# Patient Record
Sex: MALE | Race: WHITE | Employment: FULL TIME | URBAN - METROPOLITAN AREA
[De-identification: names, ages, dates, MRNs, and addresses within clinical notes are randomized per-mention and may not be internally consistent; named-entity substitution may affect disease eponyms.]

---

## 2018-09-18 ENCOUNTER — TRANSFERRED RECORDS (OUTPATIENT)
Dept: HEALTH INFORMATION MANAGEMENT | Facility: CLINIC | Age: 48
End: 2018-09-18

## 2020-10-27 ENCOUNTER — OFFICE VISIT (OUTPATIENT)
Dept: FAMILY MEDICINE | Facility: CLINIC | Age: 50
End: 2020-10-27
Payer: COMMERCIAL

## 2020-10-27 VITALS
TEMPERATURE: 98.4 F | HEART RATE: 111 BPM | DIASTOLIC BLOOD PRESSURE: 98 MMHG | HEIGHT: 74 IN | SYSTOLIC BLOOD PRESSURE: 142 MMHG | WEIGHT: 315 LBS | RESPIRATION RATE: 22 BRPM | BODY MASS INDEX: 40.43 KG/M2 | OXYGEN SATURATION: 96 %

## 2020-10-27 DIAGNOSIS — I10 HYPERTENSION GOAL BP (BLOOD PRESSURE) < 140/90: ICD-10-CM

## 2020-10-27 DIAGNOSIS — E78.5 HYPERLIPIDEMIA LDL GOAL <100: ICD-10-CM

## 2020-10-27 DIAGNOSIS — E11.65 TYPE 2 DIABETES MELLITUS WITH HYPERGLYCEMIA, WITHOUT LONG-TERM CURRENT USE OF INSULIN (H): Primary | ICD-10-CM

## 2020-10-27 DIAGNOSIS — Z23 NEED FOR PROPHYLACTIC VACCINATION AND INOCULATION AGAINST INFLUENZA: ICD-10-CM

## 2020-10-27 DIAGNOSIS — E66.01 MORBID OBESITY (H): ICD-10-CM

## 2020-10-27 LAB — HBA1C MFR BLD: 6.7 % (ref 0–5.6)

## 2020-10-27 PROCEDURE — 82043 UR ALBUMIN QUANTITATIVE: CPT | Performed by: FAMILY MEDICINE

## 2020-10-27 PROCEDURE — 36415 COLL VENOUS BLD VENIPUNCTURE: CPT | Performed by: FAMILY MEDICINE

## 2020-10-27 PROCEDURE — 83036 HEMOGLOBIN GLYCOSYLATED A1C: CPT | Performed by: FAMILY MEDICINE

## 2020-10-27 PROCEDURE — 90682 RIV4 VACC RECOMBINANT DNA IM: CPT | Performed by: FAMILY MEDICINE

## 2020-10-27 PROCEDURE — 99203 OFFICE O/P NEW LOW 30 MIN: CPT | Mod: 25 | Performed by: FAMILY MEDICINE

## 2020-10-27 PROCEDURE — 90471 IMMUNIZATION ADMIN: CPT | Performed by: FAMILY MEDICINE

## 2020-10-27 PROCEDURE — 99207 PR FOOT EXAM NO CHARGE: CPT | Performed by: FAMILY MEDICINE

## 2020-10-27 PROCEDURE — 80053 COMPREHEN METABOLIC PANEL: CPT | Performed by: FAMILY MEDICINE

## 2020-10-27 RX ORDER — LISINOPRIL 10 MG/1
10 TABLET ORAL DAILY
Qty: 30 TABLET | Refills: 1 | Status: SHIPPED | OUTPATIENT
Start: 2020-10-27 | End: 2020-12-10

## 2020-10-27 RX ORDER — LISINOPRIL 5 MG/1
5 TABLET ORAL
COMMUNITY
Start: 2019-11-04 | End: 2020-10-30 | Stop reason: DRUGHIGH

## 2020-10-27 ASSESSMENT — MIFFLIN-ST. JEOR: SCORE: 2480.12

## 2020-10-27 NOTE — PROGRESS NOTES
"Subjective     Heath Carpenter is a 50 year old male who presents to clinic today for the following health issues:    HPI         New Patient/Transfer of Care  Transferring care from [facility] due to change of insurance  Has been diagnosed with diabetes but was not prescribed any treatment.   Family History: Maternal grandmother and uncles dx with diabetes, as well as sister.     Hypertension Follow-up  Currently on Lisinopril 5 mg/day- tolerating well. No side effects reported.    Blood pressure     Do you check your blood pressure regularly outside of the clinic? Yes     Are you following a low salt diet? Yes    Are your blood pressures ever more than 140 on the top number (systolic) OR more   than 90 on the bottom number (diastolic), for example 140/90? Yes 146/94, will also have low 120/80s      DIABETES - Patient has a longstanding history of DiabetesType Type II . Patient is not being treated. Control has been unable to assess. Complicating factors include but are not limited to: hypertension, hyperlipidemia and morbid obesity .     Reporting some left sided hip pain he is seeing a chiropractor for, advised him to follow up for another appointment to further evaluate and discuss.         Review of Systems   Constitutional, HEENT, cardiovascular, pulmonary, gi and gu systems are negative, except as otherwise noted.      Objective    BP (!) 142/98   Pulse 111   Temp 98.4  F (36.9  C) (Tympanic)   Resp 22   Ht 1.88 m (6' 2\")   Wt (!) 156 kg (344 lb)   SpO2 96%   BMI 44.17 kg/m    Body mass index is 44.17 kg/m .  Physical Exam   GENERAL: healthy, alert and no distress  NECK: no adenopathy, no asymmetry, masses, or scars and thyroid normal to palpation  RESP: lungs clear to auscultation - no rales, rhonchi or wheezes  CV: regular rate and rhythm, normal S1 S2, no S3 or S4, no murmur, click or rub, no peripheral edema and peripheral pulses strong  Diabetic foot exam: normal DP and PT pulses, no trophic changes or " "ulcerative lesions, normal sensory exam and normal monofilament exam    DATA  Reviewed and discussed with patient prior to discharge.  Component      Latest Ref Rng & Units 10/27/2020   Hemoglobin A1C      0 - 5.6 % 6.7 (H)           Assessment & Plan     Heath was seen today for establish care and imm/inj.    Diagnoses and all orders for this visit:    Type 2 diabetes mellitus with hyperglycemia, without long-term current use of insulin (H)  -     A1C FUTURE 6mo  -     FOOT EXAM  -     Albumin Random Urine Quantitative with Creat Ratio        -     Comprehensive metabolic panel  -     EYE ADULT REFERRAL; Future  -     Start: aspirin (ASA) 81 MG EC tablet; Take 1 tablet (81 mg) by mouth daily        -     Start: metFORMIN (GLUCOPHAGE) 500 MG tablet; Take 1 tablet (500 mg) by mouth daily (with dinner)    Hypertension goal BP (blood pressure) < 140/90, uncontrolled  -    Increase dose:  lisinopril (ZESTRIL) 10 MG tablet; Take 1 tablet (10 mg) by mouth daily    Hyperlipidemia LDL goal <100  -     Lipid Profile; Future    Morbid obesity (H)  Weight management plan: Discussed healthy diet and exercise guidelines      Need for prophylactic vaccination and inoculation against influenza  -     INFLUENZA QUAD, RECOMBINANT, P-FREE (RIV4) (FLUBLOCK) [72209]         BMI:   Estimated body mass index is 44.17 kg/m  as calculated from the following:    Height as of this encounter: 1.88 m (6' 2\").    Weight as of this encounter: 156 kg (344 lb).   Weight management plan: Discussed healthy diet and exercise guidelines         Return in about 1 month (around 11/27/2020) for Physical Exam with fasting labs.  Diabetes follow up in 3 months.    Fatemeh Stevens MD  [facility]    "

## 2020-10-28 LAB
ALBUMIN SERPL-MCNC: 3.9 G/DL (ref 3.4–5)
ALP SERPL-CCNC: 76 U/L (ref 40–150)
ALT SERPL W P-5'-P-CCNC: 109 U/L (ref 0–70)
ANION GAP SERPL CALCULATED.3IONS-SCNC: 8 MMOL/L (ref 3–14)
AST SERPL W P-5'-P-CCNC: 89 U/L (ref 0–45)
BILIRUB SERPL-MCNC: 0.4 MG/DL (ref 0.2–1.3)
BUN SERPL-MCNC: 22 MG/DL (ref 7–30)
CALCIUM SERPL-MCNC: 9.3 MG/DL (ref 8.5–10.1)
CHLORIDE SERPL-SCNC: 103 MMOL/L (ref 94–109)
CO2 SERPL-SCNC: 25 MMOL/L (ref 20–32)
CREAT SERPL-MCNC: 1.08 MG/DL (ref 0.66–1.25)
CREAT UR-MCNC: 231 MG/DL
GFR SERPL CREATININE-BSD FRML MDRD: 78 ML/MIN/{1.73_M2}
GLUCOSE SERPL-MCNC: 119 MG/DL (ref 70–99)
MICROALBUMIN UR-MCNC: 69 MG/L
MICROALBUMIN/CREAT UR: 29.7 MG/G CR (ref 0–17)
POTASSIUM SERPL-SCNC: 4.1 MMOL/L (ref 3.4–5.3)
PROT SERPL-MCNC: 8.8 G/DL (ref 6.8–8.8)
SODIUM SERPL-SCNC: 136 MMOL/L (ref 133–144)

## 2020-11-01 DIAGNOSIS — R74.8 ELEVATED LIVER ENZYMES: Primary | ICD-10-CM

## 2020-12-08 DIAGNOSIS — I10 HYPERTENSION GOAL BP (BLOOD PRESSURE) < 140/90: ICD-10-CM

## 2020-12-10 NOTE — TELEPHONE ENCOUNTER
"Routing refill request to provider for review/approval because:  Labs out of range:  bp  Pt due for physical  Medication pended for approval, 30 day supply with reminder.               Requested Prescriptions   Pending Prescriptions Disp Refills     lisinopril (ZESTRIL) 10 MG tablet [Pharmacy Med Name: LISINOPRIL 10 MG TABLET] 30 tablet 0     Sig: TAKE 1 TABLET BY MOUTH EVERY DAY       ACE Inhibitors (Including Combos) Protocol Failed - 12/8/2020  4:04 PM        Failed - Blood pressure under 140/90 in past 12 months     BP Readings from Last 3 Encounters:   10/27/20 (!) 142/98   10/19/16 98/61   05/23/16 132/86                 Passed - Recent (12 mo) or future (30 days) visit within the authorizing provider's specialty     Patient has had an office visit with the authorizing provider or a provider within the authorizing providers department within the previous 12 mos or has a future within next 30 days. See \"Patient Info\" tab in inbasket, or \"Choose Columns\" in Meds & Orders section of the refill encounter.              Passed - Medication is active on med list        Passed - Patient is age 18 or older        Passed - Normal serum creatinine on file in past 12 months     Recent Labs   Lab Test 10/27/20  1740   CR 1.08       Ok to refill medication if creatinine is low          Passed - Normal serum potassium on file in past 12 months     Recent Labs   Lab Test 10/27/20  1740   POTASSIUM 4.1                  "

## 2020-12-13 RX ORDER — LISINOPRIL 10 MG/1
TABLET ORAL
Qty: 30 TABLET | Refills: 0 | Status: SHIPPED | OUTPATIENT
Start: 2020-12-13 | End: 2021-01-21

## 2020-12-14 ENCOUNTER — TRANSFERRED RECORDS (OUTPATIENT)
Dept: HEALTH INFORMATION MANAGEMENT | Facility: CLINIC | Age: 50
End: 2020-12-14

## 2020-12-14 LAB — RETINOPATHY: NEGATIVE

## 2021-01-15 ENCOUNTER — HEALTH MAINTENANCE LETTER (OUTPATIENT)
Age: 51
End: 2021-01-15

## 2021-01-17 DIAGNOSIS — I10 HYPERTENSION GOAL BP (BLOOD PRESSURE) < 140/90: ICD-10-CM

## 2021-01-19 NOTE — TELEPHONE ENCOUNTER
"Requested Prescriptions   Pending Prescriptions Disp Refills    lisinopril (ZESTRIL) 10 MG tablet [Pharmacy Med Name: LISINOPRIL 10 MG TABLET] 30 tablet 0     Sig: TAKE 1 TABLET BY MOUTH EVERY DAY       ACE Inhibitors (Including Combos) Protocol Failed - 1/17/2021 11:24 AM        Failed - Blood pressure under 140/90 in past 12 months     BP Readings from Last 3 Encounters:   10/27/20 (!) 142/98   10/19/16 98/61   05/23/16 132/86                 Passed - Recent (12 mo) or future (30 days) visit within the authorizing provider's specialty     Patient has had an office visit with the authorizing provider or a provider within the authorizing providers department within the previous 12 mos or has a future within next 30 days. See \"Patient Info\" tab in inbasket, or \"Choose Columns\" in Meds & Orders section of the refill encounter.              Passed - Medication is active on med list        Passed - Patient is age 18 or older        Passed - Normal serum creatinine on file in past 12 months     Recent Labs   Lab Test 10/27/20  1740   CR 1.08       Ok to refill medication if creatinine is low          Passed - Normal serum potassium on file in past 12 months     Recent Labs   Lab Test 10/27/20  1740   POTASSIUM 4.1                Routing refill request to provider for review/approval because:  Failed protocol.  Ingrid JOEN-RN  [facility]    "

## 2021-01-21 RX ORDER — LISINOPRIL 10 MG/1
TABLET ORAL
Qty: 30 TABLET | Refills: 0 | Status: SHIPPED | OUTPATIENT
Start: 2021-01-21 | End: 2021-01-29

## 2021-01-29 ENCOUNTER — ANCILLARY PROCEDURE (OUTPATIENT)
Dept: GENERAL RADIOLOGY | Facility: CLINIC | Age: 51
End: 2021-01-29
Attending: FAMILY MEDICINE
Payer: COMMERCIAL

## 2021-01-29 ENCOUNTER — OFFICE VISIT (OUTPATIENT)
Dept: FAMILY MEDICINE | Facility: CLINIC | Age: 51
End: 2021-01-29
Payer: COMMERCIAL

## 2021-01-29 VITALS
BODY MASS INDEX: 43.76 KG/M2 | HEART RATE: 80 BPM | TEMPERATURE: 98.2 F | OXYGEN SATURATION: 97 % | DIASTOLIC BLOOD PRESSURE: 86 MMHG | WEIGHT: 315 LBS | RESPIRATION RATE: 18 BRPM | SYSTOLIC BLOOD PRESSURE: 134 MMHG

## 2021-01-29 DIAGNOSIS — G47.33 OSA ON CPAP: ICD-10-CM

## 2021-01-29 DIAGNOSIS — E78.5 HYPERLIPIDEMIA LDL GOAL <100: ICD-10-CM

## 2021-01-29 DIAGNOSIS — M25.562 LEFT KNEE PAIN, UNSPECIFIED CHRONICITY: ICD-10-CM

## 2021-01-29 DIAGNOSIS — I10 HYPERTENSION GOAL BP (BLOOD PRESSURE) < 140/90: ICD-10-CM

## 2021-01-29 DIAGNOSIS — Z85.828 HISTORY OF SKIN CANCER: ICD-10-CM

## 2021-01-29 DIAGNOSIS — R74.8 ELEVATED LIVER ENZYMES: ICD-10-CM

## 2021-01-29 DIAGNOSIS — E11.65 TYPE 2 DIABETES MELLITUS WITH HYPERGLYCEMIA, WITHOUT LONG-TERM CURRENT USE OF INSULIN (H): Primary | ICD-10-CM

## 2021-01-29 LAB
ALT SERPL W P-5'-P-CCNC: 96 U/L (ref 0–70)
AST SERPL W P-5'-P-CCNC: 71 U/L (ref 0–45)
CHOLEST SERPL-MCNC: 202 MG/DL
HBA1C MFR BLD: 6.6 % (ref 0–5.6)
HDLC SERPL-MCNC: 43 MG/DL
LDLC SERPL CALC-MCNC: 128 MG/DL
NONHDLC SERPL-MCNC: 159 MG/DL
TRIGL SERPL-MCNC: 156 MG/DL
VIT B12 SERPL-MCNC: 352 PG/ML (ref 193–986)

## 2021-01-29 PROCEDURE — 82607 VITAMIN B-12: CPT | Performed by: FAMILY MEDICINE

## 2021-01-29 PROCEDURE — 84460 ALANINE AMINO (ALT) (SGPT): CPT | Performed by: FAMILY MEDICINE

## 2021-01-29 PROCEDURE — 80061 LIPID PANEL: CPT | Performed by: FAMILY MEDICINE

## 2021-01-29 PROCEDURE — 73562 X-RAY EXAM OF KNEE 3: CPT | Mod: LT | Performed by: RADIOLOGY

## 2021-01-29 PROCEDURE — 99207 PR FOOT EXAM NO CHARGE: CPT | Performed by: FAMILY MEDICINE

## 2021-01-29 PROCEDURE — 83036 HEMOGLOBIN GLYCOSYLATED A1C: CPT | Performed by: FAMILY MEDICINE

## 2021-01-29 PROCEDURE — 36415 COLL VENOUS BLD VENIPUNCTURE: CPT | Performed by: FAMILY MEDICINE

## 2021-01-29 PROCEDURE — 99214 OFFICE O/P EST MOD 30 MIN: CPT | Performed by: FAMILY MEDICINE

## 2021-01-29 PROCEDURE — 84450 TRANSFERASE (AST) (SGOT): CPT | Performed by: FAMILY MEDICINE

## 2021-01-29 RX ORDER — LISINOPRIL 20 MG/1
20 TABLET ORAL DAILY
Qty: 90 TABLET | Refills: 3 | Status: SHIPPED | OUTPATIENT
Start: 2021-01-29 | End: 2022-01-31

## 2021-01-29 NOTE — PROGRESS NOTES
Sabi Joe is a 50 year old who presents to clinic today for the following health issues     HPI     Diabetes Follow-up  Currently on Metformin 500 mg/day-tolerating well. Admits that he forgets to take it about 3 times a week.     How often are you checking your blood sugar? Not at all    What concerns do you have today about your diabetes? None     Do you have any of these symptoms? (Select all that apply)  Numbness in feet      BP Readings from Last 2 Encounters:   01/29/21 134/86   10/27/20 (!) 142/98     Hemoglobin A1C (%)   Date Value   01/29/2021 6.6 (H)   10/27/2020 6.7 (H)         How many servings of fruits and vegetables do you eat daily?  2-3    On average, how many sweetened beverages do you drink each day (Examples: soda, juice, sweet tea, etc.  Do NOT count diet or artificially sweetened beverages)?   0    How many days per week do you exercise enough to make your heart beat faster? Has a farm    How many minutes a day do you exercise enough to make your heart beat faster? na    How many days per week do you miss taking your medication?  2-3x/ week - forgets to take metformin at night .  Wanting to switch to taking in the morning     Hyperlipidemia Follow-Up  Currently not on any statin therapy    Are you regularly taking any medication or supplement to lower your cholesterol?   No    Are you having muscle aches or other side effects that you think could be caused by your cholesterol lowering medication?  N/A    Hypertension Follow-up  Currently on Lisinopril 10 mg/day- tolerating well.   BP remains borderline high.     Do you check your blood pressure regularly outside of the clinic? Yes     Are you following a low salt diet? Yes    Are your blood pressures ever more than 140 on the top number (systolic) OR more   than 90 on the bottom number (diastolic), for example 140/90? Yes      Musculoskeletal problem/pain  Onset/Duration: end of December   Description  Location: left knee, left  him, left lower back   Joint Swelling: no  Redness: no  Pain: YES- ache now, at times sharp   Warmth: no  Intensity:  moderate  Progression of Symptoms:  same  Accompanying signs and symptoms:   Fevers: no  Numbness/tingling/weakness: YES- weakness   History  Trauma to the area: YES- fall, missed three steps while carrying laundry basket down stairs  (heard snap in left knee with fall)   Recent illness:  no  Previous similar problem: no  Previous evaluation:  no  Precipitating or alleviating factors:  Aggravating factors include: getting up from sitting position, straightening of left  leg   Therapies tried and outcome: stretching, yoga for legs and hips- helpful but it's not going away     Reports a history of JONA- diagnosed about 7 years ago. Currently on nightly CPAP  Due for renewal/prescription for CPAP supplies   States that he has not seen a Sleep Medicine provider in 7 years.       At the last office visit- noted to have elevated liver enzymes. Due for repeat labs.   Component      Latest Ref Rng & Units 10/27/2020   ALT      0 - 70 U/L 109 (H)   AST      0 - 45 U/L 89 (H)       Requesting a referral to Dermatology. Reports a history of a non--melanoma skin cancer and needs routine skin care checks by the Dermatologist.       Review of Systems   Constitutional, HEENT, cardiovascular, pulmonary, gi and gu systems are negative, except as otherwise noted.      Objective    /86   Pulse 80   Temp 98.2  F (36.8  C) (Tympanic)   Resp 18   Wt (!) 154.6 kg (340 lb 12.8 oz)   SpO2 97%   BMI 43.76 kg/m    Body mass index is 43.76 kg/m .  Physical Exam   GENERAL: healthy, alert and no distress  RESP: lungs clear to auscultation - no rales, rhonchi or wheezes  CV: regular rate and rhythm, normal S1 S2, no S3 or S4, no murmur, click or rub, no peripheral edema and peripheral pulses strong  Diabetic foot exam: normal DP and PT pulses, no trophic changes or ulcerative lesions, normal sensory exam, normal  monofilament exam and onychomycosis  Left Knee: Normal appearing. No gross abnormalities. Left lateral joint tenderness. FROM. Negative anterior and posterior drawer's sign. Positive crepitus.     DATA  Reviewed and discussed with patient prior to discharge.  Results for orders placed or performed in visit on 01/29/21   XR Knee Left 3 Views     Status: None (Preliminary result)    Narrative    LEFT KNEE THREE VIEWS   1/29/2021 11:51 AM     HISTORY:  Left knee pain, unspecified chronicity.      Impression    IMPRESSION: There is at least one calcified intra-articular body in  the anterior central aspect of the joint and there is probably one or  two additional calcified intra-articular bodies. No effusion. Normal  otherwise.   Hemoglobin A1c     Status: Abnormal   Result Value Ref Range    Hemoglobin A1C 6.6 (H) 0 - 5.6 %       Assessment & Plan     Type 2 diabetes mellitus with hyperglycemia, without long-term current use of insulin (H); controlled  - Hemoglobin A1c  - FOOT EXAM  - Vitamin B12  - Refill: metFORMIN (GLUCOPHAGE) 500 MG tablet  Dispense: 90 tablet; Refill: 3    Hypertension goal BP (blood pressure) < 140/90. Initial BP elevated  - Increase dose: lisinopril (ZESTRIL) 20 MG tablet  Dispense: 90 tablet; Refill: 3  - Continue home monitoring BP check.     Hyperlipidemia LDL goal <100  - Lipid panel reflex to direct LDL Fasting  - Consider starting moderate or high intensity statin after labs.    Elevated liver enzymes, suspect fatty liver  - ALT  - AST    Left knee pain, unspecified chronicity  - XR Knee Left 3 Views  - Consider Knee MRI if X ray is non-informative    JONA on CPAP  - SLEEP EVALUATION & MANAGEMENT REFERRAL - ADULT -[facility] [place]  [phone] (Age 15 and up)    History of skin cancer (non  - DERMATOLOGY ADULT REFERRAL        Return in about 3 months (around 4/29/2021) for Diabetes Follow Up with a HgbA1C prior to visit.    Fatemeh Stevens MD  [facility]  [facility]

## 2021-02-03 DIAGNOSIS — E78.5 HYPERLIPIDEMIA LDL GOAL <100: Primary | ICD-10-CM

## 2021-02-03 RX ORDER — ATORVASTATIN CALCIUM 20 MG/1
20 TABLET, FILM COATED ORAL DAILY
Qty: 90 TABLET | Refills: 3 | Status: SHIPPED | OUTPATIENT
Start: 2021-02-03 | End: 2022-01-31

## 2021-02-11 NOTE — PROGRESS NOTES
Heath is a 50 year old who is being evaluated via a billable video visit.      How would you like to obtain your AVS? MyChart  If the video visit is dropped, the invitation should be resent by: Text to cell phone: [phone]  Will anyone else be joining your video visit? Krystyna Adam MA    Video Start Time: 3:30 PM  Video-Visit Details    Type of service:  Video Visit    Video End Time:3:50 PM    Originating Location (pt. Location): Home    Distant Location (provider location):  [facility]     Platform used for Video Visit: LXSN  Sleep Consultation:    Date on this visit: 2/15/2021    Heath Carpenter is sent by Fatemeh Stevens for a sleep consultation regarding sleep apnea.    Primary Physician: Fatemeh Stevens MICHAEL Pricehl presents to clinic for management of sleep apnea.     His medical history is significant for HTN & DM-2.     Patient was diagnosed with sleep apnea in 2015. PSG at [facility] on 3/2/2015 at weight of 334 lbs showed an AHI of 33 per hour (based on 4% desaturation criteria) and RDI of 42 per hour. CPAP at 8 cm H2O was effective.     Patient prescribed CPAP and reports that he had a good response to treatment. He has not used therapy for last 9 months, mainly because there was a disruption in supplies.     He uses nasal mask.     Auto-PAP download:  Download report is not available.,     Heath goes to sleep at 9:00 PM during the week. He wakes up at 4:00 - 5:30 AM without an alarm. He falls asleep in 15 minutes.  Heath denies difficulty falling asleep.  He wakes up 0-1 times a night for 5 minutes before falling back to sleep.  Heath wakes up to go to the bathroom and uncertain reasons.  On weekends, Heath goes to sleep at 9:00 PM.  He wakes up at 5:30 AM without an alarm. He falls asleep in 10 minutes.  Patient gets an average of 7 hours of sleep per night.     Heath is a .      He denies no morning headaches and restless  legs. Heath denies any bruxism, sleep walking, sleep talking, dream enactment, sleep paralysis, cataplexy and hypnogogic/hypnopompic hallucinations.    Patient's [place] Sleepiness score 14/24 consistent with  daytime sleepiness.      Heath naps 0 times per week . He takes no inadvertant naps.  He denies closing eyes, dozing and falling asleep while driving.  Patient was counseled on the importance of driving while alert, to pull over if drowsy, or nap before getting into the vehicle if sleepy.      He uses 1 cups/day of coffee. Last caffeine intake is usually before 10 am.    Allergies:    Allergies   Allergen Reactions     Molds & Smuts Other (See Comments)     Puffy eyes     No Clinical Screening - See Comments Other (See Comments)     Cigarette smoke       Medications:    Current Outpatient Medications   Medication Sig Dispense Refill     aspirin (ASA) 81 MG EC tablet Take 1 tablet (81 mg) by mouth daily       atorvastatin (LIPITOR) 20 MG tablet Take 1 tablet (20 mg) by mouth daily 90 tablet 3     lisinopril (ZESTRIL) 20 MG tablet Take 1 tablet (20 mg) by mouth daily 90 tablet 3     metFORMIN (GLUCOPHAGE) 500 MG tablet Take 1 tablet (500 mg) by mouth daily (with breakfast) 90 tablet 3     MULTIPLE VITAMIN PO Take 1 tablet by mouth daily       Omeprazole (PRILOSEC PO) Take 40 mg by mouth every morning         Problem List:  Patient Active Problem List    Diagnosis Date Noted     Morbid obesity (H) 10/27/2020     Priority: Medium     Type 2 diabetes mellitus with hyperglycemia, without long-term current use of insulin (H) 10/27/2020     Priority: Medium     Hypertension goal BP (blood pressure) < 140/90 10/27/2020     Priority: Medium        Past Medical/Surgical History:  Past Medical History:   Diagnosis Date     Gastro-oesophageal reflux disease      Hypertension goal BP (blood pressure) < 140/90      Past Surgical History:   Procedure Laterality Date     VASECTOMY         Social History:  Social History      Socioeconomic History     Marital status:      Spouse name: Not on file     Number of children: Not on file     Years of education: Not on file     Highest education level: Not on file   Occupational History     Not on file   Social Needs     Financial resource strain: Not on file     Food insecurity     Worry: Not on file     Inability: Not on file     Transportation needs     Medical: Not on file     Non-medical: Not on file   Tobacco Use     Smoking status: Never Smoker     Smokeless tobacco: Never Used   Substance and Sexual Activity     Alcohol use: Yes     Comment: rare     Drug use: No     Sexual activity: Yes     Partners: Female   Lifestyle     Physical activity     Days per week: Not on file     Minutes per session: Not on file     Stress: Not on file   Relationships     Social connections     Talks on phone: Not on file     Gets together: Not on file     Attends Faith service: Not on file     Active member of club or organization: Not on file     Attends meetings of clubs or organizations: Not on file     Relationship status: Not on file     Intimate partner violence     Fear of current or ex partner: Not on file     Emotionally abused: Not on file     Physically abused: Not on file     Forced sexual activity: Not on file   Other Topics Concern     Not on file   Social History Narrative     Not on file       Family History:  Family History   Problem Relation Age of Onset     Alcoholism Father      Lung Cancer Maternal Grandfather        Review of Systems:  A complete review of systems reviewed by me is negative with the exeption of what has been mentioned in the history of present illness.  CONSTITUTIONAL: NEGATIVE for weight gain/loss, fever, chills, sweats or night sweats, drug allergies.  EYES: NEGATIVE for changes in vision, blind spots, double vision.  ENT: NEGATIVE for ear pain, sore throat, sinus pain, post-nasal drip, runny nose, bloody nose  CARDIAC: NEGATIVE for fast heartbeats or  fluttering in chest, chest pain or pressure, breathlessness when lying flat, swollen legs or swollen feet.  NEUROLOGIC: NEGATIVE headaches, weakness or numbness in the arms or legs.  DERMATOLOGIC: NEGATIVE for rashes, new moles or change in mole(s)  PULMONARY: NEGATIVE SOB at rest, SOB with activity, dry cough, productive cough, coughing up blood, wheezing or whistling when breathing.    GASTROINTESTINAL: NEGATIVE for nausea or vomitting, loose or watery stools, fat or grease in stools, constipation, abdominal pain, bowel movements black in color or blood noted.  GENITOURINARY: NEGATIVE for pain during urination, blood in urine, urinating more frequently than usual, irregular menstrual periods.  MUSCULOSKELETAL: NEGATIVE for muscle pain, bone or joint pain, swollen joints.  ENDOCRINE: NEGATIVE for increased thirst or urination, diabetes.  LYMPHATIC: NEGATIVE for swollen lymph nodes, lumps or bumps in the breasts or nipple discharge.    Physical Examination:  Vitals: There were no vitals taken for this visit.  BMI= There is no height or weight on file to calculate BMI.         [place] Total Score 2/11/2021   Total score - [place] 14       [place] Total Score: 6 (02/11/21 2729)    GENERAL APPEARANCE: healthy, alert, active and no distress  RESP: Negative for cough or dyspnea  NEURO: mentation intact and speech normal  PSYCH: mentation appears normal and affect normal/bright    Impression/Plan:    1. Severe Obstructive sleep apnea    Patient has severe obstructive sleep apnea with an apnea hypopnea index of 33 per hour in 2015. He was prescribed CPAP therapy and reports to have responded well and used therapy regularly until 9 months ago. He has uncontrolled hypertension and wants to return to regular PAP therapy. I will have him continue current PAP settings and we can follow up in a few months to review CPAP download.     Plan:     1. Restart CPAP therapy. Patient will contact DME to obtain supplies      Obstructive  sleep apnea reviewed.  Complications of untreated sleep apnea were reviewed.    I spent a total of 40 minutes for this appointment today which include time spent before, during and after the visit for patient care, counseling and coordination of care.    Dr. Calixto Richardson     CC: Fatemeh Stevens

## 2021-02-12 DIAGNOSIS — I10 HYPERTENSION GOAL BP (BLOOD PRESSURE) < 140/90: ICD-10-CM

## 2021-02-15 ENCOUNTER — HOSPITAL ENCOUNTER (OUTPATIENT)
Dept: MRI IMAGING | Facility: CLINIC | Age: 51
Discharge: HOME | End: 2021-02-15
Attending: FAMILY MEDICINE | Admitting: FAMILY MEDICINE
Payer: COMMERCIAL

## 2021-02-15 ENCOUNTER — VIRTUAL VISIT (OUTPATIENT)
Dept: SLEEP MEDICINE | Facility: CLINIC | Age: 51
End: 2021-02-15
Attending: FAMILY MEDICINE
Payer: COMMERCIAL

## 2021-02-15 DIAGNOSIS — M25.562 LEFT KNEE PAIN, UNSPECIFIED CHRONICITY: ICD-10-CM

## 2021-02-15 DIAGNOSIS — G47.33 OBSTRUCTIVE SLEEP APNEA: Primary | ICD-10-CM

## 2021-02-15 DIAGNOSIS — M24.00 INTRA-ARTICULAR LOOSE BODY: ICD-10-CM

## 2021-02-15 DIAGNOSIS — Z91.81 HISTORY OF FALL: ICD-10-CM

## 2021-02-15 DIAGNOSIS — M25.562 LEFT KNEE PAIN, UNSPECIFIED CHRONICITY: Primary | ICD-10-CM

## 2021-02-15 PROCEDURE — 73721 MRI JNT OF LWR EXTRE W/O DYE: CPT | Mod: LT

## 2021-02-15 PROCEDURE — 73721 MRI JNT OF LWR EXTRE W/O DYE: CPT | Mod: 26 | Performed by: RADIOLOGY

## 2021-02-15 PROCEDURE — 99203 OFFICE O/P NEW LOW 30 MIN: CPT | Mod: 95 | Performed by: INTERNAL MEDICINE

## 2021-02-15 RX ORDER — LISINOPRIL 20 MG/1
20 TABLET ORAL DAILY
Qty: 90 TABLET | Refills: 3 | OUTPATIENT
Start: 2021-02-15

## 2021-02-16 ENCOUNTER — CARE COORDINATION (OUTPATIENT)
Dept: SLEEP MEDICINE | Facility: CLINIC | Age: 51
End: 2021-02-16

## 2021-02-16 NOTE — PROGRESS NOTES
Scan of download cpap, routed to Dr. Richardson for review.  Heath had virtual appointment 2/15/2021.  Mailed SD Card back to him.

## 2021-02-16 NOTE — PROGRESS NOTES
Spoke with patient, he states he is using [facility] for cpap supplies. Patient will call back close to may to schedule his 3 month follow up.    Rosangela Adam MA

## 2021-02-17 DIAGNOSIS — I10 HYPERTENSION GOAL BP (BLOOD PRESSURE) < 140/90: ICD-10-CM

## 2021-02-18 ENCOUNTER — DOCUMENTATION ONLY (OUTPATIENT)
Dept: SLEEP MEDICINE | Facility: CLINIC | Age: 51
End: 2021-02-18

## 2021-02-18 RX ORDER — LISINOPRIL 20 MG/1
20 TABLET ORAL DAILY
Qty: 90 TABLET | Refills: 3 | OUTPATIENT
Start: 2021-02-18

## 2021-02-18 NOTE — PROGRESS NOTES
CPAP download from CPAP reviewed.       [employer] (04/18/20- 07/16/20)    Auto-PAP 8-15 cmH2O download:  5/90 total days of use. 4/90 days with >4 hours use.  Average use 5 hours 6 minutes per day. Median Leak 3.8 L/min. 95%ile Leak 13.9 L/min. CPAP 95% pressure 10.8cm. AHI 1.0      Pressure settings are adequate. Has poor compliance with CPAP last year but patient is planning to restart regular therapy.

## 2021-02-19 ENCOUNTER — CARE COORDINATION (OUTPATIENT)
Dept: SLEEP MEDICINE | Facility: CLINIC | Age: 51
End: 2021-02-19

## 2021-02-19 NOTE — PROGRESS NOTES
Scan of compliance cpap report 4/18/2020 -7/16/2020.  Copy given to Dr Calixto Richardson for review

## 2021-02-22 ENCOUNTER — ANCILLARY PROCEDURE (OUTPATIENT)
Dept: GENERAL RADIOLOGY | Facility: CLINIC | Age: 51
End: 2021-02-22
Attending: ORTHOPAEDIC SURGERY
Payer: COMMERCIAL

## 2021-02-22 ENCOUNTER — OFFICE VISIT (OUTPATIENT)
Dept: ORTHOPEDICS | Facility: CLINIC | Age: 51
End: 2021-02-22
Attending: FAMILY MEDICINE
Payer: COMMERCIAL

## 2021-02-22 VITALS
DIASTOLIC BLOOD PRESSURE: 83 MMHG | BODY MASS INDEX: 39.17 KG/M2 | HEIGHT: 75 IN | SYSTOLIC BLOOD PRESSURE: 128 MMHG | WEIGHT: 315 LBS

## 2021-02-22 DIAGNOSIS — M25.562 LEFT KNEE PAIN: ICD-10-CM

## 2021-02-22 DIAGNOSIS — M17.12 PRIMARY OSTEOARTHRITIS OF LEFT KNEE: ICD-10-CM

## 2021-02-22 DIAGNOSIS — M24.00 INTRA-ARTICULAR LOOSE BODY: Primary | ICD-10-CM

## 2021-02-22 PROCEDURE — 99243 OFF/OP CNSLTJ NEW/EST LOW 30: CPT | Mod: 25 | Performed by: ORTHOPAEDIC SURGERY

## 2021-02-22 PROCEDURE — 20610 DRAIN/INJ JOINT/BURSA W/O US: CPT | Mod: LT | Performed by: ORTHOPAEDIC SURGERY

## 2021-02-22 PROCEDURE — 73560 X-RAY EXAM OF KNEE 1 OR 2: CPT | Mod: TC | Performed by: RADIOLOGY

## 2021-02-22 RX ORDER — TRIAMCINOLONE ACETONIDE 40 MG/ML
40 INJECTION, SUSPENSION INTRA-ARTICULAR; INTRAMUSCULAR ONCE
Status: COMPLETED | OUTPATIENT
Start: 2021-02-22 | End: 2021-02-22

## 2021-02-22 RX ADMIN — TRIAMCINOLONE ACETONIDE 40 MG: 40 INJECTION, SUSPENSION INTRA-ARTICULAR; INTRAMUSCULAR at 10:08

## 2021-02-22 ASSESSMENT — MIFFLIN-ST. JEOR: SCORE: 2489.2

## 2021-02-22 ASSESSMENT — PAIN SCALES - GENERAL: PAINLEVEL: MILD PAIN (2)
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
## 2021-02-22 NOTE — PROGRESS NOTES
Clinic Administered Medication Documentation      Injection Documentation     Injection was administered by provider (please see MAR for given by information). Please see MAR and medication order for additional information.     Site: Joint injection   Medication Used: Kenalog 40mg   Expiration Date:  10/2022      The following medication was given by Nelson Swenson PA-C:     MEDICATION: Kenalog 40mg/1ml  ROUTE: Joint Injection  SITE: left knee  DOSE: 1 mL  LOT #: [id number]  : [address]  EXPIRATION DATE:  10/2022  NDC: [address]-5

## 2021-03-02 ENCOUNTER — HOSPITAL ENCOUNTER (EMERGENCY)
Facility: CLINIC | Age: 51
Discharge: HOME | End: 2021-03-02
Attending: EMERGENCY MEDICINE | Admitting: EMERGENCY MEDICINE
Payer: COMMERCIAL

## 2021-03-02 ENCOUNTER — APPOINTMENT (OUTPATIENT)
Dept: ULTRASOUND IMAGING | Facility: CLINIC | Age: 51
End: 2021-03-02
Attending: EMERGENCY MEDICINE
Payer: COMMERCIAL

## 2021-03-02 VITALS
SYSTOLIC BLOOD PRESSURE: 146 MMHG | DIASTOLIC BLOOD PRESSURE: 83 MMHG | WEIGHT: 315 LBS | TEMPERATURE: 98.6 F | HEART RATE: 74 BPM | OXYGEN SATURATION: 96 % | BODY MASS INDEX: 41.87 KG/M2 | RESPIRATION RATE: 18 BRPM

## 2021-03-02 DIAGNOSIS — N45.1 EPIDIDYMITIS: ICD-10-CM

## 2021-03-02 LAB
ALBUMIN UR-MCNC: NEGATIVE MG/DL
APPEARANCE UR: CLEAR
BILIRUB UR QL STRIP: NEGATIVE
COLOR UR AUTO: YELLOW
GLUCOSE UR STRIP-MCNC: NEGATIVE MG/DL
HGB UR QL STRIP: NEGATIVE
KETONES UR STRIP-MCNC: NEGATIVE MG/DL
LEUKOCYTE ESTERASE UR QL STRIP: NEGATIVE
NITRATE UR QL: NEGATIVE
PH UR STRIP: 5 PH (ref 5–7)
SOURCE: NORMAL
SP GR UR STRIP: 1.02 (ref 1–1.03)
UROBILINOGEN UR STRIP-MCNC: 0 MG/DL (ref 0–2)

## 2021-03-02 PROCEDURE — 96372 THER/PROPH/DIAG INJ SC/IM: CPT | Performed by: EMERGENCY MEDICINE

## 2021-03-02 PROCEDURE — 99285 EMERGENCY DEPT VISIT HI MDM: CPT | Performed by: EMERGENCY MEDICINE

## 2021-03-02 PROCEDURE — 250N000013 HC RX MED GY IP 250 OP 250 PS 637: Performed by: EMERGENCY MEDICINE

## 2021-03-02 PROCEDURE — 81003 URINALYSIS AUTO W/O SCOPE: CPT | Performed by: EMERGENCY MEDICINE

## 2021-03-02 PROCEDURE — 99285 EMERGENCY DEPT VISIT HI MDM: CPT | Mod: 25 | Performed by: EMERGENCY MEDICINE

## 2021-03-02 PROCEDURE — 250N000011 HC RX IP 250 OP 636: Performed by: EMERGENCY MEDICINE

## 2021-03-02 PROCEDURE — 76870 US EXAM SCROTUM: CPT

## 2021-03-02 RX ORDER — OXYCODONE AND ACETAMINOPHEN 5; 325 MG/1; MG/1
1 TABLET ORAL ONCE
Status: COMPLETED | OUTPATIENT
Start: 2021-03-02 | End: 2021-03-02

## 2021-03-02 RX ORDER — OXYCODONE AND ACETAMINOPHEN 5; 325 MG/1; MG/1
1-2 TABLET ORAL EVERY 4 HOURS PRN
Qty: 12 TABLET | Refills: 0 | Status: SHIPPED | OUTPATIENT
Start: 2021-03-02 | End: 2021-05-24

## 2021-03-02 RX ORDER — ONDANSETRON 4 MG/1
4 TABLET, ORALLY DISINTEGRATING ORAL ONCE
Status: COMPLETED | OUTPATIENT
Start: 2021-03-02 | End: 2021-03-02

## 2021-03-02 RX ORDER — LEVOFLOXACIN 500 MG/1
500 TABLET, FILM COATED ORAL DAILY
Qty: 10 TABLET | Refills: 0 | Status: SHIPPED | OUTPATIENT
Start: 2021-03-02 | End: 2021-03-12

## 2021-03-02 RX ADMIN — ONDANSETRON 4 MG: 4 TABLET, ORALLY DISINTEGRATING ORAL at 17:43

## 2021-03-02 RX ADMIN — OXYCODONE HYDROCHLORIDE AND ACETAMINOPHEN 1 TABLET: 5; 325 TABLET ORAL at 18:45

## 2021-03-02 RX ADMIN — HYDROMORPHONE HYDROCHLORIDE 1 MG: 1 INJECTION, SOLUTION INTRAMUSCULAR; INTRAVENOUS; SUBCUTANEOUS at 17:36

## 2021-03-02 NOTE — ED PROVIDER NOTES
"  History     Chief Complaint   Patient presents with     Flank Pain     Groin Swelling     The history is provided by the patient.     Heath Carpenter is a 50 year old male with a PMHx of hypertension, diabetes who presents to the emergency department secondary to pain in his right testicle that is radiating up into his groin area, abdomen and right lower back. The patient says his groin pain started yesterday and has gradually been spreading. He twisted at the waist and noticed a cramping pain in his right chest/abdomen that took his breath away. It has returned intermittently, but has now subsided. Today when he was sitting down the pain went into his right testicle. He says it felt as though \"someone was squeezing it\". This feeling has returned intermittently throughout the rest of the day today. He has noticed his right testicle appears to be larger than normal and larger than the left testicle. He is unsure if it has twisted. The patient has felt nauseated and notes his GERD being increasingly worse. He decided to get a cheeseburger and states that this helped calm his GERD symptoms.  No fatigue, fevers, vomiting, cough, urinary symptoms. He mentions that his urine is always bright due to his vitamins. No history of similar pain affecting his testicles. He has not been lifting any heavy objects recently. Patient fell in December 2020 and ended up seeing orthopedics for his left leg and is now starting PT, but never had pain in his right side.     Allergies:  Allergies   Allergen Reactions     Molds & Smuts Other (See Comments)     Puffy eyes     No Clinical Screening - See Comments Other (See Comments)     Cigarette smoke       Problem List:    Patient Active Problem List    Diagnosis Date Noted     Morbid obesity (H) 10/27/2020     Priority: Medium     Type 2 diabetes mellitus with hyperglycemia, without long-term current use of insulin (H) 10/27/2020     Priority: Medium     Hypertension goal BP (blood " pressure) < 140/90 10/27/2020     Priority: Medium        Past Medical History:    Past Medical History:   Diagnosis Date     Gastro-oesophageal reflux disease      Hypertension goal BP (blood pressure) < 140/90        Past Surgical History:    Past Surgical History:   Procedure Laterality Date     VASECTOMY         Family History:    Family History   Problem Relation Age of Onset     Alcoholism Father      Lung Cancer Maternal Grandfather        Social History:  Marital Status:   [2]  Social History     Tobacco Use     Smoking status: Never Smoker     Smokeless tobacco: Never Used   Substance Use Topics     Alcohol use: Yes     Comment: rare     Drug use: No        Medications:    aspirin (ASA) 81 MG EC tablet  atorvastatin (LIPITOR) 20 MG tablet  levofloxacin (LEVAQUIN) 500 MG tablet  lisinopril (ZESTRIL) 20 MG tablet  metFORMIN (GLUCOPHAGE) 500 MG tablet  MULTIPLE VITAMIN PO  oxyCODONE-acetaminophen (PERCOCET) 5-325 MG tablet          Review of Systems   All other systems reviewed and are negative.      Physical Exam   BP: (!) 142/92  Pulse: 102  Temp: 98.6  F (37  C)  Resp: 18  Weight: (!) 152 kg (335 lb)  SpO2: 96 %      Physical Exam  Vitals signs and nursing note reviewed.   Constitutional:       General: He is not in acute distress.     Appearance: Normal appearance. He is not diaphoretic.   HENT:      Head: Normocephalic and atraumatic.      Right Ear: External ear normal.      Left Ear: External ear normal.      Mouth/Throat:      Mouth: Mucous membranes are moist.   Eyes:      General: No scleral icterus.     Extraocular Movements: Extraocular movements intact.      Conjunctiva/sclera: Conjunctivae normal.      Pupils: Pupils are equal, round, and reactive to light.   Neck:      Musculoskeletal: Normal range of motion and neck supple. No neck rigidity or muscular tenderness.   Cardiovascular:      Rate and Rhythm: Normal rate.      Heart sounds: Normal heart sounds.   Pulmonary:      Breath sounds:  Normal breath sounds.   Genitourinary:     Penis: Normal.       Comments: Right testicle is descended and somewhat swollen and exquisitely tender diffusely without palpable masses compared to the left.  The left testicle is actually higher than the right testicle and the left testicle is nontender.  Musculoskeletal: Normal range of motion.         General: No deformity or signs of injury.   Skin:     General: Skin is warm and dry.      Coloration: Skin is not jaundiced or pale.      Findings: No bruising or rash.   Neurological:      General: No focal deficit present.      Mental Status: He is alert and oriented to person, place, and time.   Psychiatric:         Behavior: Behavior normal.         Thought Content: Thought content normal.         ED Course        Procedures                   Results for orders placed or performed during the hospital encounter of 03/02/21 (from the past 24 hour(s))   US Testicular & Scrotum w Doppler Ltd    Narrative    US TESTICULAR AND SCROTUM WITH DOPPLER LIMITED 3/2/2021 6:15 PM    CLINICAL HISTORY: right testicular pain  TECHNIQUE: Ultrasound of scrotum with color flow and spectral Doppler  with waveform analysis performed.    COMPARISON: None.    FINDINGS:    RIGHT: Right testicle measures 4.4 x 3.6 x 2.8 cm. No testicular mass.  Numerous microcalcifications throughout the right testicle. Normal  arterial duplex and normal color flow. The right epididymis is  enlarged and hyperemic. Small right hydrocele. No varicocele.    LEFT: Left testicle measures 4.5 x 3 x 2.2 cm. No testicular mass.  Numerous microcalcifications throughout the left testicle. Normal  arterial duplex and normal color flow. Normal epididymis. No  hydrocele. No varicocele.      Impression    IMPRESSION:  1.  Right-sided epididymitis.  2.  Testicular microlithiasis. No current testicular mass. Some  studies have suggested an association between this finding and future  development of testicular cancer. Clinical  follow-up recommended.    ROSIE CONTRERAS MD   UA reflex to Microscopic and Culture    Specimen: Midstream Urine   Result Value Ref Range    Color Urine Yellow     Appearance Urine Clear     Glucose Urine Negative NEG^Negative mg/dL    Bilirubin Urine Negative NEG^Negative    Ketones Urine Negative NEG^Negative mg/dL    Specific Gravity Urine 1.023 1.003 - 1.035    Blood Urine Negative NEG^Negative    pH Urine 5.0 5.0 - 7.0 pH    Protein Albumin Urine Negative NEG^Negative mg/dL    Urobilinogen mg/dL 0.0 0.0 - 2.0 mg/dL    Nitrite Urine Negative NEG^Negative    Leukocyte Esterase Urine Negative NEG^Negative    Source Midstream Urine        Medications   HYDROmorphone (DILAUDID) injection 1 mg (1 mg Intramuscular Given 3/2/21 1736)   ondansetron (ZOFRAN-ODT) ODT tab 4 mg (4 mg Oral Given 3/2/21 1743)   oxyCODONE-acetaminophen (PERCOCET) 5-325 MG per tablet 1 tablet (1 tablet Oral Given 3/2/21 1845)       Assessments & Plan (with Medical Decision Making)  Heath is a 50 year old male who presents to the ED for concerns of pain in his right testicle radiating into his right abdomen and right lower back. No history of testicular torsion. Patient is vitally stable upon arrival. He is afebrile. Physical exam as noted above with exquisite right testicular tenderness on exam. Pain and nausea medications were given while the patient was in the ED prior to his ultrasound.  Urinalysis was collected and negative.  Ultrasound demonstrated epididymitis.  I recommended Levaquin, Percocet, tightfitting underwear, ice packs or frozen peas, follow-up with primary care if no improvement.  Return to ER precautions and follow-up precautions discussed.       I have reviewed the nursing notes.    I have reviewed the findings, diagnosis, plan and need for follow up with the patient.      Discharge Medication List as of 3/2/2021  7:16 PM      START taking these medications    Details   levofloxacin (LEVAQUIN) 500 MG tablet Take 1 tablet (500  mg) by mouth daily for 10 days, Disp-10 tablet, R-0, E-Prescribe      oxyCODONE-acetaminophen (PERCOCET) 5-325 MG tablet Take 1-2 tablets by mouth every 4 hours as needed for pain, Disp-12 tablet, R-0, E-Prescribe             Final diagnoses:   Epididymitis       This document serves as a record of services personally performed by Yo Madrid MD. It was created on their behalf by Elma Mcmillan, a trained medical scribe. The creation of this record is based on the provider's personal observations and the statements of the patient. This document has been checked and approved by the attending provider.    Note: Chart documentation done in part with Dragon Voice Recognition software. Although reviewed after completion, some word and grammatical errors may remain.    3/2/2021   [facility] EMERGENCY DEPT     Yo Madrid MD  03/02/21 2010

## 2021-03-02 NOTE — ED TRIAGE NOTES
Pt here with right flank pain going down to lower right abdomen.  This morning having low back and right hip pain, and right testicular pain

## 2021-03-03 ENCOUNTER — HOSPITAL ENCOUNTER (OUTPATIENT)
Dept: PHYSICAL THERAPY | Facility: CLINIC | Age: 51
Setting detail: THERAPIES SERIES
End: 2021-03-03
Attending: ORTHOPAEDIC SURGERY
Payer: COMMERCIAL

## 2021-03-03 DIAGNOSIS — M17.12 PRIMARY OSTEOARTHRITIS OF LEFT KNEE: ICD-10-CM

## 2021-03-03 DIAGNOSIS — M24.00 INTRA-ARTICULAR LOOSE BODY: ICD-10-CM

## 2021-03-03 PROCEDURE — 97110 THERAPEUTIC EXERCISES: CPT | Mod: GP | Performed by: PHYSICAL THERAPIST

## 2021-03-03 PROCEDURE — 97161 PT EVAL LOW COMPLEX 20 MIN: CPT | Mod: GP | Performed by: PHYSICAL THERAPIST
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
## 2021-03-03 NOTE — ED NOTES
Reviewed discharge instruction, verbalized understanding. No questions or concerns. Meds reviewed. VS reassessed.
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
## 2021-03-03 NOTE — DISCHARGE INSTRUCTIONS
Your ultrasound showed epididymitis as we thought it would be. No evidence for torsion or twisted testicle. Use tight fitting underwear, ice packs, pain pills as needed and levaquin for the infection. Return to the ER if new or worsening symptoms.

## 2021-03-03 NOTE — PROGRESS NOTES
[facility]    OUTPATIENT PHYSICAL THERAPY ORTHOPEDIC EVALUATION  PLAN OF TREATMENT FOR OUTPATIENT REHABILITATION  (COMPLETE FOR INITIAL CLAIMS ONLY)  Patient's Last Name, First Name, M.I.  YOB: 1970  Heath Carpenter    Provider s Name:  [facility]   Medical Record No.  [id number]   Start of Care Date:  03/03/21   Onset Date:  12/26/20   Type:     _X__PT   ___OT   ___SLP Medical Diagnosis:  Primary osteoarthritis of L knee; intra-articular loose body     PT Diagnosis:  L knee pain, weakness, compensation due to injury to lateral meniscus   Visits from SOC:  1      _________________________________________________________________________________  Plan of Treatment/Functional Goals:  balance training, manual therapy, neuromuscular re-education, strengthening, stretching           Goals  Goal Identifier: #1  Goal Description: Pt will demonstrate 5/5 MMT of L LE in order to demonstrate ability to get off floor without assistance of chair or sturdy object.  Target Date: 04/16/21    Goal Identifier: #2  Goal Description: Pt will demonstrate ability to squat down to  objects off floor without knee pain.  Target Date: 04/16/21    Goal Identifier: #3  Goal Description: Pt will report >75% on the LEFS demonstrating improved mobility with daily functional activities.  Target Date: 04/16/21                                                           Therapy Frequency:  2 times/Week  Predicted Duration of Therapy Intervention:  6 weeks    Lia Ulrich, PT                 I CERTIFY THE NEED FOR THESE SERVICES FURNISHED UNDER        THIS PLAN OF TREATMENT AND WHILE UNDER MY CARE     (Physician co-signature of this document indicates review and certification of the therapy plan).                       Certification Date From:  03/03/21   Certification Date To:  04/16/21    Referring Provider:  Dr. Amezquita / Nelson Swenson PA-C    Initial  Assessment        See Epic Evaluation Start of Care Date: 03/03/21             Thank you for your referral.    Lia Ulrich, PT, DPT  [facility]  [phone]

## 2021-03-08 ENCOUNTER — HOSPITAL ENCOUNTER (OUTPATIENT)
Dept: PHYSICAL THERAPY | Facility: CLINIC | Age: 51
Setting detail: THERAPIES SERIES
End: 2021-03-08
Attending: ORTHOPAEDIC SURGERY
Payer: COMMERCIAL

## 2021-03-08 ENCOUNTER — OFFICE VISIT (OUTPATIENT)
Dept: DERMATOLOGY | Facility: CLINIC | Age: 51
End: 2021-03-08
Payer: COMMERCIAL

## 2021-03-08 DIAGNOSIS — L82.0 INFLAMED SEBORRHEIC KERATOSIS: ICD-10-CM

## 2021-03-08 DIAGNOSIS — D18.01 CHERRY ANGIOMA: ICD-10-CM

## 2021-03-08 DIAGNOSIS — L57.0 ACTINIC KERATOSIS: ICD-10-CM

## 2021-03-08 DIAGNOSIS — L81.4 SOLAR LENTIGO: Primary | ICD-10-CM

## 2021-03-08 DIAGNOSIS — D22.9 MULTIPLE BENIGN NEVI: ICD-10-CM

## 2021-03-08 DIAGNOSIS — Z85.828 HISTORY OF NONMELANOMA SKIN CANCER: ICD-10-CM

## 2021-03-08 DIAGNOSIS — D48.5 NEOPLASM OF UNCERTAIN BEHAVIOR OF SKIN: ICD-10-CM

## 2021-03-08 DIAGNOSIS — L82.1 SEBORRHEIC KERATOSIS: ICD-10-CM

## 2021-03-08 DIAGNOSIS — L91.8 INFLAMED SKIN TAG: ICD-10-CM

## 2021-03-08 PROCEDURE — 11102 TANGNTL BX SKIN SINGLE LES: CPT | Mod: XS | Performed by: DERMATOLOGY

## 2021-03-08 PROCEDURE — 17000 DESTRUCT PREMALG LESION: CPT | Mod: XS | Performed by: DERMATOLOGY

## 2021-03-08 PROCEDURE — 17110 DESTRUCTION B9 LES UP TO 14: CPT | Performed by: DERMATOLOGY

## 2021-03-08 PROCEDURE — 88305 TISSUE EXAM BY PATHOLOGIST: CPT | Performed by: PATHOLOGY

## 2021-03-08 PROCEDURE — 99203 OFFICE O/P NEW LOW 30 MIN: CPT | Mod: 25 | Performed by: DERMATOLOGY

## 2021-03-08 PROCEDURE — 97140 MANUAL THERAPY 1/> REGIONS: CPT | Mod: GP | Performed by: PHYSICAL THERAPIST

## 2021-03-08 PROCEDURE — 11200 RMVL SKIN TAGS UP TO&INC 15: CPT | Mod: XS | Performed by: DERMATOLOGY

## 2021-03-08 ASSESSMENT — PAIN SCALES - GENERAL: PAINLEVEL: NO PAIN (0)

## 2021-03-08 NOTE — NURSING NOTE
Heath Carpenter's goals for this visit include:   Chief Complaint   Patient presents with     Derm Problem     Skin check; recheck surgical scar right upper arm, lesion on right zygomatic for a month now, lesion on back has had it for about 6 months, skin tags inner thigh/legs.      New Patient     seen at [facility]  in the past       He requests these members of his care team be copied on today's visit information: Yes     PCP: Fatemeh Stevens    Referring Provider:  No referring provider defined for this encounter.    There were no vitals taken for this visit.    Do you need any medication refills at today's visit? No   [id number], MEDICAL ASSISTANT

## 2021-03-08 NOTE — PATIENT INSTRUCTIONS
[facility] Dermatology Visit    Thank you for allowing us to participate in your care. Your findings, instructions and follow-up plan are as follows:         When should I call my doctor?    If you are worsening or not improving, please, contact us or seek urgent care as noted below.     Who should I call with questions (adults)?    [facility] (adult and pediatric): [phone]     [facility] (adult): [phone]    For urgent needs outside of business hours call the [facility] at [phone] and ask for the dermatology resident on call    If this is a medical emergency and you are unable to reach an ER, Call 911      Who should I call with questions (pediatric)?  [facility]- Pediatric Dermatology  Dr. Aga Shields, Dr. Kiran Stoddard, Dr. Ashia Cannon, Katelyn Simeon, PA  Dr. Gabrielle Kim, Dr. Ingrid Machado & Dr. Slade Jasso  Non Urgent  Nurse Triage Line; [phone]- Charo and Justa RN Care Coordinators   Jerilyn (/Complex ) [phone]    If you need a prescription refill, please contact your pharmacy. Refills are approved or denied by our Physicians during normal business hours, Monday through Fridays  Per office policy, refills will not be granted if you have not been seen within the past year (or sooner depending on your child's condition)    Scheduling Information:  Pediatric Appointment Scheduling and Call Center [phone]  Radiology Scheduling- [phone]  Sedation Unit Scheduling- [phone]  [place] Scheduling- General [phone]; Pediatric Dermatology [phone]  Main  Services: [phone]  Serbian: [phone]  Spanish: [phone]  Hmong/Maltese/Faroese: [phone]  Preadmission Nursing Department Fax Number: [phone] (Fax all pre-operative paperwork to this number)    For urgent matters arising during evenings,  weekends, or holidays that cannot wait for normal business hours please call [phone] and ask for the Dermatology Resident On-Call to be paged.      Cryotherapy    What is it?    Use of a very cold liquid, such as liquid nitrogen, to freeze and destroy abnormal skin cells that need to be removed    What should I expect?    Tenderness and redness    A small blister that might grow and fill with dark purple blood. There may be crusting.    More than one treatment may be needed if the lesions do not go away.    How do I care for the treated area?    Gently wash the area with your hands when bathing.    Use a thin layer of Vaseline to help with healing. You may use a Band-Aid.     The area should heal within 7-10 days and may leave behind a pink or lighter color.     Do not use an antibiotic or Neosporin ointment.     You may take acetaminophen (Tylenol) for pain.     Call your Doctor if you have:    Severe pain    Signs of infection (warmth, redness, cloudy yellow drainage, and or a bad smell)    Questions or concerns    Who should I call with questions?       [facility]: [phone]       [facility]: [phone]       For urgent needs outside of business hours call the [facility] at [phone]        and ask for the dermatology resident on call      Wound Care After a Biopsy    What is a skin biopsy?  A skin biopsy allows the doctor to examine a very small piece of tissue under the microscope to determine the diagnosis and the best treatment for the skin condition. A local anesthetic (numbing medicine)  is injected with a very small needle into the skin area to be tested. A small piece of skin is taken from the area. Sometimes a suture (stitch) is used.     What are the risks of a skin biopsy?  I will experience scar, bleeding, swelling, pain, crusting and redness. I may experience incomplete removal or recurrence. Risks of this procedure  are excessive bleeding, bruising, infection, nerve damage, numbness, thick (hypertrophic or keloidal) scar and non-diagnostic biopsy.    How should I care for my wound for the first 24 hours?    Keep the wound dry and covered for 24 hours    If it bleeds, hold direct pressure on the area for 15 minutes. If bleeding does not stop then go to the emergency room    Avoid strenuous exercise the first 1-2 days or as your doctor instructs you    How should I care for the wound after 24 hours?    After 24 hours, remove the bandage    You may bathe or shower as normal    If you had a scalp biopsy, you can shampoo as usual and can use shower water to clean the biopsy site daily    Clean the wound twice a day with gentle soap and water    Do not scrub, be gentle    Apply white petroleum/Vaseline after cleaning the wound with a cotton swab or a clean finger, and keep the site covered with a Bandaid /bandage. Bandages are not necessary with a scalp biopsy    If you are unable to cover the site with a Bandaid /bandage, re-apply ointment 2-3 times a day to keep the site moist. Moisture will help with healing    Avoid strenuous activity for first 1-2 days    Avoid lakes, rivers, pools, and oceans until the stitches are removed or the site is healed    How do I clean my wound?    Wash hands thoroughly with soap or use hand  before all wound care    Clean the wound with gentle soap and water    Apply white petroleum/Vaseline  to wound after it is clean    Replace the Bandaid /bandage to keep the wound covered for the first few days or as instructed by your doctor    If you had a scalp biopsy, warm shower water to the area on a daily basis should suffice    What should I use to clean my wound?     Cotton-tipped applicators (Qtips )    White petroleum jelly (Vaseline ). Use a clean new container and use Q-tips to apply.    Bandaids   as needed    Gentle soap     How should I care for my wound long term?    Do not get your  wound dirty    Keep up with wound care for one week or until the area is healed.    A small scab will form and fall off by itself when the area is completely healed. The area will be red and will become pink in color as it heals. Sun protection is very important for how your scar will turn out. Sunscreen with an SPF 30 or greater is recommended once the area is healed.    You should have some soreness but it should be mild and slowly go away over several days. Talk to your doctor about using tylenol for pain,    When should I call my doctor?  If you have increased:     Pain or swelling    Pus or drainage (clear or slightly yellow drainage is ok)    Temperature over 100F    Spreading redness or warmth around wound    When will I hear about my results?  The biopsy results can take 2-3 weeks to come back. The clinic will call you with the results, send you a [employer] message, or have you schedule a follow-up clinic or phone time to discuss the results. Contact our clinics if you do not hear from us in 3 weeks.     Who should I call with questions?    [facility]: [phone]     [facility]: [phone]    For urgent needs outside of business hours call the [facility] at [phone] and ask for the dermatology resident on call

## 2021-03-08 NOTE — PROGRESS NOTES
[facility] Dermatology Note  Encounter Date: Mar 8, 2021  Office Visit     Dermatology Problem List:  1. Hx NMSC   - BCC, right upper arm, s/p ED&C 5/21/19  2. ISKs. LiqN2.   3. AK. LiqN2.   4. Skin tags. LiqN2.   5. NUB, central upper back. BCC vs. Other. S/p shave bx 3/8/21.   ____________________________________________    Assessment & Plan:     # Neoplasm of uncertain behavior of skin, central upper back. The differential diagnosis includes BCC versus other.   - Shave biopsy as below    # Actinic keratosis, L nasal sidewall x 1.   - Cryotherapy performed today (see procedure note(s) below).    # Inflamed seborrheic keratosis, R cheek x 1.   - Cryotherapy performed today (see procedure note(s) below).    # Inflamed skin tag, R inner thigh x 1, R thigh posterior x 1  - Cryotherapy performed today (see procedure note(s) below).    #Hx NMSC. NERD.   - Sun precaution was advised including the use of sun screens of SPF 30 or higher, sun protective clothing, and avoidance of tanning beds.  - Continue at least annual skin examinations    #Benign lesions: Multiple benign nevi, solar lentigos, seborrheic keratoses, cherry angiomas. Explained to patient benign nature of lesion. No treatment is necessary at this time unless the lesion changes or becomes symptomatic.     - ABCDs of melanoma were discussed and self skin checks were advised.  - Sun precaution was advised including the use of sun screens of SPF 30 or higher, sun protective clothing, and avoidance of tanning beds.      Procedures Performed:   - Shave biopsy procedure note, location(s): central upper back. After discussion of benefits and risks including but not limited to bleeding, infection, scar, incomplete removal, recurrence, and non-diagnostic biopsy, written consent and photographs were obtained. The area was cleaned with isopropyl alcohol. 0.5mL of 1% lidocaine with epinephrine was injected to obtain adequate anesthesia of lesion(s).  Shave biopsy at site(s) performed. Hemostasis was achieved with aluminium chloride. Petrolatum ointment and a sterile dressing were applied. The patient tolerated the procedure and no complications were noted. The patient was provided with verbal and written post care instructions.   - Cryotherapy procedure note, location(s): 2 skin tags (R inner thigh x 1, R posterior thigh x 1). After verbal consent and discussion of risks and benefits including, but not limited to, dyspigmentation/scar, blister, and pain, 2 lesion(s) was(were) treated with 1-2 mm freeze border for 1-2 cycles with liquid nitrogen. Post cryotherapy instructions were provided.  - Cryotherapy procedure note, location(s): 1 inflamed SK (R cheek) . After verbal consent and discussion of risks and benefits including, but not limited to, dyspigmentation/scar, blister, and pain, 1 lesion(s) was(were) treated with 1-2 mm freeze border for 1-2 cycles with liquid nitrogen. Post cryotherapy instructions were provided.  - Cryotherapy procedure note, location(s): 1 AK ( L nasal sidewall). After verbal consent and discussion of risks and benefits including, but not limited to, dyspigmentation/scar, blister, and pain, 1 lesion(s) was(were) treated with 1-2 mm freeze border for 1-2 cycles with liquid nitrogen. Post cryotherapy instructions were provided.    Follow-up: pending path results    Staff:     Jay Roach MD  Pronouns: he/him/his    Department of Dermatology  [facility]: Phone: [phone], Fax:[phone]  [facility]: Phone: [phone] Fax: [phone]    ____________________________________________    CC: Derm Problem (Skin check; recheck surgical scar right upper arm, lesion on right zygomatic for a month now, lesion on back has had it for about 6 months, skin tags inner thigh/legs. ) and New Patient (seen at [facility] in the past)    HPI:  Mr. Heath Carpenter is a(n) 50 year old male who presents today as a new patient with a history of BCC on the right upper arm s/p ED&C 5/19, present for full body skin examinations.     He has a few concerns today including:    (1) Recheck of surgical scar on the right upper arm. Had ED&C in 5/19 for BCC. He states scar was initially thick but has been softening well. No pain, tenderness.     (2) Lesion of concern on the right cheek - Brown, rough spot that became inflamed about 2-3 weeks ago while shaving. No pain, bleeding.     (3) Lesion of concern on the back - dark raised \"mole\" that has been slightly changing in size and color.     (4) Skin tags - Left inguinal area and R thigh. He states these are itchy and would like treated with cryotherapy.     The patient otherwise denies any new or concerning lesions. No bleeding, painful, pruritic, or changing lesions. They report a personal history of skin cancer. There is no family history of skin cancer. No history of immunosuppression. There is a history of indoor tanning (100-200 lifetime sessions in his 40s and 50s). They do use sunscreen and protective clothing when outdoors for sun protection. There is former occupational exposure to ultraviolet light or other forms of radiation. Patient has been a ; works as a . Health otherwise stable. No other skin concerns.     Patient is otherwise feeling well, without additional skin concerns.     Labs Reviewed:  N/A    Physical Exam:  Vitals: There were no vitals taken for this visit.  SKIN: Full skin, which includes the head/face, both arms, chest, back, abdomen,both legs, genitalia and/or groin buttocks, digits and/or nails, was examined.  - On the central upper back (/neck) there is a 2.0 x 1.5 cm pink, shiny plaque with non-specific dilated blood vessels  - Brown macules on sun exposed areas  - Brown waxy, stuck-on appearing papules on face, trunk, " and extremities, including excoriated lesion on the R cheek x 1.  - Pink gritty papule on the L nasal sidewall x 1.  - Brown macules and papules on trunk and extremities without concerning dermoscopy features  - Red vascular papules on trunk.  - Well healed circular scar on the right upper arm at prior ED&C site with mild hypertrophy in central portion but no concerning features for recurrence.  - Skin-colored pedunculated papules on the right inner thigh x 1, right posterior thigh x 1.    - No other lesions of concern on areas examined.             Medications:  Current Outpatient Medications   Medication     aspirin (ASA) 81 MG EC tablet     atorvastatin (LIPITOR) 20 MG tablet     levofloxacin (LEVAQUIN) 500 MG tablet     lisinopril (ZESTRIL) 20 MG tablet     metFORMIN (GLUCOPHAGE) 500 MG tablet     MULTIPLE VITAMIN PO     oxyCODONE-acetaminophen (PERCOCET) 5-325 MG tablet     No current facility-administered medications for this visit.       Past Medical History:   Patient Active Problem List   Diagnosis     Morbid obesity (H)     Type 2 diabetes mellitus with hyperglycemia, without long-term current use of insulin (H)     Hypertension goal BP (blood pressure) < 140/90     Past Medical History:   Diagnosis Date     Gastro-oesophageal reflux disease      Hypertension goal BP (blood pressure) < 140/90

## 2021-03-08 NOTE — LETTER
3/8/2021         RE: Heath Carpenter  [address] [place] [address]        Dear Colleague,    Thank you for referring your patient, Heath Carpenter, to the [facility]. Please see a copy of my visit note below.    [facility] Dermatology Note  Encounter Date: Mar 8, 2021  Office Visit     Dermatology Problem List:  1. Hx NMSC   - BCC, right upper arm, s/p ED&C 5/21/19  2. ISKs. LiqN2.   3. AK. LiqN2.   4. Skin tags. LiqN2.   5. NUB, central upper back. BCC vs. Other. S/p shave bx 3/8/21.   ____________________________________________    Assessment & Plan:     # Neoplasm of uncertain behavior of skin, central upper back. The differential diagnosis includes BCC versus other.   - Shave biopsy as below    # Actinic keratosis, L nasal sidewall x 1.   - Cryotherapy performed today (see procedure note(s) below).    # Inflamed seborrheic keratosis, R cheek x 1.   - Cryotherapy performed today (see procedure note(s) below).    # Inflamed skin tag, R inner thigh x 1, R thigh posterior x 1  - Cryotherapy performed today (see procedure note(s) below).    #Hx NMSC. NERD.   - Sun precaution was advised including the use of sun screens of SPF 30 or higher, sun protective clothing, and avoidance of tanning beds.  - Continue at least annual skin examinations    #Benign lesions: Multiple benign nevi, solar lentigos, seborrheic keratoses, cherry angiomas. Explained to patient benign nature of lesion. No treatment is necessary at this time unless the lesion changes or becomes symptomatic.     - ABCDs of melanoma were discussed and self skin checks were advised.  - Sun precaution was advised including the use of sun screens of SPF 30 or higher, sun protective clothing, and avoidance of tanning beds.      Procedures Performed:   - Shave biopsy procedure note, location(s): central upper back. After discussion of benefits and risks including but not limited to bleeding, infection, scar,  incomplete removal, recurrence, and non-diagnostic biopsy, written consent and photographs were obtained. The area was cleaned with isopropyl alcohol. 0.5mL of 1% lidocaine with epinephrine was injected to obtain adequate anesthesia of lesion(s). Shave biopsy at site(s) performed. Hemostasis was achieved with aluminium chloride. Petrolatum ointment and a sterile dressing were applied. The patient tolerated the procedure and no complications were noted. The patient was provided with verbal and written post care instructions.   - Cryotherapy procedure note, location(s): 2 skin tags (R inner thigh x 1, R posterior thigh x 1). After verbal consent and discussion of risks and benefits including, but not limited to, dyspigmentation/scar, blister, and pain, 2 lesion(s) was(were) treated with 1-2 mm freeze border for 1-2 cycles with liquid nitrogen. Post cryotherapy instructions were provided.  - Cryotherapy procedure note, location(s): 1 inflamed SK (R cheek) . After verbal consent and discussion of risks and benefits including, but not limited to, dyspigmentation/scar, blister, and pain, 1 lesion(s) was(were) treated with 1-2 mm freeze border for 1-2 cycles with liquid nitrogen. Post cryotherapy instructions were provided.  - Cryotherapy procedure note, location(s): 1 AK ( L nasal sidewall). After verbal consent and discussion of risks and benefits including, but not limited to, dyspigmentation/scar, blister, and pain, 1 lesion(s) was(were) treated with 1-2 mm freeze border for 1-2 cycles with liquid nitrogen. Post cryotherapy instructions were provided.    Follow-up: pending path results    Staff:     Jay Roach MD  Pronouns: he/him/his    Department of Dermatology  [facility]: Phone: [phone], Fax:[phone]  [facility]: Phone: [phone] Fax:  "[phone]    ____________________________________________    CC: Derm Problem (Skin check; recheck surgical scar right upper arm, lesion on right zygomatic for a month now, lesion on back has had it for about 6 months, skin tags inner thigh/legs. ) and New Patient (seen at [facility]  in the past)    HPI:  Mr. Heath Carpenter is a(n) 50 year old male who presents today as a new patient with a history of BCC on the right upper arm s/p ED&C 5/19, present for full body skin examinations.     He has a few concerns today including:    (1) Recheck of surgical scar on the right upper arm. Had ED&C in 5/19 for BCC. He states scar was initially thick but has been softening well. No pain, tenderness.     (2) Lesion of concern on the right cheek - Brown, rough spot that became inflamed about 2-3 weeks ago while shaving. No pain, bleeding.     (3) Lesion of concern on the back - dark raised \"mole\" that has been slightly changing in size and color.     (4) Skin tags - Left inguinal area and R thigh. He states these are itchy and would like treated with cryotherapy.     The patient otherwise denies any new or concerning lesions. No bleeding, painful, pruritic, or changing lesions. They report a personal history of skin cancer. There is no family history of skin cancer. No history of immunosuppression. There is a history of indoor tanning (100-200 lifetime sessions in his 40s and 50s). They do use sunscreen and protective clothing when outdoors for sun protection. There is former occupational exposure to ultraviolet light or other forms of radiation. Patient has been a ; works as a . Health otherwise stable. No other skin concerns.     Patient is otherwise feeling well, without additional skin concerns.     Labs Reviewed:  N/A    Physical Exam:  Vitals: There were no vitals taken for this visit.  SKIN: Full skin, which includes the head/face, both arms, chest, back, abdomen,both legs, " genitalia and/or groin buttocks, digits and/or nails, was examined.  - On the central upper back (/neck) there is a 2.0 x 1.5 cm pink, shiny plaque with non-specific dilated blood vessels  - Brown macules on sun exposed areas  - Brown waxy, stuck-on appearing papules on face, trunk, and extremities, including excoriated lesion on the R cheek x 1.  - Pink gritty papule on the L nasal sidewall x 1.  - Brown macules and papules on trunk and extremities without concerning dermoscopy features  - Red vascular papules on trunk.  - Well healed circular scar on the right upper arm at prior ED&C site with mild hypertrophy in central portion but no concerning features for recurrence.  - Skin-colored pedunculated papules on the right inner thigh x 1, right posterior thigh x 1.    - No other lesions of concern on areas examined.             Medications:  Current Outpatient Medications   Medication     aspirin (ASA) 81 MG EC tablet     atorvastatin (LIPITOR) 20 MG tablet     levofloxacin (LEVAQUIN) 500 MG tablet     lisinopril (ZESTRIL) 20 MG tablet     metFORMIN (GLUCOPHAGE) 500 MG tablet     MULTIPLE VITAMIN PO     oxyCODONE-acetaminophen (PERCOCET) 5-325 MG tablet     No current facility-administered medications for this visit.       Past Medical History:   Patient Active Problem List   Diagnosis     Morbid obesity (H)     Type 2 diabetes mellitus with hyperglycemia, without long-term current use of insulin (H)     Hypertension goal BP (blood pressure) < 140/90     Past Medical History:   Diagnosis Date     Gastro-oesophageal reflux disease      Hypertension goal BP (blood pressure) < 140/90            Again, thank you for allowing me to participate in the care of your patient.        Sincerely,        Jay Roach MD

## 2021-03-10 ENCOUNTER — HOSPITAL ENCOUNTER (OUTPATIENT)
Dept: PHYSICAL THERAPY | Facility: CLINIC | Age: 51
Setting detail: THERAPIES SERIES
End: 2021-03-10
Attending: ORTHOPAEDIC SURGERY
Payer: COMMERCIAL

## 2021-03-10 PROCEDURE — 97140 MANUAL THERAPY 1/> REGIONS: CPT | Mod: GP | Performed by: PHYSICAL THERAPIST

## 2021-03-11 LAB — COPATH REPORT: NORMAL

## 2021-03-12 ENCOUNTER — TELEPHONE (OUTPATIENT)
Dept: SURGERY | Facility: CLINIC | Age: 51
End: 2021-03-12

## 2021-03-12 NOTE — RESULT ENCOUNTER NOTE
Could we call patient and let him know the spot on the upper back was a BCC as expected. Given the size of the lesion, I would recommend excision. Give you schedule with Dr. Cobb?    He should otherwise follow-up with me in 6 months. Thanks!    Jay Roach MD  Pronouns: he/him/his    Department of Dermatology  [facility]: Phone: [phone], Fax:[phone]  [facility]: Phone: [phone] Fax: [phone]

## 2021-03-12 NOTE — TELEPHONE ENCOUNTER
Excision previsit information                                                    Heath Carpenter is a 50 year old male     Patient is being referred to Dr. Cobb  Referring Provider:   For treatment of: basal cell carcinoma  Site(s): Central upper back    -if site is the groin or below knee, send to RN for initiation of antibiotic prophylaxis protocol.  Previous Records received:  NO    Medication & Allergy Information                                                    CURRENT MEDICATIONS:   Current Outpatient Medications   Medication Sig Dispense Refill     aspirin (ASA) 81 MG EC tablet Take 1 tablet (81 mg) by mouth daily       atorvastatin (LIPITOR) 20 MG tablet Take 1 tablet (20 mg) by mouth daily 90 tablet 3     levofloxacin (LEVAQUIN) 500 MG tablet Take 1 tablet (500 mg) by mouth daily for 10 days 10 tablet 0     lisinopril (ZESTRIL) 20 MG tablet Take 1 tablet (20 mg) by mouth daily 90 tablet 3     metFORMIN (GLUCOPHAGE) 500 MG tablet Take 1 tablet (500 mg) by mouth daily (with breakfast) 90 tablet 3     MULTIPLE VITAMIN PO Take 1 tablet by mouth daily       oxyCODONE-acetaminophen (PERCOCET) 5-325 MG tablet Take 1-2 tablets by mouth every 4 hours as needed for pain 12 tablet 0       Do you take the following medications:  Aspirin:  YES. Pt states he will check with his PCP on aspirin use prior to procedure   Ibuprofen/Advil/Motrin, Aleve/Naproxen:   NO  Coumadin, Eliquis, Pradaxa, Xarelto:   NO   -If on Coumadin, INR should be checked within 7 days of surgery.  Range should be 3.5 or less or within therapeutic range.  Vitamin E:   YES-Pt instructed to stop taking one week prior   Plavix:   NO    [place]'s wart: NO   Garlic supplement:  NO   Fish Oil: NO  Antibiotic Prophylaxis:  NO    Do you have an ALLERGIC REACTION to any medications:  NO   Molds & smuts and No clinical screening - see comments    Past Medical History                                                    Do you currently or have you  previously had any of the following conditions:       HIV/AIDS:  NO    Hepatitis:  NO    Suppressed Immune System:  NO    Autoimmune disorder (eg RA, Lupus):  NO    Prolonged bleeding or bleeding disorder:  yes    Fever blisters/herpes, cold sores, shingles:  NO    Kidney disease:  NO  Creatinine   Date Value Ref Range Status   10/27/2020 1.08 0.66 - 1.25 mg/dL Final   ]    Pacemaker or Defibrillator:  NO.      History of artificial or heart valve replacement:  NO.      Endocarditis/Rheumatic Fever: NO    Organ transplant: NO.      Joint replacement within past 2 years: NO    Previous prosthetic joint infection: NO    Diabetes: YES    Pregnant:: NO    Keloids or Abnormal scars: YES    Mobility device (wheelchair, transfer difficulty): NO    Tobacco use:  NO.    History   Smoking Status     Never Smoker   Smokeless Tobacco     Never Used       If any positives, send to RN to initiate antibiotic prophylaxis protocol and/or anticoagulation management protocol    Reviewed by:  MARIA DE JESUS Ibrahim Matthew David, MD  [facility] [place]             Could we call patient and let him know the spot on the upper back was a BCC as expected. Given the size of the lesion, I would recommend excision. Give you schedule with Dr. Cobb?     He should otherwise follow-up with me in 6 months. Thanks!     Jay Roach MD   Pronouns: he/him/his      Department of Dermatology   [facility]: Phone: [phone], Fax:[phone]   [facility]: Phone: [phone] Fax: [phone]    Associated Results    Dermatological path order and indications  Order: [id number]  Status:  Final result   Visible to patient:  Yes (MyChart) Dx:  Neoplasm of uncertain behavior of skin  Component 4d ago   Copath Report Patient Name: OLGA DAVILA   MR#: [id number]   Specimen #: [id number]   Collected:  3/8/2021   Received: 3/9/2021   Reported: 3/10/2021 20:49   Ordering Phy(s): ANGELITO MOSER     For improved result formatting, select 'View Enhanced Report Format' under    Linked Documents section.     SPECIMEN(S):   Skin, central upper back, shave     FINAL DIAGNOSIS:   Skin, central upper back, shave:   - Basal cell carcinoma, superficial type, extending to the lateral margin   - (see description)     I have personally reviewed all specimens and/or slides, including the   listed special stains, and used them   with my medical judgement to determine or confirm the final diagnosis.            Shikha Cain RN   3/12/2021  1:57 PM CST      Six month follow up previously scheduled..Shikha Yun RN, RN   3/12/2021  1:51 PM CST      Pt called and notified of results and pt scheduled for an excision with  on 3/29/21 at 3pm. Excision previsit screening completed..Shikha Cain RN

## 2021-03-22 ENCOUNTER — DOCUMENTATION ONLY (OUTPATIENT)
Dept: SLEEP MEDICINE | Facility: CLINIC | Age: 51
End: 2021-03-22
Payer: COMMERCIAL

## 2021-03-22 ENCOUNTER — MYC MEDICAL ADVICE (OUTPATIENT)
Dept: SLEEP MEDICINE | Facility: CLINIC | Age: 51
End: 2021-03-22

## 2021-03-22 ENCOUNTER — HOSPITAL ENCOUNTER (OUTPATIENT)
Dept: PHYSICAL THERAPY | Facility: CLINIC | Age: 51
Setting detail: THERAPIES SERIES
End: 2021-03-22
Attending: ORTHOPAEDIC SURGERY
Payer: COMMERCIAL

## 2021-03-22 DIAGNOSIS — G47.33 OSA (OBSTRUCTIVE SLEEP APNEA): Primary | ICD-10-CM

## 2021-03-22 PROCEDURE — 97140 MANUAL THERAPY 1/> REGIONS: CPT | Mod: GP | Performed by: PHYSICAL THERAPIST

## 2021-03-22 NOTE — PROGRESS NOTES
Patient came to [place] for mask fitting appointment on March 22, 2021. Patient requested to switch masks because the mask is very old. Discussed the following masks: airfit n30i and Airfit n20 Patient selected a Resmed Mask name: Airfit N20 Nasal mask size Large

## 2021-03-24 ENCOUNTER — DOCUMENTATION ONLY (OUTPATIENT)
Dept: SLEEP MEDICINE | Facility: CLINIC | Age: 51
End: 2021-03-24

## 2021-03-24 NOTE — PROGRESS NOTES
Sent note to my coworker Janice to call patient and schedule for replacement cpap setup at [facility]. Patient saw her there on 3/22/2021 and she does her own scheduling.

## 2021-03-29 ENCOUNTER — OFFICE VISIT (OUTPATIENT)
Dept: DERMATOLOGY | Facility: CLINIC | Age: 51
End: 2021-03-29
Payer: COMMERCIAL

## 2021-03-29 ENCOUNTER — HOSPITAL ENCOUNTER (OUTPATIENT)
Dept: PHYSICAL THERAPY | Facility: CLINIC | Age: 51
Setting detail: THERAPIES SERIES
End: 2021-03-29
Attending: ORTHOPAEDIC SURGERY
Payer: COMMERCIAL

## 2021-03-29 VITALS — DIASTOLIC BLOOD PRESSURE: 92 MMHG | SYSTOLIC BLOOD PRESSURE: 139 MMHG | HEART RATE: 98 BPM

## 2021-03-29 DIAGNOSIS — C44.519 BCC (BASAL CELL CARCINOMA), TRUNK: Primary | ICD-10-CM

## 2021-03-29 PROCEDURE — 12032 INTMD RPR S/A/T/EXT 2.6-7.5: CPT | Mod: GC | Performed by: DERMATOLOGY

## 2021-03-29 PROCEDURE — 97110 THERAPEUTIC EXERCISES: CPT | Mod: GP | Performed by: PHYSICAL THERAPIST

## 2021-03-29 PROCEDURE — 97140 MANUAL THERAPY 1/> REGIONS: CPT | Mod: GP | Performed by: PHYSICAL THERAPIST

## 2021-03-29 PROCEDURE — 11604 EXC TR-EXT MAL+MARG 3.1-4 CM: CPT | Mod: GC | Performed by: DERMATOLOGY

## 2021-03-29 PROCEDURE — 88305 TISSUE EXAM BY PATHOLOGIST: CPT | Performed by: PATHOLOGY

## 2021-03-29 ASSESSMENT — PAIN SCALES - GENERAL: PAINLEVEL: NO PAIN (0)

## 2021-03-29 NOTE — PROGRESS NOTES
DERMATOLOGY EXCISION PROCEDURE NOTE    Dermatology Problem List:  1. Hx NMSC              - BCC, right upper arm, s/p ED&C 5/21/19   - BCC, central upper back, s/p excision 3/29/21  2. ISKs. LiqN2.   3. AK. LiqN2.   4. Skin tags. LiqN2.       NAME OF PROCEDURE: Excision intermediate layered linear closure  Staff surgeon: Dr. Cobb  Resident: Dr. Vann  Scrub Nurse: Yolanda Gan LPN      PRE-OPERATIVE DIAGNOSIS:  Basal Cell Carcinoma  POST-OPERATIVE DIAGNOSIS: Same   LOCATION: central upper back  FINAL EXCISION SIZE(DEFECT SIZE): 3.2x2.1 cm  MARGIN: 0.4 cm  FINAL REPAIR LENGTH: 6.9 cm   ANESTHESIA: 12cc 1% lidocaine with 1:100,000 epinephrine           INDICATIONS: This patient presented with a 2.4x1.3cm Basal Cell Carcinoma. Excision was indicated. We discussed the principles of treatment and most likely complications including scarring, bleeding, infection, incomplete excision, wound dehiscence, pain, nerve damage, and recurrence. Informed consent was obtained and the patient underwent the procedure as follows:    PROCEDURE: The patient was taken to the operative suite. Time-out was performed.  The treatment area was anesthetized with 1% lidocaine with epinephrine. The area was prepped with Chlorhexidine and rinsed with sterile saline and draped with sterile towels. The lesion was delineated and excised down to subcutaneous fat in a elliptical manner. Hemostasis was obtained by electrocoagulation.     REPAIR: An intermediate layered linear closure was selected as the procedure which would maximally preserve both function and cosmesis.    After the excision of the tumor, the area was carefully undermined. Hemostasis was obtained with bipolar electrocoagulation.  Closure was oriented so that the wound was in the patient's natural skin tension lines. The subcutaneous and dermal layers were then closed with 3/4 vicryl sutures. The epidermis was then carefully approximated along the length of the wound using 4-0  prolene simple running sutures.     Estimated blood loss was less than 10 ml for all surgical sites. A sterile pressure dressing was applied and wound care instructions, with a written handout, were given. The patient was discharged from the [facility] Center alert and ambulatory.     The patient elected for pathology results to automatically release and understands that the clinical staff will contact them as soon as possible to notify them of the results.     Follow-up in 2 weeks for suture removal.     Dr. Cobb was immediately available for the entire surgery and was physicially present for the key portions of the procedure.    Anatomic Pathology Results: pending    Clinical Follow-Up: 6 months     Staff Involved:  Resident/Staff     Staff Physician Comments:   I saw and evaluated the patient with the resident and I agree with the assessment and plan. I was present for the key portions of the above major procedure and examination.    Heber Cobb DO    Department of Dermatology  [facility]: Phone: [phone], Fax:[phone]  [facility]: Phone: [phone], Fax: [phone]

## 2021-03-29 NOTE — NURSING NOTE
Heath Carpenter's goals for this visit include:   Chief Complaint   Patient presents with     Derm Problem     Heath is here today for excision on his central upper back due to BCC       He requests these members of his care team be copied on today's visit information:     PCP: Fatemeh Stevens    Referring Provider:  No referring provider defined for this encounter.    BP (!) 139/92 (BP Location: Left arm, Patient Position: Sitting, Cuff Size: Adult Large)   Pulse 98     Do you need any medication refills at today's visit? No    Yolanda Gan LPN

## 2021-03-29 NOTE — PATIENT INSTRUCTIONS

## 2021-03-29 NOTE — LETTER
3/29/2021         RE: Heath Carpenter  [address] [place] [id number]        Dear Colleague,    Thank you for referring your patient, Heath Carpenter, to the [facility]. Please see a copy of my visit note below.    DERMATOLOGY EXCISION PROCEDURE NOTE    Dermatology Problem List:  1. Hx NMSC              - BCC, right upper arm, s/p ED&C 5/21/19   - BCC, central upper back, s/p excision 3/29/21  2. ISKs. LiqN2.   3. AK. LiqN2.   4. Skin tags. LiqN2.       NAME OF PROCEDURE: Excision intermediate layered linear closure  Staff surgeon: Dr. Cobb  Resident: Dr. Vann  Scrub Nurse: Yolanda Gan LPN      PRE-OPERATIVE DIAGNOSIS:  Basal Cell Carcinoma  POST-OPERATIVE DIAGNOSIS: Same   LOCATION: central upper back  FINAL EXCISION SIZE(DEFECT SIZE): 3.2x2.1 cm  MARGIN: 0.4 cm  FINAL REPAIR LENGTH: 6.9 cm   ANESTHESIA: 12cc 1% lidocaine with 1:100,000 epinephrine           INDICATIONS: This patient presented with a 2.4x1.3cm Basal Cell Carcinoma. Excision was indicated. We discussed the principles of treatment and most likely complications including scarring, bleeding, infection, incomplete excision, wound dehiscence, pain, nerve damage, and recurrence. Informed consent was obtained and the patient underwent the procedure as follows:    PROCEDURE: The patient was taken to the operative suite. Time-out was performed.  The treatment area was anesthetized with 1% lidocaine with epinephrine. The area was prepped with Chlorhexidine and rinsed with sterile saline and draped with sterile towels. The lesion was delineated and excised down to subcutaneous fat in a elliptical manner. Hemostasis was obtained by electrocoagulation.     REPAIR: An intermediate layered linear closure was selected as the procedure which would maximally preserve both function and cosmesis.    After the excision of the tumor, the area was carefully undermined. Hemostasis was obtained with bipolar electrocoagulation.  Closure was  oriented so that the wound was in the patient's natural skin tension lines. The subcutaneous and dermal layers were then closed with 3/4 vicryl sutures. The epidermis was then carefully approximated along the length of the wound using 4-0 prolene simple running sutures.     Estimated blood loss was less than 10 ml for all surgical sites. A sterile pressure dressing was applied and wound care instructions, with a written handout, were given. The patient was discharged from the Dermatologic Surgery Center alert and ambulatory.     The patient elected for pathology results to automatically release and understands that the clinical staff will contact them as soon as possible to notify them of the results.     Follow-up in 2 weeks for suture removal.     Dr. Cobb was immediately available for the entire surgery and was physicially present for the key portions of the procedure.    Anatomic Pathology Results: pending    Clinical Follow-Up: 6 months     Staff Involved:  Resident/Staff     Staff Physician Comments:   I saw and evaluated the patient with the resident and I agree with the assessment and plan. I was present for the key portions of the above major procedure and examination.    Heber Cobb DO    Department of Dermatology  [facility]: Phone: [phone], Fax:[phone]  [facility]: Phone: [phone], Fax: [phone]          Again, thank you for allowing me to participate in the care of your patient.        Sincerely,        Heber Cobb MD

## 2021-03-30 RX ORDER — CEPHALEXIN 500 MG/1
500 CAPSULE ORAL 2 TIMES DAILY
Qty: 14 CAPSULE | Refills: 0 | Status: SHIPPED | OUTPATIENT
Start: 2021-03-30 | End: 2021-04-06

## 2021-03-31 LAB — COPATH REPORT: NORMAL

## 2021-04-05 ENCOUNTER — HOSPITAL ENCOUNTER (OUTPATIENT)
Dept: PHYSICAL THERAPY | Facility: CLINIC | Age: 51
Setting detail: THERAPIES SERIES
End: 2021-04-05
Attending: ORTHOPAEDIC SURGERY
Payer: COMMERCIAL

## 2021-04-05 PROCEDURE — 97140 MANUAL THERAPY 1/> REGIONS: CPT | Mod: GP | Performed by: PHYSICAL THERAPIST

## 2021-04-06 ENCOUNTER — TELEPHONE (OUTPATIENT)
Dept: DERMATOLOGY | Facility: CLINIC | Age: 51
End: 2021-04-06

## 2021-04-06 NOTE — TELEPHONE ENCOUNTER
Shikha Cain RN   4/6/2021 10:53 AM CDT      Pt called and notified of results. Pt verbalized understanding and had no questions or concerns...Shikha Son, MD [facility] [place]             Please call the patient with pathology results.     The pathologist saw small pieces of the basal cell carcinoma adjacent to the biopsy scar, however the surgical margins were clear.  The pathologist suggested we were successful removing the entire basal cell carcinoma.     As long as the wound is healing well, no additional surgery is necessary.   Thank you.    Associated Results    Dermatological path order and indications  Order: [id number]  Status:  Final result   Visible to patient:  Yes (MyChart) Dx:  BCC (basal cell carcinoma), trunk  Component 8d ago   Copath Report Patient Name: OLGA DAVILA   MR#: [id number]   Specimen #: [id number]   Collected: 3/29/2021   Received: 3/30/2021   Reported: 3/31/2021 22:02   Ordering Phy(s): MATTIE SON     For improved result formatting, select 'View Enhanced Report Format' under    Linked Documents section.     SPECIMEN(S):   Skin, central upper back, excision     FINAL DIAGNOSIS:   Skin, central upper back, excision:   - Residual basal cell carcinoma, superficial type, adjacent to scar,   completely excised - (see description)

## 2021-04-19 ENCOUNTER — OFFICE VISIT (OUTPATIENT)
Dept: DERMATOLOGY | Facility: CLINIC | Age: 51
End: 2021-04-19
Payer: COMMERCIAL

## 2021-04-19 ENCOUNTER — HOSPITAL ENCOUNTER (OUTPATIENT)
Dept: PHYSICAL THERAPY | Facility: CLINIC | Age: 51
Setting detail: THERAPIES SERIES
End: 2021-04-19
Attending: ORTHOPAEDIC SURGERY
Payer: COMMERCIAL

## 2021-04-19 DIAGNOSIS — Z85.828 HISTORY OF BASAL CELL CARCINOMA OF SKIN: Primary | ICD-10-CM

## 2021-04-19 DIAGNOSIS — Z51.89 VISIT FOR WOUND CHECK: ICD-10-CM

## 2021-04-19 PROCEDURE — 99024 POSTOP FOLLOW-UP VISIT: CPT | Performed by: DERMATOLOGY

## 2021-04-19 PROCEDURE — 97140 MANUAL THERAPY 1/> REGIONS: CPT | Mod: GP | Performed by: PHYSICAL THERAPIST

## 2021-04-19 ASSESSMENT — PAIN SCALES - GENERAL: PAINLEVEL: NO PAIN (0)

## 2021-04-19 NOTE — NURSING NOTE
Heath Carpenter's goals for this visit include:   Chief Complaint   Patient presents with     Suture Removal     BCC central upper back        He requests these members of his care team be copied on today's visit information:     PCP: Fatemeh Stevens    Referring Provider:  No referring provider defined for this encounter.    There were no vitals taken for this visit.    Do you need any medication refills at today's visit? No    Coby Guajardo, AMANDA on 4/19/2021 at 11:09 AM

## 2021-04-19 NOTE — LETTER
4/19/2021         RE: Heath Carpenter  [address] [facility] [address]        Dear Colleague,    Thank you for referring your patient, Heath Carpenter, to the [facility]. Please see a copy of my visit note below.    [facility] Post-operative Visit note:  Dermatology Problem List:  1. Hx NMSC   - BCC, central upper back, s/p excision 3/29/21              - BCC, right upper arm, s/p ED&C 5/21/19  2. ISKs. LiqN2.   3. AK. LiqN2.   4. Skin tags. LiqN2.       Subjective: The patient follows up for a wound check after undergoing excision to remove a basal cell carcinoma from the central upper back on 3/29/2021. The patient says the site(s) is(are) doing well and denies any bleeding, purulence, or excess pain/tenderness.    Objective: Focused examination of the prior surgical site was performed.  - Appropriately healing surgical site on the central upper back with no purulence, tenderness or surrounding erythema.  Assessment and Plan: Appropriately healing surgical site without any signs of infection    Instructed to continue daily dressing changes and application of petroleum jelly until healed over with new pink skin. Sutures removed today.     Recommended sunscreens SPF #50 or greater and protective clothing. Risk of scar dyspigmentation discussed.     Continue periodic self skin exams and report of any new or changing lesions.     Please refer to previous clinic note for details regarding treatment.  Follow up in 6 months with Dr. Roach.    Staff:    Heber Cobb DO    Department of Dermatology  [facility]: Phone: [phone], Fax:[phone]  [facility]: Phone: [phone], Fax: [phone]              Again, thank you for allowing me to participate in the care of your patient.        Sincerely,        Heber Cobb MD

## 2021-04-19 NOTE — PROGRESS NOTES
Rehabilitation Services      OUTPATIENT PHYSICAL THERAPY  PLAN OF TREATMENT FOR OUTPATIENT REHABILITATION    Patient's Last Name, First Name, M.I.                YOB: 1970  Heath Carpenter                        Provider's Name  Lia Ulrich, PT Medical Record No.  [id number]                               Onset Date: 12/26/2020   Start of Care Date: 3/3/2021   Type:     _X_PT   ___OT   ___SLP Medical Diagnosis: Primary osteoarthritis of L knee; intra-articular loose body                       PT Diagnosis: L knee pain, weakness, compensation due to injury to lateral meniscus      _________________________________________________________________________________  Plan of Treatment:    Frequency/Duration: 1x/week for 6 weeks     Goals:  Goal Identifier #1   Goal Description Pt will demonstrate 5/5 MMT of L LE in order to demonstrate ability to get off floor without assistance of chair or sturdy object.   Target Date 06/02/21   Date Met      Progress:     Goal Identifier #2   Goal Description Pt will demonstrate ability to squat down to  objects off floor without knee pain.   Target Date 06/02/21   Date Met  (Still needs to use object/UE to get up)   Progress:     Goal Identifier #3   Goal Description Pt will report >75% on the LEFS demonstrating improved mobility with daily functional activities.   Target Date 06/02/21   Date Met      Progress:     Progress Toward Goals:   Progress this reporting period: Pt has been seen for a total of 7 visits.  He is reporting improvement with strength and mobility but still having some pain especially with getting off the floor and squatting down.  States he will have difficulties and will occasionally need UE support to get back up.  Pt is demonstrating tissue restrictions and weakness in gluteal musculature.  He will continue to benefit from skilled PT services to address  Conor Mack MD, FAASM, [facility]  Christine Castellanos, MSN, RN, CNP     [address] [place] [address] SLEEP MEDICINE  [address] [facility] [address]  Dept: [phone]  Dept Fax: : [phone] [facility]  [address] [facility] [address] [phone]    Subjective:     Patient ID: Oswaldo Saint is a 77 y.o. female. Chief Complaint   Patient presents with    Sleep Apnea       HPI:      Machine Present today:  Yes    Machine Modem/Download Info:  Compliance (hours/night): 4.9 hrs/night  Download AHI (/hour): 4.5 /HR  Average CPAP Pressure : 8.8 cmH2O           APAP - Settings  Pressure Min: 7 cmH2O  Pressure Max: 20 cmH2O                 Comfort Settings  Humidity Level (0-8): 3  Heated Tubing (Yes/No): Yes  Flex/EPR (0-3): 2 PAP Mask  Mask Type: Nasal mask  Mask Model: Dream Wear  Mask Size: S     [place] - Total score: 15    Follow-up :     Last Visit : May 2018      Patient reports the listed chronic Co-morbidities: Obesity, DM, Asthma    are well controlled and stable at this time. Subjective Health Changes: None     Over Night Oximetry: [] Yes  [] No  [x] NA   Using O2: [] Yes  [] No  [x] NA   Patient is compliant with the machine  [] Yes  [x] No   Feeling rested when using the machine   [x] Yes  [] No     Pressure is comfortable with inspiration and expiration  [x] Yes  [] No     Noticed changes in pressure   [] Yes  [] No  [x] NA   Mask is fitting well  [x] Yes  [] No   Noting Mask Air Leak  [] Yes  [x] No   Having painful Aerophagia  [] Yes  [x] No   Nocturia   0  per night.    Having  HA upon waking  [] Yes  [x] No   Dry mouth upon waking   Dry Nose  Dry Eyes  [] Yes  [x] No   Congestion upon waking   [] Yes  [x] No    Nose Bleeds  [] Yes  [x] No   Using Sleep Aides    [x] NA   Understands how to change humidification and/or tubing temperature for comfort while at home  [x] Yes [] No     Difficulties falling asleep  [] Yes  [x] No   Difficulties staying asleep  [] Yes  [x] No   Approximate time to bed  1am   Approximate wake time  5am   Taking Naps  frequent   If taking naps usual length  120 minutes  [] NA   If taking naps using the machine  [x] Yes  [] No  [] NA   Drowsy when driving  [] Yes  [x] No     Does patient carry a DOT/CDL  [] Yes  [x] No     Does patient carry FAA/Pilots License   [] Yes  [x] No      Any concerns noted with the machine at this time  [] Yes  [x] No        Diagnosis Orders   1. MARION (obstructive sleep apnea)     2. Type 2 diabetes mellitus without complication, with long-term current use of insulin ([facility] [address]     3. Moderate persistent asthma without complication     4. Class 1 obesity with alveolar hypoventilation without serious comorbidity with body mass index (BMI) of 32.0 to 32.9 in adult [facility])         The chronic medical conditions listed are directly related to the primary diagnosis listed above. The management of the primary diagnosis affects the secondary diagnosis and vice versa. Review of Systems   Constitutional: Negative for appetite change, chills, fatigue and fever. HENT: Negative for congestion, nosebleeds, rhinorrhea and sinus pressure. Eyes: Negative for pain and redness. Respiratory: Negative for apnea, cough and shortness of breath. Cardiovascular: Negative for chest pain and palpitations. Gastrointestinal: Negative for abdominal distention and abdominal pain. Neurological: Negative for dizziness and headaches. Psychiatric/Behavioral: Negative for sleep disturbance.        Social History     Socioeconomic History    Marital status: Single     Spouse name: Not on file    Number of children: Not on file    Years of education: Not on file    Highest education level: Not on file   Occupational History    Occupation: retired   Social Needs    Financial resource strain: Not on file    Food insecurity:     Worry: Not on file impairments and continue working towards PLOF.    Certification date from 4/17/2021 to 6/2/2021.    Lia Ulrich, PT          I CERTIFY THE NEED FOR THESE SERVICES FURNISHED UNDER        THIS PLAN OF TREATMENT AND WHILE UNDER MY CARE     (Physician co-signature of this document indicates review and certification of the therapy plan).                Referring Provider: Dr. Amezquita / Nelson Swenson PA-C    Thank you for your referral.    Lia Ulrich, PT, DPT  [facility]  [phone]     Inability: Not on file    Transportation needs:     Medical: Not on file     Non-medical: Not on file   Tobacco Use    Smoking status: Never Smoker    Smokeless tobacco: Never Used   Substance and Sexual Activity    Alcohol use: No     Alcohol/week: 0.0 oz    Drug use: No    Sexual activity: Not on file   Lifestyle    Physical activity:     Days per week: Not on file     Minutes per session: Not on file    Stress: Not on file   Relationships    Social connections:     Talks on phone: Not on file     Gets together: Not on file     Attends Mu-ism service: Not on file     Active member of club or organization: Not on file     Attends meetings of clubs or organizations: Not on file     Relationship status: Not on file    Intimate partner violence:     Fear of current or ex partner: Not on file     Emotionally abused: Not on file     Physically abused: Not on file     Forced sexual activity: Not on file   Other Topics Concern    Not on file   Social History Narrative    Not on file       Prior to Admission medications    Medication Sig Start Date End Date Taking? Authorizing Provider   methocarbamol (ROBAXIN) 500 MG tablet Take 1 tablet by mouth 3 times daily for 10 days 5/15/19 5/25/19 Yes Thien Sandoval MD   atorvastatin (LIPITOR) 10 MG tablet Take 1 tablet by mouth daily 4/18/19  Yes Thien Sandoval MD   Continuous Blood Gluc  (FREESTYLE WINSTON 14 DAY READER) RUSS Use sensor as directed 3/20/19  Yes Thien Sandoval MD   Continuous Blood Gluc Sensor (FREESTYLE WINSTON 14 DAY SENSOR) MISC Use sensor as directed 3/20/19  Yes Thien Sandoval MD   methylphenidate (RITALIN) 20 MG tablet Take 1 tablet by mouth 3 times daily for 89 days.  3/20/19 6/17/19 Yes Thien Sandoval MD   Insulin Glargine-Lixisenatide [facility]) 100-33 UNT-MCG/ML SOPN INJECT 18 UNITS SUBCUTANEOUSLY EVERY MORNING AND 26 UNITS EVERY EVENING 3/6/19  Yes Thien Sandoval MD   celecoxib (CELEBREX) 200 MG capsule Take 1 capsule by mouth 2 times daily 12/12/18  Yes Janell Iniguez MD   diazepam (VALIUM) 5 MG tablet TAKE 1/2 TABLET BY MOUTH 3 TIMES A DAY 2/22/18  Yes Historical Provider, MD   albuterol sulfate  (90 Base) MCG/ACT inhaler Inhale 2 puffs into the lungs 2 times daily   Yes Historical Provider, MD   Insulin Pen Needle 32G X 4 MM MISC 1 each by Does not apply route daily 3/8/18  Yes Janell Iniguez MD   lisinopril (PRINIVIL;ZESTRIL) 2.5 MG tablet Take 1 tablet by mouth daily  Patient taking differently: Take 2.5 mg by mouth daily  2/1/18  Yes Janell Iniguez MD   budesonide-formoterol [facility]) 160-4.5 MCG/ACT AERO Inhale 2 puffs into the lungs 2 times daily Patient to bring Symbicort Voucher 12/5/16  Yes Janell Iniguez MD       Allergies as of 05/21/2019 - Review Complete 05/21/2019   Allergen Reaction Noted    Naproxen Other (See Comments) 09/12/2012    Pepcid [famotidine]  02/17/2016    Soma [carisoprodol]  02/17/2016    Tanzeum [albiglutide] Other (See Comments) 02/17/2016    Tetracyclines & related  02/17/2016    Xanax [alprazolam]  76/31/6861    Tricyclic antidepressants Palpitations 02/17/2016       Patient Active Problem List   Diagnosis    Hot flashes    MARION (obstructive sleep apnea)    Trigger finger, left index finger    Uses hearing aid    Moderate persistent asthma without complication    Ophthalmoplegic migraine    Trigger finger, right index finger    Vitamin D deficiency    Type 2 diabetes mellitus (HCC)    Trigger ring finger of right hand    Trigger middle finger of right hand    Class 1 obesity with alveolar hypoventilation without serious comorbidity with body mass index (BMI) of 32.0 to 32.9 in adult [facility])       Past Medical History:   Diagnosis Date    ADHD (attention deficit hyperactivity disorder)     Asthma     Diabetes ([facility] [address]     MARION (obstructive sleep apnea) 2/17/2016       Past Surgical History:   Procedure Laterality Date  CATARACT REMOVAL      GALLBLADDER SURGERY      HYSTERECTOMY      TONSILLECTOMY         Family History   Problem Relation Age of Onset    High Blood Pressure Mother     Stroke Mother     Heart Disease Father     High Blood Pressure Sister     Diabetes Sister     Diabetes Brother     Cancer Maternal Grandmother        Vitals:  Weight BMI   Wt Readings from Last 3 Encounters:   05/21/19 175 lb (79.4 kg)   05/15/19 176 lb (79.8 kg)   03/20/19 177 lb (80.3 kg)    Body mass index is 32.01 kg/m². BP HR SaO2   BP Readings from Last 3 Encounters:   05/21/19 132/64   05/15/19 124/60   03/20/19 126/71    Pulse Readings from Last 3 Encounters:   05/21/19 74   05/15/19 110   03/20/19 106    SpO2 Readings from Last 3 Encounters:   05/21/19 98%   05/15/19 97%   03/20/19 97%        Assessment/Plan:     Type 2 diabetes mellitus (HCC)  Chronic- Stable. Cont meds per PCP and other physicians. Moderate persistent asthma without complication  Chronic- Stable. Cont meds per PCP and other physicians. Class 1 obesity with alveolar hypoventilation without serious comorbidity with body mass index (BMI) of 32.0 to 32.9 in adult (HCC)  Chronic-Stable. Encouraged her to work on weight loss through diet and exercise. MARION (obstructive sleep apnea)  Reviewed compliance download with pt. Supplies and parts as needed for her machine. These are medically necessary. Continue medications per her PCP and other physicians. Limit caffeine use after 3pm.  Encouraged her to work on weight loss through diet and exercise. Diagnoses of Type 2 diabetes mellitus without complication, with long-term current use of insulin (HCC), Moderate persistent asthma without complication, and Class 1 obesity with alveolar hypoventilation without serious comorbidity with body mass index (BMI) of 32.0 to 32.9 in adult [facility]) were pertinent to this visit.   The chronic medical conditions listed are directly related to the primary diagnosis

## 2021-04-19 NOTE — PROGRESS NOTES
Outpatient Physical Therapy Progress Note     Patient: Heath Carpenter  : 1970    Beginning/End Dates of Reporting Period:  3/3/2021 to 2021    Referring Provider: Dr. Amezquita    Therapy Diagnosis: L knee pain, weakness, compensation due to injury to lateral meniscus     Client Self Report: Pt states the knee and back were bothersome from sitting on the plane.  Pt states had to order the orthotics.  Pt states not having the cramping anymore.  Noticing not so much back and knee pain while walking around on vacation.    Objective Measurements:  Objective Measure: LEFS  Details: 61/80 = 76.25%  Objective Measure: Palpation  Details: Tissue tenderness and tightness to the L ITBand, glute medius, and glute elis.  Objective Measure: Position  Details: Pes planus B, feels pull in L hip and back when standing bare foot.    Goals:  Goal Identifier #1   Goal Description Pt will demonstrate 5/5 MMT of L LE in order to demonstrate ability to get off floor without assistance of chair or sturdy object.   Target Date 21   Date Met      Progress:     Goal Identifier #2   Goal Description Pt will demonstrate ability to squat down to  objects off floor without knee pain.   Target Date 21   Date Met  (Still needs to use object/UE to get up)   Progress:     Goal Identifier #3   Goal Description Pt will report >75% on the LEFS demonstrating improved mobility with daily functional activities.   Target Date 21   Date Met      Progress:     Progress Toward Goals:   Progress this reporting period: Pt has been seen for a total of 7 visits.  He is reporting improvement with strength and mobility but still having some pain especially with getting off the floor and squatting down.  States he will have difficulties and will occasionally need UE support to get back up.  Pt is demonstrating tissue restrictions and weakness in gluteal musculature.  He will continue to benefit from skilled PT services to address  impairments and continue working towards PLOF.    Plan:  Continue therapy per current plan of care.    Discharge:  No    Thank you for your referral.    Lia Ulrich PT, DPT  [facility]  [phone]

## 2021-04-26 ENCOUNTER — HOSPITAL ENCOUNTER (OUTPATIENT)
Dept: PHYSICAL THERAPY | Facility: CLINIC | Age: 51
Setting detail: THERAPIES SERIES
End: 2021-04-26
Attending: ORTHOPAEDIC SURGERY
Payer: COMMERCIAL

## 2021-04-26 PROCEDURE — 97530 THERAPEUTIC ACTIVITIES: CPT | Mod: GP | Performed by: PHYSICAL THERAPIST

## 2021-04-26 PROCEDURE — 97140 MANUAL THERAPY 1/> REGIONS: CPT | Mod: GP | Performed by: PHYSICAL THERAPIST

## 2021-05-03 ENCOUNTER — HOSPITAL ENCOUNTER (OUTPATIENT)
Dept: PHYSICAL THERAPY | Facility: CLINIC | Age: 51
Setting detail: THERAPIES SERIES
End: 2021-05-03
Attending: ORTHOPAEDIC SURGERY
Payer: COMMERCIAL

## 2021-05-03 PROCEDURE — 97140 MANUAL THERAPY 1/> REGIONS: CPT | Mod: GP | Performed by: PHYSICAL THERAPIST

## 2021-05-09 ENCOUNTER — HEALTH MAINTENANCE LETTER (OUTPATIENT)
Age: 51
End: 2021-05-09

## 2021-05-10 ENCOUNTER — HOSPITAL ENCOUNTER (OUTPATIENT)
Dept: PHYSICAL THERAPY | Facility: CLINIC | Age: 51
Setting detail: THERAPIES SERIES
End: 2021-05-10
Attending: ORTHOPAEDIC SURGERY
Payer: COMMERCIAL

## 2021-05-10 PROCEDURE — 97140 MANUAL THERAPY 1/> REGIONS: CPT | Mod: GP | Performed by: PHYSICAL THERAPIST

## 2021-05-11 ENCOUNTER — MYC MEDICAL ADVICE (OUTPATIENT)
Dept: FAMILY MEDICINE | Facility: CLINIC | Age: 51
End: 2021-05-11

## 2021-05-11 DIAGNOSIS — M79.18 MUSCULOSKELETAL PAIN: Primary | ICD-10-CM

## 2021-05-17 NOTE — PROGRESS NOTES
[facility] Dermatology Post-operative Visit note:  Dermatology Problem List:  1. Hx NMSC   - BCC, central upper back, s/p excision 3/29/21              - BCC, right upper arm, s/p ED&C 5/21/19  2. ISKs. LiqN2.   3. AK. LiqN2.   4. Skin tags. LiqN2.       Subjective: The patient follows up for a wound check after undergoing excision to remove a basal cell carcinoma from the central upper back on 3/29/2021. The patient says the site(s) is(are) doing well and denies any bleeding, purulence, or excess pain/tenderness.    Objective: Focused examination of the prior surgical site was performed.  - Appropriately healing surgical site on the central upper back with no purulence, tenderness or surrounding erythema.  Assessment and Plan: Appropriately healing surgical site without any signs of infection    Instructed to continue daily dressing changes and application of petroleum jelly until healed over with new pink skin. Sutures removed today.     Recommended sunscreens SPF #50 or greater and protective clothing. Risk of scar dyspigmentation discussed.     Continue periodic self skin exams and report of any new or changing lesions.     Please refer to previous clinic note for details regarding treatment.  Follow up in 6 months with Dr. Roach.    Staff:    Heber Cobb DO    Department of Dermatology  [facility]: Phone: [phone], Fax:[phone]  [facility]: Phone: [phone], Fax: [phone]

## 2021-05-22 ENCOUNTER — HOSPITAL ENCOUNTER (EMERGENCY)
Facility: CLINIC | Age: 51
Discharge: HOME | End: 2021-05-23
Attending: FAMILY MEDICINE | Admitting: FAMILY MEDICINE
Payer: COMMERCIAL

## 2021-05-22 ENCOUNTER — APPOINTMENT (OUTPATIENT)
Dept: CT IMAGING | Facility: CLINIC | Age: 51
End: 2021-05-22
Attending: FAMILY MEDICINE
Payer: COMMERCIAL

## 2021-05-22 ENCOUNTER — APPOINTMENT (OUTPATIENT)
Dept: GENERAL RADIOLOGY | Facility: CLINIC | Age: 51
End: 2021-05-22
Attending: FAMILY MEDICINE
Payer: COMMERCIAL

## 2021-05-22 DIAGNOSIS — B34.9 VIRAL SYNDROME: ICD-10-CM

## 2021-05-22 DIAGNOSIS — A69.20 LYME DISEASE: ICD-10-CM

## 2021-05-22 DIAGNOSIS — R50.9 FEBRILE ILLNESS: ICD-10-CM

## 2021-05-22 LAB
ALBUMIN SERPL-MCNC: 3.5 G/DL (ref 3.4–5)
ALBUMIN UR-MCNC: NEGATIVE MG/DL
ALP SERPL-CCNC: 62 U/L (ref 40–150)
ALT SERPL W P-5'-P-CCNC: 77 U/L (ref 0–70)
ANION GAP SERPL CALCULATED.3IONS-SCNC: 6 MMOL/L (ref 3–14)
APPEARANCE UR: CLEAR
APTT PPP: 32 SEC (ref 22–37)
AST SERPL W P-5'-P-CCNC: 66 U/L (ref 0–45)
BASE EXCESS BLDV CALC-SCNC: 6.9 MMOL/L
BASOPHILS # BLD AUTO: 0 10E9/L (ref 0–0.2)
BASOPHILS NFR BLD AUTO: 0.8 %
BILIRUB SERPL-MCNC: 0.5 MG/DL (ref 0.2–1.3)
BILIRUB UR QL STRIP: NEGATIVE
BUN SERPL-MCNC: 13 MG/DL (ref 7–30)
CALCIUM SERPL-MCNC: 8.5 MG/DL (ref 8.5–10.1)
CHLORIDE SERPL-SCNC: 104 MMOL/L (ref 94–109)
CO2 SERPL-SCNC: 29 MMOL/L (ref 20–32)
COLOR UR AUTO: YELLOW
CREAT SERPL-MCNC: 1.1 MG/DL (ref 0.66–1.25)
D DIMER PPP FEU-MCNC: 1.2 UG/ML FEU (ref 0–0.5)
DIFFERENTIAL METHOD BLD: ABNORMAL
EOSINOPHIL # BLD AUTO: 0 10E9/L (ref 0–0.7)
EOSINOPHIL NFR BLD AUTO: 0.2 %
ERYTHROCYTE [DISTWIDTH] IN BLOOD BY AUTOMATED COUNT: 14.3 % (ref 10–15)
GFR SERPL CREATININE-BSD FRML MDRD: 76 ML/MIN/{1.73_M2}
GLUCOSE SERPL-MCNC: 124 MG/DL (ref 70–99)
GLUCOSE UR STRIP-MCNC: NEGATIVE MG/DL
HCO3 BLDV-SCNC: 32 MMOL/L (ref 21–28)
HCT VFR BLD AUTO: 40.5 % (ref 40–53)
HGB BLD-MCNC: 13.2 G/DL (ref 13.3–17.7)
HGB UR QL STRIP: ABNORMAL
IMM GRANULOCYTES # BLD: 0 10E9/L (ref 0–0.4)
IMM GRANULOCYTES NFR BLD: 0.4 %
INR PPP: 1.1 (ref 0.86–1.14)
KETONES UR STRIP-MCNC: NEGATIVE MG/DL
LABORATORY COMMENT REPORT: NORMAL
LACTATE BLD-SCNC: 1.2 MMOL/L (ref 0.7–2)
LEUKOCYTE ESTERASE UR QL STRIP: NEGATIVE
LYMPHOCYTES # BLD AUTO: 1.1 10E9/L (ref 0.8–5.3)
LYMPHOCYTES NFR BLD AUTO: 23.8 %
MCH RBC QN AUTO: 27.7 PG (ref 26.5–33)
MCHC RBC AUTO-ENTMCNC: 32.6 G/DL (ref 31.5–36.5)
MCV RBC AUTO: 85 FL (ref 78–100)
MONOCYTES # BLD AUTO: 0.5 10E9/L (ref 0–1.3)
MONOCYTES NFR BLD AUTO: 9.5 %
MUCOUS THREADS #/AREA URNS LPF: PRESENT /LPF
NEUTROPHILS # BLD AUTO: 3.1 10E9/L (ref 1.6–8.3)
NEUTROPHILS NFR BLD AUTO: 65.3 %
NITRATE UR QL: NEGATIVE
NRBC # BLD AUTO: 0 10*3/UL
NRBC BLD AUTO-RTO: 0 /100
O2/TOTAL GAS SETTING VFR VENT: 21 %
PCO2 BLDV: 47 MM HG (ref 40–50)
PH BLDV: 7.44 PH (ref 7.32–7.43)
PH UR STRIP: 6 PH (ref 5–7)
PLATELET # BLD AUTO: 164 10E9/L (ref 150–450)
PO2 BLDV: 23 MM HG (ref 25–47)
POTASSIUM SERPL-SCNC: 3.9 MMOL/L (ref 3.4–5.3)
PROT SERPL-MCNC: 7.6 G/DL (ref 6.8–8.8)
RBC # BLD AUTO: 4.77 10E12/L (ref 4.4–5.9)
RBC #/AREA URNS AUTO: 3 /HPF (ref 0–2)
SARS-COV-2 RNA RESP QL NAA+PROBE: NEGATIVE
SODIUM SERPL-SCNC: 139 MMOL/L (ref 133–144)
SOURCE: ABNORMAL
SP GR UR STRIP: 1.02 (ref 1–1.03)
SPECIMEN SOURCE: NORMAL
SQUAMOUS #/AREA URNS AUTO: <1 /HPF (ref 0–1)
UROBILINOGEN UR STRIP-MCNC: 0 MG/DL (ref 0–2)
WBC # BLD AUTO: 4.8 10E9/L (ref 4–11)
WBC #/AREA URNS AUTO: <1 /HPF (ref 0–5)

## 2021-05-22 PROCEDURE — 96375 TX/PRO/DX INJ NEW DRUG ADDON: CPT | Performed by: FAMILY MEDICINE

## 2021-05-22 PROCEDURE — 250N000011 HC RX IP 250 OP 636: Performed by: FAMILY MEDICINE

## 2021-05-22 PROCEDURE — 85379 FIBRIN DEGRADATION QUANT: CPT | Performed by: FAMILY MEDICINE

## 2021-05-22 PROCEDURE — 83605 ASSAY OF LACTIC ACID: CPT | Performed by: FAMILY MEDICINE

## 2021-05-22 PROCEDURE — 36415 COLL VENOUS BLD VENIPUNCTURE: CPT | Performed by: FAMILY MEDICINE

## 2021-05-22 PROCEDURE — 96361 HYDRATE IV INFUSION ADD-ON: CPT | Performed by: FAMILY MEDICINE

## 2021-05-22 PROCEDURE — 87086 URINE CULTURE/COLONY COUNT: CPT | Performed by: FAMILY MEDICINE

## 2021-05-22 PROCEDURE — 85730 THROMBOPLASTIN TIME PARTIAL: CPT | Performed by: FAMILY MEDICINE

## 2021-05-22 PROCEDURE — 80053 COMPREHEN METABOLIC PANEL: CPT | Performed by: FAMILY MEDICINE

## 2021-05-22 PROCEDURE — 86618 LYME DISEASE ANTIBODY: CPT | Performed by: FAMILY MEDICINE

## 2021-05-22 PROCEDURE — C9803 HOPD COVID-19 SPEC COLLECT: HCPCS | Performed by: FAMILY MEDICINE

## 2021-05-22 PROCEDURE — 85610 PROTHROMBIN TIME: CPT | Performed by: FAMILY MEDICINE

## 2021-05-22 PROCEDURE — 86617 LYME DISEASE ANTIBODY: CPT | Performed by: FAMILY MEDICINE

## 2021-05-22 PROCEDURE — 250N000013 HC RX MED GY IP 250 OP 250 PS 637: Performed by: FAMILY MEDICINE

## 2021-05-22 PROCEDURE — 85025 COMPLETE CBC W/AUTO DIFF WBC: CPT | Performed by: FAMILY MEDICINE

## 2021-05-22 PROCEDURE — 87040 BLOOD CULTURE FOR BACTERIA: CPT | Performed by: FAMILY MEDICINE

## 2021-05-22 PROCEDURE — 81001 URINALYSIS AUTO W/SCOPE: CPT | Performed by: FAMILY MEDICINE

## 2021-05-22 PROCEDURE — 258N000003 HC RX IP 258 OP 636: Performed by: FAMILY MEDICINE

## 2021-05-22 PROCEDURE — 250N000009 HC RX 250: Performed by: FAMILY MEDICINE

## 2021-05-22 PROCEDURE — 71045 X-RAY EXAM CHEST 1 VIEW: CPT

## 2021-05-22 PROCEDURE — 82803 BLOOD GASES ANY COMBINATION: CPT | Performed by: FAMILY MEDICINE

## 2021-05-22 PROCEDURE — 86617 LYME DISEASE ANTIBODY: CPT | Mod: 91 | Performed by: FAMILY MEDICINE

## 2021-05-22 PROCEDURE — 96365 THER/PROPH/DIAG IV INF INIT: CPT | Mod: 59 | Performed by: FAMILY MEDICINE

## 2021-05-22 PROCEDURE — 99285 EMERGENCY DEPT VISIT HI MDM: CPT | Performed by: FAMILY MEDICINE

## 2021-05-22 PROCEDURE — 71275 CT ANGIOGRAPHY CHEST: CPT

## 2021-05-22 PROCEDURE — 99285 EMERGENCY DEPT VISIT HI MDM: CPT | Mod: 25 | Performed by: FAMILY MEDICINE

## 2021-05-22 PROCEDURE — 87635 SARS-COV-2 COVID-19 AMP PRB: CPT | Performed by: FAMILY MEDICINE

## 2021-05-22 RX ORDER — ONDANSETRON 4 MG/1
4 TABLET, ORALLY DISINTEGRATING ORAL EVERY 6 HOURS PRN
Qty: 10 TABLET | Refills: 0 | Status: SHIPPED | OUTPATIENT
Start: 2021-05-22 | End: 2021-05-25

## 2021-05-22 RX ORDER — IBUPROFEN 800 MG/1
800 TABLET, FILM COATED ORAL ONCE
Status: COMPLETED | OUTPATIENT
Start: 2021-05-22 | End: 2021-05-22

## 2021-05-22 RX ORDER — DOXYCYCLINE 100 MG/1
100 CAPSULE ORAL 2 TIMES DAILY
Qty: 40 CAPSULE | Refills: 0 | Status: SHIPPED | OUTPATIENT
Start: 2021-05-22 | End: 2021-06-11

## 2021-05-22 RX ORDER — CEFTRIAXONE 2 G/1
2 INJECTION, POWDER, FOR SOLUTION INTRAMUSCULAR; INTRAVENOUS ONCE
Status: COMPLETED | OUTPATIENT
Start: 2021-05-22 | End: 2021-05-23

## 2021-05-22 RX ORDER — IOPAMIDOL 755 MG/ML
100 INJECTION, SOLUTION INTRAVASCULAR ONCE
Status: COMPLETED | OUTPATIENT
Start: 2021-05-22 | End: 2021-05-22

## 2021-05-22 RX ORDER — ACETAMINOPHEN 650 MG/1
650 SUPPOSITORY RECTAL
Status: DISCONTINUED | OUTPATIENT
Start: 2021-05-22 | End: 2021-05-23 | Stop reason: HOSPADM

## 2021-05-22 RX ORDER — ONDANSETRON 2 MG/ML
4 INJECTION INTRAMUSCULAR; INTRAVENOUS EVERY 30 MIN PRN
Status: DISCONTINUED | OUTPATIENT
Start: 2021-05-22 | End: 2021-05-23 | Stop reason: HOSPADM

## 2021-05-22 RX ORDER — ACETAMINOPHEN 325 MG/1
650 TABLET ORAL
Status: DISCONTINUED | OUTPATIENT
Start: 2021-05-22 | End: 2021-05-23 | Stop reason: HOSPADM

## 2021-05-22 RX ADMIN — IBUPROFEN 800 MG: 800 TABLET, FILM COATED ORAL at 22:26

## 2021-05-22 RX ADMIN — SODIUM CHLORIDE 40 ML: 9 INJECTION, SOLUTION INTRAVENOUS at 22:59

## 2021-05-22 RX ADMIN — ACETAMINOPHEN 650 MG: 325 TABLET, FILM COATED ORAL at 20:35

## 2021-05-22 RX ADMIN — ONDANSETRON 4 MG: 2 INJECTION INTRAMUSCULAR; INTRAVENOUS at 23:14

## 2021-05-22 RX ADMIN — SODIUM CHLORIDE 2000 ML: 9 INJECTION, SOLUTION INTRAVENOUS at 20:36

## 2021-05-22 RX ADMIN — IOPAMIDOL 100 ML: 755 INJECTION, SOLUTION INTRAVENOUS at 22:58

## 2021-05-22 RX ADMIN — CEFTRIAXONE SODIUM 2 G: 2 INJECTION, POWDER, FOR SOLUTION INTRAMUSCULAR; INTRAVENOUS at 23:39

## 2021-05-22 ASSESSMENT — ENCOUNTER SYMPTOMS
TROUBLE SWALLOWING: 0
VOMITING: 1
NAUSEA: 1
CONFUSION: 1
APPETITE CHANGE: 1
WHEEZING: 0
LIGHT-HEADEDNESS: 1
ABDOMINAL PAIN: 0
EYE REDNESS: 0
DIZZINESS: 1
NERVOUS/ANXIOUS: 0
SINUS PAIN: 0
HALLUCINATIONS: 0
FEVER: 1
DIAPHORESIS: 1
DIARRHEA: 0
SHORTNESS OF BREATH: 0
DYSURIA: 0
BACK PAIN: 0
SINUS PRESSURE: 0
FATIGUE: 1
SORE THROAT: 0
CHILLS: 1
COUGH: 0
SPEECH DIFFICULTY: 1
FREQUENCY: 1
WEAKNESS: 1

## 2021-05-23 ENCOUNTER — NURSE TRIAGE (OUTPATIENT)
Dept: NURSING | Facility: CLINIC | Age: 51
End: 2021-05-23

## 2021-05-23 VITALS
TEMPERATURE: 98.7 F | SYSTOLIC BLOOD PRESSURE: 97 MMHG | DIASTOLIC BLOOD PRESSURE: 52 MMHG | HEART RATE: 78 BPM | WEIGHT: 315 LBS | RESPIRATION RATE: 20 BRPM | BODY MASS INDEX: 42.5 KG/M2 | OXYGEN SATURATION: 99 %

## 2021-05-23 LAB — B BURGDOR IGG+IGM SER QL: 1.29 (ref 0–0.89)

## 2021-05-23 NOTE — TELEPHONE ENCOUNTER
S: Fever  B: Was at ED 5/22 for 103.0 fever.  Covid came back negative.  Awaiting labs to come back for Lyme's disease.  This morning 0800, 103.0 (ear).  Took Tylenol at 0815.  At 0915 fever 101.8 (ear).  Still having body aches in shoulders and back.  Left ED at 0025 have not picked up discharge antibiotic of Doxycycline.  Patient was told to call if fever was above 100.0.  Patient is diabetic.  A: Will age on call provider as second level triage.  Dr. Jama at 0935.   R: Per Dr. Jama have patient monitor fever at home.  Start antibiotics and stay on Tylenol for fever.  Writer called patient with update. Caller states understanding of the recommended disposition.  Advised to call back if further questions or concerns.  Ramya Dominguez RN MAN   Triage Nurse Advisor [facility]    Reason for Disposition    [1] Fever > 100.0 F (37.8 C) AND [2] diabetes mellitus or weak immune system (e.g., HIV positive, cancer chemo, splenectomy, organ transplant, chronic steroids)    Additional Information    Negative: Shock suspected (e.g., cold/pale/clammy skin, too weak to stand, low BP, rapid pulse)    Negative: Difficult to awaken or acting confused (e.g., disoriented, slurred speech)    Negative: [1] Difficulty breathing AND [2] bluish lips, tongue or face    Negative: New onset rash with multiple purple (or blood-colored) spots or dots    Negative: Sounds like a life-threatening emergency to the triager    Negative: [1] Headache AND [2] stiff neck (can't touch chin to chest)    Negative: Difficulty breathing    Negative: IV drug abuse    Negative: [1] Drinking very little AND [2] dehydration suspected (e.g., no urine > 12 hours, very dry mouth, very lightheaded)    Negative: Patient sounds very sick or weak to the triager  (Exception: mild weakness and hasn't taken fever medicine)    Negative: [1] Fever > 100.0 F (37.8 C) AND [2] has port (portacath), central line, or PICC line    Negative: [1] Fever > 100.0 F (37.8 C)  AND [2] indwelling urinary catheter (e.g., Riley, Coude)    Negative: [1] Fever > 100.0 F (37.8 C) AND [2] bedridden (e.g., nursing home patient, CVA, chronic illness, recovering from surgery)    Negative: [1] Fever > 101 F (38.3 C) AND [2] age > 60    Negative: Fever > 104 F (40 C)    Protocols used: FEVER-A-AH

## 2021-05-23 NOTE — DISCHARGE INSTRUCTIONS
Your febrile illness is highly suspicious for Lyme disease.  A Lyme test has been sent out.  Your blood work is consistent with Lyme disease or a viral illness.  Your Covid test is negative.  Your CT scan shows no pneumonia or Covid pneumonia.  Return to the emergency department if you are getting worse.  Drink plenty of liquids.  Get extra rest.  Follow-up next week in clinic with your primary care provider for reevaluation.

## 2021-05-23 NOTE — ED PROVIDER NOTES
History     Chief Complaint   Patient presents with     Heat Exposure     HPI  Heath Carpenter is a 51 year old male with history of morbid obesity, adult onset type 2 diabetes without long-term use of insulin, hypertension, GERD who presents to the emergency department with fever chills body aches malaise fatigue lightheadedness weakness.  He was also working in a hot humid barn the last couple of days and is worried about heat illness because of high heat and humidity.  He has been trying to drink lots of liquids but noticed his urine is darker than usual.  He feels dehydrated with a dry mouth and feels lightheaded and dizzy.  He feels mentally slow and his wife thought he was slurring his speech a little bit.  Patient has been prediabetic but was diagnosed with type 2 diabetes and started on Metformin in January.  He has never required insulin.  He has had increased urinary frequency today but smaller amounts and dark urine.  He has had nausea and vomiting.  He tried some chicken soup for dinner and kept it down for a while but then became nauseated and threw that up also.  He has no abdominal pain.  He has been having normal bowel movements.  No diarrhea.  No travel outside the area.  The patient works as a  so he is mostly at a desk.  He did get the Covid vaccine in January or February-Pfizer.  He has no known Covid exposures but there is a local endemic.  His wife has been vaccinated.  They have a 17-year-old son who is currently not ill.  No one at work that he knows of has COVID-19.  He has no cough sore throat, loss of taste or smell or shortness of breath or dyspnea on exertion.  He denies any chest pain.  [place] and [place].  He has had no tick bites.  He does pull off nonembedded ticks on occasion.    Allergies:  Allergies   Allergen Reactions     Molds & Smuts Other (See Comments)     Puffy eyes     No Clinical Screening - See Comments Other (See Comments)     Cigarette  smoke       Problem List:    Patient Active Problem List    Diagnosis Date Noted     Morbid obesity (H) 10/27/2020     Priority: Medium     Type 2 diabetes mellitus with hyperglycemia, without long-term current use of insulin (H) 10/27/2020     Priority: Medium     Hypertension goal BP (blood pressure) < 140/90 10/27/2020     Priority: Medium        Past Medical History:    Past Medical History:   Diagnosis Date     Gastro-oesophageal reflux disease      Hypertension goal BP (blood pressure) < 140/90        Past Surgical History:    Past Surgical History:   Procedure Laterality Date     VASECTOMY         Family History:    Family History   Problem Relation Age of Onset     Alcoholism Father      Lung Cancer Maternal Grandfather        Social History:  Marital Status:   [2]  Social History     Tobacco Use     Smoking status: Never Smoker     Smokeless tobacco: Never Used   Substance Use Topics     Alcohol use: Yes     Comment: rare     Drug use: No        Medications:    doxycycline hyclate (VIBRAMYCIN) 100 MG capsule  ondansetron (ZOFRAN ODT) 4 MG ODT tab  aspirin (ASA) 81 MG EC tablet  atorvastatin (LIPITOR) 20 MG tablet  lisinopril (ZESTRIL) 20 MG tablet  metFORMIN (GLUCOPHAGE) 500 MG tablet  MULTIPLE VITAMIN PO  oxyCODONE-acetaminophen (PERCOCET) 5-325 MG tablet          Review of Systems   Constitutional: Positive for appetite change, chills, diaphoresis, fatigue and fever.   HENT: Negative for congestion, sinus pressure, sinus pain, sore throat and trouble swallowing.    Eyes: Negative for redness and visual disturbance.   Respiratory: Negative for cough, shortness of breath and wheezing.    Cardiovascular: Negative for chest pain.   Gastrointestinal: Positive for nausea and vomiting. Negative for abdominal pain and diarrhea.   Endocrine: Positive for polyuria.   Genitourinary: Positive for frequency. Negative for dysuria.   Musculoskeletal: Negative for back pain.   Skin: Positive for pallor. Negative  for rash.   Allergic/Immunologic: Negative for immunocompromised state.   Neurological: Positive for dizziness, speech difficulty, weakness and light-headedness.   Psychiatric/Behavioral: Positive for confusion. Negative for hallucinations. The patient is not nervous/anxious.    All other systems reviewed and are negative.      Physical Exam   BP: 134/80  Pulse: 96  Temp: 101.6  F (38.7  C)  Resp: 20  Weight: (!) 154.2 kg (340 lb)  SpO2: 98 %      Physical Exam  Vitals signs and nursing note reviewed.   Constitutional:       Comments: Alert morbidly obese ill-appearing 51-year-old.  He is febrile.,  Normotensive.  /80   Pulse 96   Temp 101.6  F (38.7  C) (Oral)   Resp 20   Wt (!) 154.2 kg (340 lb)   SpO2 98%   BMI 42.50 kg/m       HENT:      Head: Normocephalic and atraumatic.      Right Ear: Tympanic membrane, ear canal and external ear normal.      Left Ear: Tympanic membrane, ear canal and external ear normal.      Nose: Nose normal.      Mouth/Throat:      Mouth: Mucous membranes are dry.   Eyes:      Conjunctiva/sclera: Conjunctivae normal.   Neck:      Musculoskeletal: Normal range of motion and neck supple.   Cardiovascular:      Rate and Rhythm: Normal rate.      Pulses: Normal pulses.      Heart sounds: Normal heart sounds.   Pulmonary:      Effort: Pulmonary effort is normal.      Breath sounds: Normal breath sounds.   Abdominal:      General: Abdomen is flat.   Musculoskeletal: Normal range of motion.   Skin:     General: Skin is warm and dry.      Capillary Refill: Capillary refill takes less than 2 seconds.      Comments: I examined his face chest abdomen and arms and see no rash.  He is flush but he is febrile.   Neurological:      General: No focal deficit present.      Mental Status: He is oriented to person, place, and time.      GCS: GCS eye subscore is 4. GCS verbal subscore is 5. GCS motor subscore is 6.      Cranial Nerves: Cranial nerves are intact.      Sensory: Sensation is  intact.      Motor: Motor function is intact.      Coordination: Coordination is intact.      Gait: Gait is intact.      Comments: Alert and oriented x3.  His speech is clear and articulate.  He answers questions quickly and appropriately.  I note no confusion or disorientation.  He is not dysarthric.  He is not aphasic.  He makes good eye contact.  Pupils are equal react and accommodate.  Extraocular movements intact without nystagmus.  Visual fields intact.  Vision and hearing reported normal.  Face is symmetric.         ED Course     Results for orders placed or performed during the hospital encounter of 05/22/21   XR Chest Port 1 View     Status: None    Narrative    XR CHEST PORT 1 VIEW 5/22/2021 8:53 PM    HISTORY: fever    COMPARISON: 5/23/2016      Impression    IMPRESSION: No infiltrate, pleural effusion or pneumothorax. Right  upper lobe granulomas unchanged. Normal heart size.    RIAZ HOWARD MD   CT Chest Pulmonary Embolism w Contrast     Status: None    Narrative    EXAM: CT CHEST PULMONARY EMBOLISM W CONTRAST  LOCATION: [facility]  DATE/TIME: 5/22/2021 10:52 PM    INDICATION: PE suspected, low/intermediate prob, positive D-dimer. Shortness of breath and fever.  COMPARISON: None.  TECHNIQUE: CT chest pulmonary angiogram during arterial phase injection of IV contrast. Multiplanar reformats and MIP reconstructions were performed. Dose reduction techniques were used.   CONTRAST: 100 mLs IsoVue 370    FINDINGS:  ANGIOGRAM CHEST: Pulmonary arteries are normal caliber and negative for pulmonary emboli. Thoracic aorta is negative for dissection. No CT evidence of right heart strain.    LUNGS AND PLEURA: Mild geographic groundglass opacities consistent with mosaic attenuation of lung and most suggestive of small airways disease. Minimal discoid atelectasis at posterior lung bases. Calcified granuloma right upper lobe.    MEDIASTINUM/AXILLAE: No lymphadenopathy. Heart upper limits of  normal.    CORONARY ARTERY CALCIFICATION: Mild.    UPPER ABDOMEN: Fatty infiltration of liver.    MUSCULOSKELETAL: Normal.      Impression    IMPRESSION:  1.  No pulmonary emboli identified.  2.  Mosaic attenuation of lung parenchyma consistent with small airways disease. No pneumonia evident.   CBC with platelets differential     Status: Abnormal   Result Value Ref Range    WBC 4.8 4.0 - 11.0 10e9/L    RBC Count 4.77 4.4 - 5.9 10e12/L    Hemoglobin 13.2 (L) 13.3 - 17.7 g/dL    Hematocrit 40.5 40.0 - 53.0 %    MCV 85 78 - 100 fl    MCH 27.7 26.5 - 33.0 pg    MCHC 32.6 31.5 - 36.5 g/dL    RDW 14.3 10.0 - 15.0 %    Platelet Count 164 150 - 450 10e9/L    Diff Method Automated Method     % Neutrophils 65.3 %    % Lymphocytes 23.8 %    % Monocytes 9.5 %    % Eosinophils 0.2 %    % Basophils 0.8 %    % Immature Granulocytes 0.4 %    Nucleated RBCs 0 0 /100    Absolute Neutrophil 3.1 1.6 - 8.3 10e9/L    Absolute Lymphocytes 1.1 0.8 - 5.3 10e9/L    Absolute Monocytes 0.5 0.0 - 1.3 10e9/L    Absolute Eosinophils 0.0 0.0 - 0.7 10e9/L    Absolute Basophils 0.0 0.0 - 0.2 10e9/L    Abs Immature Granulocytes 0.0 0 - 0.4 10e9/L    Absolute Nucleated RBC 0.0    Comprehensive metabolic panel     Status: Abnormal   Result Value Ref Range    Sodium 139 133 - 144 mmol/L    Potassium 3.9 3.4 - 5.3 mmol/L    Chloride 104 94 - 109 mmol/L    Carbon Dioxide 29 20 - 32 mmol/L    Anion Gap 6 3 - 14 mmol/L    Glucose 124 (H) 70 - 99 mg/dL    Urea Nitrogen 13 7 - 30 mg/dL    Creatinine 1.10 0.66 - 1.25 mg/dL    GFR Estimate 76 >60 mL/min/[1.73_m2]    GFR Estimate If Black 88 >60 mL/min/[1.73_m2]    Calcium 8.5 8.5 - 10.1 mg/dL    Bilirubin Total 0.5 0.2 - 1.3 mg/dL    Albumin 3.5 3.4 - 5.0 g/dL    Protein Total 7.6 6.8 - 8.8 g/dL    Alkaline Phosphatase 62 40 - 150 U/L    ALT 77 (H) 0 - 70 U/L    AST 66 (H) 0 - 45 U/L   Lactic acid whole blood     Status: None   Result Value Ref Range    Lactic Acid 1.2 0.7 - 2.0 mmol/L   Blood gas venous      Status: Abnormal   Result Value Ref Range    Ph Venous 7.44 (H) 7.32 - 7.43 pH    PCO2 Venous 47 40 - 50 mm Hg    PO2 Venous 23 (L) 25 - 47 mm Hg    Bicarbonate Venous 32 (H) 21 - 28 mmol/L    Base Excess Venous 6.9 mmol/L    FIO2 21    D dimer quantitative     Status: Abnormal   Result Value Ref Range    D Dimer 1.2 (H) 0.0 - 0.50 ug/ml FEU   INR     Status: None   Result Value Ref Range    INR 1.10 0.86 - 1.14   Partial thromboplastin time     Status: None   Result Value Ref Range    PTT 32 22 - 37 sec   Symptomatic SARS-CoV-2 COVID-19 Virus (Coronavirus) by PCR     Status: None    Specimen: Nasopharyngeal   Result Value Ref Range    SARS-CoV-2 Virus Specimen Source Nasopharyngeal     SARS-CoV-2 PCR Result NEGATIVE     SARS-CoV-2 PCR Comment (Note)    Lyme Disease Chata with reflex to WB Serum     Status: Abnormal   Result Value Ref Range    Lyme Disease Antibodies Serum 1.29 (H) 0.00 - 0.89   UA with Microscopic     Status: Abnormal   Result Value Ref Range    Color Urine Yellow     Appearance Urine Clear     Glucose Urine Negative NEG^Negative mg/dL    Bilirubin Urine Negative NEG^Negative    Ketones Urine Negative NEG^Negative mg/dL    Specific Gravity Urine 1.023 1.003 - 1.035    Blood Urine Small (A) NEG^Negative    pH Urine 6.0 5.0 - 7.0 pH    Protein Albumin Urine Negative NEG^Negative mg/dL    Urobilinogen mg/dL 0.0 0.0 - 2.0 mg/dL    Nitrite Urine Negative NEG^Negative    Leukocyte Esterase Urine Negative NEG^Negative    Source Unspecified Urine     WBC Urine <1 0 - 5 /HPF    RBC Urine 3 (H) 0 - 2 /HPF    Squamous Epithelial /HPF Urine <1 0 - 1 /HPF    Mucous Urine Present (A) NEG^Negative /LPF   Blood culture     Status: None (Preliminary result)    Specimen: Blood    Right Arm   Result Value Ref Range    Specimen Description Blood Right Arm     Culture Micro No growth after 1 day    Urine Culture Aerobic Bacterial     Status: None    Specimen: Unspecified Urine   Result Value Ref Range    Specimen  Description Unspecified Urine     Special Requests Specimen received in preservative     Culture Micro No growth    Blood culture     Status: None (Preliminary result)    Specimen: Blood    Left Arm   Result Value Ref Range    Specimen Description Blood Left Arm     Culture Micro No growth after 1 day           Procedures               Critical Care time:  none                   Medications   0.9% sodium chloride BOLUS (0 mLs Intravenous Stopped 5/22/21 2205)   ibuprofen (ADVIL/MOTRIN) tablet 800 mg (800 mg Oral Given 5/22/21 2226)   iopamidol (ISOVUE-370) solution 100 mL (100 mLs Intravenous Given 5/22/21 2258)   sodium chloride 0.9 % bag 500mL for CT scan flush use (40 mLs Intravenous Given 5/22/21 2259)   sodium chloride (PF) 0.9% PF flush 3 mL (3 mLs Intravenous Given 5/22/21 2258)   cefTRIAXone (ROCEPHIN) 2 g vial to attach to  ml bag for ADULTS or NS 50 ml bag for PEDS (0 g Intravenous Stopped 5/23/21 0009)     10:04 PM--patient and wife updated.  Labs reviewed.  Unfortunately his D-dimer is elevated.  We will proceed with a chest CT to rule out PE.  Patient has no chest pain or hypoxia or shortness of breath or any other respiratory symptoms.  However given the pattern of his blood this will help further rule out COVID-19 pneumonia.  His blood work would also be consistent with a viral illness or Lyme disease.  He is at high risk for Lyme disease.  If his chest CT is clear of pneumonia I will treat him with Rocephin and a 3-week course of doxycycline.  Lyme test has been sent off.    Addendum--5/24/2021.--I checked the patient's records today and his Lyme test did come back positive.  I contacted the patient who is already aware of this.  He received 2 g of Rocephin Friday night when I saw him and he started the doxycycline.  However he continues to have fever and chills.  He thinks his fever broke last night with a drenching sweat.  He still has severe myalgias and malaise and is still feeling quite  ill.  I recommended he come back to the emergency department for reevaluation, IV fluids and some more Rocephin.  He is in agreement and will come in later today.  Assessments & Plan (with Medical Decision Making)   MDM--51-year-old with history of obesity, adult onset diabetes who presents to the emergency department with a 3-day history of fever chills malaise myalgias fatigue weakness.  He has no respiratory symptoms.  He was vaccinated with the Pfizer Covid vaccine.  He has no known Covid exposures.  His Covid test here is negative.  His blood work however appears viral/atypical infection which would be consistent with a viral illness and or Lyme disease.  [place]  His D-dimer was elevated so I did proceed with a chest CT.  I have reviewed the nursing notes.    I have reviewed the findings, diagnosis, plan and need for follow up with the patient.          Discharge Medication List as of 5/23/2021 12:17 AM      START taking these medications    Details   doxycycline hyclate (VIBRAMYCIN) 100 MG capsule Take 1 capsule (100 mg) by mouth 2 times daily for 20 days, Disp-40 capsule, R-0, E-Prescribe      ondansetron (ZOFRAN ODT) 4 MG ODT tab Take 1 tablet (4 mg) by mouth every 6 hours as needed for nausea or vomiting, Disp-10 tablet, R-0, E-Prescribe             Final diagnoses:   Febrile illness   Lyme disease   Viral syndrome       5/22/2021   [facility] EMERGENCY DEPT     Rojelio, Janusz HYATT MD  05/24/21 6692       Rojelio, Janusz HYATT MD  05/24/21 4954

## 2021-05-24 ENCOUNTER — HOSPITAL ENCOUNTER (EMERGENCY)
Facility: CLINIC | Age: 51
Discharge: HOME | End: 2021-05-24
Attending: FAMILY MEDICINE | Admitting: FAMILY MEDICINE
Payer: COMMERCIAL

## 2021-05-24 VITALS
BODY MASS INDEX: 42.5 KG/M2 | SYSTOLIC BLOOD PRESSURE: 142 MMHG | RESPIRATION RATE: 15 BRPM | WEIGHT: 315 LBS | DIASTOLIC BLOOD PRESSURE: 68 MMHG | HEART RATE: 68 BPM | TEMPERATURE: 99.7 F

## 2021-05-24 DIAGNOSIS — I10 HYPERTENSION GOAL BP (BLOOD PRESSURE) < 140/90: ICD-10-CM

## 2021-05-24 DIAGNOSIS — E11.65 TYPE 2 DIABETES MELLITUS WITH HYPERGLYCEMIA, WITHOUT LONG-TERM CURRENT USE OF INSULIN (H): ICD-10-CM

## 2021-05-24 DIAGNOSIS — A69.20 LYME DISEASE: ICD-10-CM

## 2021-05-24 LAB
BACTERIA SPEC CULT: NO GROWTH
Lab: NORMAL
SPECIMEN SOURCE: NORMAL

## 2021-05-24 PROCEDURE — 99285 EMERGENCY DEPT VISIT HI MDM: CPT | Mod: 25 | Performed by: FAMILY MEDICINE

## 2021-05-24 PROCEDURE — 99285 EMERGENCY DEPT VISIT HI MDM: CPT | Performed by: FAMILY MEDICINE

## 2021-05-24 PROCEDURE — 96365 THER/PROPH/DIAG IV INF INIT: CPT | Performed by: FAMILY MEDICINE

## 2021-05-24 PROCEDURE — 96361 HYDRATE IV INFUSION ADD-ON: CPT | Performed by: FAMILY MEDICINE

## 2021-05-24 PROCEDURE — 258N000003 HC RX IP 258 OP 636: Performed by: FAMILY MEDICINE

## 2021-05-24 PROCEDURE — 96376 TX/PRO/DX INJ SAME DRUG ADON: CPT | Performed by: FAMILY MEDICINE

## 2021-05-24 PROCEDURE — 250N000011 HC RX IP 250 OP 636: Performed by: FAMILY MEDICINE

## 2021-05-24 PROCEDURE — 96375 TX/PRO/DX INJ NEW DRUG ADDON: CPT | Performed by: FAMILY MEDICINE

## 2021-05-24 RX ORDER — HYDROMORPHONE HYDROCHLORIDE 1 MG/ML
0.5 INJECTION, SOLUTION INTRAMUSCULAR; INTRAVENOUS; SUBCUTANEOUS
Status: COMPLETED | OUTPATIENT
Start: 2021-05-24 | End: 2021-05-24

## 2021-05-24 RX ORDER — MULTIVIT WITH MINERALS/LUTEIN
1000 TABLET ORAL DAILY
COMMUNITY
End: 2023-05-18

## 2021-05-24 RX ORDER — OXYCODONE AND ACETAMINOPHEN 5; 325 MG/1; MG/1
1 TABLET ORAL EVERY 4 HOURS PRN
Qty: 12 TABLET | Refills: 0 | Status: SHIPPED | OUTPATIENT
Start: 2021-05-24 | End: 2021-06-18

## 2021-05-24 RX ORDER — MULTIVITAMIN WITH IRON
1 TABLET ORAL DAILY
COMMUNITY

## 2021-05-24 RX ORDER — ONDANSETRON 2 MG/ML
4 INJECTION INTRAMUSCULAR; INTRAVENOUS EVERY 30 MIN PRN
Status: DISCONTINUED | OUTPATIENT
Start: 2021-05-24 | End: 2021-05-24 | Stop reason: HOSPADM

## 2021-05-24 RX ORDER — IBUPROFEN 200 MG
400 TABLET ORAL EVERY 8 HOURS PRN
COMMUNITY
End: 2022-10-29

## 2021-05-24 RX ORDER — ACETAMINOPHEN 500 MG
1000 TABLET ORAL EVERY 6 HOURS PRN
COMMUNITY

## 2021-05-24 RX ORDER — CEFTRIAXONE 2 G/1
2 INJECTION, POWDER, FOR SOLUTION INTRAMUSCULAR; INTRAVENOUS ONCE
Status: COMPLETED | OUTPATIENT
Start: 2021-05-24 | End: 2021-05-24

## 2021-05-24 RX ADMIN — SODIUM CHLORIDE 1000 ML: 9 INJECTION, SOLUTION INTRAVENOUS at 14:39

## 2021-05-24 RX ADMIN — HYDROMORPHONE HYDROCHLORIDE 0.5 MG: 1 INJECTION, SOLUTION INTRAMUSCULAR; INTRAVENOUS; SUBCUTANEOUS at 14:11

## 2021-05-24 RX ADMIN — CEFTRIAXONE SODIUM 2 G: 2 INJECTION, POWDER, FOR SOLUTION INTRAMUSCULAR; INTRAVENOUS at 14:10

## 2021-05-24 RX ADMIN — HYDROMORPHONE HYDROCHLORIDE 0.5 MG: 1 INJECTION, SOLUTION INTRAMUSCULAR; INTRAVENOUS; SUBCUTANEOUS at 15:16

## 2021-05-24 RX ADMIN — ONDANSETRON 4 MG: 2 INJECTION INTRAMUSCULAR; INTRAVENOUS at 14:10

## 2021-05-24 RX ADMIN — SODIUM CHLORIDE 1000 ML: 9 INJECTION, SOLUTION INTRAVENOUS at 14:09

## 2021-05-24 RX ADMIN — HYDROMORPHONE HYDROCHLORIDE 0.5 MG: 1 INJECTION, SOLUTION INTRAMUSCULAR; INTRAVENOUS; SUBCUTANEOUS at 14:39

## 2021-05-24 NOTE — ED PROVIDER NOTES
History     Chief Complaint   Patient presents with     Fever     HPI  Heath Carpenter is a 51 year old male who returns to the emergency department for reevaluation IV fluids and antibiotics.  I saw the patient on Saturday-2 days ago with fever chills myalgias weakness and malaise.  No known tick bite.  No rash.  His blood profile was consistent with Lyme disease.  He was tested for Lyme's and treated with Rocephin and IV fluids and discharged.  His Lyme test came back positive.  He feels like his fever broke yesterday with drenching sweat.  He still feels very weak and dehydrated today.  Severe myalgias.  I recommended that he come to the ED for reevaluation and another dose of Rocephin and patient agrees.  He is still feeling quite poorly but better than he did 2 days ago.  He is eating small amounts.  He is constipated.  He has been taking Tylenol and ibuprofen.  I offered him a prescription which he initially declined but request at this time.    Allergies:  Allergies   Allergen Reactions     Molds & Smuts Other (See Comments)     Puffy eyes     No Clinical Screening - See Comments Other (See Comments)     Cigarette smoke       Problem List:    Patient Active Problem List    Diagnosis Date Noted     Morbid obesity (H) 10/27/2020     Priority: Medium     Type 2 diabetes mellitus with hyperglycemia, without long-term current use of insulin (H) 10/27/2020     Priority: Medium     Hypertension goal BP (blood pressure) < 140/90 10/27/2020     Priority: Medium        Past Medical History:    Past Medical History:   Diagnosis Date     Gastro-oesophageal reflux disease      Hypertension goal BP (blood pressure) < 140/90        Past Surgical History:    Past Surgical History:   Procedure Laterality Date     VASECTOMY         Family History:    Family History   Problem Relation Age of Onset     Alcoholism Father      Lung Cancer Maternal Grandfather        Social History:  Marital Status:   [2]  Social History      Tobacco Use     Smoking status: Never Smoker     Smokeless tobacco: Never Used   Substance Use Topics     Alcohol use: Yes     Comment: rare     Drug use: No        Medications:    acetaminophen (TYLENOL) 500 MG tablet  aspirin (ASA) 81 MG EC tablet  atorvastatin (LIPITOR) 20 MG tablet  doxycycline hyclate (VIBRAMYCIN) 100 MG capsule  ibuprofen (ADVIL/MOTRIN) 200 MG tablet  lisinopril (ZESTRIL) 20 MG tablet  magnesium 250 MG tablet  metFORMIN (GLUCOPHAGE) 500 MG tablet  MULTIPLE VITAMIN PO  ondansetron (ZOFRAN ODT) 4 MG ODT tab  oxyCODONE-acetaminophen (PERCOCET) 5-325 MG tablet  vitamin (B COMPLEX-C) tablet  vitamin E (TOCOPHEROL) 1000 units (450 mg) capsule          Review of Systems   All other systems reviewed and are negative.      Physical Exam   BP: 137/87  Pulse: 85  Temp: 98.3  F (36.8  C)  Resp: 16  Weight: (!) 154.2 kg (340 lb)      Physical Exam  Constitutional:       Appearance: He is obese.      Comments: Alert pleasant male.  He appears in no distress.  Pale.  Vital signs stable./87   Pulse 85   Temp 98.3  F (36.8  C) (Oral)   Resp 16   Wt (!) 154.2 kg (340 lb)   BMI 42.50 kg/m       HENT:      Head: Normocephalic and atraumatic.      Right Ear: Tympanic membrane, ear canal and external ear normal.      Left Ear: Tympanic membrane and ear canal normal.      Nose: Nose normal.   Eyes:      Conjunctiva/sclera: Conjunctivae normal.   Neck:      Musculoskeletal: Normal range of motion.   Cardiovascular:      Rate and Rhythm: Normal rate and regular rhythm.      Pulses: Normal pulses.   Pulmonary:      Effort: Pulmonary effort is normal.      Breath sounds: Normal breath sounds.   Abdominal:      General: Abdomen is flat.   Musculoskeletal: Normal range of motion.   Skin:     General: Skin is warm.      Capillary Refill: Capillary refill takes less than 2 seconds.   Neurological:      General: No focal deficit present.      Mental Status: He is alert and oriented to person, place, and time.    Psychiatric:         Mood and Affect: Mood normal.         Behavior: Behavior normal.         ED Course        Procedures               Critical Care time:  none               No results found for this or any previous visit (from the past 24 hour(s)).    Medications   0.9% sodium chloride BOLUS (0 mLs Intravenous Stopped 5/24/21 1516)     Followed by   0.9% sodium chloride BOLUS (0 mLs Intravenous Stopped 5/24/21 1536)   HYDROmorphone (PF) (DILAUDID) injection 0.5 mg (0.5 mg Intravenous Given 5/24/21 1516)   cefTRIAXone (ROCEPHIN) 2 g vial to attach to  ml bag for ADULTS or NS 50 ml bag for PEDS (0 g Intravenous Stopped 5/24/21 1516)       Assessments & Plan (with Medical Decision Making)   MDM--51-year-old with 5-day history of fever chills malaise myalgias headache and positive Lyme test 2 days ago.  I saw him in the ED on Saturday 2 days ago and treated him for presumptive Lyme's with Rocephin 2 g and IV fluids and he has since started doxycycline 100 twice daily for 3 weeks.  His fever broke yesterday and is feeling a little better and comes in today for reevaluation and further fluids and another dose of Rocephin.  On examination he is looking much better.  He is now afebrile.  I did not recheck his labs.  He was given 2 L of fluid and 2 g of Rocephin, Zofran and Dilaudid with improvement of his symptoms.  I also prescribed him 12 tablets of Percocet.  He should follow-up with his primary care provider.  One of their dogs is also having similar symptoms and I recommended them taking her to their .  Discussed that this is not transmissible from animal to human or human to human but the risks for animals and other family members is the same with a high prevalence of ticks and Lyme disease at this time in this area.  Patient and wife are comfortable with this evaluation treatment and discharge plan is discharged in improved conditions.  I have reviewed the nursing notes.    I have reviewed the  findings, diagnosis, plan and need for follow up with the patient.          Discharge Medication List as of 5/24/2021  3:37 PM          Final diagnoses:   Lyme disease   Type 2 diabetes mellitus with hyperglycemia, without long-term current use of insulin (H)   Hypertension goal BP (blood pressure) < 140/90       5/24/2021   [facility] EMERGENCY DEPT     RojelioJanusz MD  05/24/21 1528       Rojelio, Janusz HYATT MD  05/24/21 0133

## 2021-05-24 NOTE — DISCHARGE INSTRUCTIONS
Drink extra fluids, extra rest.  Take Percocet for severe pain.  Take Tylenol and/or ibuprofen for mild discomfort.  Follow-up if persistent fever or worsening symptoms.

## 2021-05-25 LAB
B BURGDOR IGG SER QL IB: NEGATIVE
B BURGDOR IGM SER QL IB: NEGATIVE

## 2021-05-29 LAB
BACTERIA SPEC CULT: NO GROWTH
BACTERIA SPEC CULT: NO GROWTH
SPECIMEN SOURCE: NORMAL
SPECIMEN SOURCE: NORMAL

## 2021-06-01 DIAGNOSIS — E11.65 TYPE 2 DIABETES MELLITUS WITH HYPERGLYCEMIA, WITHOUT LONG-TERM CURRENT USE OF INSULIN (H): Primary | ICD-10-CM

## 2021-06-02 ENCOUNTER — TELEPHONE (OUTPATIENT)
Dept: FAMILY MEDICINE | Facility: CLINIC | Age: 51
End: 2021-06-02

## 2021-06-02 NOTE — TELEPHONE ENCOUNTER
Diabetes Education Scheduling Outreach #1:    Call to patient to schedule. Left message with phone number to call to schedule.    Plan for 2nd outreach attempt within 1 week.    Luz Leiva  [place] [facility]  Diabetes and Nutrition Scheduling

## 2021-06-08 NOTE — TELEPHONE ENCOUNTER
Diabetes Education Scheduling Outreach #2:    Sending MyChart message for second attempt.    Luz Leiva  [place] [facility]  Diabetes and Nutrition Scheduling

## 2021-06-15 ENCOUNTER — NURSE TRIAGE (OUTPATIENT)
Dept: NURSING | Facility: CLINIC | Age: 51
End: 2021-06-15

## 2021-06-15 NOTE — TELEPHONE ENCOUNTER
Heath states that 2.5 weeks ago diagnosed with Lyme's disease and put on an antibiotic and has a fever since then.  Wife actually got admitted to the hospital with covid.  Heath states that he is going to [facility] to get tested and will call back if his test is positive for his appointment this Friday.  Currently only symptom is low grade fever.    COVID 19 Nurse Triage Plan/Patient Instructions    Please be aware that novel coronavirus (COVID-19) may be circulating in the community. If you develop symptoms such as fever, cough, or SOB or if you have concerns about the presence of another infection including coronavirus (COVID-19), please contact your health care provider or visit [email or web address].     Disposition/Instructions    Virtual Visit with provider recommended. Reference Visit Selection Guide.    Thank you for taking steps to prevent the spread of this virus.  o Limit your contact with others.  o Wear a simple mask to cover your cough.  o Wash your hands well and often.    Resources    [facility]: About COVID-19: [email or web address]    CDC: What to Do If You're Sick: www.cdc.gov/coronavirus/2019-ncov/about/steps-when-sick.html    CDC: Ending Home Isolation: www.cdc.gov/coronavirus/2019-ncov/hcp/disposition-in-home-patients.html     CDC: Caring for Someone: www.cdc.gov/coronavirus/2019-ncov/if-you-are-sick/care-for-someone.html     [facility]: Interim Guidance for Hospital Discharge to Home: www.health.[facility].[place].us/diseases/coronavirus/hcp/hospdischarge.pdf    [facility] clinical trials (COVID-19 research studies): clinicalaffairs.[facility]-clinical-trials     Below are the COVID-19 hotlines at the [place] Department of Health ([facility]). Interpreters are available.   o For health questions: Call 972-075-5306 or 1-433.665.7867 (7 a.m. to 7 p.m.)  o For questions about schools and childcare: Call 232-635-7521 or 1-938.426.1580 (7 a.m. to 7 p.m.)                          Reason for Disposition    [1] CLOSE CONTACT COVID-19 EXPOSURE within last 14 days AND [2] NO symptoms    Additional Information    Negative: [1] CLOSE CONTACT COVID-19 EXPOSURE within last 14 days AND [2] needs COVID-19 lab test to return to work AND [3] NO symptoms    Negative: [1] CLOSE CONTACT COVID-19 EXPOSURE within last 14 days AND [2] exposed person is a  (e.g., police or paramedic) AND [3] NO symptoms    Negative: [1] CLOSE CONTACT COVID-19 EXPOSURE within last 14 days AND [2] exposed person is a healthcare worker who was NOT using all recommended personal protective equipment (e.g., a respirator-N95 mask, eye protection, gloves, and gown) AND [3] NO symptoms    Negative: [1] Living or working in a correctional facility, long-term care facility, or shelter (i.e., congregate setting; densely populated) AND [2] where an outbreak has occurred AND [3] NO symptoms    Protocols used: CORONAVIRUS (COVID-19) EXPOSURE-A- 3.25

## 2021-06-18 ENCOUNTER — OFFICE VISIT (OUTPATIENT)
Dept: FAMILY MEDICINE | Facility: CLINIC | Age: 51
End: 2021-06-18
Payer: COMMERCIAL

## 2021-06-18 VITALS
TEMPERATURE: 98.7 F | OXYGEN SATURATION: 97 % | WEIGHT: 315 LBS | RESPIRATION RATE: 16 BRPM | HEART RATE: 74 BPM | BODY MASS INDEX: 42.3 KG/M2 | DIASTOLIC BLOOD PRESSURE: 98 MMHG | SYSTOLIC BLOOD PRESSURE: 145 MMHG

## 2021-06-18 DIAGNOSIS — F43.9 STRESS AT HOME: ICD-10-CM

## 2021-06-18 DIAGNOSIS — Z11.59 NEED FOR HEPATITIS C SCREENING TEST: ICD-10-CM

## 2021-06-18 DIAGNOSIS — E78.5 HYPERLIPIDEMIA LDL GOAL <100: ICD-10-CM

## 2021-06-18 DIAGNOSIS — I10 HYPERTENSION GOAL BP (BLOOD PRESSURE) < 140/90: ICD-10-CM

## 2021-06-18 DIAGNOSIS — E11.65 TYPE 2 DIABETES MELLITUS WITH HYPERGLYCEMIA, WITHOUT LONG-TERM CURRENT USE OF INSULIN (H): Primary | ICD-10-CM

## 2021-06-18 DIAGNOSIS — Z86.19 HISTORY OF LYME DISEASE: ICD-10-CM

## 2021-06-18 DIAGNOSIS — Z11.4 ENCOUNTER FOR SCREENING FOR HIV: ICD-10-CM

## 2021-06-18 LAB
CHOLEST SERPL-MCNC: 208 MG/DL
HBA1C MFR BLD: 6.4 % (ref 0–5.6)
HDLC SERPL-MCNC: 42 MG/DL
LDLC SERPL CALC-MCNC: 125 MG/DL
NONHDLC SERPL-MCNC: 166 MG/DL
TRIGL SERPL-MCNC: 206 MG/DL

## 2021-06-18 PROCEDURE — 83036 HEMOGLOBIN GLYCOSYLATED A1C: CPT | Performed by: FAMILY MEDICINE

## 2021-06-18 PROCEDURE — 99214 OFFICE O/P EST MOD 30 MIN: CPT | Mod: 25 | Performed by: FAMILY MEDICINE

## 2021-06-18 PROCEDURE — 86803 HEPATITIS C AB TEST: CPT | Performed by: FAMILY MEDICINE

## 2021-06-18 PROCEDURE — 80061 LIPID PANEL: CPT | Performed by: FAMILY MEDICINE

## 2021-06-18 PROCEDURE — 36415 COLL VENOUS BLD VENIPUNCTURE: CPT | Performed by: FAMILY MEDICINE

## 2021-06-18 PROCEDURE — 90732 PPSV23 VACC 2 YRS+ SUBQ/IM: CPT | Performed by: FAMILY MEDICINE

## 2021-06-18 PROCEDURE — 99207 PR FOOT EXAM NO CHARGE: CPT | Mod: 25 | Performed by: FAMILY MEDICINE

## 2021-06-18 PROCEDURE — 90471 IMMUNIZATION ADMIN: CPT | Performed by: FAMILY MEDICINE

## 2021-06-18 PROCEDURE — 87389 HIV-1 AG W/HIV-1&-2 AB AG IA: CPT | Performed by: FAMILY MEDICINE

## 2021-06-18 NOTE — PROGRESS NOTES
"    Assessment & Plan     Type 2 diabetes mellitus with hyperglycemia, without long-term current use of insulin (H), controlled.  - Hemoglobin A1c  - Continue current management.  - Pneumococcal vaccine 23 valent PPSV23  (Pneumovax) [81810]    Hypertension goal BP (blood pressure) < 140/90, uncontrolled  - Resume Lisinopril 20 mg/day  - Repeat BP check in 2 weeks (Ancillary)    Hyperlipidemia LDL goal <100, on Atorvastatin   - Lipid Profile    History of Lyme disease  - Completed 21 days of Doxycycline    Need for hepatitis C screening test  - Hepatitis C Screen Reflex to HCV RNA Quant and Genotype    Encounter for screening for HIV  - HIV Antigen Antibody Combo    Stress at home  - Wife is hospitalized with COVID. Patient reports that she is improving.   Monitor mental health periodically.     BMI:   Estimated body mass index is 42.3 kg/m  as calculated from the following:    Height as of 2/22/21: 1.905 m (6' 3\").    Weight as of this encounter: 153.5 kg (338 lb 6.4 oz).   Weight management plan: Discussed healthy diet and exercise guidelines        Return in about 6 months (around 12/18/2021) for Diabetes Follow Up with a HgbA1C prior to visit.    Fatemeh Stevens MD  [facility] LEANDRO Joe is a 51 year old who presents for the following health issues     HPI         Diabetes Follow-up  Currently on Metformin 500 mg/day. Has been of of it while on the Doxycycline.     How often are you checking your blood sugar? Not at all    What concerns do you have today about your diabetes? None     Do you have any of these symptoms? (Select all that apply)  Numbness in feet  Having increase in stress level due to wife being in hospital for COVID, currently in the ICU but reports that she is now improving and is hoping to be discharged in week.     BP Readings from Last 2 Encounters:   06/18/21 (!) 145/98   05/24/21 (!) 142/68     Hemoglobin A1C (%)   Date Value   06/18/2021 6.4 (H)   01/29/2021 " 6.6 (H)     LDL Cholesterol Calculated (mg/dL)   Date Value   01/29/2021 128 (H)       Hypertension Follow-up  BP is elevated in the clinic today.  States that he stopped his BP meds when he started the Doxycycline.     Do you check your blood pressure regularly outside of the clinic? Yes     Are you following a low salt diet? No    Are your blood pressures ever more than 140 on the top number (systolic) OR more   than 90 on the bottom number (diastolic), for example 140/90? No    Recent history of Lyme disease- just completed a 21 days of Doxycycline feeling better. No longer having intense fevers.     Hyperlipidemia- was started on Atorvastatin. Making dietary and lifestyle changes.   Last lipid panel-   Recent Labs   Lab Test 01/29/21  1036   CHOL 202*   HDL 43   *   TRIG 156*      Would like to repeat labs today- states that he is fasting.     HEALTH CARE MAINTENANCE: due for HIV and Hep C screening. Due for Pneumovax. States that he is fully COVID vaccinated.     Review of Systems   Constitutional, HEENT, cardiovascular, pulmonary, gi and gu systems are negative, except as otherwise noted.      Objective    BP (!) 145/98   Pulse 74   Temp 98.7  F (37.1  C) (Tympanic)   Resp 16   Wt (!) 153.5 kg (338 lb 6.4 oz)   SpO2 97%   BMI 42.30 kg/m    Body mass index is 42.3 kg/m .  Physical Exam   GENERAL: healthy, alert and no distress  RESP: lungs clear to auscultation - no rales, rhonchi or wheezes  CV: regular rate and rhythm, normal S1 S2, no S3 or S4, no murmur, click or rub, no peripheral edema and peripheral pulses strong  Diabetic foot exam: normal DP and PT pulses, no trophic changes or ulcerative lesions, normal sensory exam and normal monofilament exam    DATA  Reviewed and discussed with patient prior to discharge.  Results for orders placed or performed in visit on 06/18/21   Hemoglobin A1c     Status: Abnormal   Result Value Ref Range    Hemoglobin A1C 6.4 (H) 0 - 5.6 %

## 2021-06-21 LAB
HCV AB SERPL QL IA: NONREACTIVE
HIV 1+2 AB+HIV1 P24 AG SERPL QL IA: NONREACTIVE

## 2021-07-06 NOTE — PROGRESS NOTES
Outpatient Physical Therapy Discharge Note     Patient: Heath Carpenter  : 1970    Beginning/End Dates of Reporting Period:  3/3/21 to 2021    Referring Provider: Dr. Amezquita    Therapy Diagnosis: L knee pain.     Client Self Report: Had a rough week.  States the back and hip were more sore.  Did a lot of traveling yesterday and got more sore.  Did a lot of stretching the last 4 days which has been helping.  Increased activity with outside chores.  Diet has been helping lost a couple pounds.  Trying to wean into orthotics.  Trying to figure out chiropractic coverage.    Objective Measurements:        Objective Measure: Palpation  Details: Increased tone and tightness to the L gluteal and ITBand.         Goals:  Goal Identifier #1   Goal Description Pt will demonstrate 5/5 MMT of L LE in order to demonstrate ability to get off floor without assistance of chair or sturdy object.   Target Date 21   Date Met      Progress:     Goal Identifier #2   Goal Description Pt will demonstrate ability to squat down to  objects off floor without knee pain.   Target Date 21   Date Met  (Still needs to use object/UE to get up)   Progress:     Goal Identifier #3   Goal Description Pt will report >75% on the LEFS demonstrating improved mobility with daily functional activities.   Target Date 21   Date Met      Progress:     Goal Identifier     Goal Description     Target Date     Date Met      Progress:     Goal Identifier     Goal Description     Target Date     Date Met      Progress:     Goal Identifier     Goal Description     Target Date     Date Met      Progress:     Goal Identifier     Goal Description     Target Date     Date Met      Progress:     Goal Identifier     Goal Description     Target Date     Date Met      Progress:     Progress Toward Goals:   Some progress noted.          Plan:  Discharge from therapy.    Discharge:    Reason for Discharge: Patient chooses to discontinue  therapy.    Equipment Issued: none    Discharge Plan: Patient to continue home program.

## 2021-07-29 ENCOUNTER — ALLIED HEALTH/NURSE VISIT (OUTPATIENT)
Dept: EDUCATION SERVICES | Facility: CLINIC | Age: 51
End: 2021-07-29
Attending: FAMILY MEDICINE
Payer: COMMERCIAL

## 2021-07-29 VITALS — WEIGHT: 315 LBS | BODY MASS INDEX: 43.82 KG/M2

## 2021-07-29 DIAGNOSIS — E11.65 TYPE 2 DIABETES MELLITUS WITH HYPERGLYCEMIA, WITHOUT LONG-TERM CURRENT USE OF INSULIN (H): ICD-10-CM

## 2021-07-29 PROCEDURE — G0108 DIAB MANAGE TRN  PER INDIV: HCPCS | Performed by: FAMILY MEDICINE

## 2021-07-29 RX ORDER — LANCETS
EACH MISCELLANEOUS
Qty: 100 EACH | Refills: 1 | Status: SHIPPED | OUTPATIENT
Start: 2021-07-29 | End: 2022-10-05

## 2021-07-29 NOTE — LETTER
"    7/29/2021         RE: Heath Carpenter  [id number] [id number]        Dear Colleague,    Thank you for referring your patient, Heath Carpenter, to the [facility]. Please see a copy of my visit note below.    Diabetes Self-Management Education & Support    Presents for: Individual review    SUBJECTIVE/OBJECTIVE:  Presents for: Individual review  Accompanied by: Self  Diabetes education in the past 24mo: (P) No  Diabetes type: (P) Type 2  Date of diagnosis: 10+ years w/ prediabetes  Disease course: (P) Stable  Diabetes management related comments/concerns: (P) no  Other concerns:: None  Cultural Influences/Ethnic Background:  Choose not to answer    Diabetes Symptoms & Complications:  Fatigue: (P) Yes  Neuropathy: (P) Yes  Polydipsia: (P) Yes  Polyphagia: (P) Sometimes  Polyuria: (P) Sometimes  Visual change: (P) No  Slow healing wounds: (P) Sometimes  Autonomic neuropathy: (P) No  CVA: (P) No  Heart disease: (P) No  Nephropathy: (P) No  Peripheral neuropathy: (P) Yes  Peripheral Vascular Disease: (P) No  Retinopathy: (P) No  Sexual dysfunction: (P) Yes    Patient Problem List and Family Medical History reviewed for relevant medical history, current medical status, and diabetes risk factors.    Vitals:  Wt (!) 159 kg (350 lb 9.6 oz)   BMI 43.82 kg/m    Estimated body mass index is 43.82 kg/m  as calculated from the following:    Height as of 2/22/21: 1.905 m (6' 3\").    Weight as of this encounter: 159 kg (350 lb 9.6 oz).   Last 3 BP:   BP Readings from Last 3 Encounters:   06/18/21 (!) 145/98   05/24/21 (!) 142/68   05/23/21 97/52       History   Smoking Status     Never Smoker   Smokeless Tobacco     Never Used       Labs:  Lab Results   Component Value Date    A1C 6.4 06/18/2021     Lab Results   Component Value Date     05/22/2021     Lab Results   Component Value Date     06/18/2021     HDL Cholesterol   Date Value Ref Range Status   06/18/2021 42 >39 mg/dL " TRANSFER - OUT REPORT:    Verbal report given to [address] Manuel Harris RN (name) on [address]  being transferred to 6th floor (unit) for routine progression of care       Report consisted of patients Situation, Background, Assessment and   Recommendations(SBAR). Information from the following report(s) SBAR was reviewed with the receiving nurse. Lines:   Peripheral IV 06/10/21 Left;Posterior Forearm (Active)   Site Assessment Clean, dry, & intact 06/10/21 1139   Phlebitis Assessment 0 06/10/21 1139   Infiltration Assessment 0 06/10/21 1139   Dressing Status Clean, dry, & intact 06/10/21 1139       Peripheral IV 06/10/21 Right Antecubital (Active)   Site Assessment Clean, dry, & intact 06/10/21 1424        Opportunity for questions and clarification was provided.       Patient transported with:   [employer] Final   ]  GFR Estimate   Date Value Ref Range Status   05/22/2021 76 >60 mL/min/[1.73_m2] Final     Comment:     Non  GFR Calc  Starting 12/18/2018, serum creatinine based estimated GFR (eGFR) will be   calculated using the Chronic Kidney Disease Epidemiology Collaboration   (CKD-EPI) equation.       GFR Estimate If Black   Date Value Ref Range Status   05/22/2021 88 >60 mL/min/[1.73_m2] Final     Comment:      GFR Calc  Starting 12/18/2018, serum creatinine based estimated GFR (eGFR) will be   calculated using the Chronic Kidney Disease Epidemiology Collaboration   (CKD-EPI) equation.       Lab Results   Component Value Date    CR 1.10 05/22/2021     No results found for: MICROALBUMIN    Healthy Eating:  Healthy Eating Assessed Today: Yes  Cultural/Sabianism diet restrictions?: (P) No  Meal planning/habits: (P) Calorie counting, Carb counting, Smaller portions, Keeps food records  How many times a week on average do you eat food made away from home (restaurant/take-out)?: (P) 5+  Meals include: (P) Breakfast, Lunch, Dinner, Morning Snack, Evening Snack  Breakfast: bowl of Special K, banana, coffee w/ 2% milk  Lunch: leftover chicken fried rice, shrimp OR sandwich w/ wheat bread, miracle whip, mustard, turkey, cheese & lettuc, small salads + ranch + cheese, water  Dinner: spaghetti OR protein, starch, vegetable - weighs foods  Snacks: AM - [employer] yogurt; HS -  Other: Using Noom - 2210 kcals/day as goal  Beverages: (P) Water, Tea, Coffee, Milk  Has patient met with a dietitian in the past?: No    Being Active:  Being Active Assessed Today: Yes  Exercise:: Yes  Days per week of moderate to strenuous exercise (like a brisk walk): 7  On average, minutes per day of exercise at this level: 60  How intense was your typical exercise? : Moderate (like brisk walking)  Exercise Minutes per Week: 420  Barrier to exercise: (P) Other    Monitoring:  Monitoring Assessed Today: Yes  Did patient bring  glucose meter to appointment? : No  Times checking blood sugar at home (number): (P) Never  Blood glucose trend: (P) No change    Taught meter today     Taking Medications:  Diabetes Medication(s)     Biguanides       metFORMIN (GLUCOPHAGE) 500 MG tablet    Take 1 tablet (500 mg) by mouth daily (with breakfast)     Patient taking differently: Take 500 mg by mouth daily (with lunch)           Taking Medication Assessed Today: Yes  Current Treatments: (P) Oral Medication (taken by mouth)  Problems taking diabetes medications regularly?: No  Diabetes medication side effects?: Yes    Problem Solving:  Problem Solving Assessed Today: Yes  Is the patient at risk for hypoglycemia?: No  Is the patient at risk for DKA?: No  Does patient have severe weather/disaster plan for diabetes management?: No  Does patient have sick day plan for diabetes management?: No    Reducing Risks:  Reducing Risks Assessed Today: No  Diabetes Risks: Family History, Age over 45 years  CAD Risks: (P) Diabetes Mellitus, Dyslipidemia, Hypertension, Male sex, Obesity, Stress  Has dilated eye exam at least once a year?: (P) Yes  Sees dentist every 6 months?: (P) Yes  Feet checked by healthcare provider in the last year?: (P) Yes    Healthy Coping:  Healthy Coping Assessed Today: Yes  Emotional response to diabetes: Ready to learn, Acceptance, Concern for health and well-being  Informal Support system:: (P) Children, Family, Friends, Spouse  Stage of change: ACTION (Actively working towards change)  Support resources: None  Patient Activation Measure Survey Score:  No flowsheet data found.    Diabetes knowledge and skills assessment:   Patient is knowledgeable in diabetes management concepts related to: Healthy Eating and Being Active    Patient needs further education on the following diabetes management concepts: Healthy Eating, Being Active, Monitoring, Taking Medication, Problem Solving, Reducing Risks and Healthy Coping    Based on learning  assessment above, most appropriate setting for further diabetes education would be: Group class or Individual setting.      INTERVENTIONS:    Education provided today on:  AADE Self-Care Behaviors:  Diabetes Pathophysiology  Healthy Eating: carbohydrate counting, consistency in amount, composition, and timing of food intake, weight reduction, portion control, plate planning method and label reading  Being Active: relationship to blood glucose, describe appropriate activity program and precautions to take  Monitoring: log and interpret results, individual blood glucose targets and frequency of monitoring  Taking Medication: side effects of prescribed medications  Problem Solving: low blood glucose - causes, signs/symptoms, treatment and prevention  Reducing Risks: major complications of diabetes, prevention, early diagnostic measures and treatment of complications and A1C - goals, relating to blood glucose levels, how often to check  Patient was instructed on Contour Next EZ meter and was able to provide an accurate return demonstration. Patient's blood glucose reading today was 205 mg/dL.    Opportunities for ongoing education and support in diabetes-self management were discussed.    Pt verbalized understanding of concepts discussed and recommendations provided today.       Education Materials Provided:  BG Log Sheet, Carbohydrate Counting and My Plate Planner      ASSESSMENT:  Patient has had diabetes for many years, always borderline diabetes/pre-diabetes A1Cs. Has been on & off metformin over time, now back on it. Asking for more education regarding his diet, testing blood sugars and diabetes in general. He has utilized [employer] in the past, now using [employer]om to log foods. 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 He would like to be able to check his blood sugars on occasion - agreed that this is appropriate a few times  a week, especially for help with food choices, but no need to check blood sugars all that frequently. He is agreeable.  His goal is to lose some weight - encouraged more dedicated exercise to help support healthy diet changes. He is agreeable.     Patient's most recent   Lab Results   Component Value Date    A1C 6.4 06/18/2021    is meeting goal of <7.0    PLAN  Check blood sugars 1-2 times/week, no more than once a day  Count carbohydrates at meals & snacks - goal of 60-75 grams/meal & 15-30 grams/snack  See Follow-Up section for recommended follow-up.    Mirna Ellison RD, LD, [facility]   Time Spent: 60 minutes  Encounter Type: Individual    Any diabetes medication dose changes were made via the CDE Protocol and Collaborative Practice Agreement with the patient's referring provider. A copy of this encounter was shared with the provider.

## 2021-07-29 NOTE — PROGRESS NOTES
"Diabetes Self-Management Education & Support    Presents for: Individual review    SUBJECTIVE/OBJECTIVE:  Presents for: Individual review  Accompanied by: Self  Diabetes education in the past 24mo: (P) No  Diabetes type: (P) Type 2  Date of diagnosis: 10+ years w/ prediabetes  Disease course: (P) Stable  Diabetes management related comments/concerns: (P) no  Other concerns:: None  Cultural Influences/Ethnic Background:  Choose not to answer    Diabetes Symptoms & Complications:  Fatigue: (P) Yes  Neuropathy: (P) Yes  Polydipsia: (P) Yes  Polyphagia: (P) Sometimes  Polyuria: (P) Sometimes  Visual change: (P) No  Slow healing wounds: (P) Sometimes  Autonomic neuropathy: (P) No  CVA: (P) No  Heart disease: (P) No  Nephropathy: (P) No  Peripheral neuropathy: (P) Yes  Peripheral Vascular Disease: (P) No  Retinopathy: (P) No  Sexual dysfunction: (P) Yes    Patient Problem List and Family Medical History reviewed for relevant medical history, current medical status, and diabetes risk factors.    Vitals:  Wt (!) 159 kg (350 lb 9.6 oz)   BMI 43.82 kg/m    Estimated body mass index is 43.82 kg/m  as calculated from the following:    Height as of 2/22/21: 1.905 m (6' 3\").    Weight as of this encounter: 159 kg (350 lb 9.6 oz).   Last 3 BP:   BP Readings from Last 3 Encounters:   06/18/21 (!) 145/98   05/24/21 (!) 142/68   05/23/21 97/52       History   Smoking Status     Never Smoker   Smokeless Tobacco     Never Used       Labs:  Lab Results   Component Value Date    A1C 6.4 06/18/2021     Lab Results   Component Value Date     05/22/2021     Lab Results   Component Value Date     06/18/2021     HDL Cholesterol   Date Value Ref Range Status   06/18/2021 42 >39 mg/dL Final   ]  GFR Estimate   Date Value Ref Range Status   05/22/2021 76 >60 mL/min/[1.73_m2] Final     Comment:     Non  GFR Calc  Starting 12/18/2018, serum creatinine based estimated GFR (eGFR) will be   calculated using the Chronic " Kidney Disease Epidemiology Collaboration   (CKD-EPI) equation.       GFR Estimate If Black   Date Value Ref Range Status   05/22/2021 88 >60 mL/min/[1.73_m2] Final     Comment:      GFR Calc  Starting 12/18/2018, serum creatinine based estimated GFR (eGFR) will be   calculated using the Chronic Kidney Disease Epidemiology Collaboration   (CKD-EPI) equation.       Lab Results   Component Value Date    CR 1.10 05/22/2021     No results found for: MICROALBUMIN    Healthy Eating:  Healthy Eating Assessed Today: Yes  Cultural/Scientology diet restrictions?: (P) No  Meal planning/habits: (P) Calorie counting, Carb counting, Smaller portions, Keeps food records  How many times a week on average do you eat food made away from home (restaurant/take-out)?: (P) 5+  Meals include: (P) Breakfast, Lunch, Dinner, Morning Snack, Evening Snack  Breakfast: bowl of Special K, banana, coffee w/ 2% milk  Lunch: leftover chicken fried rice, shrimp OR sandwich w/ wheat bread, miracle whip, mustard, turkey, cheese & lettuc, small salads + ranch + cheese, water  Dinner: spaghetti OR protein, starch, vegetable - weighs foods  Snacks: AM - [employer]i yogurt; HS -  Other: Using Noom - 2210 kcals/day as goal  Beverages: (P) Water, Tea, Coffee, Milk  Has patient met with a dietitian in the past?: No    Being Active:  Being Active Assessed Today: Yes  Exercise:: Yes  Days per week of moderate to strenuous exercise (like a brisk walk): 7  On average, minutes per day of exercise at this level: 60  How intense was your typical exercise? : Moderate (like brisk walking)  Exercise Minutes per Week: 420  Barrier to exercise: (P) Other    Monitoring:  Monitoring Assessed Today: Yes  Did patient bring glucose meter to appointment? : No  Times checking blood sugar at home (number): (P) Never  Blood glucose trend: (P) No change    Taught meter today     Taking Medications:  Diabetes Medication(s)     Biguanides       metFORMIN (GLUCOPHAGE) 500 MG  tablet    Take 1 tablet (500 mg) by mouth daily (with breakfast)     Patient taking differently: Take 500 mg by mouth daily (with lunch)           Taking Medication Assessed Today: Yes  Current Treatments: (P) Oral Medication (taken by mouth)  Problems taking diabetes medications regularly?: No  Diabetes medication side effects?: Yes    Problem Solving:  Problem Solving Assessed Today: Yes  Is the patient at risk for hypoglycemia?: No  Is the patient at risk for DKA?: No  Does patient have severe weather/disaster plan for diabetes management?: No  Does patient have sick day plan for diabetes management?: No    Reducing Risks:  Reducing Risks Assessed Today: No  Diabetes Risks: Family History, Age over 45 years  CAD Risks: (P) Diabetes Mellitus, Dyslipidemia, Hypertension, Male sex, Obesity, Stress  Has dilated eye exam at least once a year?: (P) Yes  Sees dentist every 6 months?: (P) Yes  Feet checked by healthcare provider in the last year?: (P) Yes    Healthy Coping:  Healthy Coping Assessed Today: Yes  Emotional response to diabetes: Ready to learn, Acceptance, Concern for health and well-being  Informal Support system:: (P) Children, Family, Friends, Spouse  Stage of change: ACTION (Actively working towards change)  Support resources: None  Patient Activation Measure Survey Score:  No flowsheet data found.    Diabetes knowledge and skills assessment:   Patient is knowledgeable in diabetes management concepts related to: Healthy Eating and Being Active    Patient needs further education on the following diabetes management concepts: Healthy Eating, Being Active, Monitoring, Taking Medication, Problem Solving, Reducing Risks and Healthy Coping    Based on learning assessment above, most appropriate setting for further diabetes education would be: Group class or Individual setting.      INTERVENTIONS:    Education provided today on:  AADE Self-Care Behaviors:  Diabetes Pathophysiology  Healthy Eating: carbohydrate  counting, consistency in amount, composition, and timing of food intake, weight reduction, portion control, plate planning method and label reading  Being Active: relationship to blood glucose, describe appropriate activity program and precautions to take  Monitoring: log and interpret results, individual blood glucose targets and frequency of monitoring  Taking Medication: side effects of prescribed medications  Problem Solving: low blood glucose - causes, signs/symptoms, treatment and prevention  Reducing Risks: major complications of diabetes, prevention, early diagnostic measures and treatment of complications and A1C - goals, relating to blood glucose levels, how often to check  Patient was instructed on Contour Next EZ meter and was able to provide an accurate return demonstration. Patient's blood glucose reading today was 205 mg/dL.    Opportunities for ongoing education and support in diabetes-self management were discussed.    Pt verbalized understanding of concepts discussed and recommendations provided today.       Education Materials Provided:  BG Log Sheet, Carbohydrate Counting and My Plate Planner      ASSESSMENT:  Patient has had diabetes for many years, always borderline diabetes/pre-diabetes A1Cs. Has been on & off metformin over time, now back on it. Asking for more education regarding his diet, testing blood sugars and diabetes in general. He has utilized Sgnam in the past, now using [employer]igen to log foods. 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 He would like to be able to check his blood sugars on occasion - agreed that this is appropriate a few times a week, especially for help with food choices, but no need to check blood sugars all that frequently. He is agreeable.  His goal is to lose some weight - encouraged more dedicated exercise to help support healthy diet changes. He is agreeable.      Patient's most recent   Lab Results   Component Value Date    A1C 6.4 06/18/2021    is meeting goal of <7.0    PLAN  Check blood sugars 1-2 times/week, no more than once a day  Count carbohydrates at meals & snacks - goal of 60-75 grams/meal & 15-30 grams/snack  See Follow-Up section for recommended follow-up.    Mirna Ellison RD, LD, [facility]   Time Spent: 60 minutes  Encounter Type: Individual    Any diabetes medication dose changes were made via the CDE Protocol and Collaborative Practice Agreement with the patient's referring provider. A copy of this encounter was shared with the provider.

## 2021-07-30 ENCOUNTER — VIRTUAL VISIT (OUTPATIENT)
Dept: NUTRITION | Facility: CLINIC | Age: 51
End: 2021-07-30
Payer: COMMERCIAL

## 2021-07-30 DIAGNOSIS — E66.01 MORBID OBESITY (H): Primary | ICD-10-CM

## 2021-07-30 DIAGNOSIS — E11.65 TYPE 2 DIABETES MELLITUS WITH HYPERGLYCEMIA, WITHOUT LONG-TERM CURRENT USE OF INSULIN (H): ICD-10-CM

## 2021-07-30 PROCEDURE — 97802 MEDICAL NUTRITION INDIV IN: CPT

## 2021-07-30 NOTE — PROGRESS NOTES
Type of service:  Video Visit    If the video visit is dropped, the video visit invitation should be resent by: Send to e-mail at: [email or web address]    Originating Location (pt. Location): Other work  Distant Location (provider location): Home  Mode of Communication:  Video Conference via Tagent    Video Start Time: 2:35 pm  Video End Time (time video stopped): 3:18 pm    How would patient like to obtain AVS? [facility]      Medical Nutrition Therapy  Visit Type:Initial assessment and intervention    Heath Carpenter presents today for MNT and education related to type 2 diabetes and weight management.   He is accompanied by self.     ASSESSMENT:   Patient comments/concerns relating to nutrition: Has noticed some weight gain with Noom. Calorie goal on Noom is ~2200 kcal/d. He feels this is too high. He is worried about his fatty liver and wants to help his diabetes and lose weight. Had a stressful last 1-2 months with his wife as she had covid and was in the hospital and he could not visit her. She is home now. Pt does most of the cooking for meals. He plans ahead for dinner but not as much for breakfast and lunch.     NUTRITION HISTORY:  Trying to shop the perimeter of the grocery store. Is frustrated with trying to lose weight but not seeing any come off.  How many times a week on average do you eat food made away from home (restaurant/take-out)?: (P) 5+  Meals include: (P) Breakfast, Lunch, Dinner, Morning Snack, Evening Snack  Breakfast: bowl of Special K, banana, coffee w/ 2% milk  Lunch: leftover chicken fried rice, shrimp OR sandwich w/ wheat bread, miracle whip, mustard, turkey, cheese & lettuce, small salads + ranch + cheese, water  Dinner: spaghetti OR protein, starch, vegetable - weighs foods  Snacks: AM - Aldi yogurt; HS - likes something sweet like ice cream (has tried smoothies and yogurt instead)    Misses meals? 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 Used to walk but since his wife had covid he has not been walking. Is a  so has activity with his role.     SOCIO/ECONOMIC:   Lives with: spouse    MEDICATIONS:  Current Outpatient Medications   Medication     acetaminophen (TYLENOL) 500 MG tablet     aspirin (ASA) 81 MG EC tablet     atorvastatin (LIPITOR) 20 MG tablet     blood glucose (NO BRAND SPECIFIED) test strip     ibuprofen (ADVIL/MOTRIN) 200 MG tablet     lisinopril (ZESTRIL) 20 MG tablet     magnesium 250 MG tablet     metFORMIN (GLUCOPHAGE) 500 MG tablet     Microlet Lancets MISC     MULTIPLE VITAMIN PO     vitamin (B COMPLEX-C) tablet     vitamin E (TOCOPHEROL) 1000 units (450 mg) capsule     No current facility-administered medications for this visit.       LABS:  Last Basic Metabolic Panel:  Lab Results   Component Value Date     05/22/2021      Lab Results   Component Value Date    POTASSIUM 3.9 05/22/2021     Lab Results   Component Value Date    CHLORIDE 104 05/22/2021     Lab Results   Component Value Date    ANCELMO 8.5 05/22/2021     Lab Results   Component Value Date    CO2 29 05/22/2021     Lab Results   Component Value Date    BUN 13 05/22/2021     Lab Results   Component Value Date    CR 1.10 05/22/2021     Lab Results   Component Value Date     05/22/2021       ANTHROPOMETRICS:  Vitals: There were no vitals taken for this visit.  There is no height or weight on file to calculate BMI.      Wt Readings from Last 5 Encounters:   07/29/21 (!) 159 kg (350 lb 9.6 oz)   06/18/21 (!) 153.5 kg (338 lb 6.4 oz)   05/24/21 (!) 154.2 kg (340 lb)   05/22/21 (!) 154.2 kg (340 lb)   03/02/21 (!) 152 kg (335 lb)       Weight Change: has seen it go up a bit    ESTIMATED KCAL REQUIREMENTS:  5429-1852 kcal per day using mifflin st jeor (for weight loss)    NUTRITION DIAGNOSIS: Food- and nutrition-related knowledge deficit related to no prior nutrition education as evidenced by  pt with a number of questions today regarding his diet and fatty liver, weight loss, and diabetes.     NUTRITION INTERVENTION:  Education given to support: general nutrition guidelines, weight reduction, consistent meals, fat modification, exercise, fiber, behavior modification, portion control and heart healthy diet  Motivational Interviewing    Focused on how many calories to aim for. Educated on goal of 0858-3530 kcal/d for weight loss but would try to aim for the lower end of this.  Discussed apps for tracking his intakes. He likes my fitness pal so encouraged this one.  For macros, encouraged 50% carb, 25-30% protein, 20-25% fat as a starting place. Educated on keeping his carb intake consistent at meals to help his blood sugars.  Focused on heart healthy sources today to help with his cholesterol and liver too.  Encouraged foods rich in fiber and limiting sat fat in the diet.  For his liver, encouraged weight loss, healthy blood sugars, exercise, and avoiding alcohol. Discussed some recipe sites for healthy meals and encouraged planning ahead.      PATIENT'S BEHAVIOR CHANGE GOALS:   See Patient Instructions for patient stated behavior change goals. AVS was printed and given to patient at today's appointment.    MONITOR / EVALUATE:  RD will monitor/evaluate:  Progress toward meeting stated nutrition-related goals  Weight change    FOLLOW-UP:  Follow up with RD as needed.  Call RD with questions/concerns.     WILLEM Johnson CDE    Time spent in minutes: 43  Encounter: Individual

## 2021-07-30 NOTE — PATIENT INSTRUCTIONS
Recipe websites:  [email or web address]    [email or web address]    For diabetes, recommend 45-60 grams of carb per meal. Try to include lean protein at each meal/snack. Can track intakes in My Fitness Pal.     For weight loss, recommend 5091-6104 calories per day. Would try to aim for the lower end of this.    Recommend to increase exercise as able and aim for at least 150 min of mod intensity exercise per week.     To help improve fatty liver:    Losing excess weight    Getting regular exercise    Controlling diabetes    Controlling high cholesterol or triglyceride levels    Taking medicines and vitamins as prescribed by your provider    Not smoking    Not drinking alcohol    Eating a healthy diet        Patient Education     Diabetes: Understanding Carbohydrates, Fats, and Protein  Food is a source of fuel and nourishment for your body. It s also a source of pleasure. Having diabetes doesn t mean you have to eat special foods or give up desserts. Instead, your dietitian can show you how to plan meals to suit your body. To start, learn how different foods affect blood sugar.  Carbohydrates  Carbohydrates (carbs) are the main source of fuel for the body. They raise blood sugar. Many people think carbohydrates are only in pasta or bread. But carbohydrates are in many kinds of foods. Carbs include:    Sugars.These are naturally in foods such as fruit, milk, honey, and molasses. Sugars can also be added to many foods. They may be added to cereals and yogurt to candy and desserts. Sugars raise blood sugar.    Starches. These are in bread, cereals, pasta, and dried beans. They re found in corn, peas, potatoes, yam, acorn squash, and butternut squash. Starches raise blood sugar.     Fiber. This is in foods such as vegetables, fruits, beans, and whole grains. Unlike other carbs, fiber isn t digested or absorbed. So it doesn t raise blood sugar. In fact, fiber can help keep blood sugar from rising too fast. It helps keep blood  "cholesterol at a healthy level.  Did you know?  Even though carbohydrates raise blood sugar, it s best to have some in every meal. They are an important part of a healthy diet.  Fat  Fat is an energy source that can be stored until needed. Fat does not raise blood sugar. But it can raise blood cholesterol. This increases the risk of heart disease. Fat is high in calories. Eating too many calories can cause weight gain. Not all types of fat are the same.  More healthy:    Monounsaturated fats. These are mostly found in vegetable oils, such as olive, canola, and peanut oils. They are found in avocados and some nuts. Monounsaturated fats are healthy for your heart. That s because they lower LDL (unhealthy) cholesterol.    Polyunsaturated fats.These are mostly found in vegetable oils, such as corn, safflower, and soybean oils. They are found in some seeds, nuts, and fish. Polyunsaturated fats lower LDL (unhealthy) cholesterol. So, choosing them instead of saturated fats is healthy for your heart. Certain unsaturated fats can help lower triglycerides.   Less healthy:    Saturated fats.These are found in animal products, such as meat, poultry, whole milk, lard, and butter. Saturated fats raise LDL cholesterol.They are not healthy for your heart.    Hydrogenated oilsand trans fats. These are formed when vegetable oils are processed into solid fats. They are found in many processed foods. Hydrogenated oils and trans fats raise LDL (\"bad\") cholesterol and lower HDL (\"good\") cholesterol. They are not healthy for your heart.  Protein  Protein helps the body build and repair muscle and other tissue. Protein has little or no effect on blood sugar. But many foods that have protein also have saturated fat. By choosing low-fat protein sources, you can get the benefits of protein without the extra fat:    Plant protein. This is found in dry beans and peas, nuts, and soy products, such as tofu and soymilk. These foods tend to have no " cholesterol.Most are low in saturated fat.    Animal protein. This is found in fish, poultry, meat, cheese, milk, and eggs. These foods have cholesterol.They can be high in saturated fat. Aim for lean, lower-fat choices. Don't eat fried foods.  Conor last reviewed this educational content on 4/1/2019 2000-2021 [employer]. All rights reserved. This information is not intended as a substitute for professional medical care. Always follow your healthcare professional's instructions.           Patient Education     Diabetes: Understanding Carbohydrates, Fats, and Protein  Food is a source of fuel and nourishment for your body. It s also a source of pleasure. Having diabetes doesn t mean you have to eat special foods or give up desserts. Instead, your dietitian can show you how to plan meals to suit your body. To start, learn how different foods affect blood sugar.  Carbohydrates  Carbohydrates (carbs) are the main source of fuel for the body. They raise blood sugar. Many people think carbohydrates are only in pasta or bread. But carbohydrates are in many kinds of foods. Carbs include:    Sugars.These are naturally in foods such as fruit, milk, honey, and molasses. Sugars can also be added to many foods. They may be added to cereals and yogurt to candy and desserts. Sugars raise blood sugar.    Starches. These are in bread, cereals, pasta, and dried beans. They re found in corn, peas, potatoes, yam, acorn squash, and butternut squash. Starches raise blood sugar.     Fiber. This is in foods such as vegetables, fruits, beans, and whole grains. Unlike other carbs, fiber isn t digested or absorbed. So it doesn t raise blood sugar. In fact, fiber can help keep blood sugar from rising too fast. It helps keep blood cholesterol at a healthy level.  Did you know?  Even though carbohydrates raise blood sugar, it s best to have some in every meal. They are an important part of a healthy diet.  Fat  Fat is an  "energy source that can be stored until needed. Fat does not raise blood sugar. But it can raise blood cholesterol. This increases the risk of heart disease. Fat is high in calories. Eating too many calories can cause weight gain. Not all types of fat are the same.  More healthy:    Monounsaturated fats. These are mostly found in vegetable oils, such as olive, canola, and peanut oils. They are found in avocados and some nuts. Monounsaturated fats are healthy for your heart. That s because they lower LDL (unhealthy) cholesterol.    Polyunsaturated fats.These are mostly found in vegetable oils, such as corn, safflower, and soybean oils. They are found in some seeds, nuts, and fish. Polyunsaturated fats lower LDL (unhealthy) cholesterol. So, choosing them instead of saturated fats is healthy for your heart. Certain unsaturated fats can help lower triglycerides.   Less healthy:    Saturated fats.These are found in animal products, such as meat, poultry, whole milk, lard, and butter. Saturated fats raise LDL cholesterol.They are not healthy for your heart.    Hydrogenated oilsand trans fats. These are formed when vegetable oils are processed into solid fats. They are found in many processed foods. Hydrogenated oils and trans fats raise LDL (\"bad\") cholesterol and lower HDL (\"good\") cholesterol. They are not healthy for your heart.  Protein  Protein helps the body build and repair muscle and other tissue. Protein has little or no effect on blood sugar. But many foods that have protein also have saturated fat. By choosing low-fat protein sources, you can get the benefits of protein without the extra fat:    Plant protein. This is found in dry beans and peas, nuts, and soy products, such as tofu and soymilk. These foods tend to have no cholesterol.Most are low in saturated fat.    Animal protein. This is found in fish, poultry, meat, cheese, milk, and eggs. These foods have cholesterol.They can be high in saturated fat. Aim " for lean, lower-fat choices. Don't eat fried foods.  [employer] last reviewed this educational content on 4/1/2019 2000-2021 [employer]. All rights reserved. This information is not intended as a substitute for professional medical care. Always follow your healthcare professional's instructions.           Patient Education     Healthy Meals for Diabetes     A healthcare provider will help you develop a meal plan that fits your needs.   Ask your healthcare team to help you make a meal plan that fits your needs. Your meal plan tells you when to eat your meals and snacks, what kinds of foods to eat, and how much of each food to eat. You don t have to give up all the foods you like. But you do need to follow some guidelines.  Choose healthy carbohydrates  Starches, sugars, and fiber are all types of carbohydrates. Fiber can help lower your cholesterol and triglycerides. Fiber is also healthy for your heart. You should have 20 to 35 grams of total fiber each day. Fiber-rich foods include:    Whole-grain breads and cereals    Bulgur wheat    Brown rice   Whole-wheat pasta    Fruits and vegetables    Dry beans, and peas   Keep track of the amount of carbohydrates you eat. This can help you keep the right balance of physical activity and medicine. The amount of carbohydrates needed will vary for each person. It depends on many things such as your health, the medicines you take, and how active you are. Your healthcare team will help you figure out the right amount of carbohydrates for you. You may start with around 45 to 60 grams of carbohydrates per meal, depending on your situation.   Here are some examples of foods containing about 15 grams of carbohydrates (1 serving of carbohydrates):    1/2 cup of canned or frozen fruit    A small piece of fresh fruit (4 ounces)    1 slice of bread    1/2 cup of oatmeal    1/3 cup of rice    4 to 6 crackers    1/2 English muffin    1/2 cup of black beans    1/4 of a  large baked potato (3 ounces)    2/3 cup of plain fat-free yogurt    1 cup of soup    1/2 cup of casserole    6 chicken nuggets    2-inch-square brownie or cake without frosting    2 small cookies    1/2 cup of ice cream or sherbet  Choose healthy protein foods  Eating protein that is low in fat can help you control your weight. It also helps keep your heart healthy. Low-fat protein foods include:    Fish    Plant proteins, such as dry beans and peas, nuts, and soy products like tofu and soymilk    Lean meat with all visible fat removed    Poultry with the skin removed    Low-fat or nonfat milk, cheese, and yogurt  Limit unhealthy fats and sugar  Saturated and trans fats are unhealthy for your heart. They raise LDL (bad) cholesterol. Fat is also high in calories, so it can make you gain weight. To cut down on unhealthy fats and sugar, limit these foods:    Butter or margarine    Palm and palm kernel oils and coconut oil    Cream    Cheese    Cornejo    Lunch meats   Ice cream    Sweet bakery goods such as pies, muffins, and donuts    Jams and jellies    Candy bars    Regular sodas   How much to eat  The amount of food you eat affects your blood sugar. It also affects your weight. Your healthcare team will tell you how much of each type of food you should eat.    Use measuring cups and spoons and a food scale to measure serving sizes.    Learn what a correct serving size looks like on your plate. This will help when you are away from home and can t measure your servings. For example, a serving of meat is about the palm of your hand.    Eat only the number of servings given on your meal plan for each food. Don t take seconds.    Learn to read food labels. Be sure to look at serving size, total carbohydrates, fiber, calories, sugar, and saturated and trans fats. Look for healthier alternatives to foods that have added sugar.    Plan ahead for parties so you can still have a good time without going overboard with  unhealthy food choices. Set a good example yourself by bringing a healthy dish to pot randal.   Choose healthy snacks  When it comes to snacks, we usually think about foods with added sugar and fats. But there are many other options for healthier snack choices. Here are a few snack ideas to choose from:  Snacks with less than 5 grams of carbohydrates    1 piece of string cheese    3 celery sticks plus 1 tablespoon of peanut butter    5 cherry tomatoes plus 1 tablespoon of ranch dressing    1 hard-boiled egg    1/4 cup of fresh blueberries     5 baby carrots    1 cup of light popcorn    1/2 cup of sugar-free gelatin    15 almonds  Snacks with about 10 to 20 grams of carbohydrates    1/3 cup of hummus plus 1 cup of fresh cut nonstarchy vegetables (carrots, green peppers, broccoli, celery, or a combination)    1/2 cup of fresh or canned fruit plus 1/4 cup of cottage cheese    1/2 cup of tuna salad with 4 crackers    2 rice cakes and a tablespoon of peanut butter    1 small apple or orange    3 cups light popcorn    1/2 of a turkey sandwich (1 slice of whole-wheat bread, 2 ounces of turkey, and mustard)  Portion sizes are important to controlling your blood sugar and staying at a healthy weight. Stock up on healthy snack items so you always have them on hand.  When to eat  Your meal plan will likely include breakfast, lunch, dinner, and some snacks.    Try to eat your meals and snacks at about the same times each day.    Eat all your meals and snacks. Skipping a meal or snack can make your blood sugar drop too low. It can also cause you to eat too much at the next meal or snack. Then your blood sugar could get too high.  [employer] last reviewed this educational content on 11/1/2018 2000-2021 [employer]. All rights reserved. This information is not intended as a substitute for professional medical care. Always follow your healthcare professional's instructions.

## 2021-09-13 ENCOUNTER — OFFICE VISIT (OUTPATIENT)
Dept: DERMATOLOGY | Facility: CLINIC | Age: 51
End: 2021-09-13
Payer: COMMERCIAL

## 2021-09-13 DIAGNOSIS — D48.5 NEOPLASM OF UNCERTAIN BEHAVIOR OF SKIN: ICD-10-CM

## 2021-09-13 DIAGNOSIS — L82.1 SEBORRHEIC KERATOSIS: ICD-10-CM

## 2021-09-13 DIAGNOSIS — D18.01 CHERRY ANGIOMA: ICD-10-CM

## 2021-09-13 DIAGNOSIS — L91.8 INFLAMED SKIN TAG: ICD-10-CM

## 2021-09-13 DIAGNOSIS — L81.4 SOLAR LENTIGO: ICD-10-CM

## 2021-09-13 DIAGNOSIS — D22.9 MULTIPLE BENIGN NEVI: ICD-10-CM

## 2021-09-13 DIAGNOSIS — L82.0 SEBORRHEIC KERATOSIS, INFLAMED: ICD-10-CM

## 2021-09-13 DIAGNOSIS — L73.9 FOLLICULITIS: ICD-10-CM

## 2021-09-13 DIAGNOSIS — L57.8 SUN-DAMAGED SKIN: ICD-10-CM

## 2021-09-13 DIAGNOSIS — Z85.828 HISTORY OF NONMELANOMA SKIN CANCER: Primary | ICD-10-CM

## 2021-09-13 DIAGNOSIS — L57.0 ACTINIC KERATOSIS: ICD-10-CM

## 2021-09-13 PROCEDURE — 17000 DESTRUCT PREMALG LESION: CPT | Mod: XS | Performed by: DERMATOLOGY

## 2021-09-13 PROCEDURE — 17003 DESTRUCT PREMALG LES 2-14: CPT | Mod: XS | Performed by: DERMATOLOGY

## 2021-09-13 PROCEDURE — 17110 DESTRUCTION B9 LES UP TO 14: CPT | Mod: GC | Performed by: DERMATOLOGY

## 2021-09-13 PROCEDURE — 88305 TISSUE EXAM BY PATHOLOGIST: CPT | Performed by: DERMATOLOGY

## 2021-09-13 PROCEDURE — 11102 TANGNTL BX SKIN SINGLE LES: CPT | Mod: XS | Performed by: DERMATOLOGY

## 2021-09-13 PROCEDURE — 99213 OFFICE O/P EST LOW 20 MIN: CPT | Mod: 25 | Performed by: DERMATOLOGY

## 2021-09-13 PROCEDURE — 11200 RMVL SKIN TAGS UP TO&INC 15: CPT | Mod: XS | Performed by: DERMATOLOGY

## 2021-09-13 NOTE — NURSING NOTE
The following medication was given:     MEDICATION:  Lidocaine with epinephrine 1% [id number]  ROUTE: SQ  SITE: see procedure note  DOSE: 0.5cc  LOT #: -[id number]  : [place]  EXPIRATION DATE: 6/1/22  NDC#: 9592-9777-41  Was there drug waste? 1.5cc  Multi-dose vial: Yes        Nona Leonard on 9/13/2021 at 2:07 PM

## 2021-09-13 NOTE — NURSING NOTE
Heath Carpenter's goals for this visit include:   Chief Complaint   Patient presents with     Skin Check     hx of nmsc/ spots on face are only concern      He requests these members of his care team be copied on today's visit information:     PCP: Fatemeh Stevens    Referring Provider:  No referring provider defined for this encounter.    There were no vitals taken for this visit.    Do you need any medication refills at today's visit? No    Coby Guajardo, AMANDA on 9/13/2021 at 12:53 PM

## 2021-09-13 NOTE — LETTER
9/13/2021         RE: Heath Carpenter  [address] [facility] [address]        Dear Colleague,    Thank you for referring your patient, Heath Carpenter, to the [facility]. Please see a copy of my visit note below.    [facility] Dermatology Note  Encounter Date: Sep 13, 2021  Office Visit     Dermatology Problem List:  1. Atrophic, shiny macule on L shoulder, unclear etiology   - Ddx includes scar vs BCC   - s/p shave bx 9/13/21, pathology pending  2. History of NMSC   - BCC, central upper back, s/p excision 3/29/21   - BCC, right upper arm, s/p ED&C 5/21/19  3. ISKs. LiqN2.   4. AK. LiqN2.   5. Skin tags. LiqN2.   6. Folliculitis on back of scalp, presumed 2/2 to CPAP mask strap  ____________________________________________    Assessment & Plan:    # NUB, L shoulder.  Differential diagnosis includes scar vs malignancy (e.g. BCC). Performed shave biopsy for further diagnostic evaluation, with pathology currently pending.  - See biopsy procedure note below.    # History of NMSC (BCC x2 s/p excision; 5/2019, 3/2021)  Scars appear well-healed, with no evidence of recurrence  - Recommend sunscreens SPF #30 or greater, protective clothing and avoidance of tanning beds.   - Recommended next skin check in 6 months.    # Actinic keratoses x3 on R-side of face  - s/p cryotherapy to all lesions    # Seborrheic keratosis, inflamed. - right cheek x2.  - See cryotherapy procedure note below.    # Irritated skin tags. - left neck x2.  - See cryotherapy procedure note below.    # Folliculitis on back of scalp, presumed 2/2 to CPAP mask strap  - Recommend regular repositioning of strap on a night to night basis  - May consider topical clindamycin in the future    # Benign lesions: Multiple benign nevi, seborrheic keratoses, cherry angiomas, skin tags. Explained to patient benign nature of lesions. No treatment is necessary at this time unless the lesions change or become symptomatic.   -  Sun precaution was advised including the use of sun screens of SPF 30 or higher, sun protective clothing, and avoidance of tanning beds.    Procedures Performed:   - Cryotherapy procedure note, locations: right cheek and left neck. After verbal consent and discussion of risks and benefits including, but not limited to, dyspigmentation/scar, blister, and pain, 3 AKs, 2 iSKs, and 2 iSKs were treated with 1-2 mm freeze border for 1-2 cycles with liquid nitrogen. Post cryotherapy instructions were provided.    - Shave biopsy procedure note, location: left shoulder. After discussion of benefits and risks including but not limited to bleeding, infection, scar, incomplete removal, recurrence, and non-diagnostic biopsy, written consent and photographs were obtained. The area was cleaned with isopropyl alcohol. 0.5mL of 1% lidocaine with epinephrine was injected to obtain adequate anesthesia of lesion. Shave biopsy at site performed. Hemostasis was achieved with aluminium chloride. Petrolatum ointment and a sterile dressing were applied. The patient tolerated the procedure and no complications were noted. The patient was provided with verbal and written post care instructions.     Follow-up: 6 months in-person for skin check, or earlier for new or changing lesions.    Scribe Disclosure:   Gay CAMPBELL, am serving as a scribe to document services personally performed by this physician, Dr. Jay Roach, based on data collection and the provider's statements to me.     This patient was seen and staffed with the attending physician, who agrees with the assessment and plan.    Maycol Mcmillan MD  Internal Medicine & Pediatrics, PGY-4  [facility]    Provider Disclosure:   The documentation recorded by the scribe accurately reflects the services I personally performed and the decisions made by me.    Jay Roach MD    Department of Dermatology  [facility]  [facility]: Phone: [phone], Fax:[phone]  [facility]: Phone: [phone] Fax: [phone]    ____________________________________________    CC: Skin Check (hx of nmsc/ spots on face are only concern )    HPI:  Mr. Heath Carpenter is a(n) 51 year old male who presents today as a return patient for skin check.    Last skin check was 3/8/21. At that time, cryotherapy was performed on AK (left nasal sidewall), iSK (right cheek), and skin tags (right inner thigh and right posterior thigh). A biopsy was also performed on the central upper back for a concerning lesion, with pathology eventually demonstrating presence of a BCC. This has since been excised by Dr. Cobb on 3/29/21.    Today, patient is accompanied by wife. Patient notes the following concerns:    1.) Bumps on R-side of face. Endorses that these sometimes get caught shaving and will bleed.  2.) Skin tags on L neck that get caught when shaving. Would like these frozen off today.  3.) Breakout on scalp. Believes this was infected hairs from the band/strap of CPAP machine. Has since calmed down, though has occasionally had to stop using his CPAP machine due to discomfort from this rash.  4.) Patient notes that the NMSC scar on the upper back previously had a stitch remaining after the excision was originally performed (Spring 2021). This stitch has since been removed, from which the scar has been healing well since then.    The patient otherwise denies any new or concerning lesions. No bleeding, painful, pruritic, or changing lesions. There is no family history of skin cancer. 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 Patient denies regular use of sun screen + sun-protective clothing prior to recent skin cancer diagnoses (BCC), though has since begun to regularly use sun screen and sun-protective clothing. Health otherwise  stable. No other skin concerns.     Labs Reviewed:  N/A    Physical Exam:  Vitals: There were no vitals taken for this visit.  SKIN: Full skin, which includes the head/face, both arms, chest, back, abdomen,both legs, genitalia and/or groin buttocks, digits and/or nails, was examined.  - There is presence of a pink, shiny atrophic macule with associated telangiectasia on the L shoulder.  - Pink gritty papules with superficial scale on the right cheek x3.  - There are tan to brown waxy stuck on papules with surrounding erythema on the right cheek x2.   - There are scattered skin colored pedunculated papules with surrounding erythema on the left neck x2.  - Well healed scars at prior skin cancer surgical sites (upper back, R shoulder) without evidence of recurrence.  - There is scattered presence of mild follicular erythema + mild pustules on the back of the scalp  - Brown macules on sun exposed areas  - Brown waxy, stuck-on appearing papules on the trunk and extremities.   - Brown macules and papules on trunk and extremities without concerning dermoscopy features.  - Red vascular papules on the back.  - There are scattered skin colored pedunculated papules in the left axilla.  - No other lesions of concern on areas examined.     Medications:  Current Outpatient Medications   Medication     acetaminophen (TYLENOL) 500 MG tablet     aspirin (ASA) 81 MG EC tablet     atorvastatin (LIPITOR) 20 MG tablet     blood glucose (NO BRAND SPECIFIED) test strip     ibuprofen (ADVIL/MOTRIN) 200 MG tablet     lisinopril (ZESTRIL) 20 MG tablet     magnesium 250 MG tablet     metFORMIN (GLUCOPHAGE) 500 MG tablet     Microlet Lancets MISC     MULTIPLE VITAMIN PO     vitamin (B COMPLEX-C) tablet     vitamin E (TOCOPHEROL) 1000 units (450 mg) capsule     No current facility-administered medications for this visit.      Past Medical History:   Patient Active Problem List   Diagnosis     Morbid obesity (H)     Type 2 diabetes mellitus with  hyperglycemia, without long-term current use of insulin (H)     Hypertension goal BP (blood pressure) < 140/90     Past Medical History:   Diagnosis Date     Gastro-oesophageal reflux disease      Hypertension goal BP (blood pressure) < 140/90             Again, thank you for allowing me to participate in the care of your patient.        Sincerely,        Jay Roach MD

## 2021-09-13 NOTE — PATIENT INSTRUCTIONS
Based upon today's exam, there was 1 lesion on your left shoulder that could be a scar, though does demonstrate some features concerning for possibility of skin cancer, for which we performed a biopsy for further evaluation. The pathology results should be available in 2-weeks, from which we will contact you and determine need for follow-up.    There were several spots on the right side of face concerning for pre skin cancer, which we froze during today's visit with cryotherapy.    We also froze several benign lesions on your face and neck with cryotherapy during today's visit.    Please continue to practice safe sun precautions including regular use of sun screen and sun protective clothing.    -----------------------------------------------------------------    Sun protective clothing and Resources     Semantify ([email or web address])  [employer] (Ovalis)  [email or web address] ([employer])  [employer]s (Tropical Skoops) - affordable  Skinz ([email or web address])    Long sleeve - Magali Cool DRI UPF 50 or [place] PFG UPF 50  Hoodie - [place] PFG UPF 50  Swimshirt/Rash Guard - Bess UPF 50 (on Amazon)  Neck - Outdoor Research Ubertubes ([email or web address])    -------------------------------------------------------------------  Wound Care After a Biopsy    What is a skin biopsy?  A skin biopsy allows the doctor to examine a very small piece of tissue under the microscope to determine the diagnosis and the best treatment for the skin condition. A local anesthetic (numbing medicine)  is injected with a very small needle into the skin area to be tested. A small piece of skin is taken from the area. Sometimes a suture (stitch) is used.     What are the risks of a skin biopsy?  I will experience scar, bleeding, swelling, pain, crusting and redness. I may experience incomplete removal or recurrence. Risks of this procedure are excessive bleeding, bruising, infection, nerve damage, numbness, thick (hypertrophic or  keloidal) scar and non-diagnostic biopsy.    How should I care for my wound for the first 24 hours?    Keep the wound dry and covered for 24 hours    If it bleeds, hold direct pressure on the area for 15 minutes. If bleeding does not stop then go to the emergency room    Avoid strenuous exercise the first 1-2 days or as your doctor instructs you    How should I care for the wound after 24 hours?    After 24 hours, remove the bandage    You may bathe or shower as normal    If you had a scalp biopsy, you can shampoo as usual and can use shower water to clean the biopsy site daily    Clean the wound twice a day with gentle soap and water    Do not scrub, be gentle    Apply white petroleum/Vaseline after cleaning the wound with a cotton swab or a clean finger, and keep the site covered with a Bandaid /bandage. Bandages are not necessary with a scalp biopsy    If you are unable to cover the site with a Bandaid /bandage, re-apply ointment 2-3 times a day to keep the site moist. Moisture will help with healing    Avoid strenuous activity for first 1-2 days    Avoid lakes, rivers, pools, and oceans until the stitches are removed or the site is healed    How do I clean my wound?    Wash hands thoroughly with soap or use hand  before all wound care    Clean the wound with gentle soap and water    Apply white petroleum/Vaseline  to wound after it is clean    Replace the Bandaid /bandage to keep the wound covered for the first few days or as instructed by your doctor    If you had a scalp biopsy, warm shower water to the area on a daily basis should suffice    What should I use to clean my wound?     Cotton-tipped applicators (Qtips )    White petroleum jelly (Vaseline ). Use a clean new container and use Q-tips to apply.    Bandaids   as needed    Gentle soap     How should I care for my wound long term?    Do not get your wound dirty    Keep up with wound care for one week or until the area is healed.    A small scab  will form and fall off by itself when the area is completely healed. The area will be red and will become pink in color as it heals. Sun protection is very important for how your scar will turn out. Sunscreen with an SPF 30 or greater is recommended once the area is healed.    You should have some soreness but it should be mild and slowly go away over several days. Talk to your doctor about using tylenol for pain,    When should I call my doctor?  If you have increased:     Pain or swelling    Pus or drainage (clear or slightly yellow drainage is ok)    Temperature over 100F    Spreading redness or warmth around wound    When will I hear about my results?  The biopsy results can take 2-3 weeks to come back. The clinic will call you with the results, send you a PLDT message, or have you schedule a follow-up clinic or phone time to discuss the results. Contact our clinics if you do not hear from us in 3 weeks.     Who should I call with questions?    [facility]: [phone]     [facility]: [phone]    For urgent needs outside of business hours call the [facility] at [phone] and ask for the dermatology resident on call

## 2021-09-13 NOTE — PROGRESS NOTES
[facility] Dermatology Note  Encounter Date: Sep 13, 2021  Office Visit     Dermatology Problem List:  1. Atrophic, shiny macule on L shoulder, unclear etiology   - Ddx includes scar vs BCC   - s/p shave bx 9/13/21, pathology pending  2. History of NMSC   - BCC, central upper back, s/p excision 3/29/21   - BCC, right upper arm, s/p ED&C 5/21/19  3. ISKs. LiqN2.   4. AK. LiqN2.   5. Skin tags. LiqN2.   6. Folliculitis on back of scalp, presumed 2/2 to CPAP mask strap  ____________________________________________    Assessment & Plan:    # NUB, L shoulder.  Differential diagnosis includes scar vs malignancy (e.g. BCC). Performed shave biopsy for further diagnostic evaluation, with pathology currently pending.  - See biopsy procedure note below.    # History of NMSC (BCC x2 s/p excision; 5/2019, 3/2021)  Scars appear well-healed, with no evidence of recurrence  - Recommend sunscreens SPF #30 or greater, protective clothing and avoidance of tanning beds.   - Recommended next skin check in 6 months.    # Actinic keratoses x3 on R-side of face  - s/p cryotherapy to all lesions    # Seborrheic keratosis, inflamed. - right cheek x2.  - See cryotherapy procedure note below.    # Irritated skin tags. - left neck x2.  - See cryotherapy procedure note below.    # Folliculitis on back of scalp, presumed 2/2 to CPAP mask strap  - Recommend regular repositioning of strap on a night to night basis  - May consider topical clindamycin in the future    # Benign lesions: Multiple benign nevi, seborrheic keratoses, cherry angiomas, skin tags. Explained to patient benign nature of lesions. No treatment is necessary at this time unless the lesions change or become symptomatic.   - Sun precaution was advised including the use of sun screens of SPF 30 or higher, sun protective clothing, and avoidance of tanning beds.    Procedures Performed:   - Cryotherapy procedure note, locations: right cheek and left neck. After  verbal consent and discussion of risks and benefits including, but not limited to, dyspigmentation/scar, blister, and pain, 3 AKs, 2 iSKs, and 2 iSKs were treated with 1-2 mm freeze border for 1-2 cycles with liquid nitrogen. Post cryotherapy instructions were provided.    - Shave biopsy procedure note, location: left shoulder. After discussion of benefits and risks including but not limited to bleeding, infection, scar, incomplete removal, recurrence, and non-diagnostic biopsy, written consent and photographs were obtained. The area was cleaned with isopropyl alcohol. 0.5mL of 1% lidocaine with epinephrine was injected to obtain adequate anesthesia of lesion. Shave biopsy at site performed. Hemostasis was achieved with aluminium chloride. Petrolatum ointment and a sterile dressing were applied. The patient tolerated the procedure and no complications were noted. The patient was provided with verbal and written post care instructions.     Follow-up: 6 months in-person for skin check, or earlier for new or changing lesions.    Scribe Disclosure:   I, Gay Vera, am serving as a scribe to document services personally performed by this physician, Dr. Jay Roach, based on data collection and the provider's statements to me.     This patient was seen and staffed with the attending physician, who agrees with the assessment and plan.    Maycol Mcmillan MD  Internal Medicine & Pediatrics, PGY-4  [facility]    Provider Disclosure:   The documentation recorded by the scribe accurately reflects the services I personally performed and the decisions made by me.    Staff Physician Comments:   I saw and evaluated the patient with the resident and I agree with the assessment and plan.  I was present for the entire minor procedure, key portions of the above major procedure and examination.    Jay Roach MD  Pronouns: he/him/his    Department of Dermatology  [facility]  [facility]: Phone: [phone], Fax:[phone]  [facility]: Phone: [phone] Fax: [phone]         ____________________________________________    CC: Skin Check (hx of nmsc/ spots on face are only concern )    HPI:  Mr. Heath Carpenter is a(n) 51 year old male who presents today as a return patient for skin check.    Last skin check was 3/8/21. At that time, cryotherapy was performed on AK (left nasal sidewall), iSK (right cheek), and skin tags (right inner thigh and right posterior thigh). A biopsy was also performed on the central upper back for a concerning lesion, with pathology eventually demonstrating presence of a BCC. This has since been excised by Dr. Cobb on 3/29/21.    Today, patient is accompanied by wife. Patient notes the following concerns:    1.) Bumps on R-side of face. Endorses that these sometimes get caught shaving and will bleed.  2.) Skin tags on L neck that get caught when shaving. Would like these frozen off today.  3.) Breakout on scalp. Believes this was infected hairs from the band/strap of CPAP machine. Has since calmed down, though has occasionally had to stop using his CPAP machine due to discomfort from this rash.  4.) Patient notes that the NMSC scar on the upper back previously had a stitch remaining after the excision was originally performed (Spring 2021). This stitch has since been removed, from which the scar has been healing well since then.    The patient otherwise denies any new or concerning lesions. No bleeding, painful, pruritic, or changing lesions. There is no family history of skin cancer. 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 Patient denies regular use of sun screen + sun-protective clothing prior to recent skin cancer diagnoses (BCC), though has since begun to regularly use sun screen and  sun-protective clothing. Health otherwise stable. No other skin concerns.     Labs Reviewed:  N/A    Physical Exam:  Vitals: There were no vitals taken for this visit.  SKIN: Full skin, which includes the head/face, both arms, chest, back, abdomen,both legs, genitalia and/or groin buttocks, digits and/or nails, was examined.  - There is presence of a pink, shiny atrophic macule with associated telangiectasia on the L shoulder.  - Pink gritty papules with superficial scale on the right cheek x3.  - There are tan to brown waxy stuck on papules with surrounding erythema on the right cheek x2.   - There are scattered skin colored pedunculated papules with surrounding erythema on the left neck x2.  - Well healed scars at prior skin cancer surgical sites (upper back, R shoulder) without evidence of recurrence.  - There is scattered presence of mild follicular erythema + mild pustules on the back of the scalp  - Brown macules on sun exposed areas  - Brown waxy, stuck-on appearing papules on the trunk and extremities.   - Brown macules and papules on trunk and extremities without concerning dermoscopy features.  - Red vascular papules on the back.  - There are scattered skin colored pedunculated papules in the left axilla.  - No other lesions of concern on areas examined.     Medications:  Current Outpatient Medications   Medication     acetaminophen (TYLENOL) 500 MG tablet     aspirin (ASA) 81 MG EC tablet     atorvastatin (LIPITOR) 20 MG tablet     blood glucose (NO BRAND SPECIFIED) test strip     ibuprofen (ADVIL/MOTRIN) 200 MG tablet     lisinopril (ZESTRIL) 20 MG tablet     magnesium 250 MG tablet     metFORMIN (GLUCOPHAGE) 500 MG tablet     Microlet Lancets MISC     MULTIPLE VITAMIN PO     vitamin (B COMPLEX-C) tablet     vitamin E (TOCOPHEROL) 1000 units (450 mg) capsule     No current facility-administered medications for this visit.      Past Medical History:   Patient Active Problem List   Diagnosis     Morbid  obesity (H)     Type 2 diabetes mellitus with hyperglycemia, without long-term current use of insulin (H)     Hypertension goal BP (blood pressure) < 140/90     Past Medical History:   Diagnosis Date     Gastro-oesophageal reflux disease      Hypertension goal BP (blood pressure) < 140/90

## 2021-09-15 LAB
PATH REPORT.COMMENTS IMP SPEC: NORMAL
PATH REPORT.COMMENTS IMP SPEC: NORMAL
PATH REPORT.FINAL DX SPEC: NORMAL
PATH REPORT.GROSS SPEC: NORMAL
PATH REPORT.MICROSCOPIC SPEC OTHER STN: NORMAL
PATH REPORT.RELEVANT HX SPEC: NORMAL

## 2021-10-24 ENCOUNTER — HEALTH MAINTENANCE LETTER (OUTPATIENT)
Age: 51
End: 2021-10-24

## 2022-01-28 DIAGNOSIS — E78.5 HYPERLIPIDEMIA LDL GOAL <100: ICD-10-CM

## 2022-01-28 DIAGNOSIS — I10 HYPERTENSION GOAL BP (BLOOD PRESSURE) < 140/90: ICD-10-CM

## 2022-01-31 NOTE — TELEPHONE ENCOUNTER
"Routing refill request to provider for review/approval because:  Labs out of range:  bp  Patient needs to be seen because:  Due for recheck    Medication pended for approval, 30 day supply with reminder.     Requested Prescriptions   Pending Prescriptions Disp Refills     atorvastatin (LIPITOR) 20 MG tablet [Pharmacy Med Name: ATORVASTATIN 20 MG TABLET] 30 tablet 0     Sig: TAKE 1 TABLET BY MOUTH EVERY DAY       Statins Protocol Passed - 1/28/2022  9:18 AM        Passed - LDL on file in past 12 months     Recent Labs   Lab Test 06/18/21  1120   *             Passed - No abnormal creatine kinase in past 12 months     No lab results found.             Passed - Recent (12 mo) or future (30 days) visit within the authorizing provider's specialty     Patient has had an office visit with the authorizing provider or a provider within the authorizing providers department within the previous 12 mos or has a future within next 30 days. See \"Patient Info\" tab in inbasket, or \"Choose Columns\" in Meds & Orders section of the refill encounter.              Passed - Medication is active on med list        Passed - Patient is age 18 or older           lisinopril (ZESTRIL) 20 MG tablet [Pharmacy Med Name: LISINOPRIL 20 MG TABLET] 30 tablet 0     Sig: TAKE 1 TABLET BY MOUTH EVERY DAY       ACE Inhibitors (Including Combos) Protocol Failed - 1/28/2022  9:18 AM        Failed - Blood pressure under 140/90 in past 12 months     BP Readings from Last 3 Encounters:   06/18/21 (!) 145/98   05/24/21 (!) 142/68   05/23/21 97/52                 Passed - Recent (12 mo) or future (30 days) visit within the authorizing provider's specialty     Patient has had an office visit with the authorizing provider or a provider within the authorizing providers department within the previous 12 mos or has a future within next 30 days. See \"Patient Info\" tab in inbasket, or \"Choose Columns\" in Meds & Orders section of the refill encounter.             "  Passed - Medication is active on med list        Passed - Patient is age 18 or older        Passed - Normal serum creatinine on file in past 12 months     Recent Labs   Lab Test 05/22/21 2029   CR 1.10       Ok to refill medication if creatinine is low          Passed - Normal serum potassium on file in past 12 months     Recent Labs   Lab Test 05/22/21 2029   POTASSIUM 3.9

## 2022-02-02 RX ORDER — ATORVASTATIN CALCIUM 20 MG/1
TABLET, FILM COATED ORAL
Qty: 30 TABLET | Refills: 0 | Status: SHIPPED | OUTPATIENT
Start: 2022-02-02 | End: 2022-03-30

## 2022-02-02 RX ORDER — LISINOPRIL 20 MG/1
TABLET ORAL
Qty: 30 TABLET | Refills: 0 | Status: SHIPPED | OUTPATIENT
Start: 2022-02-02 | End: 2022-03-30

## 2022-02-13 ENCOUNTER — HEALTH MAINTENANCE LETTER (OUTPATIENT)
Age: 52
End: 2022-02-13

## 2022-03-20 ENCOUNTER — TRANSFERRED RECORDS (OUTPATIENT)
Dept: HEALTH INFORMATION MANAGEMENT | Facility: CLINIC | Age: 52
End: 2022-03-20
Payer: COMMERCIAL

## 2022-03-20 LAB — RETINOPATHY: NORMAL

## 2022-03-27 ASSESSMENT — ENCOUNTER SYMPTOMS
PARESTHESIAS: 0
CONSTIPATION: 0
MYALGIAS: 1
NERVOUS/ANXIOUS: 0
HEARTBURN: 1
HEMATURIA: 0
COUGH: 0
PALPITATIONS: 0
HEADACHES: 0
CHILLS: 0
FEVER: 0
HEMATOCHEZIA: 0
DIZZINESS: 0
ABDOMINAL PAIN: 0
NAUSEA: 0
FREQUENCY: 0
DIARRHEA: 0
SORE THROAT: 0
ARTHRALGIAS: 1
SHORTNESS OF BREATH: 0
DYSURIA: 0
WEAKNESS: 1
JOINT SWELLING: 1
EYE PAIN: 0

## 2022-03-30 ENCOUNTER — OFFICE VISIT (OUTPATIENT)
Dept: FAMILY MEDICINE | Facility: CLINIC | Age: 52
End: 2022-03-30
Payer: COMMERCIAL

## 2022-03-30 VITALS
BODY MASS INDEX: 40.43 KG/M2 | WEIGHT: 315 LBS | HEART RATE: 88 BPM | DIASTOLIC BLOOD PRESSURE: 72 MMHG | HEIGHT: 74 IN | TEMPERATURE: 97.3 F | SYSTOLIC BLOOD PRESSURE: 128 MMHG | OXYGEN SATURATION: 97 % | RESPIRATION RATE: 14 BRPM

## 2022-03-30 DIAGNOSIS — M25.562 CHRONIC PAIN OF LEFT KNEE: ICD-10-CM

## 2022-03-30 DIAGNOSIS — E11.65 TYPE 2 DIABETES MELLITUS WITH HYPERGLYCEMIA, WITHOUT LONG-TERM CURRENT USE OF INSULIN (H): ICD-10-CM

## 2022-03-30 DIAGNOSIS — E66.01 MORBID OBESITY (H): ICD-10-CM

## 2022-03-30 DIAGNOSIS — G89.29 CHRONIC PAIN OF LEFT KNEE: ICD-10-CM

## 2022-03-30 DIAGNOSIS — Z00.00 ROUTINE GENERAL MEDICAL EXAMINATION AT A HEALTH CARE FACILITY: Primary | ICD-10-CM

## 2022-03-30 DIAGNOSIS — E78.5 HYPERLIPIDEMIA LDL GOAL <100: ICD-10-CM

## 2022-03-30 DIAGNOSIS — K21.00 GASTROESOPHAGEAL REFLUX DISEASE WITH ESOPHAGITIS, UNSPECIFIED WHETHER HEMORRHAGE: ICD-10-CM

## 2022-03-30 DIAGNOSIS — G47.33 OSA (OBSTRUCTIVE SLEEP APNEA): ICD-10-CM

## 2022-03-30 DIAGNOSIS — I10 HYPERTENSION GOAL BP (BLOOD PRESSURE) < 140/90: ICD-10-CM

## 2022-03-30 DIAGNOSIS — B37.2 YEAST INFECTION OF THE SKIN: ICD-10-CM

## 2022-03-30 LAB
ALBUMIN SERPL-MCNC: 3.7 G/DL (ref 3.4–5)
ALP SERPL-CCNC: 80 U/L (ref 40–150)
ALT SERPL W P-5'-P-CCNC: 73 U/L (ref 0–70)
ANION GAP SERPL CALCULATED.3IONS-SCNC: 7 MMOL/L (ref 3–14)
AST SERPL W P-5'-P-CCNC: 39 U/L (ref 0–45)
BILIRUB SERPL-MCNC: 0.4 MG/DL (ref 0.2–1.3)
BUN SERPL-MCNC: 16 MG/DL (ref 7–30)
CALCIUM SERPL-MCNC: 8.8 MG/DL (ref 8.5–10.1)
CHLORIDE BLD-SCNC: 105 MMOL/L (ref 94–109)
CO2 SERPL-SCNC: 26 MMOL/L (ref 20–32)
CREAT SERPL-MCNC: 0.84 MG/DL (ref 0.66–1.25)
GFR SERPL CREATININE-BSD FRML MDRD: >90 ML/MIN/1.73M2
GLUCOSE BLD-MCNC: 144 MG/DL (ref 70–99)
HBA1C MFR BLD: 6.7 % (ref 0–5.6)
POTASSIUM BLD-SCNC: 4 MMOL/L (ref 3.4–5.3)
PROT SERPL-MCNC: 8 G/DL (ref 6.8–8.8)
SODIUM SERPL-SCNC: 138 MMOL/L (ref 133–144)

## 2022-03-30 PROCEDURE — 83036 HEMOGLOBIN GLYCOSYLATED A1C: CPT | Performed by: FAMILY MEDICINE

## 2022-03-30 PROCEDURE — 36415 COLL VENOUS BLD VENIPUNCTURE: CPT | Performed by: FAMILY MEDICINE

## 2022-03-30 PROCEDURE — 82043 UR ALBUMIN QUANTITATIVE: CPT | Performed by: FAMILY MEDICINE

## 2022-03-30 PROCEDURE — 80053 COMPREHEN METABOLIC PANEL: CPT | Performed by: FAMILY MEDICINE

## 2022-03-30 PROCEDURE — 99396 PREV VISIT EST AGE 40-64: CPT | Performed by: FAMILY MEDICINE

## 2022-03-30 PROCEDURE — 99214 OFFICE O/P EST MOD 30 MIN: CPT | Mod: 25 | Performed by: FAMILY MEDICINE

## 2022-03-30 RX ORDER — LISINOPRIL 20 MG/1
20 TABLET ORAL DAILY
Qty: 90 TABLET | Refills: 3 | Status: SHIPPED | OUTPATIENT
Start: 2022-03-30 | End: 2023-03-01

## 2022-03-30 RX ORDER — ATORVASTATIN CALCIUM 20 MG/1
20 TABLET, FILM COATED ORAL DAILY
Qty: 90 TABLET | Refills: 3 | Status: SHIPPED | OUTPATIENT
Start: 2022-03-30 | End: 2023-03-01

## 2022-03-30 RX ORDER — NYSTATIN 100000 U/G
CREAM TOPICAL 2 TIMES DAILY
Qty: 30 G | Refills: 1 | Status: SHIPPED | OUTPATIENT
Start: 2022-03-30 | End: 2023-03-01

## 2022-03-30 ASSESSMENT — ENCOUNTER SYMPTOMS
DIZZINESS: 0
HEADACHES: 0
FREQUENCY: 0
CHILLS: 0
NERVOUS/ANXIOUS: 0
DYSURIA: 0
HEARTBURN: 1
HEMATURIA: 0
PALPITATIONS: 0
MYALGIAS: 1
JOINT SWELLING: 1
SHORTNESS OF BREATH: 0
SORE THROAT: 0
HEMATOCHEZIA: 0
CONSTIPATION: 0
DIARRHEA: 0
FEVER: 0
PARESTHESIAS: 0
ABDOMINAL PAIN: 0
NAUSEA: 0
WEAKNESS: 1
EYE PAIN: 0
ARTHRALGIAS: 1
COUGH: 0

## 2022-03-30 ASSESSMENT — PAIN SCALES - GENERAL: PAINLEVEL: NO PAIN (0)

## 2022-03-30 NOTE — PATIENT INSTRUCTIONS
Stop metformin     Start ozempic injection as directed     Set up oximetry they will call you     Nystatin for rash in groin     Set up ortho     Take omeprazole twice daily       Preventive Health Recommendations  Male Ages 50 - 64    Yearly exam:             See your health care provider every year in order to  o   Review health changes.   o   Discuss preventive care.    o   Review your medicines if your doctor has prescribed any.     Have a cholesterol test every 5 years, or more frequently if you are at risk for high cholesterol/heart disease.     Have a diabetes test (fasting glucose) every three years. If you are at risk for diabetes, you should have this test more often.     Have a colonoscopy at age 50, or have a yearly FIT test (stool test). These exams will check for colon cancer.      Talk with your health care provider about whether or not a prostate cancer screening test (PSA) is right for you.    You should be tested each year for STDs (sexually transmitted diseases), if you re at risk.     Shots: Get a flu shot each year. Get a tetanus shot every 10 years.     Nutrition:    Eat at least 5 servings of fruits and vegetables daily.     Eat whole-grain bread, whole-wheat pasta and brown rice instead of white grains and rice.     Get adequate Calcium and Vitamin D.     Lifestyle    Exercise for at least 150 minutes a week (30 minutes a day, 5 days a week). This will help you control your weight and prevent disease.     Limit alcohol to one drink per day.     No smoking.     Wear sunscreen to prevent skin cancer.     See your dentist every six months for an exam and cleaning.     See your eye doctor every 1 to 2 years.

## 2022-03-30 NOTE — PROGRESS NOTES
SUBJECTIVE:   CC: Heath Carpenter is an 52 year old male who presents for preventative health visit.       Patient has been advised of split billing requirements and indicates understanding: Yes  Healthy Habits:     Getting at least 3 servings of Calcium per day:  Yes    Bi-annual eye exam:  Yes    Dental care twice a year:  Yes    Sleep apnea or symptoms of sleep apnea:  Sleep apnea    Diet:  Regular (no restrictions)    Frequency of exercise:  2-3 days/week    Duration of exercise:  15-30 minutes    Taking medications regularly:  Yes    Medication side effects:  Muscle aches    PHQ-2 Total Score: 2    Additional concerns today:  No          Diabetes Follow-up    How often are you checking your blood sugar? A few times a month  What time of day are you checking your blood sugars (select all that apply)?  Before meals  Have you had any blood sugars above 200?  No  Have you had any blood sugars below 70?  No    What symptoms do you notice when your blood sugar is low?  None    What concerns do you have today about your diabetes? None     Do you have any of these symptoms? (Select all that apply)  No numbness or tingling in feet.  No redness, sores or blisters on feet.  No complaints of excessive thirst.  No reports of blurry vision.  No significant changes to weight.    Have you had a diabetic eye exam in the last 12 months? Yes- Date of last eye exam: 03/20/2022 ,  Location: [facility] in [place]         BP Readings from Last 2 Encounters:   03/30/22 128/72   06/18/21 (!) 145/98     Hemoglobin A1C POCT (%)   Date Value   06/18/2021 6.4 (H)   01/29/2021 6.6 (H)     Hemoglobin A1C (%)   Date Value   03/30/2022 6.7 (H)     LDL Cholesterol Calculated (mg/dL)   Date Value   06/18/2021 125 (H)   01/29/2021 128 (H)         Hyperlipidemia Follow-Up      Are you regularly taking any medication or supplement to lower your cholesterol?   No    Are you having muscle aches or other side effects that you think could be caused by  your cholesterol lowering medication?  No  Hypertension Follow-up      Do you check your blood pressure regularly outside of the clinic? No     Are you following a low salt diet? Yes    Are your blood pressures ever more than 140 on the top number (systolic) OR more   than 90 on the bottom number (diastolic), for example 140/90? unsure      Musculoskeletal problem/pain      Duration: years off and on     Description  Location: left knee and left heel       Intensity:  moderate, severe    Accompanying signs and symptoms: none    History  Previous similar problem: YES  Previous evaluation:  x-ray, MRI and orthopedic evaluation    Precipitating or alleviating factors:  Trauma or overuse: no   Aggravating factors include: walking, climbing stairs, exercise and overuse    Therapies tried and outcome: rest/inactivity, stretching, exercises and has had cortisone in the left knee and this helped     Left heel he uses heel cups     Rash      Duration: off and on for years     Description  Location: left groing   Itching: moderate    Intensity:  moderate    Accompanying signs and symptoms: None    History (similar episodes/previous evaluation): None    Precipitating or alleviating factors:  New exposures:  None  Recent travel: no      Therapies tried and outcome: lamasil cream        Today's PHQ-2 Score:   PHQ-2 ( 1999 Pf[employer]) 3/27/2022   Q1: Little interest or pleasure in doing things 1   Q2: Feeling down, depressed or hopeless 1   PHQ-2 Score 2   PHQ-2 Total Score (12-17 Years)- Positive if 3 or more points; Administer PHQ-A if positive -   Q1: Little interest or pleasure in doing things Several days   Q2: Feeling down, depressed or hopeless Several days   PHQ-2 Score 2       Abuse: Current or Past(Physical, Sexual or Emotional)- No  Do you feel safe in your environment? Yes    Have you ever done Advance Care Planning? (For example, a Health Directive, POLST, or a discussion with a medical provider or your loved ones about  your wishes): Yes, patient states has an Advance Care Planning document and will bring a copy to the clinic.    Social History     Tobacco Use     Smoking status: Never Smoker     Smokeless tobacco: Never Used   Substance Use Topics     Alcohol use: Yes     Comment: rare     If you drink alcohol do you typically have >3 drinks per day or >7 drinks per week? No    Alcohol Use 3/30/2022   Prescreen: >3 drinks/day or >7 drinks/week? -   Prescreen: >3 drinks/day or >7 drinks/week? No       Last PSA: No results found for: PSA    Reviewed orders with patient. Reviewed health maintenance and updated orders accordingly - Yes  Labs reviewed in EPIC    Reviewed and updated as needed this visit by clinical staff   Tobacco  Allergies  Meds  Problems  Med Hx  Surg Hx  Fam Hx          Reviewed and updated as needed this visit by Provider   Tobacco  Allergies  Meds  Problems  Med Hx  Surg Hx  Fam Hx             Review of Systems   Constitutional: Negative for chills and fever.   HENT: Negative for congestion, ear pain, hearing loss and sore throat.    Eyes: Negative for pain and visual disturbance.   Respiratory: Negative for cough and shortness of breath.    Cardiovascular: Negative for chest pain, palpitations and peripheral edema.   Gastrointestinal: Positive for heartburn. Negative for abdominal pain, constipation, diarrhea, hematochezia and nausea.   Genitourinary: Positive for impotence. Negative for dysuria, frequency, genital sores, hematuria, penile discharge and urgency.   Musculoskeletal: Positive for arthralgias, joint swelling and myalgias.   Skin: Positive for rash.   Neurological: Positive for weakness. Negative for dizziness, headaches and paresthesias.   Psychiatric/Behavioral: Positive for mood changes. The patient is not nervous/anxious.      Heartburn is much worse   He is having dry heaves at times   He is on PPI and this helps but lots of breakthrough sxs       JONA   Not sure if settings are  "right   Very tired during the day   Sleepy       OBJECTIVE:   /72   Pulse 88   Temp 97.3  F (36.3  C) (Tympanic)   Resp 14   Ht 1.867 m (6' 1.5\")   Wt (!) 152 kg (335 lb)   SpO2 97%   BMI 43.60 kg/m      Physical Exam  GENERAL: healthy, alert and no distress  EYES: Eyes grossly normal to inspection, PERRL and conjunctivae and sclerae normal  HENT: ear canals and TM's normal, nose and mouth without ulcers or lesions  NECK: no adenopathy, no asymmetry, masses, or scars and thyroid normal to palpation  RESP: lungs clear to auscultation - no rales, rhonchi or wheezes  CV: regular rate and rhythm, normal S1 S2, no S3 or S4, no murmur, click or rub, no peripheral edema and peripheral pulses strong  ABDOMEN: soft, nontender, no hepatosplenomegaly, no masses and bowel sounds normal  MS: no gross musculoskeletal defects noted, no edema  SKIN: erythematous rash left groin  NEURO: Normal strength and tone, mentation intact and speech normal  PSYCH: mentation appears normal, affect normal/bright    Diagnostic Test Results:  Labs reviewed in Epic    ASSESSMENT/PLAN:   (Z00.00) Routine general medical examination at a health care facility  (primary encounter diagnosis)  Comment:   Plan:     (E11.65) Type 2 diabetes mellitus with hyperglycemia, without long-term current use of insulin (H)  Comment: side affects on metformin   Needs weight loss   Will try new med ozempic    Plan: REVIEW OF HEALTH MAINTENANCE PROTOCOL ORDERS,         HEMOGLOBIN A1C, Albumin Random Urine         Quantitative with Creat Ratio, semaglutide         (OZEMPIC) 2 MG/1.5ML SOPN pen, Comprehensive         metabolic panel            (E78.5) Hyperlipidemia LDL goal <100  Comment: Stable no change in treatment plan.   Plan: atorvastatin (LIPITOR) 20 MG tablet            (I10) Hypertension goal BP (blood pressure) < 140/90  Comment: Stable no change in treatment plan.   Plan: lisinopril (ZESTRIL) 20 MG tablet            (E66.01) Morbid obesity " "(H)  Comment: trial of ozempic   Plan:     (K21.00) Gastroesophageal reflux disease with esophagitis, unspecified whether hemorrhage  Comment: not well controlled at all   Increase ppi to bid   Plan: Adult Gastro Ref - Procedure Only            (G47.33) JONA (obstructive sleep apnea)  Comment: ? Settings   Plan: Overnight oximetry study            (M25.562,  G89.29) Chronic pain of left knee  Comment: not well controlled   Plan: Orthopedic  Referral            (B37.2) Yeast infection of the skin  Comment:   Plan: nystatin (MYCOSTATIN) [id number] UNIT/GM external         cream              Patient has been advised of split billing requirements and indicates understanding: Yes    COUNSELING:   Reviewed preventive health counseling, as reflected in patient instructions    Estimated body mass index is 43.6 kg/m  as calculated from the following:    Height as of this encounter: 1.867 m (6' 1.5\").    Weight as of this encounter: 152 kg (335 lb).     Weight management plan: Discussed healthy diet and exercise guidelines    He reports that he has never smoked. He has never used smokeless tobacco.      Counseling Resources:  ATP IV Guidelines  Pooled Cohorts Equation Calculator  FRAX Risk Assessment  ICSI Preventive Guidelines  Dietary Guidelines for Americans, 2010  USDA's MyPlate  ASA Prophylaxis  Lung CA Screening    Maria Antonia Goodwin MD  [facility]  "

## 2022-03-31 ENCOUNTER — TELEPHONE (OUTPATIENT)
Dept: ORTHOPEDICS | Facility: CLINIC | Age: 52
End: 2022-03-31
Payer: COMMERCIAL

## 2022-03-31 DIAGNOSIS — G89.29 CHRONIC PAIN OF LEFT KNEE: ICD-10-CM

## 2022-03-31 DIAGNOSIS — M25.562 CHRONIC PAIN OF LEFT KNEE: ICD-10-CM

## 2022-03-31 DIAGNOSIS — M17.12 PRIMARY OSTEOARTHRITIS OF LEFT KNEE: Primary | ICD-10-CM

## 2022-03-31 LAB
CREAT UR-MCNC: 238 MG/DL
MICROALBUMIN UR-MCNC: 32 MG/L
MICROALBUMIN/CREAT UR: 13.45 MG/G CR (ref 0–17)

## 2022-03-31 NOTE — TELEPHONE ENCOUNTER
Patient scheduled for appointment on 4/12/22 for discussion of viscosupplementation injection vs steroid injection of left knee.      Steroid  injection last completed 2/22/21 by Dr Amezquita.       Prior authorization referral for Monovisc injection pended.     Please advise.    Alexander Jimenez ATC

## 2022-04-12 ENCOUNTER — OFFICE VISIT (OUTPATIENT)
Dept: ORTHOPEDICS | Facility: CLINIC | Age: 52
End: 2022-04-12
Attending: FAMILY MEDICINE
Payer: COMMERCIAL

## 2022-04-12 VITALS
WEIGHT: 315 LBS | DIASTOLIC BLOOD PRESSURE: 83 MMHG | SYSTOLIC BLOOD PRESSURE: 133 MMHG | BODY MASS INDEX: 40.43 KG/M2 | HEIGHT: 74 IN

## 2022-04-12 DIAGNOSIS — M25.462 KNEE EFFUSION, LEFT: Primary | ICD-10-CM

## 2022-04-12 DIAGNOSIS — M21.42 PES PLANUS OF BOTH FEET: ICD-10-CM

## 2022-04-12 DIAGNOSIS — M21.41 PES PLANUS OF BOTH FEET: ICD-10-CM

## 2022-04-12 DIAGNOSIS — G89.29 CHRONIC PAIN OF LEFT KNEE: ICD-10-CM

## 2022-04-12 DIAGNOSIS — M17.12 PRIMARY OSTEOARTHRITIS OF LEFT KNEE: ICD-10-CM

## 2022-04-12 DIAGNOSIS — M76.62 ACHILLES TENDINITIS OF LEFT LOWER EXTREMITY: ICD-10-CM

## 2022-04-12 DIAGNOSIS — M72.2 PLANTAR FASCIITIS, BILATERAL: ICD-10-CM

## 2022-04-12 DIAGNOSIS — M25.562 CHRONIC PAIN OF LEFT KNEE: ICD-10-CM

## 2022-04-12 PROCEDURE — 99204 OFFICE O/P NEW MOD 45 MIN: CPT | Mod: 25 | Performed by: FAMILY MEDICINE

## 2022-04-12 PROCEDURE — 20611 DRAIN/INJ JOINT/BURSA W/US: CPT | Mod: LT | Performed by: FAMILY MEDICINE

## 2022-04-12 RX ORDER — HYALURONATE SODIUM 10 MG/ML
20 SYRINGE (ML) INTRAARTICULAR
Status: DISCONTINUED | OUTPATIENT
Start: 2022-04-12 | End: 2022-07-12 | Stop reason: ALTCHOICE

## 2022-04-12 RX ADMIN — Medication 20 MG: at 09:15

## 2022-04-12 NOTE — PROGRESS NOTES
Heath Carpenter  :  1970  DOS: 2022  MRN: [id number]    Sports Medicine Clinic Visit    PCP: Maria Antonia Goodwin    Heath Carpenter is a 52 year old male who is seen in consultation at the request of  Maria Antonia Goodwin M.D. presenting with chronic left knee pain and bilateral heel pain.    Injury: Gradual onset of pain over the past 18+ months, that has been worsening over the past 4+ months.  Pain located over left deep superior anterior and lateral knee, radiating to lateral thigh and posterior lower leg.  Additional Features:  Positive: swelling, grinding and weakness.  Symptoms are better with Ice and Rest.  Symptoms are worse with: kneeling, going from sit to stand, walking, bending knee.  Other evaluation and/or treatments so far consists of: Ice, Ibuprofen, Rest and physical therapy/HEP, steroid injection, Ortho Surgery - Dr Amezquita.  Recent imaging completed: MRI completed .  Prior History of related problems: steroid injection completed 21 provided good relief.    Second complaint of chronic bilateral plantar heel and medial arch pain.  Pain is worse with waking in AM, prolonged standing, going to stand.  Currently treating with gel cups, trial of HEP/PT completed, OTC orthotics, and CAM boot.  No recent imaging completed.      Social History: currently employed as     Review of Systems  Musculoskeletal: as above  Remainder of review of systems is negative including constitutional, CV, pulmonary, GI, Skin and Neurologic except as noted in HPI or medical history.    Past Medical History:   Diagnosis Date     Cancer (H)     Skin     Depressive disorder      Diabetes (H)      Gastro-oesophageal reflux disease      Hypertension goal BP (blood pressure) < 140/90      Past Surgical History:   Procedure Laterality Date     COLONOSCOPY       VASECTOMY       Family History   Problem Relation Age of Onset     Alcoholism Father      Lung Cancer Maternal Grandfather      Diabetes  "Maternal Grandfather      Hypertension Maternal Grandfather      Other Cancer Maternal Grandfather         Lung     Colon Cancer Maternal Grandmother        Objective  /83   Ht 1.867 m (6' 1.5\")   Wt (!) 152 kg (335 lb)   BMI 43.60 kg/m        General: healthy, alert and in no distress      HEENT: no scleral icterus or conjunctival erythema     Skin: no suspicious lesions or rash. No jaundice.     CV: regular rhythm by palpation, 2+ distal pulses, no pedal edema      Resp: normal respiratory effort without conversational dyspnea     Psych: normal mood and affect      Gait: mildly antalgic, appropriate coordination and balance     Neuro: normal light touch sensory exam of the extremities. Motor strength as noted below     Left Knee exam    ROM:        Decreased terminal active and passive ROM with flexion and extension    Inspection:       no visible ecchymosis        effusion noted moderate    Skin:       no visible deformities       well perfused       capillary refill brisk    Patellar Motion:        Normal patellar tracking noted through range of motion       Crepitus noted in the patellofemoral joint    Tender:        lateral patellar border       medial joint line       lateral joint line    Non Tender:         remainder of knee area    Special Tests:        Mild pain with lateral>medial Cade       neg (-) Lachman       neg (-) anterior drawer       neg (-) posterior drawer       neg (-) varus at 0 deg and 30 deg       neg (-) valgus at 0 deg and 30 deg    Evaluation of ipsilateral kinetic chain       normal strength with hip extension and abduction    Lower leg and foot/ankle exam  Tightness in b/l gastrocs with palpable spasm and active trigger points, L>R  TTP of the achilles insertion on the left, neg garcia test  TTP of b/l plantar fascia at attachment to calcaneus, L>R  No lower leg or foot/ankle bony TTP  Bilateral pes planus    Radiology  MR left knee without contrast 2/15/2021 9:21 " AM     Techniques: Multiplanar multisequence imaging of the left knee was  obtained without administration of intra-articular or intravenous  contrast using routing protocol.     History: Knee trauma, meniscal/ligament injury suspected, xray done  (Age >= 1y); Left knee pain, unspecified chronicity; History of fall     Additional History from EMR: None     Comparison: Radiographs of the left knee 1/29/2021     Findings:     MENISCI:  Medial meniscus: Intact.   Lateral meniscus: Intact.      LIGAMENTS  Cruciate ligaments: Intact.  Medial supporting structures: Intact.  Lateral supporting structures: Intact.     EXTENSOR MECHANISM  Intact. Nonspecific edema superficial to the patellar tendon. Distal  quadriceps tendinosis.     FLUID  Small joint effusion. No substantial Baker's cyst.     OSSEOUS and ARTICULAR STRUCTURES  Bones: No fracture, contusion, or osseous lesion is seen. Corticated  intra-articular ossicle adjacent to the lateral tibial spine near the  attachment of the lateral meniscus anterior and anterior cruciate  ligament which measures up to 5 mm.     Patellofemoral compartment: No hyaline cartilage disease.     Medial compartment: No hyaline cartilage disease.     Lateral compartment: Heterogeneity of cartilage over the lateral  tibial plateau compatible with mild chondromalacia.     ANCILLARY FINDINGS  Edema in the proximal lateral gastrocnemius, likely delayed onset  muscle soreness muscle strain.                                                                      Impression:  1. Intra-articular body near the lateral tibial spine between the  anterior horn of the lateral meniscus and anterior cruciate ligament  measuring up to 5 mm.     2. The menisci, cruciate ligaments, and medial and lateral supporting  structures are intact.     3. Small joint effusion.     4. Mild lateral compartment chondromalacia.     I have personally reviewed the examination and initial interpretation  and I agree with the  findings.    Large Joint Injection/Arthocentesis: L knee joint    Date/Time: 4/12/2022 9:15 AM  Performed by: Geovany Bean DO  Authorized by: Geovany Bean DO     Indications:  Osteoarthritis  Needle Size:  21 G  Guidance: ultrasound    Approach:  Superolateral  Location:  Knee      Medications:  20 mg sodium hyaluronate 20 MG/2ML  Aspirate amount (mL):  22  Aspirate:  Serous and yellow  Outcome:  Tolerated well, no immediate complications  Procedure discussed: discussed risks, benefits, and alternatives    Consent Given by:  Patient  Timeout: timeout called immediately prior to procedure    Prep: patient was prepped and draped in usual sterile fashion     Ultrasound images of procedure were permanently stored.      Assessment:  1. Knee effusion, left    2. Chronic pain of left knee    3. Primary osteoarthritis of left knee    4. Plantar fasciitis, bilateral    5. Achilles tendinitis of left lower extremity        Plan:  Discussed the assessment with the patient.  Follow up: prn based on clinical progress  Chronic left knee pain, worsened recently, effusion palpable on exam  MR and XR images independently visualized and reviewed with patient today in clinic  Some underlying DJD in lateral compartment, known loose bodies but no mechanical sx of concern  Suspect his knee effusion and pain are limiting hip and knee ROM, precipitating the compensatory PF and achilles pain, also signs of lateral hip pain/bursitis for the same reasons  He does have pes planus, has tried some OTC mgmt strategies for PF, provided silicone heel cups today  Reviewed option for orthotic inserts or referral to orthotics/prosthetics for custom orthotics, defer for now  Calf stretching and massage reviewed, use percussion gun, TPI available to try in the future if needed  US guided viscosupplementation trial today, with aspiration which provided some early relief  Euflexxa #1 of 3 today   Reviewed wt loss, activity  modification and progressive increase in activity as tolerated and guided by pain  Reviewed options for potential steroid vs viscosupplementation injections and the possibility for future orthopedic referral prn  Reviewed safe and appropriate OTC medication choices, try tylenol first  Up to 3000mg daily of tylenol is generally safe, NSAID dosing and duration limitations reviewed  Discussed nature of degenerative arthrosis of the knee.   Discussed symptom treatment with ice or heat, topical treatments, and rest if needed.   Expectations and limitations of Euflexxa were reviewed in detail  Often 4-6 weeks before full effect may be noticed  Usually covered up to every 6 months by insurance, but does not need to be repeated unless pain returns, at which point we would re-evaluate  Potential use of CSI in future for flares of pain reviewed in detail  Encouraged modified progressive pain-free activity as tolerated  HEP and Supportive care reviewed  All questions were answered today  Contact us with additional questions or concerns  Signs and sx of concern reviewed    Thanks very much for sending this nice gentleman to us, I will keep you updated with his progress      Geovany Bean DO, [facility]  Sports Medicine Physician  [facility]      Time spent in chart review, one-on-one evaluation, discussion with patient regarding: nature of problem, clinical course, prior treatments, therapeutic options, shared-decision making, potential procedures and referrals, and charting related to the visit: 36 minutes.  If applicable, time does not include time spent performing any procedure.        Disclaimer: This note consists of symbols derived from keyboarding, dictation and/or voice recognition software. As a result, there may be errors in the script that have gone undetected. Please consider this when interpreting information found in this chart.

## 2022-04-12 NOTE — LETTER
2022         RE: Heath Carpenter  [address] [facility] [place] [address]        Dear Colleague,    Thank you for referring your patient, Heath Carpenter, to the [facility] [place]. Please see a copy of my visit note below.    Heath Carpenter  :  1970  DOS: 2022  MRN: [id number]    Sports Medicine Clinic Visit    PCP: Maria Antonia Goodwin    Heath Carpenter is a 52 year old male who is seen in consultation at the request of  Maria Antonia Goodwin M.D. presenting with chronic left knee pain and bilateral heel pain.    Injury: Gradual onset of pain over the past 18+ months, that has been worsening over the past 4+ months.  Pain located over left deep superior anterior and lateral knee, radiating to lateral thigh and posterior lower leg.  Additional Features:  Positive: swelling, grinding and weakness.  Symptoms are better with Ice and Rest.  Symptoms are worse with: kneeling, going from sit to stand, walking, bending knee.  Other evaluation and/or treatments so far consists of: Ice, Ibuprofen, Rest and physical therapy/HEP, steroid injection, Ortho Surgery - Dr Amezquita.  Recent imaging completed: MRI completed .  Prior History of related problems: steroid injection completed 21 provided good relief.    Second complaint of chronic bilateral plantar heel and medial arch pain.  Pain is worse with waking in AM, prolonged standing, going to stand.  Currently treating with gel cups, trial of HEP/PT completed, OTC orthotics, and CAM boot.  No recent imaging completed.      Social History: currently employed as     Review of Systems  Musculoskeletal: as above  Remainder of review of systems is negative including constitutional, CV, pulmonary, GI, Skin and Neurologic except as noted in HPI or medical history.    Past Medical History:   Diagnosis Date     Cancer (H)     Skin     Depressive disorder      Diabetes (H)      Gastro-oesophageal reflux disease       "Hypertension goal BP (blood pressure) < 140/90      Past Surgical History:   Procedure Laterality Date     COLONOSCOPY       VASECTOMY       Family History   Problem Relation Age of Onset     Alcoholism Father      Lung Cancer Maternal Grandfather      Diabetes Maternal Grandfather      Hypertension Maternal Grandfather      Other Cancer Maternal Grandfather         Lung     Colon Cancer Maternal Grandmother        Objective  /83   Ht 1.867 m (6' 1.5\")   Wt (!) 152 kg (335 lb)   BMI 43.60 kg/m        General: healthy, alert and in no distress      HEENT: no scleral icterus or conjunctival erythema     Skin: no suspicious lesions or rash. No jaundice.     CV: regular rhythm by palpation, 2+ distal pulses, no pedal edema      Resp: normal respiratory effort without conversational dyspnea     Psych: normal mood and affect      Gait: mildly antalgic, appropriate coordination and balance     Neuro: normal light touch sensory exam of the extremities. Motor strength as noted below     Left Knee exam    ROM:        Decreased terminal active and passive ROM with flexion and extension    Inspection:       no visible ecchymosis        effusion noted moderate    Skin:       no visible deformities       well perfused       capillary refill brisk    Patellar Motion:        Normal patellar tracking noted through range of motion       Crepitus noted in the patellofemoral joint    Tender:        lateral patellar border       medial joint line       lateral joint line    Non Tender:         remainder of knee area    Special Tests:        Mild pain with lateral>medial Cade       neg (-) Lachman       neg (-) anterior drawer       neg (-) posterior drawer       neg (-) varus at 0 deg and 30 deg       neg (-) valgus at 0 deg and 30 deg    Evaluation of ipsilateral kinetic chain       normal strength with hip extension and abduction    Lower leg and foot/ankle exam  Tightness in b/l gastrocs with palpable spasm and active " trigger points, L>R  TTP of the achilles insertion on the left, neg garcia test  TTP of b/l plantar fascia at attachment to calcaneus, L>R  No lower leg or foot/ankle bony TTP  Bilateral pes planus    Radiology  MR left knee without contrast 2/15/2021 9:21 AM     Techniques: Multiplanar multisequence imaging of the left knee was  obtained without administration of intra-articular or intravenous  contrast using routing protocol.     History: Knee trauma, meniscal/ligament injury suspected, xray done  (Age >= 1y); Left knee pain, unspecified chronicity; History of fall     Additional History from EMR: None     Comparison: Radiographs of the left knee 1/29/2021     Findings:     MENISCI:  Medial meniscus: Intact.   Lateral meniscus: Intact.      LIGAMENTS  Cruciate ligaments: Intact.  Medial supporting structures: Intact.  Lateral supporting structures: Intact.     EXTENSOR MECHANISM  Intact. Nonspecific edema superficial to the patellar tendon. Distal  quadriceps tendinosis.     FLUID  Small joint effusion. No substantial Baker's cyst.     OSSEOUS and ARTICULAR STRUCTURES  Bones: No fracture, contusion, or osseous lesion is seen. Corticated  intra-articular ossicle adjacent to the lateral tibial spine near the  attachment of the lateral meniscus anterior and anterior cruciate  ligament which measures up to 5 mm.     Patellofemoral compartment: No hyaline cartilage disease.     Medial compartment: No hyaline cartilage disease.     Lateral compartment: Heterogeneity of cartilage over the lateral  tibial plateau compatible with mild chondromalacia.     ANCILLARY FINDINGS  Edema in the proximal lateral gastrocnemius, likely delayed onset  muscle soreness muscle strain.                                                                      Impression:  1. Intra-articular body near the lateral tibial spine between the  anterior horn of the lateral meniscus and anterior cruciate ligament  measuring up to 5 mm.     2. The  menisci, cruciate ligaments, and medial and lateral supporting  structures are intact.     3. Small joint effusion.     4. Mild lateral compartment chondromalacia.     I have personally reviewed the examination and initial interpretation  and I agree with the findings.    Large Joint Injection/Arthocentesis: L knee joint    Date/Time: 4/12/2022 9:15 AM  Performed by: Geovany Bean DO  Authorized by: Geovany Bean DO     Indications:  Osteoarthritis  Needle Size:  21 G  Guidance: ultrasound    Approach:  Superolateral  Location:  Knee      Medications:  20 mg sodium hyaluronate 20 MG/2ML  Aspirate amount (mL):  22  Aspirate:  Serous and yellow  Outcome:  Tolerated well, no immediate complications  Procedure discussed: discussed risks, benefits, and alternatives    Consent Given by:  Patient  Timeout: timeout called immediately prior to procedure    Prep: patient was prepped and draped in usual sterile fashion     Ultrasound images of procedure were permanently stored.      Assessment:  1. Knee effusion, left    2. Chronic pain of left knee    3. Primary osteoarthritis of left knee    4. Plantar fasciitis, bilateral    5. Achilles tendinitis of left lower extremity        Plan:  Discussed the assessment with the patient.  Follow up: prn based on clinical progress  Chronic left knee pain, worsened recently, effusion palpable on exam  MR and XR images independently visualized and reviewed with patient today in clinic  Some underlying DJD in lateral compartment, known loose bodies but no mechanical sx of concern  Suspect his knee effusion and pain are limiting hip and knee ROM, precipitating the compensatory PF and achilles pain, also signs of lateral hip pain/bursitis for the same reasons  He does have pes planus, has tried some OTC mgmt strategies for PF, provided silicone heel cups today  Reviewed option for orthotic inserts or referral to orthotics/prosthetics for custom orthotics, defer for  now  Calf stretching and massage reviewed, use percussion gun, TPI available to try in the future if needed  US guided viscosupplementation trial today, with aspiration which provided some early relief  Euflexxa #1 of 3 today   Reviewed wt loss, activity modification and progressive increase in activity as tolerated and guided by pain  Reviewed options for potential steroid vs viscosupplementation injections and the possibility for future orthopedic referral prn  Reviewed safe and appropriate OTC medication choices, try tylenol first  Up to 3000mg daily of tylenol is generally safe, NSAID dosing and duration limitations reviewed  Discussed nature of degenerative arthrosis of the knee.   Discussed symptom treatment with ice or heat, topical treatments, and rest if needed.   Expectations and limitations of Euflexxa were reviewed in detail  Often 4-6 weeks before full effect may be noticed  Usually covered up to every 6 months by insurance, but does not need to be repeated unless pain returns, at which point we would re-evaluate  Potential use of CSI in future for flares of pain reviewed in detail  Encouraged modified progressive pain-free activity as tolerated  HEP and Supportive care reviewed  All questions were answered today  Contact us with additional questions or concerns  Signs and sx of concern reviewed    Thanks very much for sending this nice gentleman to us, I will keep you updated with his progress      Geovany Bean DO, CAQ  Sports Medicine Physician  [facility]      Time spent in chart review, one-on-one evaluation, discussion with patient regarding: nature of problem, clinical course, prior treatments, therapeutic options, shared-decision making, potential procedures and referrals, and charting related to the visit: 36 minutes.  If applicable, time does not include time spent performing any procedure.        Disclaimer: This note consists of symbols derived from  keyboarding, dictation and/or voice recognition software. As a result, there may be errors in the script that have gone undetected. Please consider this when interpreting information found in this chart.      Again, thank you for allowing me to participate in the care of your patient.        Sincerely,        Geovany Bean, DO

## 2022-04-19 ENCOUNTER — OFFICE VISIT (OUTPATIENT)
Dept: ORTHOPEDICS | Facility: CLINIC | Age: 52
End: 2022-04-19
Payer: COMMERCIAL

## 2022-04-19 DIAGNOSIS — M17.12 PRIMARY OSTEOARTHRITIS OF LEFT KNEE: Primary | ICD-10-CM

## 2022-04-19 DIAGNOSIS — G89.29 CHRONIC PAIN OF LEFT KNEE: ICD-10-CM

## 2022-04-19 DIAGNOSIS — M25.562 CHRONIC PAIN OF LEFT KNEE: ICD-10-CM

## 2022-04-19 PROCEDURE — 20611 DRAIN/INJ JOINT/BURSA W/US: CPT | Mod: LT | Performed by: FAMILY MEDICINE

## 2022-04-19 RX ORDER — HYALURONATE SODIUM 10 MG/ML
20 SYRINGE (ML) INTRAARTICULAR
Status: DISCONTINUED | OUTPATIENT
Start: 2022-04-19 | End: 2022-07-12 | Stop reason: ALTCHOICE

## 2022-04-19 RX ADMIN — Medication 20 MG: at 08:15

## 2022-04-19 NOTE — PROGRESS NOTES
Heath RODRIGUEZ Pauline  :  1970  DOS: 2022  MRN: [id number]    Sports Medicine Clinic Procedure    Ultrasound Guided Left Intra-Articular Knee Euflexxa Injection #2 of 3, +/- Aspiration    Clinical History: Patient is noting moderate relief following Euflexxa injection and aspiration completed on     Diagnosis:   1. Primary osteoarthritis of left knee    2. Chronic pain of left knee      Large Joint Injection/Arthocentesis: L knee joint    Date/Time: 2022 8:15 AM  Performed by: Geovany Bean DO  Authorized by: Geovany Bean DO     Indications:  Osteoarthritis  Needle Size:  21 G  Guidance: ultrasound    Approach:  Superolateral  Location:  Knee      Medications:  20 mg sodium hyaluronate 20 MG/2ML  Aspirate amount (mL):  9  Aspirate:  Serous and yellow  Outcome:  Tolerated well, no immediate complications  Procedure discussed: discussed risks, benefits, and alternatives    Consent Given by:  Patient  Timeout: timeout called immediately prior to procedure    Prep: patient was prepped and draped in usual sterile fashion     Ultrasound images of procedure were permanently stored.        Impression:  Successful Left intra-articular knee injection and aspiration.    Plan:  Follow up next week for Euflexxa #3 of 3  Expectations and limitations of Euflexxa were reviewed in detail  Often 4-6 weeks before full effect may be noticed  Usually covered up to every 6 months by insurance, but does not need to be repeated unless pain returns, at which point we would re-evaluate  Potential use of CSI in future for flares of pain reviewed in detail  Encouraged modified progressive pain-free activity as tolerated  HEP and Supportive care reviewed  All questions were answered today  Contact us with additional questions or concerns  Signs and sx of concern reviewed      Geovany Bean DO, CAQ  Primary Care Sports Medicine  [facility]

## 2022-04-20 ENCOUNTER — TELEPHONE (OUTPATIENT)
Dept: FAMILY MEDICINE | Facility: CLINIC | Age: 52
End: 2022-04-20
Payer: COMMERCIAL

## 2022-04-26 ENCOUNTER — OFFICE VISIT (OUTPATIENT)
Dept: ORTHOPEDICS | Facility: CLINIC | Age: 52
End: 2022-04-26
Payer: COMMERCIAL

## 2022-04-26 VITALS — WEIGHT: 315 LBS | BODY MASS INDEX: 40.43 KG/M2 | HEIGHT: 74 IN

## 2022-04-26 DIAGNOSIS — M25.562 CHRONIC PAIN OF LEFT KNEE: ICD-10-CM

## 2022-04-26 DIAGNOSIS — G89.29 CHRONIC PAIN OF LEFT KNEE: ICD-10-CM

## 2022-04-26 DIAGNOSIS — M17.12 PRIMARY OSTEOARTHRITIS OF LEFT KNEE: Primary | ICD-10-CM

## 2022-04-26 PROCEDURE — 20611 DRAIN/INJ JOINT/BURSA W/US: CPT | Mod: LT

## 2022-04-26 RX ORDER — HYALURONATE SODIUM 10 MG/ML
20 SYRINGE (ML) INTRAARTICULAR
Status: DISCONTINUED | OUTPATIENT
Start: 2022-04-26 | End: 2022-07-12 | Stop reason: ALTCHOICE

## 2022-04-26 RX ADMIN — Medication 20 MG: at 09:05

## 2022-04-26 NOTE — PROGRESS NOTES
Heath RODRIGUEZ Pauline  :  1970  DOS: 22  MRN: [id number]    Sports Medicine Clinic Procedure    Ultrasound Guided Left Intra-Articular Knee Euflexxa Injection #3 of 3, +/- Aspiration    Clinical History: Patient is noting moderate relief following Euflexxa injection and aspiration completed on 22.  He describes mild discomfort radiating to left lateral hip today with walking and transitioning to stand.    Diagnosis:   1. Primary osteoarthritis of left knee    2. Chronic pain of left knee         Large Joint Injection/Arthocentesis: L knee joint    Date/Time: 2022 9:05 AM  Performed by: Alexander Jimenez, ATC  Authorized by: Geovany Bean DO     Indications:  Osteoarthritis  Needle Size:  21 G  Guidance: ultrasound    Approach:  Superolateral  Location:  Knee      Medications:  20 mg sodium hyaluronate 20 MG/2ML  Aspirate amount (mL):  10  Aspirate:  Serous and yellow  Outcome:  Tolerated well, no immediate complications  Procedure discussed: discussed risks, benefits, and alternatives    Consent Given by:  Patient  Timeout: timeout called immediately prior to procedure    Prep: patient was prepped and draped in usual sterile fashion     Ultrasound images of procedure were permanently stored.        Impression:  Successful Left intra-articular knee injection and aspiration.    Plan:  Follow up next week for Euflexxa #3 of 3  Expectations and limitations of Euflexxa were reviewed in detail  Often 4-6 weeks before full effect may be noticed  Usually covered up to every 6 months by insurance, but does not need to be repeated unless pain returns, at which point we would re-evaluate  Potential use of CSI in future for flares of pain reviewed in detail  Encouraged modified progressive pain-free activity as tolerated  HEP and Supportive care reviewed  All questions were answered today  Contact us with additional questions or concerns  Signs and sx of concern reviewed      Geovany Bean DO, ADINA  Primary  Care Sports Medicine  [facility]

## 2022-04-26 NOTE — LETTER
2022         RE: Heath Carpenter  [address] [facility] [address]        Dear Colleague,    Thank you for referring your patient, Heath Carpenter, to the [facility] [place]. Please see a copy of my visit note below.    Heath Carpenter  :  1970  DOS: 22  MRN: [id number]    Sports Medicine Clinic Procedure    Ultrasound Guided Left Intra-Articular Knee Euflexxa Injection #3 of 3, +/- Aspiration    Clinical History: Patient is noting moderate relief following Euflexxa injection and aspiration completed on 22.  He describes mild discomfort radiating to left lateral hip today with walking and transitioning to stand.    Diagnosis:   1. Primary osteoarthritis of left knee    2. Chronic pain of left knee         Large Joint Injection/Arthocentesis: L knee joint    Date/Time: 2022 9:05 AM  Performed by: Alexander Jimenez, ATC  Authorized by: Geovany Bean DO     Indications:  Osteoarthritis  Needle Size:  21 G  Guidance: ultrasound    Approach:  Superolateral  Location:  Knee      Medications:  20 mg sodium hyaluronate 20 MG/2ML  Aspirate amount (mL):  10  Aspirate:  Serous and yellow  Outcome:  Tolerated well, no immediate complications  Procedure discussed: discussed risks, benefits, and alternatives    Consent Given by:  Patient  Timeout: timeout called immediately prior to procedure    Prep: patient was prepped and draped in usual sterile fashion     Ultrasound images of procedure were permanently stored.        Impression:  Successful Left intra-articular knee injection and aspiration.    Plan:  Follow up next week for Euflexxa #3 of 3  Expectations and limitations of Euflexxa were reviewed in detail  Often 4-6 weeks before full effect may be noticed  Usually covered up to every 6 months by insurance, but does not need to be repeated unless pain returns, at which point we would re-evaluate  Potential use of CSI in future for flares of pain reviewed in  detail  Encouraged modified progressive pain-free activity as tolerated  HEP and Supportive care reviewed  All questions were answered today  Contact us with additional questions or concerns  Signs and sx of concern reviewed      Geovany Bean DO, CAQ  Primary Care Sports Medicine  [facility]           Again, thank you for allowing me to participate in the care of your patient.        Sincerely,        Geovany Bean DO

## 2022-05-01 ENCOUNTER — MYC MEDICAL ADVICE (OUTPATIENT)
Dept: FAMILY MEDICINE | Facility: CLINIC | Age: 52
End: 2022-05-01
Payer: COMMERCIAL

## 2022-05-01 DIAGNOSIS — E11.65 TYPE 2 DIABETES MELLITUS WITH HYPERGLYCEMIA, WITHOUT LONG-TERM CURRENT USE OF INSULIN (H): ICD-10-CM

## 2022-05-25 ENCOUNTER — MYC MEDICAL ADVICE (OUTPATIENT)
Dept: FAMILY MEDICINE | Facility: CLINIC | Age: 52
End: 2022-05-25
Payer: COMMERCIAL

## 2022-05-29 ENCOUNTER — MYC MEDICAL ADVICE (OUTPATIENT)
Dept: FAMILY MEDICINE | Facility: CLINIC | Age: 52
End: 2022-05-29
Payer: COMMERCIAL

## 2022-06-08 ENCOUNTER — VIRTUAL VISIT (OUTPATIENT)
Dept: FAMILY MEDICINE | Facility: CLINIC | Age: 52
End: 2022-06-08
Payer: COMMERCIAL

## 2022-06-08 DIAGNOSIS — M79.672 PAIN IN BOTH FEET: ICD-10-CM

## 2022-06-08 DIAGNOSIS — E11.65 TYPE 2 DIABETES MELLITUS WITH HYPERGLYCEMIA, WITHOUT LONG-TERM CURRENT USE OF INSULIN (H): Primary | ICD-10-CM

## 2022-06-08 DIAGNOSIS — F33.0 MILD EPISODE OF RECURRENT MAJOR DEPRESSIVE DISORDER (H): ICD-10-CM

## 2022-06-08 DIAGNOSIS — M79.671 PAIN IN BOTH FEET: ICD-10-CM

## 2022-06-08 PROCEDURE — 99214 OFFICE O/P EST MOD 30 MIN: CPT | Mod: 95 | Performed by: FAMILY MEDICINE

## 2022-06-08 ASSESSMENT — ANXIETY QUESTIONNAIRES
GAD7 TOTAL SCORE: 7
2. NOT BEING ABLE TO STOP OR CONTROL WORRYING: NOT AT ALL
GAD7 TOTAL SCORE: 7
6. BECOMING EASILY ANNOYED OR IRRITABLE: NEARLY EVERY DAY
5. BEING SO RESTLESS THAT IT IS HARD TO SIT STILL: NOT AT ALL
1. FEELING NERVOUS, ANXIOUS, OR ON EDGE: SEVERAL DAYS
3. WORRYING TOO MUCH ABOUT DIFFERENT THINGS: NOT AT ALL
7. FEELING AFRAID AS IF SOMETHING AWFUL MIGHT HAPPEN: NOT AT ALL

## 2022-06-08 ASSESSMENT — PATIENT HEALTH QUESTIONNAIRE - PHQ9
SUM OF ALL RESPONSES TO PHQ QUESTIONS 1-9: 6
5. POOR APPETITE OR OVEREATING: NEARLY EVERY DAY
10. IF YOU CHECKED OFF ANY PROBLEMS, HOW DIFFICULT HAVE THESE PROBLEMS MADE IT FOR YOU TO DO YOUR WORK, TAKE CARE OF THINGS AT HOME, OR GET ALONG WITH OTHER PEOPLE: SOMEWHAT DIFFICULT
SUM OF ALL RESPONSES TO PHQ QUESTIONS 1-9: 6

## 2022-06-08 NOTE — PROGRESS NOTES
Heath is a 52 year old who is being evaluated via a billable video visit.      How would you like to obtain your AVS? Wander (f. [employer])  If the video visit is dropped, the invitation should be resent by: Text to cell phone: [phone]  Will anyone else be joining your video visit? No    Video Start Time: 4pm    Assessment & Plan     Type 2 diabetes mellitus with hyperglycemia, without long-term current use of insulin (H)  Stable no change in treatment plan.   - Lipid panel reflex to direct LDL Fasting; Future    Mild episode of recurrent major depressive disorder (H)  Trial of new med and therapy   - buPROPion (WELLBUTRIN XL) 150 MG 24 hr tablet; Take 1 tablet (150 mg) by mouth every morning  - Adult Mental Health  Referral; Future    Pain in both feet  Sounds like plantar fascitis and/or achilles tendonitis and need orthotic   Will refer to podiatry   - Orthopedic  Referral; Future             Patient Instructions   Sent Rx for wellbutrin to the pharmacy for you     Also sent referral for therapy     Sent in referral for podiatry to address foot pain     Please let me know in the next couple of weeks how you are doing via [employer] update       Return in about 3 months (around 9/8/2022) for Diabetic Visit, with me, using an in person visit.    Maria Antonia Goodwin MD  [facility]    Subjective   Heath is a 52 year old who presents for the following health issues     History of Present Illness       Mental Health Follow-up:  Patient presents to follow-up on Depression.Patient's depression since last visit has been:  Worse  The patient is having other symptoms associated with depression.      Any significant life events: job concerns, financial concerns and housing concerns  Patient is not feeling anxious or having panic attacks.  Patient has no concerns about alcohol or drug use.    He eats 2-3 servings of fruits and vegetables daily.He consumes 1 sweetened beverage(s) daily.He  "exercises with enough effort to increase his heart rate 30 to 60 minutes per day.  He exercises with enough effort to increase his heart rate 6 days per week.   He is taking medications regularly.    Today's PHQ-9         PHQ-9 Total Score: 6    PHQ-9 Q9 Thoughts of better off dead/self-harm past 2 weeks :   Not at all    How difficult have these problems made it for you to do your work, take care of things at home, or get along with other people: Somewhat difficult    Pt was on sertraline - and felt like a zombie  Would like to try something different than this     Diabetes Follow-up  Started Ozempic in March    How often are you checking your blood sugar? A few times a week averages 110-120 - highest at 140  What time of day are you checking your blood sugars (select all that apply)?  Before meals  Have you had any blood sugars above 200?  No  Have you had any blood sugars below 70?  No    What symptoms do you notice when your blood sugar is low?  None    What concerns do you have today about your diabetes? None     Do you have any of these symptoms? (Select all that apply)  Numbness in feet and Burning in feet     Having issues with feet - calves and feet hurt    Has been seeing Podiatry    Feet feel \"weird\"    Had knee injected      BP Readings from Last 2 Encounters:   04/12/22 133/83   03/30/22 128/72     Hemoglobin A1C POCT (%)   Date Value   06/18/2021 6.4 (H)   01/29/2021 6.6 (H)     Hemoglobin A1C (%)   Date Value   03/30/2022 6.7 (H)     LDL Cholesterol Calculated (mg/dL)   Date Value   06/18/2021 125 (H)   01/29/2021 128 (H)             Review of Systems   Constitutional, HEENT, cardiovascular, pulmonary, gi and gu systems are negative, except as otherwise noted.      Objective           Vitals:  No vitals were obtained today due to virtual visit.    Physical Exam   GENERAL: Healthy, alert and no distress  EYES: Eyes grossly normal to inspection.  No discharge or erythema, or obvious scleral/conjunctival " abnormalities.  RESP: No audible wheeze, cough, or visible cyanosis.  No visible retractions or increased work of breathing.    SKIN: Visible skin clear. No significant rash, abnormal pigmentation or lesions.  NEURO: Cranial nerves grossly intact.  Mentation and speech appropriate for age.  PSYCH: Mentation appears normal, affect normal/bright, judgement and insight intact, normal speech and appearance well-groomed.                Video-Visit Details    Type of service:  Video Visit    Video End Time:430pm    Originating Location (pt. Location): Home    Distant Location (provider location):  [facility]     Platform used for Video Visit: OLED-T

## 2022-06-09 PROBLEM — F33.0 MILD EPISODE OF RECURRENT MAJOR DEPRESSIVE DISORDER (H): Status: ACTIVE | Noted: 2022-06-09

## 2022-06-09 RX ORDER — BUPROPION HYDROCHLORIDE 150 MG/1
150 TABLET ORAL EVERY MORNING
Qty: 90 TABLET | Refills: 3 | Status: SHIPPED | OUTPATIENT
Start: 2022-06-09 | End: 2023-06-13

## 2022-06-09 NOTE — PATIENT INSTRUCTIONS
Sent Rx for wellbutrin to the pharmacy for you     Also sent referral for therapy     Sent in referral for podiatry to address foot pain     Please let me know in the next couple of weeks how you are doing via [employer] update

## 2022-06-20 ENCOUNTER — MYC MEDICAL ADVICE (OUTPATIENT)
Dept: FAMILY MEDICINE | Facility: CLINIC | Age: 52
End: 2022-06-20
Payer: COMMERCIAL

## 2022-07-12 ENCOUNTER — HOSPITAL ENCOUNTER (OUTPATIENT)
Dept: PHYSICAL THERAPY | Facility: CLINIC | Age: 52
Setting detail: THERAPIES SERIES
Discharge: HOME | End: 2022-07-12
Attending: FAMILY MEDICINE
Payer: COMMERCIAL

## 2022-07-12 ENCOUNTER — OFFICE VISIT (OUTPATIENT)
Dept: ORTHOPEDICS | Facility: CLINIC | Age: 52
End: 2022-07-12

## 2022-07-12 ENCOUNTER — ANCILLARY PROCEDURE (OUTPATIENT)
Dept: GENERAL RADIOLOGY | Facility: CLINIC | Age: 52
End: 2022-07-12
Attending: FAMILY MEDICINE
Payer: COMMERCIAL

## 2022-07-12 VITALS
SYSTOLIC BLOOD PRESSURE: 125 MMHG | BODY MASS INDEX: 40.43 KG/M2 | WEIGHT: 315 LBS | HEIGHT: 74 IN | DIASTOLIC BLOOD PRESSURE: 85 MMHG

## 2022-07-12 DIAGNOSIS — M21.42 PES PLANUS OF BOTH FEET: ICD-10-CM

## 2022-07-12 DIAGNOSIS — M72.2 PLANTAR FASCIITIS, BILATERAL: Primary | ICD-10-CM

## 2022-07-12 DIAGNOSIS — M21.41 PES PLANUS OF BOTH FEET: ICD-10-CM

## 2022-07-12 DIAGNOSIS — M17.12 PRIMARY OSTEOARTHRITIS OF LEFT KNEE: ICD-10-CM

## 2022-07-12 DIAGNOSIS — M72.2 PLANTAR FASCIITIS, BILATERAL: ICD-10-CM

## 2022-07-12 DIAGNOSIS — M25.562 CHRONIC PAIN OF LEFT KNEE: ICD-10-CM

## 2022-07-12 DIAGNOSIS — M76.62 ACHILLES TENDINITIS OF LEFT LOWER EXTREMITY: ICD-10-CM

## 2022-07-12 DIAGNOSIS — G89.29 CHRONIC PAIN OF LEFT KNEE: ICD-10-CM

## 2022-07-12 PROCEDURE — 97110 THERAPEUTIC EXERCISES: CPT | Mod: GP | Performed by: PHYSICAL THERAPIST

## 2022-07-12 PROCEDURE — 97161 PT EVAL LOW COMPLEX 20 MIN: CPT | Mod: GP | Performed by: PHYSICAL THERAPIST

## 2022-07-12 PROCEDURE — 73630 X-RAY EXAM OF FOOT: CPT | Mod: TC | Performed by: RADIOLOGY

## 2022-07-12 PROCEDURE — 99214 OFFICE O/P EST MOD 30 MIN: CPT | Performed by: FAMILY MEDICINE

## 2022-07-12 NOTE — PROGRESS NOTES
Heath Carpenter  :  1970  DOS: 2022  MRN: [id number]    Sports Medicine Clinic Visit    PCP: Maria Antonia Goodwin    Heath Carpenter is a 52 year old male who is seen in consultation at the request of  Maria Antonia Goodwin M.D. presenting with chronic left knee pain and bilateral heel pain.    Injury: Gradual onset of pain over the past 18+ months, that has been worsening over the past 4+ months.  Pain located over left deep superior anterior and lateral knee, radiating to lateral thigh and posterior lower leg.  Additional Features:  Positive: swelling, grinding and weakness.  Symptoms are better with Ice and Rest.  Symptoms are worse with: kneeling, going from sit to stand, walking, bending knee.  Other evaluation and/or treatments so far consists of: Ice, Ibuprofen, Rest and physical therapy/HEP, steroid injection, Ortho Surgery - Dr Amezquita.  Recent imaging completed: MRI completed .  Prior History of related problems: steroid injection completed 21 provided good relief.    Second complaint of chronic bilateral plantar heel and medial arch pain.  Pain is worse with waking in AM, prolonged standing, going to stand.  Currently treating with gel cups, trial of HEP/PT completed, OTC orthotics, and CAM boot.  No recent imaging completed.      Social History: currently employed as      Interim History - 2022  Since last visit on 2022 patient has moderate-severe bilateral heel and left achilles tendon.  He notes continued discomfort with walking and standing.  No interim injury.       Review of Systems  Musculoskeletal: as above  Remainder of review of systems is negative including constitutional, CV, pulmonary, GI, Skin and Neurologic except as noted in HPI or medical history.    Past Medical History:   Diagnosis Date     Cancer (H)     Skin     Depressive disorder      Diabetes (H)      Gastro-oesophageal reflux disease      Hypertension goal BP (blood pressure) < 140/90   "    Past Surgical History:   Procedure Laterality Date     COLONOSCOPY       VASECTOMY       Family History   Problem Relation Age of Onset     Alcoholism Father      Lung Cancer Maternal Grandfather      Diabetes Maternal Grandfather      Hypertension Maternal Grandfather      Other Cancer Maternal Grandfather         Lung     Colon Cancer Maternal Grandmother        Objective  /85   Ht 1.867 m (6' 1.5\")   Wt 146.5 kg (323 lb)   BMI 42.04 kg/m        General: healthy, alert and in no distress      HEENT: no scleral icterus or conjunctival erythema     Skin: no suspicious lesions or rash. No jaundice.     CV: regular rhythm by palpation, 2+ distal pulses, no pedal edema      Resp: normal respiratory effort without conversational dyspnea     Psych: normal mood and affect      Gait: mildly antalgic, appropriate coordination and balance     Neuro: normal light touch sensory exam of the extremities. Motor strength as noted below     Left Knee exam    ROM:        Near full terminal active and passive ROM with flexion and extension    Inspection:       no visible ecchymosis        effusion noted small    Skin:       no visible deformities       well perfused       capillary refill brisk    Patellar Motion:        Normal patellar tracking noted through range of motion       Crepitus noted in the patellofemoral joint    Tender:        lateral patellar border       medial joint line       lateral joint line    Non Tender:         remainder of knee area    Special Tests:        Mild pain with Cade       neg (-) varus at 0 deg and 30 deg       neg (-) valgus at 0 deg and 30 deg      Lower leg and foot/ankle exam  Tightness in b/l gastrocs with palpable spasm and active trigger points, L>R  TTP of the achilles insertion on the left, neg garcia test  TTP of b/l plantar fascia at attachment to calcaneus, L>R  No lower leg or foot/ankle bony TTP  Bilateral pes planus    Radiology  MR left knee without contrast " 2/15/2021 9:21 AM     Techniques: Multiplanar multisequence imaging of the left knee was  obtained without administration of intra-articular or intravenous  contrast using routing protocol.     History: Knee trauma, meniscal/ligament injury suspected, xray done  (Age >= 1y); Left knee pain, unspecified chronicity; History of fall     Additional History from EMR: None     Comparison: Radiographs of the left knee 1/29/2021     Findings:     MENISCI:  Medial meniscus: Intact.   Lateral meniscus: Intact.      LIGAMENTS  Cruciate ligaments: Intact.  Medial supporting structures: Intact.  Lateral supporting structures: Intact.     EXTENSOR MECHANISM  Intact. Nonspecific edema superficial to the patellar tendon. Distal  quadriceps tendinosis.     FLUID  Small joint effusion. No substantial Baker's cyst.     OSSEOUS and ARTICULAR STRUCTURES  Bones: No fracture, contusion, or osseous lesion is seen. Corticated  intra-articular ossicle adjacent to the lateral tibial spine near the  attachment of the lateral meniscus anterior and anterior cruciate  ligament which measures up to 5 mm.     Patellofemoral compartment: No hyaline cartilage disease.     Medial compartment: No hyaline cartilage disease.     Lateral compartment: Heterogeneity of cartilage over the lateral  tibial plateau compatible with mild chondromalacia.     ANCILLARY FINDINGS  Edema in the proximal lateral gastrocnemius, likely delayed onset  muscle soreness muscle strain.                                                                      Impression:  1. Intra-articular body near the lateral tibial spine between the  anterior horn of the lateral meniscus and anterior cruciate ligament  measuring up to 5 mm.     2. The menisci, cruciate ligaments, and medial and lateral supporting  structures are intact.     3. Small joint effusion.     4. Mild lateral compartment chondromalacia.     I have personally reviewed the examination and initial interpretation  and I  agree with the findings.    Recent Results (from the past 744 hour(s))   XR Foot Bilateral G/E 3 Views    Narrative    XR FOOT BILATERAL G/E 3 VIEWS 7/12/2022 1:48 PM     HISTORY: Plantar fasciitis, bilateral; Achilles tendinitis of left  lower extremity; Pes planus of both feet; Pes planus of both feet    COMPARISON: None.      Impression    IMPRESSION: Pes planus bilaterally. Normal joint spacing. Plantar and  Achilles calcaneal spurs bilaterally.     JOSELINE JENNINGS MD         SYSTEM ID:  [id number]       Assessment:  1. Plantar fasciitis, bilateral    2. Achilles tendinitis of left lower extremity    3. Pes planus of both feet    4. Chronic pain of left knee    5. Primary osteoarthritis of left knee        Plan:  Discussed the assessment with the patient.  Follow up: prn based on clinical progress  Chronic left knee pain, worsened recently but not as bad as previous, small effusion palpable on exam  MR and XR images independently visualized and reviewed with patient again today in clinic  Some underlying DJD in lateral compartment, known loose bodies but no mechanical sx of concern  Suspect his knee effusion and pain are limiting hip and knee ROM, precipitating the compensatory PF and achilles pain, although given increased pain in lower legs and feet today, knee pain could be secondary currently  He does have pes planus, has tried some OTC mgmt strategies for PF, provided silicone heel cups previously  New orthotics order placed today  Calf stretching and massage reviewed, use percussion gun, TPI available to try in the future if needed  PT options reviewed  XR images independently visualized and reviewed with patient today in clinic  Potential use of CSI in future for flares of knee pain reviewed in detail, as well as return to visco in the future as well once it has been 6 mo  Encouraged modified progressive pain-free activity as tolerated  HEP and Supportive care reviewed  All questions were answered  today  Contact us with additional questions or concerns  Signs and sx of concern reviewed    Thanks very much for sending this nice gentleman to us, I will keep you updated with his progress      Geovany Bean DO, [facility]  Sports Medicine Physician  [facility]      Time spent in chart review, one-on-one evaluation, discussion with patient regarding: nature of problem, clinical course, prior treatments, therapeutic options, shared-decision making, potential procedures and referrals, and charting related to the visit: 31 minutes.  If applicable, time does not include time spent performing any procedure.      Disclaimer: This note consists of symbols derived from keyboarding, dictation and/or voice recognition software. As a result, there may be errors in the script that have gone undetected. Please consider this when interpreting information found in this chart.

## 2022-07-12 NOTE — LETTER
2022         RE: Heath Carpenter  [address] [facility] [address]        Dear Colleague,    Thank you for referring your patient, Heath Carpenter, to the [facility] [place]. Please see a copy of my visit note below.    Heath Carpenter  :  1970  DOS: 2022  MRN: [id number]    Sports Medicine Clinic Visit    PCP: Maria Antonia Goodwin    Heath Carpenter is a 52 year old male who is seen in consultation at the request of  Maria Antonia Goodwin M.D. presenting with chronic left knee pain and bilateral heel pain.    Injury: Gradual onset of pain over the past 18+ months, that has been worsening over the past 4+ months.  Pain located over left deep superior anterior and lateral knee, radiating to lateral thigh and posterior lower leg.  Additional Features:  Positive: swelling, grinding and weakness.  Symptoms are better with Ice and Rest.  Symptoms are worse with: kneeling, going from sit to stand, walking, bending knee.  Other evaluation and/or treatments so far consists of: Ice, Ibuprofen, Rest and physical therapy/HEP, steroid injection, Ortho Surgery - Dr Amezquita.  Recent imaging completed: MRI completed .  Prior History of related problems: steroid injection completed 21 provided good relief.    Second complaint of chronic bilateral plantar heel and medial arch pain.  Pain is worse with waking in AM, prolonged standing, going to stand.  Currently treating with gel cups, trial of HEP/PT completed, OTC orthotics, and CAM boot.  No recent imaging completed.      Social History: currently employed as      Interim History - 2022  Since last visit on 2022 patient has moderate-severe bilateral heel and left achilles tendon.  He notes continued discomfort with walking and standing.  No interim injury.       Review of Systems  Musculoskeletal: as above  Remainder of review of systems is negative including constitutional, CV, pulmonary, GI, Skin and  "Neurologic except as noted in HPI or medical history.    Past Medical History:   Diagnosis Date     Cancer (H)     Skin     Depressive disorder      Diabetes (H)      Gastro-oesophageal reflux disease      Hypertension goal BP (blood pressure) < 140/90      Past Surgical History:   Procedure Laterality Date     COLONOSCOPY       VASECTOMY       Family History   Problem Relation Age of Onset     Alcoholism Father      Lung Cancer Maternal Grandfather      Diabetes Maternal Grandfather      Hypertension Maternal Grandfather      Other Cancer Maternal Grandfather         Lung     Colon Cancer Maternal Grandmother        Objective  /85   Ht 1.867 m (6' 1.5\")   Wt 146.5 kg (323 lb)   BMI 42.04 kg/m        General: healthy, alert and in no distress      HEENT: no scleral icterus or conjunctival erythema     Skin: no suspicious lesions or rash. No jaundice.     CV: regular rhythm by palpation, 2+ distal pulses, no pedal edema      Resp: normal respiratory effort without conversational dyspnea     Psych: normal mood and affect      Gait: mildly antalgic, appropriate coordination and balance     Neuro: normal light touch sensory exam of the extremities. Motor strength as noted below     Left Knee exam    ROM:        Near full terminal active and passive ROM with flexion and extension    Inspection:       no visible ecchymosis        effusion noted small    Skin:       no visible deformities       well perfused       capillary refill brisk    Patellar Motion:        Normal patellar tracking noted through range of motion       Crepitus noted in the patellofemoral joint    Tender:        lateral patellar border       medial joint line       lateral joint line    Non Tender:         remainder of knee area    Special Tests:        Mild pain with Cade       neg (-) varus at 0 deg and 30 deg       neg (-) valgus at 0 deg and 30 deg      Lower leg and foot/ankle exam  Tightness in b/l gastrocs with palpable spasm and " active trigger points, L>R  TTP of the achilles insertion on the left, neg garcia test  TTP of b/l plantar fascia at attachment to calcaneus, L>R  No lower leg or foot/ankle bony TTP  Bilateral pes planus    Radiology  MR left knee without contrast 2/15/2021 9:21 AM     Techniques: Multiplanar multisequence imaging of the left knee was  obtained without administration of intra-articular or intravenous  contrast using routing protocol.     History: Knee trauma, meniscal/ligament injury suspected, xray done  (Age >= 1y); Left knee pain, unspecified chronicity; History of fall     Additional History from EMR: None     Comparison: Radiographs of the left knee 1/29/2021     Findings:     MENISCI:  Medial meniscus: Intact.   Lateral meniscus: Intact.      LIGAMENTS  Cruciate ligaments: Intact.  Medial supporting structures: Intact.  Lateral supporting structures: Intact.     EXTENSOR MECHANISM  Intact. Nonspecific edema superficial to the patellar tendon. Distal  quadriceps tendinosis.     FLUID  Small joint effusion. No substantial Baker's cyst.     OSSEOUS and ARTICULAR STRUCTURES  Bones: No fracture, contusion, or osseous lesion is seen. Corticated  intra-articular ossicle adjacent to the lateral tibial spine near the  attachment of the lateral meniscus anterior and anterior cruciate  ligament which measures up to 5 mm.     Patellofemoral compartment: No hyaline cartilage disease.     Medial compartment: No hyaline cartilage disease.     Lateral compartment: Heterogeneity of cartilage over the lateral  tibial plateau compatible with mild chondromalacia.     ANCILLARY FINDINGS  Edema in the proximal lateral gastrocnemius, likely delayed onset  muscle soreness muscle strain.                                                                      Impression:  1. Intra-articular body near the lateral tibial spine between the  anterior horn of the lateral meniscus and anterior cruciate ligament  measuring up to 5 mm.     2.  The menisci, cruciate ligaments, and medial and lateral supporting  structures are intact.     3. Small joint effusion.     4. Mild lateral compartment chondromalacia.     I have personally reviewed the examination and initial interpretation  and I agree with the findings.    Recent Results (from the past 744 hour(s))   XR Foot Bilateral G/E 3 Views    Narrative    XR FOOT BILATERAL G/E 3 VIEWS 7/12/2022 1:48 PM     HISTORY: Plantar fasciitis, bilateral; Achilles tendinitis of left  lower extremity; Pes planus of both feet; Pes planus of both feet    COMPARISON: None.      Impression    IMPRESSION: Pes planus bilaterally. Normal joint spacing. Plantar and  Achilles calcaneal spurs bilaterally.     JOSELINE JENNINGS MD         SYSTEM ID:  [id number]       Assessment:  1. Plantar fasciitis, bilateral    2. Achilles tendinitis of left lower extremity    3. Pes planus of both feet    4. Chronic pain of left knee    5. Primary osteoarthritis of left knee        Plan:  Discussed the assessment with the patient.  Follow up: prn based on clinical progress  Chronic left knee pain, worsened recently but not as bad as previous, small effusion palpable on exam  MR and XR images independently visualized and reviewed with patient again today in clinic  Some underlying DJD in lateral compartment, known loose bodies but no mechanical sx of concern  Suspect his knee effusion and pain are limiting hip and knee ROM, precipitating the compensatory PF and achilles pain, although given increased pain in lower legs and feet today, knee pain could be secondary currently  He does have pes planus, has tried some OTC mgmt strategies for PF, provided silicone heel cups previously  New orthotics order placed today  Calf stretching and massage reviewed, use percussion gun, TPI available to try in the future if needed  PT options reviewed  XR images independently visualized and reviewed with patient today in clinic  Potential use of CSI in future  for flares of knee pain reviewed in detail, as well as return to visco in the future as well once it has been 6 mo  Encouraged modified progressive pain-free activity as tolerated  HEP and Supportive care reviewed  All questions were answered today  Contact us with additional questions or concerns  Signs and sx of concern reviewed    Thanks very much for sending this nice gentleman to us, I will keep you updated with his progress      Geovany Bean DO, [facility]  Sports Medicine Physician  [facility]      Time spent in chart review, one-on-one evaluation, discussion with patient regarding: nature of problem, clinical course, prior treatments, therapeutic options, shared-decision making, potential procedures and referrals, and charting related to the visit: 31 minutes.  If applicable, time does not include time spent performing any procedure.      Disclaimer: This note consists of symbols derived from keyboarding, dictation and/or voice recognition software. As a result, there may be errors in the script that have gone undetected. Please consider this when interpreting information found in this chart.      Again, thank you for allowing me to participate in the care of your patient.        Sincerely,        Geovany Bean DO

## 2022-07-14 NOTE — PROGRESS NOTES
PHYSICAL THERAPY INITIAL EVALUATION  07/12/22 1600   General Information   Type of Visit Initial OP Ortho PT Evaluation   Start of Care Date 07/12/22   Referring Physician Dr. Bean   Patient/Family Goals Statement decrease pain, to feel better   Orders Evaluate and Treat   Orders Comment work on release of calf and hip/leg TPs, progressive core stability HEP, work with William García in [facility]   Date of Order 04/12/22   Certification Required? No   Medical Diagnosis Knee effusion, left (M25.462)  - Primary     Chronic pain of left knee (M25.562, G89.29)     Primary osteoarthritis of left knee (M17.12)     Plantar fasciitis, bilateral (M72.2)     Achilles tendinitis of left lower extremity (M76.62)   Surgical/Medical history reviewed Yes   Precautions/Limitations no known precautions/limitations   Body Part(s)   Body Part(s) Ankle/Foot   Presentation and Etiology   Pertinent history of current problem (include personal factors and/or comorbidities that impact the POC) October last year left knee started to get painful. Had pain from foot all the way to the hip. Had a knee injection in April and since then the knee is a lot better. Still having the foot and lower leg pain, L > R. Painful B arches and achilles. Going to be getting custom orthotics.   Impairments A. Pain;C. Swelling;E. Decreased flexibility;F. Decreased strength and endurance;G. Impaired balance;H. Impaired gait;K. Numbness;L. Tingling;M. Locking or catching   Functional Limitations perform activities of daily living;perform required work activities;perform desired leisure / sports activities   Symptom Location B feet, L > R   How/Where did it occur From insidious onset   Onset date of current episode/exacerbation 04/12/22   Chronicity Chronic   Pain rating (0-10 point scale) Best (/10);Worst (/10)   Best (/10) 3   Worst (/10) 10   Pain quality A. Sharp;C. Aching;E. Shooting;F. Stabbing;G. Cramping   Frequency of pain/symptoms A.  Constant   Pain/symptoms exacerbated by A. Sitting;B. Walking;I. Bending   Pain/symptoms eased by D. Nothing   Progression of symptoms since onset: Improved   Prior Level of Function   Prior Level of Function-Mobility independent   Prior Level of Function-ADLs independent   Current Level of Function   Patient role/employment history A. Employed   Employment Comments  for [employer]   Fall Risk Screen   Fall screen completed by PT   Have you fallen 2 or more times in the past year? No   Have you fallen and had an injury in the past year? No   Is patient a fall risk? No   Abuse Screen (yes response referral indicated)   Feels Unsafe at Home or Work/School   (pt presents with spouse, did not ask)   Functional Scales   Functional Scales Other   Other Scales  LFES: 51/80   Ankle/Foot Objective Findings   Posterior Drawer Test -   Anterior Drawer Test -   Talar Tilt Test -   Side (if bilateral, select both right and left) Left;Right   Ankle/Foot ROM Comment stiff L great toe extension   Palpation tender calcaneal tubercle at PF origin, tender L achilles insertion point on the calcaneus. tight and tender gastroc, especially lateral head with multiple trigger points.   Gait/Locomotion pronation B, mild antalgic   Foot Position In Standing pes planus   Left Gastroc (in WB) Flexibility tight   Left Soleus (in WB) Flexibility tight   Left PF/Inversion Strength 5   Left PF/Eversion Strength 4   Left DF (Knee Ext) AROM 3, pain gastroc   Left PF AROM 50   Left Calcanceal Inversion AROM 35   Left Calcaneal Eversion AROM 15   Left DF/Inversion Strength 5   Left DF/Eversion Strength 5   Right Gastroc (in WB) Flexibility tight   Right Soleus (in WB) Flexibility tight   Right PF/Inversion Strength 5   Right PF/Eversion Strength 5   Right DF (Knee Ext) AROM 3, pain gastroc   Right PF AROM 50   Right Calcanceal Inversion AROM 30   Right Calcaneal Eversion AROM 15   Right DF/Inversion Strength 5   Right DF/Eversion  Strength 5   Planned Therapy Interventions   Planned Therapy Interventions gait training;joint mobilization;manual therapy;neuromuscular re-education;ROM;strengthening;stretching   Clinical Impression   Criteria for Skilled Therapeutic Interventions Met yes, treatment indicated   PT Diagnosis consistent with B plantar fascitis L > R, L achilles insertional tendionpathy   Influenced by the following impairments pain, decreased ROM, decreased flexibility, decreased strength, impaired gait and balance   Functional limitations due to impairments walking, sitting, bending, stairs, standing   Clinical Presentation Stable/Uncomplicated   Clinical Presentation Rationale sx stable and unchanged   Clinical Decision Making (Complexity) Low complexity   Therapy Frequency 1 time/week   Predicted Duration of Therapy Intervention (days/wks) 8 weeks   Risk & Benefits of therapy have been explained Yes   Patient, Family & other staff in agreement with plan of care Yes   Education Assessment   Preferred Learning Style Listening;Demonstration;Pictures/video   Barriers to Learning No barriers   ORTHO GOALS   PT Ortho Eval Goals 1;2;3   Ortho Goal 1   Goal Identifier 1   Goal Description Patient will be walk 2 blocks with little or no difficulty.   Target Date 09/08/22   Ortho Goal 2   Goal Identifier 2   Goal Description Patient will be able to stand 1 hour with only moderate difficulty.   Target Date 09/08/22   Ortho Goal 3   Goal Identifier 3   Goal Description Patient will be independent and consistent with HEP at [facility] 5x a week to aid functional recovery.   Target Date 09/08/22   Total Evaluation Time   PT Eval, Low Complexity Minutes (52441) 15       Please contact me with any questions or concerns.  Thank you for your referral.    Audra Pope, PT, DPT, OCS  Physical Therapist, Orthopedic Certified Specialist    [facility]  [address]  [address]  [email or web address]   Office: [phone]   Employed by [employer]

## 2022-07-31 ENCOUNTER — HEALTH MAINTENANCE LETTER (OUTPATIENT)
Age: 52
End: 2022-07-31

## 2022-08-02 ENCOUNTER — HOSPITAL ENCOUNTER (OUTPATIENT)
Dept: PHYSICAL THERAPY | Facility: CLINIC | Age: 52
Setting detail: THERAPIES SERIES
Discharge: HOME | End: 2022-08-02
Attending: FAMILY MEDICINE
Payer: COMMERCIAL

## 2022-08-02 PROCEDURE — 97140 MANUAL THERAPY 1/> REGIONS: CPT | Mod: GP | Performed by: PHYSICAL THERAPIST

## 2022-08-24 ENCOUNTER — HOSPITAL ENCOUNTER (OUTPATIENT)
Dept: PHYSICAL THERAPY | Facility: CLINIC | Age: 52
Setting detail: THERAPIES SERIES
Discharge: HOME | End: 2022-08-24
Attending: FAMILY MEDICINE
Payer: COMMERCIAL

## 2022-08-24 PROCEDURE — 97110 THERAPEUTIC EXERCISES: CPT | Mod: GP | Performed by: PHYSICAL THERAPIST

## 2022-08-24 PROCEDURE — 97140 MANUAL THERAPY 1/> REGIONS: CPT | Mod: GP | Performed by: PHYSICAL THERAPIST

## 2022-08-29 DIAGNOSIS — E11.65 TYPE 2 DIABETES MELLITUS WITH HYPERGLYCEMIA, WITHOUT LONG-TERM CURRENT USE OF INSULIN (H): ICD-10-CM

## 2022-08-30 RX ORDER — SEMAGLUTIDE 1.34 MG/ML
0.5 INJECTION, SOLUTION SUBCUTANEOUS
Qty: 6 ML | Refills: 0 | Status: SHIPPED | OUTPATIENT
Start: 2022-08-30 | End: 2023-03-01

## 2022-09-06 ENCOUNTER — OFFICE VISIT (OUTPATIENT)
Dept: FAMILY MEDICINE | Facility: CLINIC | Age: 52
End: 2022-09-06
Payer: COMMERCIAL

## 2022-09-06 VITALS
OXYGEN SATURATION: 98 % | HEART RATE: 76 BPM | SYSTOLIC BLOOD PRESSURE: 124 MMHG | BODY MASS INDEX: 42.71 KG/M2 | DIASTOLIC BLOOD PRESSURE: 88 MMHG | TEMPERATURE: 97.9 F | WEIGHT: 315 LBS | RESPIRATION RATE: 12 BRPM

## 2022-09-06 DIAGNOSIS — Z23 NEED FOR PROPHYLACTIC VACCINATION AND INOCULATION AGAINST INFLUENZA: ICD-10-CM

## 2022-09-06 DIAGNOSIS — Z23 NEED FOR VACCINATION: ICD-10-CM

## 2022-09-06 DIAGNOSIS — R06.83 SNORING: ICD-10-CM

## 2022-09-06 DIAGNOSIS — E11.65 TYPE 2 DIABETES MELLITUS WITH HYPERGLYCEMIA, WITHOUT LONG-TERM CURRENT USE OF INSULIN (H): Primary | ICD-10-CM

## 2022-09-06 DIAGNOSIS — F33.0 MILD EPISODE OF RECURRENT MAJOR DEPRESSIVE DISORDER (H): ICD-10-CM

## 2022-09-06 DIAGNOSIS — I10 HYPERTENSION GOAL BP (BLOOD PRESSURE) < 140/90: ICD-10-CM

## 2022-09-06 LAB
ALBUMIN SERPL BCG-MCNC: 4.3 G/DL (ref 3.5–5.2)
ALP SERPL-CCNC: 77 U/L (ref 40–129)
ALT SERPL W P-5'-P-CCNC: 52 U/L (ref 10–50)
ANION GAP SERPL CALCULATED.3IONS-SCNC: 13 MMOL/L (ref 7–15)
AST SERPL W P-5'-P-CCNC: 35 U/L (ref 10–50)
BILIRUB SERPL-MCNC: 0.5 MG/DL
BUN SERPL-MCNC: 16.5 MG/DL (ref 6–20)
CALCIUM SERPL-MCNC: 9.3 MG/DL (ref 8.6–10)
CHLORIDE SERPL-SCNC: 102 MMOL/L (ref 98–107)
CHOLEST SERPL-MCNC: 149 MG/DL
CREAT SERPL-MCNC: 0.9 MG/DL (ref 0.67–1.17)
DEPRECATED HCO3 PLAS-SCNC: 23 MMOL/L (ref 22–29)
GFR SERPL CREATININE-BSD FRML MDRD: >90 ML/MIN/1.73M2
GLUCOSE SERPL-MCNC: 117 MG/DL (ref 70–99)
HBA1C MFR BLD: 6.2 % (ref 0–5.6)
HDLC SERPL-MCNC: 42 MG/DL
LDLC SERPL CALC-MCNC: 84 MG/DL
NONHDLC SERPL-MCNC: 107 MG/DL
POTASSIUM SERPL-SCNC: 4.3 MMOL/L (ref 3.4–5.3)
PROT SERPL-MCNC: 8 G/DL (ref 6.4–8.3)
SODIUM SERPL-SCNC: 138 MMOL/L (ref 136–145)
TRIGL SERPL-MCNC: 113 MG/DL

## 2022-09-06 PROCEDURE — 90472 IMMUNIZATION ADMIN EACH ADD: CPT | Performed by: FAMILY MEDICINE

## 2022-09-06 PROCEDURE — 90682 RIV4 VACC RECOMBINANT DNA IM: CPT | Performed by: FAMILY MEDICINE

## 2022-09-06 PROCEDURE — 90677 PCV20 VACCINE IM: CPT | Performed by: FAMILY MEDICINE

## 2022-09-06 PROCEDURE — 90471 IMMUNIZATION ADMIN: CPT | Performed by: FAMILY MEDICINE

## 2022-09-06 PROCEDURE — 80053 COMPREHEN METABOLIC PANEL: CPT | Performed by: FAMILY MEDICINE

## 2022-09-06 PROCEDURE — 99214 OFFICE O/P EST MOD 30 MIN: CPT | Mod: 25 | Performed by: FAMILY MEDICINE

## 2022-09-06 PROCEDURE — 36415 COLL VENOUS BLD VENIPUNCTURE: CPT | Performed by: FAMILY MEDICINE

## 2022-09-06 PROCEDURE — 83036 HEMOGLOBIN GLYCOSYLATED A1C: CPT | Performed by: FAMILY MEDICINE

## 2022-09-06 PROCEDURE — 80061 LIPID PANEL: CPT | Performed by: FAMILY MEDICINE

## 2022-09-06 RX ORDER — SEMAGLUTIDE 1.34 MG/ML
0.5 INJECTION, SOLUTION SUBCUTANEOUS
Qty: 6 ML | Refills: 0 | Status: CANCELLED | OUTPATIENT
Start: 2022-09-06

## 2022-09-06 ASSESSMENT — PATIENT HEALTH QUESTIONNAIRE - PHQ9
SUM OF ALL RESPONSES TO PHQ QUESTIONS 1-9: 7
SUM OF ALL RESPONSES TO PHQ QUESTIONS 1-9: 7
10. IF YOU CHECKED OFF ANY PROBLEMS, HOW DIFFICULT HAVE THESE PROBLEMS MADE IT FOR YOU TO DO YOUR WORK, TAKE CARE OF THINGS AT HOME, OR GET ALONG WITH OTHER PEOPLE: SOMEWHAT DIFFICULT

## 2022-09-06 ASSESSMENT — PAIN SCALES - GENERAL: PAINLEVEL: NO PAIN (0)

## 2022-09-06 NOTE — PROGRESS NOTES
"  Assessment & Plan     Type 2 diabetes mellitus with hyperglycemia, without long-term current use of insulin (H)  Adjust meds as indicated by above labs.   - HEMOGLOBIN A1C; Future  - Semaglutide, 1 MG/DOSE, 4 MG/3ML SOPN; Inject 1 mg Subcutaneous once a week  - Lipid panel reflex to direct LDL Fasting; Future  - Comprehensive metabolic panel; Future  - Lipid panel reflex to direct LDL Fasting  - Comprehensive metabolic panel  - HEMOGLOBIN A1C    Snoring  Likely with JONA   Contributing to mood and BP   - Adult Sleep Eval & Management  Referral; Future    Hypertension goal BP (blood pressure) < 140/90  Stable no change in treatment plan.     Mild episode of recurrent major depressive disorder (H)  Stable no change in treatment plan.     Need for prophylactic vaccination and inoculation against influenza    - INFLUENZA QUAD, RECOMBINANT, P-FREE (RIV4) (FLUBLOK)    Need for vaccination    - Pneumococcal 20 Valent Conjugate (Prevnar 20)             BMI:   Estimated body mass index is 42.71 kg/m  as calculated from the following:    Height as of 7/12/22: 1.867 m (6' 1.5\").    Weight as of this encounter: 148.9 kg (328 lb 3.2 oz).   Weight management plan: Discussed healthy diet and exercise guidelines    Patient Instructions   Mental Health Therapist - Please call [phone].    Therapeutic services Agency     Increase semaglutide to 1mg weekly     Check BS a bit more often           Return in about 6 months (around 3/6/2023) for Diabetic Visit, with me.    Maria Antonia Goodwin MD  [facility]    Sabi Joe is a 52 year old, presenting for the following health issues:  Diabetes      History of Present Illness       Mental Health Follow-up:  Patient presents to follow-up on Depression.Patient's depression since last visit has been:  Good  The patient is having other symptoms associated with depression.      Any significant life events: financial concerns  Patient is not " feeling anxious or having panic attacks.  Patient has no concerns about alcohol or drug use.    Diabetes:   He presents for follow up of diabetes.  He is checking home blood glucose a few times a month. He checks blood glucose before meals.  Blood glucose is sometimes over 200 and never under 70. He is aware of hypoglycemia symptoms including shakiness and weakness. He has no concerns regarding his diabetes at this time.  He is having numbness in feet and weight gain.         Hypertension: He presents for follow up of hypertension.  He does check blood pressure  regularly outside of the clinic. Outside blood pressures have been over 140/90. He does not follow a low salt diet.      Today's PHQ-9         PHQ-9 Total Score: 7    PHQ-9 Q9 Thoughts of better off dead/self-harm past 2 weeks :   Not at all    How difficult have these problems made it for you to do your work, take care of things at home, or get along with other people: Somewhat difficult     Wt Readings from Last 4 Encounters:   09/06/22 148.9 kg (328 lb 3.2 oz)   07/12/22 146.5 kg (323 lb)   04/26/22 (!) 152 kg (335 lb)   04/12/22 (!) 152 kg (335 lb)             Hypertension Follow-up      Do you check your blood pressure regularly outside of the clinic? Yes     Are you following a low salt diet? Yes    Are your blood pressures ever more than 140 on the top number (systolic) OR more   than 90 on the bottom number (diastolic), for example 140/90? No    Depression Followup    How are you doing with your depression since your last visit? Worsened seems to be up and down and more down than up     Lots of stress at home with family and $    Are you having other symptoms that might be associated with depression? No    Have you had a significant life event?  Financial Concerns     Are you feeling anxious or having panic attacks?   No    Do you have any concerns with your use of alcohol or other drugs? No    Social History     Tobacco Use     Smoking status: Never  Smoker     Smokeless tobacco: Never Used   Substance Use Topics     Alcohol use: Yes     Comment: rare     Drug use: No     PHQ 6/8/2022 9/6/2022   PHQ-9 Total Score 6 7   Q9: Thoughts of better off dead/self-harm past 2 weeks Not at all Not at all     RODRIGUE-7 SCORE 6/8/2022   Total Score 7         Suicide Assessment Five-step Evaluation and Treatment (SAFE-T)              Review of Systems   Constitutional, HEENT, cardiovascular, pulmonary, gi and gu systems are negative, except as otherwise noted.      Objective    /88   Pulse 76   Temp 97.9  F (36.6  C) (Tympanic)   Resp 12   Wt (!) 152.4 kg (336 lb)   SpO2 98%   BMI 43.73 kg/m    Body mass index is 43.73 kg/m .  Physical Exam   GENERAL APPEARANCE: healthy, alert and no distress  NECK: no adenopathy, no asymmetry, masses, or scars and thyroid normal to palpation  RESP: lungs clear to auscultation - no rales, rhonchi or wheezes  CV: regular rates and rhythm, normal S1 S2, no S3 or S4 and no murmur, click or rub  PSYCH: mentation appears normal and affect normal/bright                    [unfilled]  [unfilled]

## 2022-09-06 NOTE — NURSING NOTE
"Chief Complaint   Patient presents with     Diabetes     /88   Pulse 76   Temp 97.9  F (36.6  C) (Tympanic)   Resp 12   Wt (!) 152.4 kg (336 lb)   SpO2 98%   BMI 43.73 kg/m   Estimated body mass index is 43.73 kg/m  as calculated from the following:    Height as of 7/12/22: 1.867 m (6' 1.5\").    Weight as of this encounter: 152.4 kg (336 lb).  Patient presents to the clinic using No DME      Health Maintenance that is potentially due pending provider review:    Health Maintenance Due   Topic Date Due     DEPRESSION ACTION PLAN  Never done     HEPATITIS B IMMUNIZATION (1 of 3 - Risk 3-dose series) Never done     COVID-19 Vaccine (3 - Booster for Pfizer series) 07/17/2021     LIPID  06/18/2022     DIABETIC FOOT EXAM  06/18/2022     Pneumococcal Vaccine: Pediatrics (0 to 5 Years) and At-Risk Patients (6 to 64 Years) (2 - PCV) 06/18/2022     A1C  06/30/2022     INFLUENZA VACCINE (1) 09/01/2022        Pended.        "

## 2022-09-06 NOTE — PATIENT INSTRUCTIONS
Mental Health Therapist - Please call [phone].    Therapeutic services Agency     Increase semaglutide to 1mg weekly     Check BS a bit more often

## 2022-10-03 DIAGNOSIS — E11.65 TYPE 2 DIABETES MELLITUS WITH HYPERGLYCEMIA, WITHOUT LONG-TERM CURRENT USE OF INSULIN (H): ICD-10-CM

## 2022-10-04 NOTE — TELEPHONE ENCOUNTER
Routing to provider for consideration, protocol not met though pt was just seen in September for diabetes.

## 2022-10-05 ENCOUNTER — TELEPHONE (OUTPATIENT)
Dept: FAMILY MEDICINE | Facility: CLINIC | Age: 52
End: 2022-10-05

## 2022-10-05 DIAGNOSIS — E11.65 TYPE 2 DIABETES MELLITUS WITH HYPERGLYCEMIA, WITHOUT LONG-TERM CURRENT USE OF INSULIN (H): Primary | ICD-10-CM

## 2022-10-05 RX ORDER — LANCETS
EACH MISCELLANEOUS
Qty: 100 EACH | Refills: 1 | OUTPATIENT
Start: 2022-10-05 | End: 2022-10-06

## 2022-10-05 NOTE — TELEPHONE ENCOUNTER
Contour test strips and Microlet lancets are non-formulary and not covered by insurance. Insurance prefers and covers either Accu-Chek or OneTouch products. Please switch to formulary product. Thank you!

## 2022-10-06 DIAGNOSIS — E11.65 TYPE 2 DIABETES MELLITUS WITH HYPERGLYCEMIA, WITHOUT LONG-TERM CURRENT USE OF INSULIN (H): ICD-10-CM

## 2022-10-06 RX ORDER — GLUCOSAMINE HCL/CHONDROITIN SU 500-400 MG
CAPSULE ORAL
Qty: 100 EACH | Refills: 3 | Status: SHIPPED | OUTPATIENT
Start: 2022-10-06 | End: 2024-09-05

## 2022-10-06 RX ORDER — LANCETS
EACH MISCELLANEOUS
Qty: 1 EACH | Refills: 3 | Status: SHIPPED | OUTPATIENT
Start: 2022-10-06

## 2022-10-06 RX ORDER — LANCETS
EACH MISCELLANEOUS
Qty: 100 EACH | Refills: 1 | Status: SHIPPED | OUTPATIENT
Start: 2022-10-06 | End: 2024-04-23

## 2022-10-06 NOTE — TELEPHONE ENCOUNTER
Another script for the Contour test strips and Microlet lancets were placed. They are not covered by insurance. Accu-Chek or OneTouch products are. If I need to start a PA on the Contour products, please let me know otherwise please send script for Accu-Chek or OneTouch supplies. Thank you!

## 2022-10-29 ENCOUNTER — APPOINTMENT (OUTPATIENT)
Dept: GENERAL RADIOLOGY | Facility: CLINIC | Age: 52
End: 2022-10-29
Attending: EMERGENCY MEDICINE
Payer: OTHER MISCELLANEOUS

## 2022-10-29 ENCOUNTER — HOSPITAL ENCOUNTER (EMERGENCY)
Facility: CLINIC | Age: 52
Discharge: HOME | End: 2022-10-29
Attending: EMERGENCY MEDICINE | Admitting: EMERGENCY MEDICINE
Payer: OTHER MISCELLANEOUS

## 2022-10-29 VITALS
SYSTOLIC BLOOD PRESSURE: 132 MMHG | OXYGEN SATURATION: 98 % | RESPIRATION RATE: 18 BRPM | TEMPERATURE: 98 F | HEART RATE: 87 BPM | DIASTOLIC BLOOD PRESSURE: 73 MMHG

## 2022-10-29 DIAGNOSIS — M54.6 ACUTE BILATERAL THORACIC BACK PAIN: ICD-10-CM
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
## 2022-10-29 PROCEDURE — 96372 THER/PROPH/DIAG INJ SC/IM: CPT | Performed by: EMERGENCY MEDICINE

## 2022-10-29 PROCEDURE — 72072 X-RAY EXAM THORAC SPINE 3VWS: CPT

## 2022-10-29 PROCEDURE — 250N000011 HC RX IP 250 OP 636: Performed by: EMERGENCY MEDICINE

## 2022-10-29 PROCEDURE — 99284 EMERGENCY DEPT VISIT MOD MDM: CPT | Performed by: EMERGENCY MEDICINE

## 2022-10-29 RX ORDER — CYCLOBENZAPRINE HCL 10 MG
5-10 TABLET ORAL 3 TIMES DAILY PRN
Qty: 30 TABLET | Refills: 0 | Status: SHIPPED | OUTPATIENT
Start: 2022-10-29 | End: 2023-03-01

## 2022-10-29 RX ORDER — ORPHENADRINE CITRATE 30 MG/ML
60 INJECTION INTRAMUSCULAR; INTRAVENOUS
Status: DISCONTINUED | OUTPATIENT
Start: 2022-10-29 | End: 2022-10-29 | Stop reason: HOSPADM

## 2022-10-29 RX ORDER — IBUPROFEN 600 MG/1
600 TABLET, FILM COATED ORAL EVERY 6 HOURS PRN
Qty: 40 TABLET | Refills: 0 | Status: SHIPPED | OUTPATIENT
Start: 2022-10-29 | End: 2023-03-01

## 2022-10-29 RX ORDER — KETOROLAC TROMETHAMINE 30 MG/ML
60 INJECTION, SOLUTION INTRAMUSCULAR; INTRAVENOUS ONCE
Status: COMPLETED | OUTPATIENT
Start: 2022-10-29 | End: 2022-10-29

## 2022-10-29 RX ADMIN — KETOROLAC TROMETHAMINE 60 MG: 30 INJECTION, SOLUTION INTRAMUSCULAR at 11:21

## 2022-10-29 ASSESSMENT — ACTIVITIES OF DAILY LIVING (ADL)
ADLS_ACUITY_SCORE: 35
ADLS_ACUITY_SCORE: 35

## 2022-10-29 NOTE — ED TRIAGE NOTES
Pt presents for concern of back injury at work a week ago on 10/22/22. Pt is a [place]  and had responded to a call where he had to reposition a patient and hold the patients weight in a sitting position for an extended period of time. Pt has pain in left upper back in his shoulder and radiates to his neck. Fatemeh Bains RN

## 2022-10-29 NOTE — DISCHARGE INSTRUCTIONS
Rest, ice or heat to the painful area.      Ibuprofen for pain and inflammation.  Please take with food or milk.    Flexor if needed for muscle spasm.  It can cause drowsiness so it is not recommended to drive while on this medication.    I recommend following up with your chiropractor.  Can also follow-up with your primary care provider and discuss possible physical therapy.  I think most of your pain is related to rhomboid and shoulder girdle muscle tension and spasm.    If you have any significant worsening, changes or concerns return promptly at any time.    I hope that you start to improve quickly!!!

## 2022-10-30 NOTE — ED PROVIDER NOTES
"  History     Chief Complaint   Patient presents with     Back Injury     HPI  History per patient, medical records    This is a 52-year-old male, history of type 2 diabetes, hypertension, depression, obesity presenting with back injury.  Patient works as a .  He was responding to a call on a patient who had fallen at a care facility.  The person was obese and lying on his stomach with difficulty breathing so the patient rolled this person onto his side on a blanket but then subsequently had to roll him on his back and prop him up.  In order to prop the person up, patient had to hold a blanket for an extended period of time.  He had the weight of this person on the blanket while he was holding his arms out.  He noted some discomfort in his back afterwards and did report it to his work.  He states that initially it is felt like a pulled muscle but over the past couple of days it has become more intolerable.  He has had difficulty sleeping because of the pain.  He describes the pain as a heat or burning pain between his shoulder blades.  It is worse on the left and he has some radiation up into his neck and shoulder.  He has tried aspirin, Tylenol, and using a hand massager without relief.  Patient denies history of surgery or injections in the neck or back.  He has history of low back issues.  He does not have any numbness or tingling or weakness but he notes that his left arm \"feels weird\".  No chest pain, shortness of breath, cold or cough symptoms, bowel or bladder changes, fevers or chills.  Patient does see a chiropractor but has not seen them for this issue or recently.    Allergies:  Allergies   Allergen Reactions     Molds & Smuts Other (See Comments)     Puffy eyes     No Clinical Screening - See Comments Other (See Comments)     Cigarette smoke       Problem List:    Patient Active Problem List    Diagnosis Date Noted     Mild episode of recurrent major depressive disorder (H) 06/09/2022     " Priority: Medium     Morbid obesity (H) 10/27/2020     Priority: Medium     Type 2 diabetes mellitus with hyperglycemia, without long-term current use of insulin (H) 10/27/2020     Priority: Medium     Hypertension goal BP (blood pressure) < 140/90 10/27/2020     Priority: Medium        Past Medical History:    Past Medical History:   Diagnosis Date     Cancer (H)      Depressive disorder      Diabetes (H)      Gastro-oesophageal reflux disease      Hypertension goal BP (blood pressure) < 140/90        Past Surgical History:    Past Surgical History:   Procedure Laterality Date     COLONOSCOPY       VASECTOMY         Family History:    Family History   Problem Relation Age of Onset     Alcoholism Father      Lung Cancer Maternal Grandfather      Diabetes Maternal Grandfather      Hypertension Maternal Grandfather      Other Cancer Maternal Grandfather         Lung     Colon Cancer Maternal Grandmother        Social History:  Marital Status:   [2]  Social History     Tobacco Use     Smoking status: Never     Smokeless tobacco: Never   Substance Use Topics     Alcohol use: Yes     Comment: rare     Drug use: No        Medications:    cyclobenzaprine (FLEXERIL) 10 MG tablet  ibuprofen (ADVIL/MOTRIN) 600 MG tablet  acetaminophen (TYLENOL) 500 MG tablet  alcohol swab prep pads  aspirin (ASA) 81 MG EC tablet  atorvastatin (LIPITOR) 20 MG tablet  blood glucose (NO BRAND SPECIFIED) test strip  buPROPion (WELLBUTRIN XL) 150 MG 24 hr tablet  clotrimazole (LOTRIMIN) 1 % external cream  CONTOUR NEXT TEST test strip  lisinopril (ZESTRIL) 20 MG tablet  magnesium 250 MG tablet  Microlet Lancets MISC  MULTIPLE VITAMIN PO  nystatin (MYCOSTATIN) 786945 UNIT/GM external cream  semaglutide (OZEMPIC, 0.25 OR 0.5 MG/DOSE,) 2 MG/1.5ML SOPN pen  Semaglutide, 1 MG/DOSE, 4 MG/3ML SOPN  thin (NO BRAND SPECIFIED) lancets  vitamin (B COMPLEX-C) tablet  vitamin E (TOCOPHEROL) 1000 units (450 mg) capsule          Review of Systems   All  other ROS reviewed and are negative or non-contributory except as stated in HPI.     Physical Exam   BP: 132/73  Pulse: 87  Temp: 98  F (36.7  C)  Resp: 18  SpO2: 98 %      Physical Exam  Vitals and nursing note reviewed.   Constitutional:       Appearance: He is obese.      Comments: Very pleasant, healthy-appearing male sitting upright in the bed.   HENT:      Head: Normocephalic.   Eyes:      Extraocular Movements: Extraocular movements intact.      Conjunctiva/sclera: Conjunctivae normal.   Cardiovascular:      Rate and Rhythm: Normal rate and regular rhythm.      Pulses: Normal pulses.      Heart sounds: Normal heart sounds.   Pulmonary:      Effort: Pulmonary effort is normal.      Breath sounds: Normal breath sounds.   Chest:      Chest wall: No tenderness.   Musculoskeletal:      Cervical back: Normal range of motion and neck supple.        Back:    Skin:     General: Skin is warm and dry.   Neurological:      General: No focal deficit present.      Mental Status: He is alert and oriented to person, place, and time.   Psychiatric:         Mood and Affect: Mood normal.         Behavior: Behavior normal.         ED Course (with Medical Decision Making)    Pt seen and examined by me.  RN and EPIC notes reviewed.      Patient with upper back pain after holding his arms out while holding a large weight for an extended period of time.  I think he is having some muscle spasm, likely in the rhomboids.  I did do x-ray of the thoracic spine with results as noted below.  No acute abnormalities noted.  Some chronic changes.    Results discussed with the patient.  I gave him a dose of IM Toradol and he had some relief of the sharp pain.  I am going to discharge him with an Rx for muscle relaxants, Flexeril.  He can use ice or heat to the area.  Ibuprofen or Aleve as anti-inflammatories.  He can follow-up with his chiropractor or he could see his primary care provider and possibly have physical therapy.  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  In the meantime, if he has any significant worsening, changes or concerns return at any time.      Procedures      Results for orders placed or performed during the hospital encounter of 10/29/22 (from the past 24 hour(s))   Thoracic spine XR, 3 views    Narrative    EXAM: XR THORACIC SPINE 3 VIEWS  LOCATION: [facility]  DATE/TIME: 10/29/2022, 11:32 AM    INDICATION: Work-related injury. Pain.  COMPARISON: Prior CT 05/22/2021.  TECHNIQUE: CR Thoracic Spine.      Impression    IMPRESSION: Twelve thoracic segments. Mild chronic anterior wedging suggested lower thoracic vertebral segments. Loss of height is about 25-30%. Accentuation of the thoracic kyphosis is noted. Height/alignment and anterior wedging are stable from prior   CT 05/22/2021 with no acute fracture or posttraumatic malalignment suspected in the thoracic spine. Slightly shallow inspiratory effort accentuates heart size and bibasilar atelectasis with slight elevation of the right hemidiaphragm noted. No displaced   rib fractures. S-shaped thoracic curve convex to the right T5, convex to left T11. No segmentation anomalies are noted. Spinous processes/posterior elements appear intact, unchanged from prior study with some hypertrophic changes associated with mid   thoracic spinous processes on lateral view, stable from CT as well.          Medications   ketorolac (TORADOL) injection 60 mg (60 mg Intramuscular Given 10/29/22 1121)       Assessments & Plan      I have reviewed the findings, diagnosis, plan and need for follow up with the patient.    Discharge Medication List as of 10/29/2022  1:02 PM      START taking these medications    Details   cyclobenzaprine (FLEXERIL) 10 MG tablet Take 0.5-1 tablets (5-10 mg) by mouth 3 times daily as needed for muscle spasms, Disp-30 tablet, R-0, E-Prescribe             Final diagnoses:   Acute bilateral thoracic back pain   Work  related injury     Disposition: Patient discharged home in stable condition.  Plan as above.  Return for concerns.     Note: Chart documentation done in part with Dragon Voice Recognition software. Although reviewed after completion, some word and grammatical errors may remain.       10/29/2022   [facility] EMERGENCY DEPT     Heydi Baron MD  10/30/22 0758

## 2022-11-18 DIAGNOSIS — G47.33 OSA ON CPAP: Primary | ICD-10-CM

## 2022-12-06 NOTE — PROGRESS NOTES
PHYSICAL THERAPY DISCHARGE NOTE  Patient seen from 7/12/22-8/24/22. Patient did not return for follow up treatments. The daily note from the patient's last visit will serve as the discharge note. Discharge from PT services at this time for this episode of treatment. Please see attached documentation under this episode of care for further information including dates of service, start of care date, referring physician, Dx, treatment plan, treatments, etc.    Please contact me with any questions or concerns.  Thank you for your referral.    Audra Pope, PT, DPT, OCS  Physical Therapist, Orthopedic Certified Specialist    [facility]  [address]   [address]  [place] [address]  [email or web address]   Office: [phone]   Employed by [facility]     08/25/22 0600   Signing Clinician's Name / Credentials   Signing clinician's name / credentials William García PT OCS   Session Number   Session Number 3   Adult Goals   PT Ortho Eval Goals 1;2;3   Ortho Goal 1   Goal Identifier 1   Goal Description Patient will be walk 2 blocks with little or no difficulty.   Goal Progress intermittent improvement   Target Date 09/08/22   Ortho Goal 2   Goal Identifier 2   Goal Description Patient will be able to stand 1 hour with only moderate difficulty.   Target Date 09/08/22   Ortho Goal 3   Goal Identifier 3   Goal Description Patient will be independent and consistent with HEP at [facility] 5x a week to aid functional recovery.   Target Date 09/08/22   Subjective Report   Subjective Report Left is still more painful than the right.  stiffer.  night splint ok but hard to keep on.   Therapeutic Procedure/exercise   Therapeutic Procedures: strength, endurance, ROM, flexibillity minutes (65863) 15   Skilled Intervention HEP instruction, stretching   Patient Response less tension after MT to lower legs   Treatment Detail stair gastroc and soleus stretches.   review HEP stretches and yoga for HEP for hips and low back.   Manual Therapy   Manual Therapy: Mobilization, MFR, MLD, friction massage minutes (63048) 25   Skilled Intervention MT for myofascial restriction   Patient Response much more loose feeling after MT   Treatment Detail MFR bilat med/lat gastroc and soleus mm, tib ant MFR and myofascial activation, TFM at l achilles insertion.  self MFR education.   Plan   Plan for next session cont to progress as able   Total Session Time   Timed Code Treatment Minutes 40   Total Treatment Time (sum of timed and untimed services) 40

## 2022-12-27 DIAGNOSIS — E11.65 TYPE 2 DIABETES MELLITUS WITH HYPERGLYCEMIA, WITHOUT LONG-TERM CURRENT USE OF INSULIN (H): ICD-10-CM

## 2022-12-27 RX ORDER — SEMAGLUTIDE 1.34 MG/ML
INJECTION, SOLUTION SUBCUTANEOUS
Qty: 9 ML | Refills: 0 | Status: SHIPPED | OUTPATIENT
Start: 2022-12-27 | End: 2023-01-04

## 2023-01-04 DIAGNOSIS — E11.65 TYPE 2 DIABETES MELLITUS WITH HYPERGLYCEMIA, WITHOUT LONG-TERM CURRENT USE OF INSULIN (H): ICD-10-CM

## 2023-01-05 RX ORDER — SEMAGLUTIDE 1.34 MG/ML
1 INJECTION, SOLUTION SUBCUTANEOUS WEEKLY
Qty: 9 ML | Refills: 0 | Status: SHIPPED | OUTPATIENT
Start: 2023-01-05 | End: 2023-01-06

## 2023-01-06 ENCOUNTER — TELEPHONE (OUTPATIENT)
Dept: FAMILY MEDICINE | Facility: CLINIC | Age: 53
End: 2023-01-06

## 2023-01-06 DIAGNOSIS — E11.65 TYPE 2 DIABETES MELLITUS WITH HYPERGLYCEMIA, WITHOUT LONG-TERM CURRENT USE OF INSULIN (H): ICD-10-CM

## 2023-01-06 RX ORDER — SEMAGLUTIDE 1.34 MG/ML
1 INJECTION, SOLUTION SUBCUTANEOUS WEEKLY
Qty: 9 ML | Refills: 0 | Status: SHIPPED | OUTPATIENT
Start: 2023-01-06 | End: 2023-03-01

## 2023-01-06 NOTE — TELEPHONE ENCOUNTER
New Medication Request        What medication are you requesting?: Ozempic 1MG     Preferred Pharmacy:  [id number] [place], [place] - [address] [place] [id number]  Phone: [phone] Fax: [phone]

## 2023-02-01 ENCOUNTER — TRANSFERRED RECORDS (OUTPATIENT)
Dept: MULTI SPECIALTY CLINIC | Facility: CLINIC | Age: 53
End: 2023-02-01

## 2023-02-01 LAB — RETINOPATHY: NORMAL

## 2023-02-28 ASSESSMENT — ENCOUNTER SYMPTOMS
ARTHRALGIAS: 1
MYALGIAS: 1
DIZZINESS: 0
JOINT SWELLING: 0
COUGH: 0
HEMATOCHEZIA: 0
SORE THROAT: 0
HEARTBURN: 1
PALPITATIONS: 0
EYE PAIN: 0
PARESTHESIAS: 0
WEAKNESS: 0
CHILLS: 0
FEVER: 0
CONSTIPATION: 1
NAUSEA: 0
DYSURIA: 0
HEMATURIA: 0
NERVOUS/ANXIOUS: 0
FREQUENCY: 0
DIARRHEA: 1
SHORTNESS OF BREATH: 0
HEADACHES: 0
ABDOMINAL PAIN: 0

## 2023-03-01 ENCOUNTER — OFFICE VISIT (OUTPATIENT)
Dept: FAMILY MEDICINE | Facility: CLINIC | Age: 53
End: 2023-03-01
Payer: COMMERCIAL

## 2023-03-01 VITALS
BODY MASS INDEX: 39.91 KG/M2 | DIASTOLIC BLOOD PRESSURE: 80 MMHG | HEART RATE: 77 BPM | RESPIRATION RATE: 12 BRPM | TEMPERATURE: 97.6 F | OXYGEN SATURATION: 98 % | SYSTOLIC BLOOD PRESSURE: 110 MMHG | WEIGHT: 311 LBS | HEIGHT: 74 IN

## 2023-03-01 DIAGNOSIS — F33.0 MILD EPISODE OF RECURRENT MAJOR DEPRESSIVE DISORDER (H): ICD-10-CM

## 2023-03-01 DIAGNOSIS — I10 HYPERTENSION GOAL BP (BLOOD PRESSURE) < 140/90: ICD-10-CM

## 2023-03-01 DIAGNOSIS — Z00.00 ROUTINE GENERAL MEDICAL EXAMINATION AT A HEALTH CARE FACILITY: Primary | ICD-10-CM

## 2023-03-01 DIAGNOSIS — E11.65 TYPE 2 DIABETES MELLITUS WITH HYPERGLYCEMIA, WITHOUT LONG-TERM CURRENT USE OF INSULIN (H): ICD-10-CM

## 2023-03-01 DIAGNOSIS — E78.5 HYPERLIPIDEMIA LDL GOAL <100: ICD-10-CM

## 2023-03-01 DIAGNOSIS — E66.01 MORBID OBESITY (H): ICD-10-CM

## 2023-03-01 LAB
CREAT UR-MCNC: 184.8 MG/DL
HBA1C MFR BLD: 6.1 % (ref 0–5.6)
MICROALBUMIN UR-MCNC: <12 MG/L
MICROALBUMIN/CREAT UR: NORMAL MG/G{CREAT}
PSA SERPL-MCNC: 0.41 NG/ML (ref 0–3.5)

## 2023-03-01 PROCEDURE — 83036 HEMOGLOBIN GLYCOSYLATED A1C: CPT | Performed by: FAMILY MEDICINE

## 2023-03-01 PROCEDURE — 36415 COLL VENOUS BLD VENIPUNCTURE: CPT | Performed by: FAMILY MEDICINE

## 2023-03-01 PROCEDURE — G0103 PSA SCREENING: HCPCS | Performed by: FAMILY MEDICINE

## 2023-03-01 PROCEDURE — 82570 ASSAY OF URINE CREATININE: CPT | Performed by: FAMILY MEDICINE

## 2023-03-01 PROCEDURE — 99396 PREV VISIT EST AGE 40-64: CPT | Performed by: FAMILY MEDICINE

## 2023-03-01 PROCEDURE — 82043 UR ALBUMIN QUANTITATIVE: CPT | Performed by: FAMILY MEDICINE

## 2023-03-01 PROCEDURE — 99214 OFFICE O/P EST MOD 30 MIN: CPT | Mod: 25 | Performed by: FAMILY MEDICINE

## 2023-03-01 RX ORDER — ATORVASTATIN CALCIUM 20 MG/1
20 TABLET, FILM COATED ORAL DAILY
Qty: 90 TABLET | Refills: 3 | Status: SHIPPED | OUTPATIENT
Start: 2023-03-01 | End: 2024-04-23

## 2023-03-01 RX ORDER — SEMAGLUTIDE 1.34 MG/ML
1 INJECTION, SOLUTION SUBCUTANEOUS WEEKLY
Qty: 9 ML | Refills: 0 | Status: CANCELLED | OUTPATIENT
Start: 2023-03-01

## 2023-03-01 RX ORDER — LISINOPRIL 20 MG/1
20 TABLET ORAL DAILY
Qty: 90 TABLET | Refills: 3 | Status: SHIPPED | OUTPATIENT
Start: 2023-03-01 | End: 2024-04-23

## 2023-03-01 ASSESSMENT — ENCOUNTER SYMPTOMS
HEADACHES: 0
COUGH: 0
ABDOMINAL PAIN: 0
SORE THROAT: 0
HEARTBURN: 1
WEAKNESS: 0
FEVER: 0
HEMATOCHEZIA: 0
NAUSEA: 0
ARTHRALGIAS: 1
NERVOUS/ANXIOUS: 0
CHILLS: 0
HEMATURIA: 0
FREQUENCY: 0
SHORTNESS OF BREATH: 0
PARESTHESIAS: 0
EYE PAIN: 0
DIZZINESS: 0
DYSURIA: 0
MYALGIAS: 1
DIARRHEA: 1
PALPITATIONS: 0
CONSTIPATION: 1
JOINT SWELLING: 0

## 2023-03-01 ASSESSMENT — PATIENT HEALTH QUESTIONNAIRE - PHQ9
SUM OF ALL RESPONSES TO PHQ QUESTIONS 1-9: 5
10. IF YOU CHECKED OFF ANY PROBLEMS, HOW DIFFICULT HAVE THESE PROBLEMS MADE IT FOR YOU TO DO YOUR WORK, TAKE CARE OF THINGS AT HOME, OR GET ALONG WITH OTHER PEOPLE: NOT DIFFICULT AT ALL
SUM OF ALL RESPONSES TO PHQ QUESTIONS 1-9: 5

## 2023-03-01 ASSESSMENT — PAIN SCALES - GENERAL: PAINLEVEL: NO PAIN (0)

## 2023-03-01 NOTE — PROGRESS NOTES
"SUBJECTIVE:   CC: Heath is an 52 year old who presents for preventative health visit.   Patient has been advised of split billing requirements and indicates understanding: Yes  Healthy Habits:     Getting at least 3 servings of Calcium per day:  Yes    Bi-annual eye exam:  Yes    Dental care twice a year:  Yes    Sleep apnea or symptoms of sleep apnea:  Sleep apnea    Diet:  Regular (no restrictions)    Frequency of exercise:  2-3 days/week    Duration of exercise:  30-45 minutes    Taking medications regularly:  Yes    Medication side effects:  None    PHQ-2 Total Score: 1    Additional concerns today:  No      Diabetes Follow-up    How often are you checking your blood sugar? A few times a week  What time of day are you checking your blood sugars (select all that apply)?  Before and after meals  Have you had any blood sugars above 200?  No  Have you had any blood sugars below 70?  No    What symptoms do you notice when your blood sugar is low?  Gets \"cranky\"    What concerns do you have today about your diabetes? None     Do you have any of these symptoms? (Select all that apply)  Numbness in feet sensitive    Have you had a diabetic eye exam in the last 12 months? No Has upcoming appointment March 30th - [facility]          Hyperlipidemia Follow-Up      Are you regularly taking any medication or supplement to lower your cholesterol?   Yes- Lipitor    Are you having muscle aches or other side effects that you think could be caused by your cholesterol lowering medication?  Yes- cramps/mireille horses in calves    Hypertension Follow-up      Do you check your blood pressure regularly outside of the clinic? Yes     Are you following a low salt diet? Yes    Are your blood pressures ever more than 140 on the top number (systolic) OR more   than 90 on the bottom number (diastolic), for example 140/90? No    BP Readings from Last 2 Encounters:   03/01/23 110/80   10/29/22 132/73     Hemoglobin A1C (%)   Date Value "   09/06/2022 6.2 (H)   03/30/2022 6.7 (H)   06/18/2021 6.4 (H)   01/29/2021 6.6 (H)     LDL Cholesterol Calculated (mg/dL)   Date Value   09/06/2022 84   06/18/2021 125 (H)   01/29/2021 128 (H)       Depression Followup    How are you doing with your depression since your last visit? Ups and downs    Are you having other symptoms that might be associated with depression? No    Have you had a significant life event?  OTHER: wife has been having issues, son has been in the hospital     Are you feeling anxious or having panic attacks?   No    Do you have any concerns with your use of alcohol or other drugs? No    Social History     Tobacco Use     Smoking status: Never     Smokeless tobacco: Never   Vaping Use     Vaping Use: Never used   Substance Use Topics     Alcohol use: Yes     Comment: rare     Drug use: No     PHQ 6/8/2022 9/6/2022 3/1/2023   PHQ-9 Total Score 6 7 5   Q9: Thoughts of better off dead/self-harm past 2 weeks Not at all Not at all Not at all     RODRIGUE-7 SCORE 6/8/2022   Total Score 7         Suicide Assessment Five-step Evaluation and Treatment (SAFE-T)      Today's PHQ-2 Score:   PHQ-2 ( 1999 Pfizer) 2/28/2023   Q1: Little interest or pleasure in doing things 1   Q2: Feeling down, depressed or hopeless 1   PHQ-2 Score 2   PHQ-2 Total Score (12-17 Years)- Positive if 3 or more points; Administer PHQ-A if positive -   Q1: Little interest or pleasure in doing things Several days   Q2: Feeling down, depressed or hopeless Several days   PHQ-2 Score 2           Social History     Tobacco Use     Smoking status: Never     Smokeless tobacco: Never   Substance Use Topics     Alcohol use: Yes     Comment: rare         Alcohol Use 2/28/2023   Prescreen: >3 drinks/day or >7 drinks/week? No       Last PSA: No results found for: PSA    Reviewed orders with patient. Reviewed health maintenance and updated orders accordingly - Yes  Labs reviewed in EPIC    Reviewed and updated as needed this visit by clinical  "staff   Tobacco  Allergies  Meds  Problems  Med Hx  Surg Hx  Fam Hx          Reviewed and updated as needed this visit by Provider   Tobacco  Allergies  Meds  Problems  Med Hx  Surg Hx  Fam Hx             Review of Systems   Constitutional: Negative for chills and fever.   HENT: Negative for congestion, ear pain, hearing loss and sore throat.    Eyes: Negative for pain and visual disturbance.   Respiratory: Negative for cough and shortness of breath.    Cardiovascular: Negative for chest pain, palpitations and peripheral edema.   Gastrointestinal: Positive for constipation, diarrhea and heartburn. Negative for abdominal pain, hematochezia and nausea.   Genitourinary: Positive for impotence. Negative for dysuria, frequency, genital sores, hematuria, penile discharge and urgency.   Musculoskeletal: Positive for arthralgias and myalgias. Negative for joint swelling.   Skin: Negative for rash.   Neurological: Negative for dizziness, weakness, headaches and paresthesias.   Psychiatric/Behavioral: Positive for mood changes. The patient is not nervous/anxious.      Possibly related to ozempic or ?stress     OBJECTIVE:   /80   Pulse 77   Temp 97.6  F (36.4  C) (Tympanic)   Resp 12   Ht 1.867 m (6' 1.5\")   Wt 141.1 kg (311 lb)   SpO2 98%   BMI 40.48 kg/m      Physical Exam  GENERAL: healthy, alert and no distress  HENT: ear canals and TM's normal, nose and mouth without ulcers or lesions  NECK: no adenopathy, no asymmetry, masses, or scars and thyroid normal to palpation  RESP: lungs clear to auscultation - no rales, rhonchi or wheezes  CV: regular rate and rhythm, normal S1 S2, no S3 or S4, no murmur, click or rub, no peripheral edema and peripheral pulses strong  ABDOMEN: soft, nontender, no hepatosplenomegaly, no masses and bowel sounds normal  MS: no gross musculoskeletal defects noted, no edema  PSYCH: mentation appears normal, affect normal/bright    Diagnostic Test Results:  Labs reviewed in " "Epic    ASSESSMENT/PLAN:   (Z00.00) Routine general medical examination at a health care facility  (primary encounter diagnosis)  Comment: wanting PSA screen due to history of father with prostate cancer  He is at [facility] x1 for many years has some ED   Plan: REVIEW OF HEALTH MAINTENANCE PROTOCOL ORDERS,         PSA, screen           (E11.65) Type 2 diabetes mellitus with hyperglycemia, without long-term current use of insulin (H)  Comment: great weight loss   Will increase ozempic to 2 mg   Plan: HEMOGLOBIN A1C, Albumin Random Urine         Quantitative with Creat Ratio, Semaglutide, 2         MG/DOSE, (OZEMPIC) 8 MG/3ML pen            (E78.5) Hyperlipidemia LDL goal <100  Comment: Adjust meds as indicated by above labs.   Plan: atorvastatin (LIPITOR) 20 MG tablet            (I10) Hypertension goal BP (blood pressure) < 140/90  Comment: Stable no change in treatment plan.   Plan: lisinopril (ZESTRIL) 20 MG tablet            (F33.0) Mild episode of recurrent major depressive disorder (H)  Comment: Stable no change in treatment plan.   Plan:     (E66.01) Morbid obesity (H)  Comment: great weight loss on ozempic will push dose up   Plan: add exercise     Patient has been advised of split billing requirements and indicates understanding: Yes      COUNSELING:   Reviewed preventive health counseling, as reflected in patient instructions      BMI:   Estimated body mass index is 40.48 kg/m  as calculated from the following:    Height as of this encounter: 1.867 m (6' 1.5\").    Weight as of this encounter: 141.1 kg (311 lb).   Weight management plan: Discussed healthy diet and exercise guidelines      He reports that he has never smoked. He has never used smokeless tobacco.        Maria Antonia Goodwin MD  [facility]  "

## 2023-03-01 NOTE — NURSING NOTE
"Chief Complaint   Patient presents with     Physical     /80   Pulse 77   Temp 97.6  F (36.4  C) (Tympanic)   Resp 12   Ht 1.867 m (6' 1.5\")   Wt 141.1 kg (311 lb)   SpO2 98%   BMI 40.48 kg/m   Estimated body mass index is 40.48 kg/m  as calculated from the following:    Height as of this encounter: 1.867 m (6' 1.5\").    Weight as of this encounter: 141.1 kg (311 lb).  Patient presents to the clinic using No DME      Health Maintenance that is potentially due pending provider review:    Health Maintenance Due   Topic Date Due     DEPRESSION ACTION PLAN  Never done     HEPATITIS B IMMUNIZATION (1 of 3 - 3-dose series) Never done     COVID-19 Vaccine (3 - Booster for Pfizer series) 04/14/2021     DIABETIC FOOT EXAM  06/18/2022     A1C  12/06/2022     EYE EXAM  03/20/2023     YEARLY PREVENTIVE VISIT  03/30/2023     MICROALBUMIN  03/30/2023     ANNUAL REVIEW OF HM ORDERS  03/30/2023        n/a        "

## 2023-04-19 ENCOUNTER — TRANSFERRED RECORDS (OUTPATIENT)
Dept: HEALTH INFORMATION MANAGEMENT | Facility: CLINIC | Age: 53
End: 2023-04-19

## 2023-04-19 LAB — RETINOPATHY: POSITIVE

## 2023-05-11 ENCOUNTER — ANCILLARY PROCEDURE (OUTPATIENT)
Dept: GENERAL RADIOLOGY | Facility: CLINIC | Age: 53
End: 2023-05-11
Payer: COMMERCIAL

## 2023-05-11 ENCOUNTER — OFFICE VISIT (OUTPATIENT)
Dept: URGENT CARE | Facility: URGENT CARE | Age: 53
End: 2023-05-11
Payer: COMMERCIAL

## 2023-05-11 VITALS
HEART RATE: 104 BPM | RESPIRATION RATE: 18 BRPM | DIASTOLIC BLOOD PRESSURE: 80 MMHG | BODY MASS INDEX: 40.35 KG/M2 | OXYGEN SATURATION: 97 % | TEMPERATURE: 100.4 F | SYSTOLIC BLOOD PRESSURE: 121 MMHG | WEIGHT: 310 LBS

## 2023-05-11 DIAGNOSIS — J69.0 ASPIRATION PNEUMONIA DUE TO GASTRIC SECRETIONS, UNSPECIFIED LATERALITY, UNSPECIFIED PART OF LUNG (H): ICD-10-CM

## 2023-05-11 DIAGNOSIS — J69.0 ASPIRATION PNEUMONIA DUE TO GASTRIC SECRETIONS, UNSPECIFIED LATERALITY, UNSPECIFIED PART OF LUNG (H): Primary | ICD-10-CM

## 2023-05-11 LAB
DEPRECATED S PYO AG THROAT QL EIA: NEGATIVE
FLUAV AG SPEC QL IA: NEGATIVE
FLUBV AG SPEC QL IA: NEGATIVE

## 2023-05-11 PROCEDURE — 99214 OFFICE O/P EST MOD 30 MIN: CPT | Mod: CS

## 2023-05-11 PROCEDURE — 87635 SARS-COV-2 COVID-19 AMP PRB: CPT

## 2023-05-11 PROCEDURE — 87804 INFLUENZA ASSAY W/OPTIC: CPT

## 2023-05-11 PROCEDURE — 87651 STREP A DNA AMP PROBE: CPT

## 2023-05-11 PROCEDURE — 71046 X-RAY EXAM CHEST 2 VIEWS: CPT | Mod: TC | Performed by: RADIOLOGY

## 2023-05-11 RX ORDER — PREDNISONE 20 MG/1
40 TABLET ORAL 2 TIMES DAILY
Qty: 20 TABLET | Refills: 0 | Status: SHIPPED | OUTPATIENT
Start: 2023-05-11 | End: 2023-05-16

## 2023-05-11 RX ORDER — LANCETS
EACH MISCELLANEOUS
COMMUNITY
Start: 2022-10-06 | End: 2024-09-16

## 2023-05-11 NOTE — PATIENT INSTRUCTIONS
Aspiration pneumonia in the right mid/ low lung   -follow up in one week and then again in 2-3 weeks  for review of resolution     Diagnosis: pneumonia   Plan:   Antibiotics} today   Ibuprofen and tylenol as needed for pain, fevers, and body aches   Fluids and rest   Follow up with your PCP in the next week     Monitor for:   Lips or skin looks blue, purple, or gray  Feeling dizzy or faint  Trouble breathing or wheezing, Shortness of breath gets worse   Rapid breathing   Coughing up blood  Fever of 101 F or higher,  Shaking chills  Cough with phlegm that doesn't get better, or get worse  Shortness of breath with activities  Weakness, dizziness, or fainting that gets worse  stiff neck, headache and extreme lethargy   Chest pain with breathing or coughing  Symptoms that get worse or not improving       Pneumonia (Adult)  Pneumonia is an infection deep in the lungs.   It is in the small air sacs (alveoli).   It may be caused by a virus, fungus, or bacteria.   Pneumonia caused by bacteria is often treated with an antibiotic.   Severe cases may need to be treated in the hospital.   Milder cases can be treated at home.   Pneumonia symptoms are a lot like flu symptoms  They include fever, cough (dry or with phlegm), headache, muscle weakness, and pain.   These symptoms often get worse in the first 2 days.   But they often start to get better in the first week of treatment

## 2023-05-11 NOTE — PROGRESS NOTES
URGENT CARE  Assessment & Plan   Assessment:   Heath Carpenter is a 53 year old male who's clinical presentation today is consistent with:   1. Aspiration pneumonia due to gastric secretions, unspecified laterality, unspecified part of lung (H)  - Influenza A & B Antigen - Clinic Collect  - COVID-19 Virus (Coronavirus) by PCR Nose  - Group A Streptococcus PCR Throat Swab  - XR Chest 2 Views; Future  - Primary Care Referral; Future  - amoxicillin-clavulanate (AUGMENTIN) 875-125 MG tablet; Take 1 tablet by mouth 2 times   - predniSONE (DELTASONE) 20 MG tablet; Take 2 tablets (40 mg) by mouth 2 times daily   No alarm signs or symptoms present     Plan:  Will treat patient's aspiration suggesting pneumonia today w/ antibiotics and steroids.  Additionally discussed getting plenty of rest and fluids and using ibuprofen or tylenol to help with the fevers, pain and inflammation today   Educated patient they need to follow up with their PCP in the next 2 days to monitor for improvement; however, if respiratory symptoms worsen in the next 48 hrs they should  present to the ED. Informed patient worsening symptoms would include: dyspnea, lightheadedness, cyanosis, tachypnea, hemoptysis, stiff neck, lethargy, chest pain or new onset of fever/chills.     Patient is agreeable to treatment plan and state they will follow-up if symptoms do not improve and/or if symptoms worsen (see patient's AVS 'monitor for' section for specific patient instructions given and discussed regarding what to watch for and when to follow up)    Medications ordered are listed above, please see AVS for patient's specific and personalized discharge instructions given     ASHLIE Cerna [facility] [place]      ______________________________________________________________________        Subjective  Subjective     HPI: Heath Carpenter  is a 53 year old  male who presents today for evaluation the following concerns:   Patient presents  today endorsing he was laying in bed last night and had an episode of GERD.  Patient states when the gastric contents came in to his mouth he took a deep breath and is worried that he aspirated his gastric fluid.  Today patient states his chest feels tight and heavy.  He also endorses a fever of 100  F.  Patient denies any difficulty breathing or shortness of breath.  Patient denies any episodes like this in the past.  Patient denies any pain with taking a deep breath      Allergies   Allergen Reactions     Molds & Smuts Other (See Comments)     Puffy eyes     No Clinical Screening - See Comments Other (See Comments)     Cigarette smoke     Patient Active Problem List   Diagnosis     Morbid obesity (H)     Type 2 diabetes mellitus with hyperglycemia, without long-term current use of insulin (H)     Hypertension goal BP (blood pressure) < 140/90     Mild episode of recurrent major depressive disorder (H)       Review of Systems:  Pertinent review of systems as reflected in HPI, otherwise negative.     Objective  Objective    Physical Exam:  Vitals:    05/11/23 1242   BP: 121/80   Pulse: 104   Resp: 18   Temp: 100.4  F (38  C)   TempSrc: Tympanic   SpO2: 97%   Weight: 140.6 kg (310 lb)      General: Alert and oriented, no acute distress, Vital signs reviewed: fever noted ,  Normotensive, ill appearing   Psy/mental status: Cooperative, nonanxious  SKIN: Intact, no rashes  EYES: EOMs intact, PERRLA bilaterally   Conjunctiva: Clear bilaterally, no injection or erythema present  EARS: TMs intact, translucent gray in color with normal landmarks present no erythema  or bulging tympanic membrane   Canals are without swelling, however have a mild amount of cerumen, no impaction  NOSE:  mucosa erythematous bilaterally with a mild amount of rhinorrhea, clear  discharge}               No frontal or maxillary sinus tenderness present bilaterally  MOUTH/THROAT: lips, tongue, & oral mucosa appear normal upon inspection                 Posterior oropharynx is erythematous but without exudate, lesions or tonsillar  Edema, no dysphonia, no unilateral tonsillar edema, no uvular deviation,   no signs of peritonsillar abscess  NECK: supple, has full range of motion with no meningeal signs              No lymphadenopathy present  LUNG: normal work of breathing, good respiratory effort without retractions, good air  movement, non labored, inspection reveals normal chest expansion w/  inspiration            Lung sounds are coarse  to auscultation bilaterally,            No rales/rhonic/crackles wheezing noted           No cough noted       LABS:   Results for orders placed or performed in visit on 05/11/23   Influenza A & B Antigen - Clinic Collect     Status: Normal    Specimen: Nose; Swab   Result Value Ref Range    Influenza A antigen Negative Negative    Influenza B antigen Negative Negative    Narrative    Test results must be correlated with clinical data. If necessary, results should be confirmed by a molecular assay or viral culture.   Streptococcus A Rapid Screen w/Reflex to PCR - Clinic Collect     Status: Normal    Specimen: Throat; Swab   Result Value Ref Range    Group A Strep antigen Negative Negative       Imaging:   All images were personally read by this provider (myself).   Per my independent interpretation the xray shows consolidation in the right middle to lower lung concerning for aspirate.    I explained my diagnostic considerations and recommendations to the patient, who voiced understanding and agreement with the treatment plan.   All questions were answered.   We discussed potential side effects, risks and benefits of any prescribed or recommended therapies, as well as expectations for response to treatments.  Please see AVS for any patient instructions & handouts given.   Patient was advised to contact the Nurse Care Line, their Primary Care provider, Urgent Care, or the Emergency Department if there are new or worsening  symptoms, or call 911 for emergencies.        ______________________________________________________________________          Patient Instructions     Aspiration pneumonia in the right mid/ low lung   -follow up in one week and then again in 2-3 weeks  for review of resolution     Diagnosis: pneumonia   Plan:     Antibiotics} today     Ibuprofen and tylenol as needed for pain, fevers, and body aches     Fluids and rest     Follow up with your PCP in the next week     Monitor for:     Lips or skin looks blue, purple, or gray    Feeling dizzy or faint    Trouble breathing or wheezing, Shortness of breath gets worse     Rapid breathing     Coughing up blood    Fever of 101 F or higher,    Shaking chills    Cough with phlegm that doesn't get better, or get worse    Shortness of breath with activities    Weakness, dizziness, or fainting that gets worse    stiff neck, headache and extreme lethargy     Chest pain with breathing or coughing    Symptoms that get worse or not improving       Pneumonia (Adult)  Pneumonia is an infection deep in the lungs.   It is in the small air sacs (alveoli).   It may be caused by a virus, fungus, or bacteria.   Pneumonia caused by bacteria is often treated with an antibiotic.   Severe cases may need to be treated in the hospital.   Milder cases can be treated at home.   Pneumonia symptoms are a lot like flu symptoms  They include fever, cough (dry or with phlegm), headache, muscle weakness, and pain.   These symptoms often get worse in the first 2 days.   But they often start to get better in the first week of treatment

## 2023-05-12 LAB
GROUP A STREP BY PCR: NOT DETECTED
SARS-COV-2 RNA RESP QL NAA+PROBE: NEGATIVE

## 2023-05-18 ENCOUNTER — OFFICE VISIT (OUTPATIENT)
Dept: FAMILY MEDICINE | Facility: CLINIC | Age: 53
End: 2023-05-18
Payer: COMMERCIAL

## 2023-05-18 VITALS
RESPIRATION RATE: 16 BRPM | HEART RATE: 72 BPM | WEIGHT: 315 LBS | SYSTOLIC BLOOD PRESSURE: 109 MMHG | BODY MASS INDEX: 40.43 KG/M2 | OXYGEN SATURATION: 96 % | HEIGHT: 74 IN | TEMPERATURE: 98 F | DIASTOLIC BLOOD PRESSURE: 71 MMHG

## 2023-05-18 DIAGNOSIS — E66.01 MORBID OBESITY (H): ICD-10-CM

## 2023-05-18 DIAGNOSIS — E11.9 TYPE 2 DIABETES MELLITUS WITHOUT COMPLICATION, WITHOUT LONG-TERM CURRENT USE OF INSULIN (H): ICD-10-CM

## 2023-05-18 DIAGNOSIS — J69.0 ASPIRATION PNEUMONIA DUE TO GASTRIC SECRETIONS, UNSPECIFIED LATERALITY, UNSPECIFIED PART OF LUNG (H): Primary | ICD-10-CM

## 2023-05-18 DIAGNOSIS — K21.00 GASTROESOPHAGEAL REFLUX DISEASE WITH ESOPHAGITIS, UNSPECIFIED WHETHER HEMORRHAGE: ICD-10-CM

## 2023-05-18 PROCEDURE — 99214 OFFICE O/P EST MOD 30 MIN: CPT | Performed by: PHYSICIAN ASSISTANT

## 2023-05-18 ASSESSMENT — PAIN SCALES - GENERAL: PAINLEVEL: NO PAIN (0)

## 2023-05-18 NOTE — NURSING NOTE
"Chief Complaint   Patient presents with     Hospital F/U       Initial /71 (BP Location: Right arm, Patient Position: Sitting, Cuff Size: Adult Large)   Pulse 72   Temp 98  F (36.7  C) (Tympanic)   Resp 16   Ht 1.867 m (6' 1.5\")   Wt 144.7 kg (319 lb)   SpO2 96%   BMI 41.51 kg/m   Estimated body mass index is 41.51 kg/m  as calculated from the following:    Height as of this encounter: 1.867 m (6' 1.5\").    Weight as of this encounter: 144.7 kg (319 lb).    Patient presents to the clinic using No DME    Is there anyone who you would like to be able to receive your results? No  If yes have patient fill out MAZIN    "

## 2023-05-18 NOTE — PROGRESS NOTES
Assessment & Plan   (J69.0) Aspiration pneumonia due to gastric secretions, unspecified laterality, unspecified part of lung (H)  (primary encounter diagnosis)  Comment: improving   Plan: continue current antibiotics, monitor.  Does require follow-up xray per radiology recommendation - to be done at time of upcoming appt with Dr Goodwin.    (E11.65) Type 2 diabetes mellitus without complication, without long-term current use of insulin (H)  Comment: controlled per March a1c  Plan: continue ozempic until sees PCP    (E66.01) Morbid obesity (H)  Comment: uncontrolled, despite initial good wt loss ozempic. Discussed possible change to mounjaro  Plan: continue ozempic until follow-up with PCP    (K21.00) Gastroesophageal reflux disease with esophagitis, unspecified whether hemorrhage  Comment: doing well despite random aspiration recently    Patient Instructions   Continue antibiotics   Reach out to me if needed -  my chart is fine    Keep appt with Dr Goodwin        MED REC REQUIRED  Post Medication Reconciliation Status:  Discharge medications reconciled, continue medications without change    Radha Coppola PA-C  [facility]    Sabi Joe is a 53 year old, presenting for the following health issues:  Hospital F/U        5/18/2023     1:55 PM   Additional Questions   Roomed by brian roth cma   Accompanied by spouse- roseanna     BRENDAN     ED/UC Followup:    Facility: [facility] [place]    Date of visit: 05/11/2023  Reason for visit:Aspiration pneumonia due to gastric secretions, unspecified laterality, unspecified part of   Current Status:improving/ no change     Feeling some better   Hasn't felt like has a fever  Breathing some better   Had been a dry cough but now this morning a productive cough  Burning pain has resolved      Objective    /71 (BP Location: Right arm, Patient Position: Sitting, Cuff Size: Adult Large)   Pulse 72   Temp 98  F (36.7  " C) (Tympanic)   Resp 16   Ht 1.867 m (6' 1.5\")   Wt 144.7 kg (319 lb)   SpO2 96%   BMI 41.51 kg/m    Body mass index is 41.51 kg/m .  Physical Exam   Continued audible abnormality R lung        "

## 2023-05-18 NOTE — PATIENT INSTRUCTIONS
Continue antibiotics   Reach out to me if needed -  my chart is fine    Keep appt with Dr Goodwin

## 2023-06-13 ENCOUNTER — MYC MEDICAL ADVICE (OUTPATIENT)
Dept: FAMILY MEDICINE | Facility: CLINIC | Age: 53
End: 2023-06-13

## 2023-06-13 ENCOUNTER — OFFICE VISIT (OUTPATIENT)
Dept: SLEEP MEDICINE | Facility: CLINIC | Age: 53
End: 2023-06-13
Payer: COMMERCIAL

## 2023-06-13 ENCOUNTER — OFFICE VISIT (OUTPATIENT)
Dept: FAMILY MEDICINE | Facility: CLINIC | Age: 53
End: 2023-06-13
Payer: COMMERCIAL

## 2023-06-13 VITALS
RESPIRATION RATE: 18 BRPM | DIASTOLIC BLOOD PRESSURE: 76 MMHG | WEIGHT: 307 LBS | TEMPERATURE: 98.4 F | SYSTOLIC BLOOD PRESSURE: 126 MMHG | HEART RATE: 71 BPM | HEIGHT: 74 IN | OXYGEN SATURATION: 96 % | BODY MASS INDEX: 39.4 KG/M2

## 2023-06-13 VITALS
BODY MASS INDEX: 39.96 KG/M2 | SYSTOLIC BLOOD PRESSURE: 109 MMHG | HEIGHT: 74 IN | WEIGHT: 311.4 LBS | DIASTOLIC BLOOD PRESSURE: 77 MMHG | OXYGEN SATURATION: 95 % | HEART RATE: 99 BPM

## 2023-06-13 DIAGNOSIS — G47.33 OSA (OBSTRUCTIVE SLEEP APNEA): ICD-10-CM

## 2023-06-13 DIAGNOSIS — F33.0 MILD EPISODE OF RECURRENT MAJOR DEPRESSIVE DISORDER (H): ICD-10-CM

## 2023-06-13 DIAGNOSIS — E11.65 TYPE 2 DIABETES MELLITUS WITH HYPERGLYCEMIA, WITHOUT LONG-TERM CURRENT USE OF INSULIN (H): Primary | ICD-10-CM

## 2023-06-13 DIAGNOSIS — G47.33 OSA (OBSTRUCTIVE SLEEP APNEA): Primary | ICD-10-CM

## 2023-06-13 DIAGNOSIS — K21.00 GASTROESOPHAGEAL REFLUX DISEASE WITH ESOPHAGITIS WITHOUT HEMORRHAGE: ICD-10-CM

## 2023-06-13 LAB
HBA1C MFR BLD: 6.1 % (ref 0–5.6)
MAGNESIUM SERPL-MCNC: 2.1 MG/DL (ref 1.7–2.3)
SHBG SERPL-SCNC: 37 NMOL/L (ref 11–80)
TSH SERPL DL<=0.005 MIU/L-ACNC: 2.17 UIU/ML (ref 0.3–4.2)
VIT B12 SERPL-MCNC: 346 PG/ML (ref 232–1245)

## 2023-06-13 PROCEDURE — 99214 OFFICE O/P EST MOD 30 MIN: CPT | Performed by: FAMILY MEDICINE

## 2023-06-13 PROCEDURE — 83735 ASSAY OF MAGNESIUM: CPT | Performed by: FAMILY MEDICINE

## 2023-06-13 PROCEDURE — 84270 ASSAY OF SEX HORMONE GLOBUL: CPT | Performed by: FAMILY MEDICINE

## 2023-06-13 PROCEDURE — 82607 VITAMIN B-12: CPT | Performed by: FAMILY MEDICINE

## 2023-06-13 PROCEDURE — 99214 OFFICE O/P EST MOD 30 MIN: CPT | Performed by: INTERNAL MEDICINE

## 2023-06-13 PROCEDURE — 36415 COLL VENOUS BLD VENIPUNCTURE: CPT | Performed by: FAMILY MEDICINE

## 2023-06-13 PROCEDURE — 84403 ASSAY OF TOTAL TESTOSTERONE: CPT | Performed by: FAMILY MEDICINE

## 2023-06-13 PROCEDURE — 84443 ASSAY THYROID STIM HORMONE: CPT | Performed by: FAMILY MEDICINE

## 2023-06-13 PROCEDURE — 83036 HEMOGLOBIN GLYCOSYLATED A1C: CPT | Performed by: FAMILY MEDICINE

## 2023-06-13 RX ORDER — OMEPRAZOLE 40 MG/1
40 CAPSULE, DELAYED RELEASE ORAL DAILY
Qty: 90 CAPSULE | Refills: 1 | Status: SHIPPED | OUTPATIENT
Start: 2023-06-13 | End: 2023-12-26

## 2023-06-13 RX ORDER — BUPROPION HYDROCHLORIDE 150 MG/1
150 TABLET ORAL EVERY MORNING
Qty: 90 TABLET | Refills: 3 | Status: SHIPPED | OUTPATIENT
Start: 2023-06-13 | End: 2023-10-19

## 2023-06-13 ASSESSMENT — SLEEP AND FATIGUE QUESTIONNAIRES
HOW LIKELY ARE YOU TO NOD OFF OR FALL ASLEEP WHILE WATCHING TV: HIGH CHANCE OF DOZING
HOW LIKELY ARE YOU TO NOD OFF OR FALL ASLEEP WHILE SITTING QUIETLY AFTER LUNCH WITHOUT ALCOHOL: HIGH CHANCE OF DOZING
HOW LIKELY ARE YOU TO NOD OFF OR FALL ASLEEP WHILE LYING DOWN TO REST IN THE AFTERNOON WHEN CIRCUMSTANCES PERMIT: HIGH CHANCE OF DOZING
HOW LIKELY ARE YOU TO NOD OFF OR FALL ASLEEP WHEN YOU ARE A PASSENGER IN A CAR FOR AN HOUR WITHOUT A BREAK: HIGH CHANCE OF DOZING
HOW LIKELY ARE YOU TO NOD OFF OR FALL ASLEEP WHILE SITTING INACTIVE IN A PUBLIC PLACE: HIGH CHANCE OF DOZING
HOW LIKELY ARE YOU TO NOD OFF OR FALL ASLEEP WHILE SITTING AND READING: HIGH CHANCE OF DOZING
HOW LIKELY ARE YOU TO NOD OFF OR FALL ASLEEP WHILE SITTING AND TALKING TO SOMEONE: WOULD NEVER DOZE
HOW LIKELY ARE YOU TO NOD OFF OR FALL ASLEEP IN A CAR, WHILE STOPPED FOR A FEW MINUTES IN TRAFFIC: WOULD NEVER DOZE

## 2023-06-13 ASSESSMENT — PAIN SCALES - GENERAL: PAINLEVEL: NO PAIN (0)

## 2023-06-13 NOTE — PROGRESS NOTES
Obstructive Sleep Apnea - PAP Follow-Up Visit:    Chief Complaint   Patient presents with     CPAP Follow Up     Falling asleep during the day. Having problems with CPAP     Study Results       Heath Carpenter comes in today for follow-up of their severe sleep apnea, managed with CPAP.     PSG at [facility] on 3/2/2015 at weight of 334 lbs showed an AHI of 33 per hour (based on 4% desaturation criteria) and RDI of 42 per hour. CPAP at 8 cm H2O was effective.     Do you use a CPAP Machine at home: Yes  Overall, on a scale of 0-10 how would you rate your CPAP (0 poor, 10 great):    Is your mask comfortable: No  If not, why: presses into nose  Is you mask leaking: No  If yes, where do you feel it:    How many night per week does the mask leak (0-7):    Do you notice snoring with mask on: No  Do you notice gasping arousals with mask on: Yes  Are you having significant oral or nasal dryness: Yes  Is the pressure setting comfortable: No  In not, why:    What type of mask do you use: Nasal Mask  What is your typical bedtime: 9pm  How long does it take you to go to sleep on PAP therapy: 5min or less  What time do you typically get out of bed for the day: 6am  How many hours on average per night are you using PAP therapy: havent been  How many hours are you sleeping per night: 6 to 7  Do you feel well rested in the morning: No    EPWORTH SLEEPINESS SCALE         6/13/2023     3:20 PM    Gipsy Sleepiness Scale ( MATT Parker  [id number]<br>ESS - [place]/English - Final version - 21 Nov 07 - [facility] [place].)   Sitting and reading High chance of dozing   Watching TV High chance of dozing   Sitting, inactive in a public place (e.g. a theatre or a meeting) High chance of dozing   As a passenger in a car for an hour without a break High chance of dozing   Lying down to rest in the afternoon when circumstances permit High chance of dozing   Sitting and talking to someone Would never doze   Sitting quietly after a  "lunch without alcohol High chance of dozing   In a car, while stopped for a few minutes in traffic Would never doze   Brenham Score (MC) 18   Brenham Score (Sleep) 18       INSOMNIA SEVERITY INDEX (SONY)          6/13/2023     3:17 PM   Insomnia Severity Index (SONY)   Difficulty falling asleep 0   Difficulty staying asleep 0   Problems waking up too early 1   How SATISFIED/DISSATISFIED are you with your CURRENT sleep pattern? 3   How NOTICEABLE to others do you think your sleep problem is in terms of impairing the quality of your life? 3   How WORRIED/DISTRESSED are you about your current sleep problem? 2   To what extent do you consider your sleep problem to INTERFERE with your daily functioning (e.g. daytime fatigue, mood, ability to function at work/daily chores, concentration, memory, mood, etc.) CURRENTLY? 4   SONY Total Score 13       Guidelines for Scoring/Interpretation:  Total score categories:  0-7 = No clinically significant insomnia   8-14 = Subthreshold insomnia   15-21 = Clinical insomnia (moderate severity)  22-28 = Clinical insomnia (severe)  Used via courtesy of www.EvntLive.va.gov with permission from Ebenezer Lees PhD., [facility] [place], [place]    ResMed   Auto-PAP 8.0 - 15.0 cmH2O 30 day usage data:    3% of days with > 4 hours of use. 28/30 days with no use.   Average use 9 minutes per day.   95%ile Leak 16.8 L/min.   CPAP 95% pressure 9.6 cm.   AHI 0.4 events per hour.     /77   Pulse 99   Ht 1.867 m (6' 1.5\")   Wt 141.3 kg (311 lb 6.4 oz)   SpO2 95%   BMI 40.52 kg/m      Impression/Plan:     Severe obstructive sleep apnea.     -Patient has severe obstructive sleep apnea at baseline with his PSG from 2015 at a weight of 334 pounds showing an apnea-hypopnea index of 33/h.  Patient was previously on auto CPAP settings with pressure range of 8 to 15 cm H2O.  Due to malfunction of his previous device, a replacement was provided.  Although pressure settings were kept the same, patient reports " that he could not fall asleep with CPAP due to inadequate pressure.  As result, he has not been using treatment and experiences significant sleep apnea symptoms.  Sleep quality is poor and he feels excessively tired and sleepy during the day.  He continues to snore loudly and has witnessed apneas per his wife.    -I had him try CPAP in clinic today.  Starting pressure of 8 cm H2O was uncomfortable and he had better results with a minimum pressure of 11 cm H2O.  I changed pressure settings to auto CPAP 11 to 17 cm H2O.    -Considering his difficulty with CPAP pressures, I also recommended performing a titration PSG for optimization of PAP settings.    Plan:     -Continue auto CPAP therapy with pressure settings changed to 11 to 17 cm H2O  -Titration PSG for optimization of PAP settings  - Follow up after PSG    I spent a total of 35 minutes for this appointment on this date of service which include time spent before, during and after the visit for chart review, patient care, counseling and coordination of care.      Calixto Richardson MD    CC:  Maria Antonia Goodwin,

## 2023-06-13 NOTE — NURSING NOTE
"Chief Complaint   Patient presents with     CPAP Follow Up     Falling asleep during the day. Having problems with CPAP     Study Results       Initial /77   Pulse 99   Ht 1.867 m (6' 1.5\")   Wt 141.3 kg (311 lb 6.4 oz)   SpO2 95%   BMI 40.52 kg/m   Estimated body mass index is 40.52 kg/m  as calculated from the following:    Height as of this encounter: 1.867 m (6' 1.5\").    Weight as of this encounter: 141.3 kg (311 lb 6.4 oz).    Medication Reconciliation: complete  ESS 18  Neck circumference: 49 centimeters.  Matti Lake MA      "

## 2023-06-13 NOTE — PROGRESS NOTES
Assessment & Plan     Type 2 diabetes mellitus with hyperglycemia, without long-term current use of insulin (H)  Adjust meds as indicated by above labs.   - HEMOGLOBIN A1C; Future  - TSH with free T4 reflex; Future  - Testosterone Free and Total; Future  - Semaglutide, 2 MG/DOSE, (OZEMPIC) 8 MG/3ML pen; Inject 2 mg Subcutaneous every 7 days  - Magnesium; Future  - Vitamin B12; Future  - Vitamin B12  - Magnesium  - Testosterone Free and Total  - TSH with free T4 reflex  - HEMOGLOBIN A1C    Mild episode of recurrent major depressive disorder (H)  Fair to good control  Will stay on current meds and see how the sleep eval goes   - buPROPion (WELLBUTRIN XL) 150 MG 24 hr tablet; Take 1 tablet (150 mg) by mouth every morning    Gastroesophageal reflux disease with esophagitis without hemorrhage  Not well controlled   Increase sode of PPI to 40 mg having night time sxs   - omeprazole (PRILOSEC) 40 MG DR capsule; Take 1 capsule (40 mg) by mouth daily  - Adult GI  Referral - Procedure Only; Future    JONA (obstructive sleep apnea)  Needs new sleep study   Having witnessed apnea on current cpap   - Adult Sleep Eval & Management  Referral; Future           MED REC REQUIRED  Post Medication Reconciliation Status: discharge medications reconciled, continue medications without change  Patient Instructions   Check labs today     Set up sleep study     Increase dose of omeprazole to 40mg     Set up the scope           Maria Antonia Goodwin MD  [facility]    Sabi Joe is a 53 year old, presenting for the following health issues:  Pneumonia (Follow up), Muaro Horses, and Diabetes        6/13/2023     8:50 AM   Additional Questions   Roomed by [facility]     Patient is here to follow up with his pneumonia. He states that he is feeling better. He does notice some trouble breathing with exertion. He has been feeling very fatigued.    He has had mauro horses in his legs  and feet for the last three weeks.     Diabetes Follow-up    How often are you checking your blood sugar? A few times a week  What time of day are you checking your blood sugars (select all that apply)?  Before and after meals  Have you had any blood sugars above 200?  No  Have you had any blood sugars below 70?  No    What symptoms do you notice when your blood sugar is low?  None    What concerns do you have today about your diabetes? None     Do you have any of these symptoms? (Select all that apply)  Numbness in feet    Have you had a diabetic eye exam in the last 12 months? Yes- Date of last eye exam: [facility],  Location: February/March 2023    BP Readings from Last 2 Encounters:   06/13/23 126/76   05/18/23 109/71     Hemoglobin A1C (%)   Date Value   06/13/2023 6.1 (H)   03/01/2023 6.1 (H)   06/18/2021 6.4 (H)   01/29/2021 6.6 (H)     LDL Cholesterol Calculated (mg/dL)   Date Value   09/06/2022 84   06/18/2021 125 (H)   01/29/2021 128 (H)                 Depression Followup    How are you doing with your depression since your last visit? No change    Are you having other symptoms that might be associated with depression? No    Have you had a significant life event?  No     Are you feeling anxious or having panic attacks?   No    Do you have any concerns with your use of alcohol or other drugs? No    Social History     Tobacco Use     Smoking status: Never     Smokeless tobacco: Never   Vaping Use     Vaping status: Never Used   Substance Use Topics     Alcohol use: Yes     Comment: rare     Drug use: No         6/8/2022     2:07 PM 9/6/2022     8:38 AM 3/1/2023     7:31 AM   PHQ   PHQ-9 Total Score 6 7 5   Q9: Thoughts of better off dead/self-harm past 2 weeks Not at all Not at all Not at all         6/8/2022     2:32 PM   RODRIGUE-7 SCORE   Total Score 7         Suicide Assessment Five-step Evaluation and Treatment (SAFE-T)      JONA   Not well controlled   Has choking and difficulty breathing at night wife  "has witnessed   He does not feel like cpap is working well     Had episode of choking and aspiration recetnly       GERD   ?control on the ppi at 20mg  Has night time sxs         Review of Systems   Constitutional, HEENT, cardiovascular, pulmonary, gi and gu systems are negative, except as otherwise noted.      Objective    /76 (BP Location: Right arm, Cuff Size: Adult Large)   Pulse 71   Temp 98.4  F (36.9  C) (Tympanic)   Resp 18   Ht 1.867 m (6' 1.5\")   Wt 139.3 kg (307 lb)   SpO2 96%   BMI 39.95 kg/m    Body mass index is 39.95 kg/m .  Physical Exam   GENERAL APPEARANCE: healthy, alert and no distress  NECK: no adenopathy, no asymmetry, masses, or scars and thyroid normal to palpation  RESP: lungs clear to auscultation - no rales, rhonchi or wheezes  CV: regular rates and rhythm, normal S1 S2, no S3 or S4 and no murmur, click or rub  PSYCH: mentation appears normal and affect normal/bright                    "

## 2023-06-13 NOTE — LETTER
My Depression Action Plan  Name: Heath Carpenter   Date of Birth 1970  Date: 6/13/2023    My doctor: Maria Antonia Goodwin   My clinic: [facility] [id number] [address] [place] [id number] [phone]            GREEN    ZONE   Good Control    What it looks like:   Things are going generally well. You have normal ups and downs. You may even feel depressed from time to time, but bad moods usually last less than a day.   What you need to do:  Continue to care for yourself (see self care plan)  Check your depression survival kit and update it as needed  Follow your physician s recommendations including any medication.  Do not stop taking medication unless you consult with your physician first.             YELLOW         ZONE Getting Worse    What it looks like:   Depression is starting to interfere with your life.   It may be hard to get out of bed; you may be starting to isolate yourself from others.  Symptoms of depression are starting to last most all day and this has happened for several days.   You may have suicidal thoughts but they are not constant.   What you need to do:     Call your care team. Your response to treatment will improve if you keep your care team informed of your progress. Yellow periods are signs an adjustment may need to be made.     Continue your self-care.  Just get dressed and ready for the day.  Don't give yourself time to talk yourself out of it.    Talk to someone in your support network.    Open up your Depression Self-Care Plan/Wellness Kit.             RED    ZONE Medical Alert - Get Help    What it looks like:   Depression is seriously interfering with your life.   You may experience these or other symptoms: You can t get out of bed most days, can t work or engage in other necessary activities, you have trouble taking care of basic hygiene, or basic responsibilities, thoughts of suicide or death that will not go away, self-injurious  behavior.     What you need to do:  Call your care team and request a same-day appointment. If they are not available (weekends or after hours) call your local crisis line, emergency room or 911.          Depression Self-Care Plan / Wellness Kit    Many people find that medication and therapy are helpful treatments for managing depression. In addition, making small changes to your everyday life can help to boost your mood and improve your wellbeing. Below are some tips for you to consider. Be sure to talk with your medical provider and/or behavioral health consultant if your symptoms are worsening or not improving.     Sleep   Sleep hygiene  means all of the habits that support good, restful sleep. It includes maintaining a consistent bedtime and wake time, using your bedroom only for sleeping or sex, and keeping the bedroom dark and free of distractions like a computer, smartphone, or television.     Develop a Healthy Routine  Maintain good hygiene. Get out of bed in the morning, make your bed, brush your teeth, take a shower, and get dressed. Don t spend too much time viewing media that makes you feel stressed. Find time to relax each day.    Exercise  Get some form of exercise every day. This will help reduce pain and release endorphins, the  feel good  chemicals in your brain. It can be as simple as just going for a walk or doing some gardening, anything that will get you moving.      Diet  Strive to eat healthy foods, including fruits and vegetables. Drink plenty of water. Avoid excessive sugar, caffeine, alcohol, and other mood-altering substances.     Stay Connected with Others  Stay in touch with friends and family members.    Manage Your Mood  Try deep breathing, massage therapy, biofeedback, or meditation. Take part in fun activities when you can. Try to find something to smile about each day.     Psychotherapy  Be open to working with a therapist if your provider recommends it.     Medication  Be sure to  take your medication as prescribed. Most anti-depressants need to be taken every day. It usually takes several weeks for medications to work. Not all medicines work for all people. It is important to follow-up with your provider to make sure you have a treatment plan that is working for you. Do not stop your medication abruptly without first discussing it with your provider.    Crisis Resources   These hotlines are for both adults and children. They and are open 24 hours a day, 7 days a week unless noted otherwise.    National Suicide Prevention Lifeline   988 or 6-926-783-KXNF (8326)    Crisis Text Line    www.crisistextline.org  Text HOME to 893385 from anywhere in the [place], anytime, about any type of crisis. A live, trained crisis counselor will receive the text and respond quickly.    [employer] Lifeline for LGBTQ Youth  A national crisis intervention and suicide lifeline for LGBTQ youth under 25. Provides a safe place to talk without judgement. Call 1-485.771.7615; text START to 800860 or visit www.thetrevorproject.org to talk to a trained counselor.    For [facility] crisis numbers, visit the [facility] website at:  https://[place].gov/dhs/people-we-serve/adults/health-care/mental-health/resources/crisis-contacts.jsp

## 2023-06-13 NOTE — PATIENT INSTRUCTIONS
Your There is no height or weight on file to calculate BMI.  Weight management is a personal decision.  If you are interested in exploring weight loss strategies, the following discussion covers the approaches that may be successful. Body mass index (BMI) is one way to tell whether you are at a healthy weight, overweight, or obese. It measures your weight in relation to your height.  A BMI of 18.5 to 24.9 is in the healthy range. A person with a BMI of 25 to 29.9 is considered overweight, and someone with a BMI of 30 or greater is considered obese. More than two-thirds of American adults are considered overweight or obese.  Being overweight or obese increases the risk for further weight gain. Excess weight may lead to heart disease and diabetes.  Creating and following plans for healthy eating and physical activity may help you improve your health.  Weight control is part of healthy lifestyle and includes exercise, emotional health, and healthy eating habits. Careful eating habits lifelong are the mainstay of weight control. Though there are significant health benefits from weight loss, long-term weight loss with diet alone may be very difficult to achieve- studies show long-term success with dietary management in less than 10% of people. Attaining a healthy weight may be especially difficult to achieve in those with severe obesity. In some cases, medications, devices and surgical management might be considered.  What can you do?  If you are overweight or obese and are interested in methods for weight loss, you should discuss this with your provider.     Consider reducing daily calorie intake by 500 calories.     Keep a food journal.     Avoiding skipping meals, consider cutting portions instead.    Diet combined with exercise helps maintain muscle while optimizing fat loss. Strength training is particularly important for building and maintaining muscle mass. Exercise helps reduce stress, increase energy, and  improves fitness. Increasing exercise without diet control, however, may not burn enough calories to loose weight.       Start walking three days a week 10-20 minutes at a time    Work towards walking thirty minutes five days a week     Eventually, increase the speed of your walking for 1-2 minutes at time    In addition, we recommend that you review healthy lifestyles and methods for weight loss available through the National Institutes of Health patient information sites:  http://win.niddk.nih.gov/publications/index.htm    And look into health and wellness programs that may be available through your health insurance provider, employer, local community center, or miki club.

## 2023-06-15 LAB
TESTOST FREE SERPL-MCNC: 5.28 NG/DL
TESTOST SERPL-MCNC: 289 NG/DL (ref 240–950)

## 2023-06-19 ENCOUNTER — OFFICE VISIT (OUTPATIENT)
Dept: DERMATOLOGY | Facility: CLINIC | Age: 53
End: 2023-06-19
Payer: COMMERCIAL

## 2023-06-19 DIAGNOSIS — L57.0 ACTINIC KERATOSIS: ICD-10-CM

## 2023-06-19 DIAGNOSIS — Z85.828 HISTORY OF NONMELANOMA SKIN CANCER: ICD-10-CM

## 2023-06-19 DIAGNOSIS — D18.01 CHERRY ANGIOMA: ICD-10-CM

## 2023-06-19 DIAGNOSIS — L81.4 SOLAR LENTIGO: ICD-10-CM

## 2023-06-19 DIAGNOSIS — L91.8 SKIN TAG: Primary | ICD-10-CM

## 2023-06-19 DIAGNOSIS — L82.0 INFLAMED SEBORRHEIC KERATOSIS: ICD-10-CM

## 2023-06-19 DIAGNOSIS — L73.9 FOLLICULITIS: ICD-10-CM

## 2023-06-19 DIAGNOSIS — L82.1 SEBORRHEIC KERATOSIS: ICD-10-CM

## 2023-06-19 DIAGNOSIS — D22.9 MULTIPLE BENIGN NEVI: ICD-10-CM

## 2023-06-19 PROCEDURE — 99214 OFFICE O/P EST MOD 30 MIN: CPT | Mod: 25 | Performed by: DERMATOLOGY

## 2023-06-19 PROCEDURE — 17000 DESTRUCT PREMALG LESION: CPT | Mod: 59 | Performed by: DERMATOLOGY

## 2023-06-19 PROCEDURE — 11200 RMVL SKIN TAGS UP TO&INC 15: CPT | Mod: 59 | Performed by: DERMATOLOGY

## 2023-06-19 PROCEDURE — 17110 DESTRUCTION B9 LES UP TO 14: CPT | Performed by: DERMATOLOGY

## 2023-06-19 RX ORDER — CLINDAMYCIN PHOSPHATE 11.9 MG/ML
SOLUTION TOPICAL
Qty: 60 ML | Refills: 11 | Status: SHIPPED | OUTPATIENT
Start: 2023-06-19

## 2023-06-19 NOTE — PROGRESS NOTES
[facility] Dermatology Note  Encounter Date: Jun 19, 2023  Office Visit     Dermatology Problem List:  Last skin check: 6/19/23  1. History of NMSC              - BCC, central upper back, s/p excision 3/29/21              - BCC, right upper arm, s/p ED&C 5/21/19  2. ISKs. LiqN2.   3. AK. LiqN2.   4. Skin tags. LiqN2.   4. Folliculitis on back of scalp, presumed 2/2 to CPAP mask strap  5. Benign biopsy: dermatofibroma, left shoulder, s/p shave bx 9/13/21     ____________________________________________     Assessment & Plan:      # History of nonmelanoma skin cancer, no clinical evidence of recurrence.  - ABCDEs: Counseled ABCDEs of melanoma: Asymmetry, Border (irregularity), Color (not uniform, changes in color), Diameter (greater than 6 mm which is about the size of a pencil eraser), and Evolving (any changes in preexisting moles).  - Sun protection: Counseled SPF30+ sunscreen, UPF clothing, sun avoidance, tanning bed avoidance.   - Recommended regular skin checks.    # Actinic keratosis - left cheek x1. Based on the location and chronic irritation to this lesion, treatment with cryotherapy is warranted.   - See cryo procedure note.    # Seborrheic keratosis, symptomatic - left paraspinal back x1, right shoulder x1, back x2, right calf x1. Based on the location and chronic irritation to this lesion, treatment with cryotherapy is warranted.   - See cryo procedure note.    # Skin tags, symptomatic - left axilla x3, right axilla x1, left shoulder x1. Based on the location and chronic irritation to this lesion, treatment with cryotherapy is warranted.   - See cryo procedure note.      # Folliculitis on back of the scalp. Chronic, flare. Recommended using clindamycin 1% solution.  -Start clindamycin 1% solution.     # Benign lesions: Multiple benign nevi, solar lentigos, seborrheic keratoses, cherry angiomas. Explained to patient benign nature of lesion. No treatment is necessary at this time unless  the lesion changes or becomes symptomatic.   - ABCDEs of melanoma were discussed and self skin checks were advised.  - Sun precaution was advised including the use of sun screens of SPF 30 or higher, sun protective clothing, and avoidance of tanning beds.    # EIC, left neck. Discussed treatment options such as surgical removal. Pt reports not interested in excising the lesion.     Procedures Performed:   - Cryotherapy procedure note, location(s):  left cheek x1, left axilla x3, right axilla x1, left shoulder x1  left paraspinal back x1, right shoulder x1, back x2, right calf x1,  left axilla x3, right axilla x1, left shoulder x1 . After verbal consent and discussion of risks and benefits including, but not limited to, dyspigmentation/scar, blister, and pain, 1 AK, 5 ISKs, 5 skin tags(s) was(were) treated with 1-2 mm freeze border for 1-2 cycles with liquid nitrogen. Post cryotherapy instructions were provided.       Follow-up: 1 year(s) in-person, or earlier for new or changing lesions    Staff and Scribe:     Scribe Disclosure:   I, Evelyne Johnson, am serving as a scribe to document services personally performed by this physician, Dr. Jay Roach, based on data collection and the provider's statements to me.     Provider Disclosure:   The documentation recorded by the scribe accurately reflects the services I personally performed and the decisions made by me.    Jay Roach MD    Department of Dermatology  [facility]: Phone: [phone], Fax:[phone]  [facility]: Phone: [phone] Fax: [phone]  ____________________________________________    CC: Skin Check (Patient here for a full body skin check, areas of concern two spots on back, HX BCC)    HPI:  Mr. Heath Carpenter is a(n) 53 year old male who presents today as a return patient for a skin check. Pt reports noticing a  concerning spot on the right posterior shoulder.      Last seen 9/13/21 for a skin check. At that time 3 AKs and 2 ISKs were treated with cryo. A biopsy was obtained on the left shoulder. The results of the biopsy indicated that the lesion was a dermatofibroma.      The patient otherwise denies any new or concerning lesions. No bleeding, painful, pruritic, or changing lesions. Health otherwise stable. No other skin concerns. They sometimes use sunscreen and protective clothing when outdoors for sun protection.       Labs Reviewed:  N/A    Physical Exam:  Vitals: There were no vitals taken for this visit.  SKIN: Full skin, which includes the head/face, both arms, chest, back, abdomen,both legs, genitalia and/or groin buttocks, digits and/or nails, was examined.  -Well healed scars at sites of previous NMSC, no repigmentation or nodularity noted.  - There are tan to brown waxy stuck on papules with surrounding erythema on the left paraspinal back x1, right shoulder x1, back x2, right calf x1.   - There are erythematous macules with overyling adherent scale on the  left cheek x1.    - Scattered brown macules on sun exposed areas.  - There are waxy stuck on tan to brown papules on the trunk and extremities.   - There are dome shaped bright red papules on the trunk and extremities.  - Multiple regular brown pigmented macules and papules are identified on the trunk and extremities.   - There are skin colored pedunculated papules with erythema on the  left axilla x3, right axilla x1, left shoulder x1.  - Follicular based crusted papules on occipital neck/scalp  - No other lesions of concern on areas examined.     Medications:  Current Outpatient Medications   Medication     acetaminophen (TYLENOL) 500 MG tablet     alcohol swab prep pads     aspirin (ASA) 81 MG EC tablet     atorvastatin (LIPITOR) 20 MG tablet     blood glucose (NO BRAND SPECIFIED) test strip     buPROPion (WELLBUTRIN XL) 150 MG 24 hr tablet     CONTOUR  NEXT TEST test strip     lisinopril (ZESTRIL) 20 MG tablet     magnesium 250 MG tablet     metFORMIN (GLUCOPHAGE) 500 MG tablet     Microlet Lancets MISC     Microlet Lancets MISC     MULTIPLE VITAMIN PO     omeprazole (PRILOSEC) 40 MG DR capsule     Semaglutide, 2 MG/DOSE, (OZEMPIC) 8 MG/3ML pen     thin (NO BRAND SPECIFIED) lancets     vitamin (B COMPLEX-C) tablet     No current facility-administered medications for this visit.      Past Medical History:   Patient Active Problem List   Diagnosis     Morbid obesity (H)     Type 2 diabetes mellitus with hyperglycemia, without long-term current use of insulin (H)     Hypertension goal BP (blood pressure) < 140/90     Mild episode of recurrent major depressive disorder (H)     JONA (obstructive sleep apnea)     Gastroesophageal reflux disease with esophagitis without hemorrhage     Past Medical History:   Diagnosis Date     Cancer (H)     Skin     Depressive disorder      Diabetes (H)      Gastro-oesophageal reflux disease      Hypertension goal BP (blood pressure) < 140/90         CC No referring provider defined for this encounter. on close of this encounter.

## 2023-06-19 NOTE — PATIENT INSTRUCTIONS
Patient Education     Checking for Skin Cancer  You can find cancer early by checking your skin each month. There are 3 kinds of skin cancer. They are melanoma, basal cell carcinoma, and squamous cell carcinoma. Doing monthly skin checks is the best way to find new marks or skin changes. Follow the instructions below for checking your skin.   The ABCDEs of checking moles for melanoma   Check your moles or growths for signs of melanoma using ABCDE:   Asymmetry: the sides of the mole or growth don t match  Border: the edges are ragged, notched, or blurred  Color: the color within the mole or growth varies  Diameter: the mole or growth is larger than 6 mm (size of a pencil eraser)  Evolving: the size, shape, or color of the mole or growth is changing (evolving is not shown in the images below)    Checking for other types of skin cancer  Basal cell carcinoma or squamous cell carcinoma have symptoms such as:     A spot or mole that looks different from all other marks on your skin  Changes in how an area feels, such as itching, tenderness, or pain  Changes in the skin's surface, such as oozing, bleeding, or scaliness  A sore that does not heal  New swelling or redness beyond the border of a mole    Who s at risk?  Anyone can get skin cancer. But you are at greater risk if you have:   Fair skin, light-colored hair, or light-colored eyes  Many moles or abnormal moles on your skin  A history of sunburns from sunlight or tanning beds  A family history of skin cancer  A history of exposure to radiation or chemicals  A weakened immune system  If you have had skin cancer in the past, you are at risk for recurring skin cancer.   How to check your skin  Do your monthly skin checkups in front of a full-length mirror. Check all parts of your body, including your:   Head (ears, face, neck, and scalp)  Torso (front, back, and sides)  Arms (tops, undersides, upper, and lower armpits)  Hands (palms, backs, and fingers, including  under the nails)  Buttocks and genitals  Legs (front, back, and sides)  Feet (tops, soles, toes, including under the nails, and between toes)  If you have a lot of moles, take digital photos of them each month. Make sure to take photos both up close and from a distance. These can help you see if any moles change over time.   Most skin changes are not cancer. But if you see any changes in your skin, call your doctor right away. Only he or she can diagnose a problem. If you have skin cancer, seeing your doctor can be the first step toward getting the treatment that could save your life.   [employer] last reviewed this educational content on 4/1/2019 2000-2020 [employer]. [address] [place] [address], [place] [address]. All rights reserved. This information is not intended as a substitute for professional medical care. Always follow your healthcare professional's instructions.       When should I call my doctor?  If you are worsening or not improving, please, contact us or seek urgent care as noted below.     Who should I call with questions (adults)?  [facility] (adult and pediatric): [phone]  [facility] (adult): [phone]  For urgent needs outside of business hours call the [facility] at [phone] and ask for the dermatology resident on call to be paged  If this is a medical emergency and you are unable to reach an ER, Call [phone]    Who should I call with questions (pediatric)?  [facility]- Pediatric Dermatology  Dr. Aga Shields, Dr. Kiran Stoddard, Dr. Ashia Cannon, KATTY Guzman, Dr. Gabrielle Kim, Dr. Ingrid Machado & Dr. Slade Jasso  Non-urgent nurse triage line; [phone]- Charo and Justa PRETTY Care Coordinatorrubén Jean-Baptiste (/Complex ) [phone]    If you need a prescription refill, please contact your pharmacy. Refills are approved or denied by our  Physicians during normal business hours, Monday through Fridays  Per office policy, refills will not be granted if you have not been seen within the past year (or sooner depending on your child's condition)    Scheduling Information:  Pediatric Appointment Scheduling and Call Center [phone]  Radiology Scheduling- [phone]  Sedation Unit Scheduling- [phone]  [place] Scheduling- General [phone]; Pediatric Dermatology [phone]  Main  Services: [phone]  Kiswahili: [phone]  Paraguayan: [phone]  Hmong/Nauruan/Sami: [phone]  Preadmission Nursing Department Fax Number: [phone] (Fax all pre-operative paperwork to this number)    For urgent matters arising during evenings, weekends, or holidays that cannot wait for normal business hours please call [phone] and ask for the dermatology resident on call to be paged.              Cryotherapy    What is it?  Use of a very cold liquid, such as liquid nitrogen, to freeze and destroy abnormal skin cells that need to be removed    What should I expect?  Tenderness and redness  A small blister that might grow and fill with dark purple blood. There may be crusting.  More than one treatment may be needed if the lesions do not go away.    How do I care for the treated area?  Gently wash the area with your hands when bathing.  Use a thin layer of Vaseline to help with healing. You may use a Band-Aid.   The area should heal within 7-10 days and may leave behind a pink or lighter color.   Do not use an antibiotic or Neosporin ointment.   You may take acetaminophen (Tylenol) for pain.     Call your doctor if you have:  Severe pain  Signs of infection (warmth, redness, cloudy yellow drainage, and or a bad smell)  Questions or concerns    Who should I call with questions?      [facility]: [phone]      [facility]: [phone]      For urgent needs outside of  business hours call the [facility] at [phone] and ask for the dermatology resident on call

## 2023-06-19 NOTE — LETTER
6/19/2023         RE: Heath Carpenter  [address] [facility] [address]        Dear Colleague,    Thank you for referring your patient, Heath Carpenter, to the [facility]. Please see a copy of my visit note below.    [facility] Dermatology Note  Encounter Date: Jun 19, 2023  Office Visit     Dermatology Problem List:  Last skin check: 6/19/23  1. History of NMSC              - BCC, central upper back, s/p excision 3/29/21              - BCC, right upper arm, s/p ED&C 5/21/19  2. ISKs. LiqN2.   3. AK. LiqN2.   4. Skin tags. LiqN2.   4. Folliculitis on back of scalp, presumed 2/2 to CPAP mask strap  5. Benign biopsy: dermatofibroma, left shoulder, s/p shave bx 9/13/21     ____________________________________________     Assessment & Plan:      # History of nonmelanoma skin cancer, no clinical evidence of recurrence.  - ABCDEs: Counseled ABCDEs of melanoma: Asymmetry, Border (irregularity), Color (not uniform, changes in color), Diameter (greater than 6 mm which is about the size of a pencil eraser), and Evolving (any changes in preexisting moles).  - Sun protection: Counseled SPF30+ sunscreen, UPF clothing, sun avoidance, tanning bed avoidance.   - Recommended regular skin checks.    # Actinic keratosis - left cheek x1. Based on the location and chronic irritation to this lesion, treatment with cryotherapy is warranted.   - See cryo procedure note.    # Seborrheic keratosis, symptomatic - left paraspinal back x1, right shoulder x1, back x2, right calf x1. Based on the location and chronic irritation to this lesion, treatment with cryotherapy is warranted.   - See cryo procedure note.    # Skin tags, symptomatic - left axilla x3, right axilla x1, left shoulder x1. Based on the location and chronic irritation to this lesion, treatment with cryotherapy is warranted.   - See cryo procedure note.      # Folliculitis on back of the scalp. Chronic, flare. Recommended using  clindamycin 1% solution.  -Start clindamycin 1% solution.     # Benign lesions: Multiple benign nevi, solar lentigos, seborrheic keratoses, cherry angiomas. Explained to patient benign nature of lesion. No treatment is necessary at this time unless the lesion changes or becomes symptomatic.   - ABCDEs of melanoma were discussed and self skin checks were advised.  - Sun precaution was advised including the use of sun screens of SPF 30 or higher, sun protective clothing, and avoidance of tanning beds.    # EIC, left neck. Discussed treatment options such as surgical removal. Pt reports not interested in excising the lesion.     Procedures Performed:   - Cryotherapy procedure note, location(s):  left cheek x1, left axilla x3, right axilla x1, left shoulder x1  left paraspinal back x1, right shoulder x1, back x2, right calf x1,  left axilla x3, right axilla x1, left shoulder x1 . After verbal consent and discussion of risks and benefits including, but not limited to, dyspigmentation/scar, blister, and pain, 1 AK, 5 ISKs, 5 skin tags(s) was(were) treated with 1-2 mm freeze border for 1-2 cycles with liquid nitrogen. Post cryotherapy instructions were provided.       Follow-up: 1 year(s) in-person, or earlier for new or changing lesions    Staff and Scribe:     Scribe Disclosure:   I, Evelyne Johnson, am serving as a scribe to document services personally performed by this physician, Dr. Jay Roach, based on data collection and the provider's statements to me.     Provider Disclosure:   The documentation recorded by the scribe accurately reflects the services I personally performed and the decisions made by me.    Jay Roach MD    Department of Dermatology  [facility]: Phone: [phone], Fax:[phone]  [facility] [place]: Phone: [phone] Fax:  [phone]  ____________________________________________    CC: Skin Check (Patient here for a full body skin check, areas of concern two spots on back, HX BCC)    HPI:  Mr. Heath Carpenter is a(n) 53 year old male who presents today as a return patient for a skin check. Pt reports noticing a concerning spot on the right posterior shoulder.      Last seen 9/13/21 for a skin check. At that time 3 AKs and 2 ISKs were treated with cryo. A biopsy was obtained on the left shoulder. The results of the biopsy indicated that the lesion was a dermatofibroma.      The patient otherwise denies any new or concerning lesions. No bleeding, painful, pruritic, or changing lesions. Health otherwise stable. No other skin concerns. They sometimes use sunscreen and protective clothing when outdoors for sun protection.       Labs Reviewed:  N/A    Physical Exam:  Vitals: There were no vitals taken for this visit.  SKIN: Full skin, which includes the head/face, both arms, chest, back, abdomen,both legs, genitalia and/or groin buttocks, digits and/or nails, was examined.  -Well healed scars at sites of previous NMSC, no repigmentation or nodularity noted.  - There are tan to brown waxy stuck on papules with surrounding erythema on the left paraspinal back x1, right shoulder x1, back x2, right calf x1.   - There are erythematous macules with overyling adherent scale on the  left cheek x1.    - Scattered brown macules on sun exposed areas.  - There are waxy stuck on tan to brown papules on the trunk and extremities.   - There are dome shaped bright red papules on the trunk and extremities.  - Multiple regular brown pigmented macules and papules are identified on the trunk and extremities.   - There are skin colored pedunculated papules with erythema on the  left axilla x3, right axilla x1, left shoulder x1.  - Follicular based crusted papules on occipital neck/scalp  - No other lesions of concern on areas examined.     Medications:  Current  Outpatient Medications   Medication     acetaminophen (TYLENOL) 500 MG tablet     alcohol swab prep pads     aspirin (ASA) 81 MG EC tablet     atorvastatin (LIPITOR) 20 MG tablet     blood glucose (NO BRAND SPECIFIED) test strip     buPROPion (WELLBUTRIN XL) 150 MG 24 hr tablet     CONTOUR NEXT TEST test strip     lisinopril (ZESTRIL) 20 MG tablet     magnesium 250 MG tablet     metFORMIN (GLUCOPHAGE) 500 MG tablet     Microlet Lancets MISC     Microlet Lancets MISC     MULTIPLE VITAMIN PO     omeprazole (PRILOSEC) 40 MG DR capsule     Semaglutide, 2 MG/DOSE, (OZEMPIC) 8 MG/3ML pen     thin (NO BRAND SPECIFIED) lancets     vitamin (B COMPLEX-C) tablet     No current facility-administered medications for this visit.      Past Medical History:   Patient Active Problem List   Diagnosis     Morbid obesity (H)     Type 2 diabetes mellitus with hyperglycemia, without long-term current use of insulin (H)     Hypertension goal BP (blood pressure) < 140/90     Mild episode of recurrent major depressive disorder (H)     JONA (obstructive sleep apnea)     Gastroesophageal reflux disease with esophagitis without hemorrhage     Past Medical History:   Diagnosis Date     Cancer (H)     Skin     Depressive disorder      Diabetes (H)      Gastro-oesophageal reflux disease      Hypertension goal BP (blood pressure) < 140/90         CC No referring provider defined for this encounter. on close of this encounter.    Again, thank you for allowing me to participate in the care of your patient.        Sincerely,        Jay Roach MD

## 2023-06-19 NOTE — NURSING NOTE
Heath Carpenter's goals for this visit include:   Chief Complaint   Patient presents with     Skin Check     Patient here for a full body skin check, areas of concern two spots on back, HX BCC       He requests these members of his care team be copied on today's visit information:     PCP: Maria Antonia Goodwin    Referring Provider:  No referring provider defined for this encounter.    There were no vitals taken for this visit.    Do you need any medication refills at today's visit? No         Sarah Farmer EMT

## 2023-06-19 NOTE — PROGRESS NOTES
[facility] Dermatology Note  Encounter Date: Jun 19, 2023  Office Visit     Dermatology Problem List:  1. History of NMSC              - BCC, central upper back, s/p excision 3/29/21              - BCC, right upper arm, s/p ED&C 5/21/19  2. ISKs. LiqN2.   3. AK. LiqN2.   4. Skin tags. LiqN2.   4. Folliculitis on back of scalp, presumed 2/2 to CPAP mask strap  5. Benign biopsy: dermatofibroma, left shoulder, s/p shave bx 9/13/21    ____________________________________________     Assessment & Plan:        # History of NMSC (BCC x2 s/p excision; 5/2019, 3/2021)  Scars appear well-healed, with no evidence of recurrence  - Recommend sunscreens SPF #30 or greater, protective clothing and avoidance of tanning beds.   - Recommended next skin check in 6 months.     # Actinic keratoses x3 on R-side of face  - s/p cryotherapy to all lesions     # Seborrheic keratosis, inflamed. - right cheek x2.  - See cryotherapy procedure note below.     # Irritated skin tags. - left neck x2.  - See cryotherapy procedure note below.     # Folliculitis on back of scalp, presumed 2/2 to CPAP mask strap  - Recommend regular repositioning of strap on a night to night basis  - May consider topical clindamycin in the future     # Benign lesions: Multiple benign nevi, seborrheic keratoses, cherry angiomas, skin tags. Explained to patient benign nature of lesions. No treatment is necessary at this time unless the lesions change or become symptomatic.   - Sun precaution was advised including the use of sun screens of SPF 30 or higher, sun protective clothing, and avoidance of tanning beds.  # Benign lesions: Multiple benign nevi, solar lentigos, seborrheic keratoses, cherry angiomas. Explained to patient benign nature of lesion. No treatment is necessary at this time unless the lesion changes or becomes symptomatic.   - ABCDEs of melanoma were discussed and self skin checks were advised.  - Sun precaution was advised including  the use of sun screens of SPF 30 or higher, sun protective clothing, and avoidance of tanning beds.    Procedures Performed:   ***    Follow-up: {kkfollow:824845}    Staff and Scribe:     Scribe Disclosure:   I, Evelyne Johnson, am serving as a scribe to document services personally performed by this physician, Dr. Jay Roach, based on data collection and the provider's statements to me.       ***  ____________________________________________    CC: No chief complaint on file.    HPI:  Mr. Heath Carpenter is a(n) 53 year old male who presents today as a return patient for ***    Last seen 9/13/21 for a skin check. At that time 3 AKs and 2 ISKs were treated with cryo. A biopsy was obtained on the left shoulder. The results of the biopsy indicated that the lesion was a dermatofibroma.     {HPIMansh:080417}      Labs Reviewed:  N/A    Physical Exam:  Vitals: There were no vitals taken for this visit.  SKIN: {kkSkinExam:[id number]}  - ***  ***  - {Skin Exam Derm:928649}.   - {Skin Exam Derm:077842}.   - No other lesions of concern on areas examined.     Medications:  Current Outpatient Medications   Medication     acetaminophen (TYLENOL) 500 MG tablet     alcohol swab prep pads     aspirin (ASA) 81 MG EC tablet     atorvastatin (LIPITOR) 20 MG tablet     blood glucose (NO BRAND SPECIFIED) test strip     buPROPion (WELLBUTRIN XL) 150 MG 24 hr tablet     CONTOUR NEXT TEST test strip     lisinopril (ZESTRIL) 20 MG tablet     magnesium 250 MG tablet     metFORMIN (GLUCOPHAGE) 500 MG tablet     Microlet Lancets MISC     Microlet Lancets MISC     MULTIPLE VITAMIN PO     omeprazole (PRILOSEC) 40 MG DR capsule     Semaglutide, 2 MG/DOSE, (OZEMPIC) 8 MG/3ML pen     thin (NO BRAND SPECIFIED) lancets     vitamin (B COMPLEX-C) tablet     No current facility-administered medications for this visit.      Past Medical History:   Patient Active Problem List   Diagnosis     Morbid obesity (H)     Type 2 diabetes mellitus with  hyperglycemia, without long-term current use of insulin (H)     Hypertension goal BP (blood pressure) < 140/90     Mild episode of recurrent major depressive disorder (H)     JONA (obstructive sleep apnea)     Gastroesophageal reflux disease with esophagitis without hemorrhage     Past Medical History:   Diagnosis Date     Cancer (H)     Skin     Depressive disorder      Diabetes (H)      Gastro-oesophageal reflux disease      Hypertension goal BP (blood pressure) < 140/90         CC No referring provider defined for this encounter. on close of this encounter.

## 2023-07-03 ENCOUNTER — ANESTHESIA EVENT (OUTPATIENT)
Dept: GASTROENTEROLOGY | Facility: CLINIC | Age: 53
End: 2023-07-03
Payer: COMMERCIAL

## 2023-07-03 NOTE — ANESTHESIA PREPROCEDURE EVALUATION
Anesthesia Pre-Procedure Evaluation    Patient: Heath Carpenter   MRN: [id number] : 1970        Procedure : Procedure(s):  Esophagoscopy, gastroscopy, duodenoscopy (EGD), combined          Past Medical History:   Diagnosis Date     Cancer (H)     Skin     Depressive disorder      Diabetes (H)      Gastro-oesophageal reflux disease      Hypertension goal BP (blood pressure) < 140/90       Past Surgical History:   Procedure Laterality Date     BIOPSY      Skin cancer times 2     COLONOSCOPY       VASECTOMY        Allergies   Allergen Reactions     Molds & Smuts Other (See Comments)     Puffy eyes     No Clinical Screening - See Comments Other (See Comments)     Cigarette smoke      Social History     Tobacco Use     Smoking status: Never     Smokeless tobacco: Never   Substance Use Topics     Alcohol use: Yes     Comment: rare      Wt Readings from Last 1 Encounters:   23 141.3 kg (311 lb 6.4 oz)        Anesthesia Evaluation   Pt has had prior anesthetic. Type: MAC.    No history of anesthetic complications       ROS/MED HX  ENT/Pulmonary:     (+) sleep apnea,     Neurologic:  - neg neurologic ROS     Cardiovascular:     (+) Dyslipidemia hypertension-----    METS/Exercise Tolerance:     Hematologic:  - neg hematologic  ROS     Musculoskeletal:  - neg musculoskeletal ROS     GI/Hepatic:     (+) GERD, Symptomatic,     Renal/Genitourinary:  - neg Renal ROS     Endo: Comment: Morbid obesity    (+) type II DM, Last HgA1c: 6.3, date: 2023, Not using insulin, - not using insulin pump. Obesity,     Psychiatric/Substance Use:     (+) psychiatric history depression     Infectious Disease:  - neg infectious disease ROS     Malignancy:   (+) Malignancy,     Other:  - neg other ROS          Physical Exam    Airway  airway exam normal           Respiratory Devices and Support         Dental       (+) Minor Abnormalities - some fillings, tiny chips      Cardiovascular   cardiovascular exam normal          Pulmonary    pulmonary exam normal                OUTSIDE LABS:  CBC:   Lab Results   Component Value Date    WBC 4.8 05/22/2021    WBC 7.4 10/19/2016    HGB 13.2 (L) 05/22/2021    HGB 14.0 10/19/2016    HCT 40.5 05/22/2021    HCT 42.1 10/19/2016     05/22/2021     10/19/2016     BMP:   Lab Results   Component Value Date     09/06/2022     03/30/2022    POTASSIUM 4.3 09/06/2022    POTASSIUM 4.0 03/30/2022    CHLORIDE 102 09/06/2022    CHLORIDE 105 03/30/2022    CO2 23 09/06/2022    CO2 26 03/30/2022    BUN 16.5 09/06/2022    BUN 16 03/30/2022    CR 0.90 09/06/2022    CR 0.84 03/30/2022     (H) 09/06/2022     (H) 03/30/2022     COAGS:   Lab Results   Component Value Date    PTT 32 05/22/2021    INR 1.10 05/22/2021     POC: No results found for: BGM, HCG, HCGS  HEPATIC:   Lab Results   Component Value Date    ALBUMIN 4.3 09/06/2022    PROTTOTAL 8.0 09/06/2022    ALT 52 (H) 09/06/2022    AST 35 09/06/2022    ALKPHOS 77 09/06/2022    BILITOTAL 0.5 09/06/2022     OTHER:   Lab Results   Component Value Date    LACT 1.2 05/22/2021    A1C 6.1 (H) 06/13/2023    ANCELMO 9.3 09/06/2022    MAG 2.1 06/13/2023    TSH 2.17 06/13/2023    T4 0.91 10/19/2016       Anesthesia Plan    ASA Status:  3   NPO Status:  NPO Appropriate    Anesthesia Type: General.     - Airway: Native airway      Maintenance: Balanced.        Consents    Anesthesia Plan(s) and associated risks, benefits, and realistic alternatives discussed. Questions answered and patient/representative(s) expressed understanding.     - Discussed: Risks, Benefits and Alternatives for BOTH SEDATION and the PROCEDURE were discussed     - Discussed with:  Patient, Spouse         Postoperative Care            Comments:                ASHLIE Spaulding CRNA

## 2023-07-05 ENCOUNTER — HOSPITAL ENCOUNTER (OUTPATIENT)
Facility: CLINIC | Age: 53
Discharge: HOME | End: 2023-07-05
Attending: SURGERY | Admitting: SURGERY
Payer: COMMERCIAL

## 2023-07-05 ENCOUNTER — ANESTHESIA (OUTPATIENT)
Dept: GASTROENTEROLOGY | Facility: CLINIC | Age: 53
End: 2023-07-05
Payer: COMMERCIAL

## 2023-07-05 VITALS
OXYGEN SATURATION: 97 % | TEMPERATURE: 97.6 F | HEART RATE: 69 BPM | RESPIRATION RATE: 16 BRPM | HEIGHT: 74 IN | SYSTOLIC BLOOD PRESSURE: 141 MMHG | DIASTOLIC BLOOD PRESSURE: 75 MMHG | BODY MASS INDEX: 39.96 KG/M2 | WEIGHT: 311.4 LBS

## 2023-07-05 LAB
GLUCOSE BLDC GLUCOMTR-MCNC: 131 MG/DL (ref 70–99)
UPPER GI ENDOSCOPY: NORMAL

## 2023-07-05 PROCEDURE — 43235 EGD DIAGNOSTIC BRUSH WASH: CPT | Performed by: SURGERY

## 2023-07-05 PROCEDURE — 370N000017 HC ANESTHESIA TECHNICAL FEE, PER MIN: Performed by: SURGERY

## 2023-07-05 PROCEDURE — 250N000009 HC RX 250: Performed by: SURGERY

## 2023-07-05 PROCEDURE — 250N000009 HC RX 250: Performed by: NURSE ANESTHETIST, CERTIFIED REGISTERED

## 2023-07-05 PROCEDURE — 250N000011 HC RX IP 250 OP 636: Performed by: NURSE ANESTHETIST, CERTIFIED REGISTERED

## 2023-07-05 PROCEDURE — 82962 GLUCOSE BLOOD TEST: CPT

## 2023-07-05 PROCEDURE — 258N000003 HC RX IP 258 OP 636: Performed by: SURGERY

## 2023-07-05 RX ORDER — NALOXONE HYDROCHLORIDE 0.4 MG/ML
0.4 INJECTION, SOLUTION INTRAMUSCULAR; INTRAVENOUS; SUBCUTANEOUS
Status: DISCONTINUED | OUTPATIENT
Start: 2023-07-05 | End: 2023-07-05 | Stop reason: HOSPADM

## 2023-07-05 RX ORDER — NALOXONE HYDROCHLORIDE 0.4 MG/ML
0.2 INJECTION, SOLUTION INTRAMUSCULAR; INTRAVENOUS; SUBCUTANEOUS
Status: DISCONTINUED | OUTPATIENT
Start: 2023-07-05 | End: 2023-07-05 | Stop reason: HOSPADM

## 2023-07-05 RX ORDER — PROPOFOL 10 MG/ML
INJECTION, EMULSION INTRAVENOUS CONTINUOUS PRN
Status: DISCONTINUED | OUTPATIENT
Start: 2023-07-05 | End: 2023-07-05

## 2023-07-05 RX ORDER — SODIUM CHLORIDE, SODIUM LACTATE, POTASSIUM CHLORIDE, CALCIUM CHLORIDE 600; 310; 30; 20 MG/100ML; MG/100ML; MG/100ML; MG/100ML
INJECTION, SOLUTION INTRAVENOUS CONTINUOUS
Status: DISCONTINUED | OUTPATIENT
Start: 2023-07-05 | End: 2023-07-05 | Stop reason: HOSPADM

## 2023-07-05 RX ORDER — FLUMAZENIL 0.1 MG/ML
0.2 INJECTION, SOLUTION INTRAVENOUS
Status: DISCONTINUED | OUTPATIENT
Start: 2023-07-05 | End: 2023-07-05 | Stop reason: HOSPADM

## 2023-07-05 RX ORDER — ONDANSETRON 2 MG/ML
4 INJECTION INTRAMUSCULAR; INTRAVENOUS
Status: DISCONTINUED | OUTPATIENT
Start: 2023-07-05 | End: 2023-07-05 | Stop reason: HOSPADM

## 2023-07-05 RX ORDER — GLYCOPYRROLATE 0.2 MG/ML
INJECTION, SOLUTION INTRAMUSCULAR; INTRAVENOUS PRN
Status: DISCONTINUED | OUTPATIENT
Start: 2023-07-05 | End: 2023-07-05

## 2023-07-05 RX ORDER — LIDOCAINE HYDROCHLORIDE 10 MG/ML
INJECTION, SOLUTION INFILTRATION; PERINEURAL PRN
Status: DISCONTINUED | OUTPATIENT
Start: 2023-07-05 | End: 2023-07-05

## 2023-07-05 RX ORDER — LIDOCAINE 40 MG/G
CREAM TOPICAL
Status: DISCONTINUED | OUTPATIENT
Start: 2023-07-05 | End: 2023-07-05 | Stop reason: HOSPADM

## 2023-07-05 RX ADMIN — SODIUM CHLORIDE, POTASSIUM CHLORIDE, SODIUM LACTATE AND CALCIUM CHLORIDE: 600; 310; 30; 20 INJECTION, SOLUTION INTRAVENOUS at 07:44

## 2023-07-05 RX ADMIN — PROPOFOL 200 MCG/KG/MIN: 10 INJECTION, EMULSION INTRAVENOUS at 08:48

## 2023-07-05 RX ADMIN — LIDOCAINE HYDROCHLORIDE 100 MG: 10 INJECTION, SOLUTION INFILTRATION; PERINEURAL at 08:49

## 2023-07-05 RX ADMIN — TOPICAL ANESTHETIC 1 SPRAY: 200 SPRAY DENTAL; PERIODONTAL at 08:48

## 2023-07-05 RX ADMIN — GLYCOPYRROLATE 0.2 MG: 0.2 INJECTION, SOLUTION INTRAMUSCULAR; INTRAVENOUS at 08:48

## 2023-07-05 RX ADMIN — LIDOCAINE HYDROCHLORIDE 0.2 ML: 10 INJECTION, SOLUTION EPIDURAL; INFILTRATION; INTRACAUDAL; PERINEURAL at 07:44

## 2023-07-05 RX ADMIN — PROPOFOL 50 MG: 10 INJECTION, EMULSION INTRAVENOUS at 08:50

## 2023-07-05 ASSESSMENT — ACTIVITIES OF DAILY LIVING (ADL): ADLS_ACUITY_SCORE: 35

## 2023-07-05 NOTE — ANESTHESIA CARE TRANSFER NOTE
Patient: Heath Carpenter    Procedure: Procedure(s):  Esophagoscopy, gastroscopy, duodenoscopy (EGD), combined       Diagnosis: Gastroesophageal reflux disease with esophagitis without hemorrhage [K21.00]  Diagnosis Additional Information: No value filed.    Anesthesia Type:   General     Note:    Oropharynx: oropharynx clear of all foreign objects and spontaneously breathing  Level of Consciousness: drowsy  Oxygen Supplementation: room air    Independent Airway: airway patency satisfactory and stable  Dentition: dentition unchanged  Vital Signs Stable: post-procedure vital signs reviewed and stable  Report to RN Given: handoff report given  Patient transferred to: Phase II    Handoff Report: Identifed the Patient, Identified the Reponsible Provider, Reviewed the pertinent medical history, Discussed the surgical course, Reviewed Intra-OP anesthesia mangement and issues during anesthesia, Set expectations for post-procedure period and Allowed opportunity for questions and acknowledgement of understanding      Vitals:  Vitals Value Taken Time   BP     Temp     Pulse     Resp     SpO2         Electronically Signed By: ASHLIE Auguste CRNA  July 5, 2023  9:03 AM

## 2023-07-05 NOTE — H&P
53 year old year old male here for upper endoscopy for reflux.  Ongoing.  He had upper endoscopy many years ago, was told he had reflux.  Recently had episode of aspiration at night.  Omeprazole dose increased with improved symptoms.          Patient Active Problem List   Diagnosis     Morbid obesity (H)     Type 2 diabetes mellitus with hyperglycemia, without long-term current use of insulin (H)     Hypertension goal BP (blood pressure) < 140/90     Mild episode of recurrent major depressive disorder (H)     JONA (obstructive sleep apnea)     Gastroesophageal reflux disease with esophagitis without hemorrhage       Past Medical History:   Diagnosis Date     Cancer (H)     Skin     Depressive disorder      Diabetes (H)      Gastro-oesophageal reflux disease      Hypertension goal BP (blood pressure) < 140/90        Past Surgical History:   Procedure Laterality Date     BIOPSY      Skin cancer times 2     COLONOSCOPY       VASECTOMY         Family History   Problem Relation Age of Onset     Alcoholism Father      Prostate Cancer Father         He is getting tested further but he has a type 5 and 4 lesions     Lung Cancer Maternal Grandfather      Diabetes Maternal Grandfather      Hypertension Maternal Grandfather      Other Cancer Maternal Grandfather         Lung     Colon Cancer Maternal Grandmother        No current outpatient medications on file.       Allergies   Allergen Reactions     Molds & Smuts Other (See Comments)     Puffy eyes     No Clinical Screening - See Comments Other (See Comments)     Cigarette smoke       Pt reports that he has never smoked. He has never used smokeless tobacco. He reports current alcohol use. He reports that he does not use drugs.    Exam:    Awake, Alert OX3  Lungs - CTA bilaterally  CV - RRR, no murmurs, distal pulses intact  Abd - soft, non-distended, non-tender, +BS  Extr - No cyanosis or edema    A/P 53 year old year old male in need of upper endoscopy for reflux. Risks,  benefits, alternatives, and complications were discussed including the possibility of perforation and the patient agreed to proceed.    Jay Hoyt, DO on 7/5/2023 at 8:03 AM

## 2023-07-05 NOTE — ANESTHESIA POSTPROCEDURE EVALUATION
Patient: Heath Carpenter    Procedure: Procedure(s):  Esophagoscopy, gastroscopy, duodenoscopy (EGD), combined       Anesthesia Type:  General    Note:  Disposition: Outpatient   Postop Pain Control: Uneventful            Sign Out: Well controlled pain   PONV: No   Neuro/Psych: Uneventful            Sign Out: Acceptable/Baseline neuro status   Airway/Respiratory: Uneventful            Sign Out: Acceptable/Baseline resp. status   CV/Hemodynamics: Uneventful            Sign Out: Acceptable CV status; No obvious hypovolemia; No obvious fluid overload   Other NRE: NONE   DID A NON-ROUTINE EVENT OCCUR? No           Last vitals:  Vitals Value Taken Time   /85 07/05/23 0912   Temp     Pulse 78 07/05/23 0912   Resp     SpO2 96 % 07/05/23 0918   Vitals shown include unvalidated device data.    Electronically Signed By: ASHLIE Auguste CRNA  July 5, 2023  9:18 AM

## 2023-07-10 ENCOUNTER — MYC MEDICAL ADVICE (OUTPATIENT)
Dept: FAMILY MEDICINE | Facility: CLINIC | Age: 53
End: 2023-07-10

## 2023-07-17 ENCOUNTER — TELEPHONE (OUTPATIENT)
Dept: SLEEP MEDICINE | Facility: CLINIC | Age: 53
End: 2023-07-17

## 2023-07-17 NOTE — TELEPHONE ENCOUNTER
Reason for Call:  Appointment Request    Patient requesting this type of appt:  Sleep study    Requested provider:sleep study  Reason patient unable to be scheduled: Needs to be scheduled by clinic    When does patient want to be seen/preferred time: 1-2 weeks    Comments: could not reschedule sleep study    Could we send this information to you in [facility] or would you prefer to receive a phone call?:   Patient would prefer a phone call   Okay to leave a detailed message?: Yes at Home number on file [phone] (home)    Call taken on 7/17/2023 at 12:01 PM by Milagros Adams

## 2023-08-25 NOTE — LETTER
2022         RE: Heath Carpenter  [address] [facility] [address]        Dear Colleague,    Thank you for referring your patient, Heath Carpenter, to the [facility] [place]. Please see a copy of my visit note below.    Heath Carpenter  :  1970  DOS: 2022  MRN: [id number]    Sports Medicine Clinic Procedure    Ultrasound Guided Left Intra-Articular Knee Euflexxa Injection #2 of 3, +/- Aspiration    Clinical History: Patient is noting moderate relief following Euflexxa injection and aspiration completed on     Diagnosis:   1. Primary osteoarthritis of left knee    2. Chronic pain of left knee      Large Joint Injection/Arthocentesis: L knee joint    Date/Time: 2022 8:15 AM  Performed by: Geovany Bean DO  Authorized by: Geovany Bean DO     Indications:  Osteoarthritis  Needle Size:  21 G  Guidance: ultrasound    Approach:  Superolateral  Location:  Knee      Medications:  20 mg sodium hyaluronate 20 MG/2ML  Aspirate amount (mL):  9  Aspirate:  Serous and yellow  Outcome:  Tolerated well, no immediate complications  Procedure discussed: discussed risks, benefits, and alternatives    Consent Given by:  Patient  Timeout: timeout called immediately prior to procedure    Prep: patient was prepped and draped in usual sterile fashion     Ultrasound images of procedure were permanently stored.        Impression:  Successful Left intra-articular knee injection and aspiration.    Plan:  Follow up next week for Euflexxa #3 of 3  Expectations and limitations of Euflexxa were reviewed in detail  Often 4-6 weeks before full effect may be noticed  Usually covered up to every 6 months by insurance, but does not need to be repeated unless pain returns, at which point we would re-evaluate  Potential use of CSI in future for flares of pain reviewed in detail  Encouraged modified progressive pain-free activity as tolerated  HEP and Supportive care reviewed  All  questions were answered today  Contact us with additional questions or concerns  Signs and sx of concern reviewed      Geovany Bean DO, CAQ  Primary Care Sports Medicine  [facility]           Again, thank you for allowing me to participate in the care of your patient.        Sincerely,        Geovany Bean DO     98

## 2023-10-19 ENCOUNTER — OFFICE VISIT (OUTPATIENT)
Dept: FAMILY MEDICINE | Facility: CLINIC | Age: 53
End: 2023-10-19
Payer: COMMERCIAL

## 2023-10-19 VITALS
BODY MASS INDEX: 40.43 KG/M2 | DIASTOLIC BLOOD PRESSURE: 82 MMHG | RESPIRATION RATE: 14 BRPM | WEIGHT: 315 LBS | HEART RATE: 82 BPM | OXYGEN SATURATION: 96 % | HEIGHT: 74 IN | SYSTOLIC BLOOD PRESSURE: 136 MMHG

## 2023-10-19 DIAGNOSIS — I10 HYPERTENSION GOAL BP (BLOOD PRESSURE) < 140/90: Primary | ICD-10-CM

## 2023-10-19 DIAGNOSIS — E11.65 TYPE 2 DIABETES MELLITUS WITH HYPERGLYCEMIA, WITHOUT LONG-TERM CURRENT USE OF INSULIN (H): ICD-10-CM

## 2023-10-19 DIAGNOSIS — F33.0 MILD EPISODE OF RECURRENT MAJOR DEPRESSIVE DISORDER (H): ICD-10-CM

## 2023-10-19 DIAGNOSIS — E66.01 MORBID OBESITY (H): ICD-10-CM

## 2023-10-19 LAB
ALBUMIN SERPL BCG-MCNC: 4.3 G/DL (ref 3.5–5.2)
ALP SERPL-CCNC: 66 U/L (ref 40–129)
ALT SERPL W P-5'-P-CCNC: 46 U/L (ref 0–70)
ANION GAP SERPL CALCULATED.3IONS-SCNC: 13 MMOL/L (ref 7–15)
AST SERPL W P-5'-P-CCNC: 35 U/L (ref 0–45)
BILIRUB SERPL-MCNC: 0.5 MG/DL
BUN SERPL-MCNC: 17.2 MG/DL (ref 6–20)
CALCIUM SERPL-MCNC: 9.4 MG/DL (ref 8.6–10)
CHLORIDE SERPL-SCNC: 99 MMOL/L (ref 98–107)
CHOLEST SERPL-MCNC: 134 MG/DL
CREAT SERPL-MCNC: 0.98 MG/DL (ref 0.67–1.17)
DEPRECATED HCO3 PLAS-SCNC: 25 MMOL/L (ref 22–29)
EGFRCR SERPLBLD CKD-EPI 2021: >90 ML/MIN/1.73M2
GLUCOSE SERPL-MCNC: 135 MG/DL (ref 70–99)
HBA1C MFR BLD: 6.7 % (ref 0–5.6)
HDLC SERPL-MCNC: 50 MG/DL
LDLC SERPL CALC-MCNC: 61 MG/DL
NONHDLC SERPL-MCNC: 84 MG/DL
POTASSIUM SERPL-SCNC: 4.3 MMOL/L (ref 3.4–5.3)
PROT SERPL-MCNC: 7.8 G/DL (ref 6.4–8.3)
SODIUM SERPL-SCNC: 137 MMOL/L (ref 135–145)
TRIGL SERPL-MCNC: 117 MG/DL
VIT B12 SERPL-MCNC: 328 PG/ML (ref 232–1245)

## 2023-10-19 PROCEDURE — 36415 COLL VENOUS BLD VENIPUNCTURE: CPT | Performed by: FAMILY MEDICINE

## 2023-10-19 PROCEDURE — 90682 RIV4 VACC RECOMBINANT DNA IM: CPT | Performed by: FAMILY MEDICINE

## 2023-10-19 PROCEDURE — 83036 HEMOGLOBIN GLYCOSYLATED A1C: CPT | Performed by: FAMILY MEDICINE

## 2023-10-19 PROCEDURE — 99214 OFFICE O/P EST MOD 30 MIN: CPT | Mod: 25 | Performed by: FAMILY MEDICINE

## 2023-10-19 PROCEDURE — 80053 COMPREHEN METABOLIC PANEL: CPT | Performed by: FAMILY MEDICINE

## 2023-10-19 PROCEDURE — 90471 IMMUNIZATION ADMIN: CPT | Performed by: FAMILY MEDICINE

## 2023-10-19 PROCEDURE — 80061 LIPID PANEL: CPT | Performed by: FAMILY MEDICINE

## 2023-10-19 PROCEDURE — 82607 VITAMIN B-12: CPT | Performed by: FAMILY MEDICINE

## 2023-10-19 PROCEDURE — 90480 ADMN SARSCOV2 VAC 1/ONLY CMP: CPT | Performed by: FAMILY MEDICINE

## 2023-10-19 PROCEDURE — 91320 SARSCV2 VAC 30MCG TRS-SUC IM: CPT | Performed by: FAMILY MEDICINE

## 2023-10-19 RX ORDER — TIRZEPATIDE 2.5 MG/.5ML
2.5 INJECTION, SOLUTION SUBCUTANEOUS
Qty: 2 ML | Refills: 0 | Status: SHIPPED | OUTPATIENT
Start: 2023-10-19 | End: 2023-11-16

## 2023-10-19 RX ORDER — TIRZEPATIDE 5 MG/.5ML
5 INJECTION, SOLUTION SUBCUTANEOUS
Qty: 2 ML | Refills: 0 | Status: SHIPPED | OUTPATIENT
Start: 2023-11-16 | End: 2023-12-08

## 2023-10-19 RX ORDER — BUPROPION HYDROCHLORIDE 150 MG/1
300 TABLET ORAL EVERY MORNING
Qty: 180 TABLET | Refills: 3 | Status: SHIPPED | OUTPATIENT
Start: 2023-10-19 | End: 2024-04-23 | Stop reason: ALTCHOICE

## 2023-10-19 ASSESSMENT — ANXIETY QUESTIONNAIRES
2. NOT BEING ABLE TO STOP OR CONTROL WORRYING: NOT AT ALL
7. FEELING AFRAID AS IF SOMETHING AWFUL MIGHT HAPPEN: SEVERAL DAYS
IF YOU CHECKED OFF ANY PROBLEMS ON THIS QUESTIONNAIRE, HOW DIFFICULT HAVE THESE PROBLEMS MADE IT FOR YOU TO DO YOUR WORK, TAKE CARE OF THINGS AT HOME, OR GET ALONG WITH OTHER PEOPLE: SOMEWHAT DIFFICULT
GAD7 TOTAL SCORE: 8
5. BEING SO RESTLESS THAT IT IS HARD TO SIT STILL: NOT AT ALL
1. FEELING NERVOUS, ANXIOUS, OR ON EDGE: MORE THAN HALF THE DAYS
6. BECOMING EASILY ANNOYED OR IRRITABLE: NEARLY EVERY DAY
3. WORRYING TOO MUCH ABOUT DIFFERENT THINGS: NOT AT ALL
GAD7 TOTAL SCORE: 8
4. TROUBLE RELAXING: MORE THAN HALF THE DAYS

## 2023-10-19 ASSESSMENT — PATIENT HEALTH QUESTIONNAIRE - PHQ9
SUM OF ALL RESPONSES TO PHQ QUESTIONS 1-9: 14
SUM OF ALL RESPONSES TO PHQ QUESTIONS 1-9: 14
10. IF YOU CHECKED OFF ANY PROBLEMS, HOW DIFFICULT HAVE THESE PROBLEMS MADE IT FOR YOU TO DO YOUR WORK, TAKE CARE OF THINGS AT HOME, OR GET ALONG WITH OTHER PEOPLE: SOMEWHAT DIFFICULT

## 2023-10-19 ASSESSMENT — PAIN SCALES - GENERAL: PAINLEVEL: NO PAIN (0)

## 2023-10-19 NOTE — PROGRESS NOTES
"  Assessment & Plan     Hypertension goal BP (blood pressure) < 140/90  Stable no change in treatment plan.     Type 2 diabetes mellitus with hyperglycemia, without long-term current use of insulin (H)  Will try mounjaro at lower dose and see if he is able to tolerate this   Stressed lifestyle changes eating very small meals and not eating 4-5 hours prior to bed   - Lipid panel reflex to direct LDL Non-fasting; Future  - HEMOGLOBIN A1C; Future  - tirzepatide (MOUNJARO) 2.5 MG/0.5ML pen; Inject 2.5 mg Subcutaneous every 7 days for 28 days  - tirzepatide (MOUNJARO) 5 MG/0.5ML pen; Inject 5 mg Subcutaneous every 7 days for 28 days  - Comprehensive metabolic panel; Future  - Vitamin B12; Future  - Vitamin B12  - Comprehensive metabolic panel  - HEMOGLOBIN A1C  - Lipid panel reflex to direct LDL Non-fasting    Mild episode of recurrent major depressive disorder (H24)  NOT well controlled   Needs psych eval of possible med change   Refractory to multiple meds   - Adult Mental Health  Referral; Future  - Adult Mental Health  Referral; Future  - buPROPion (WELLBUTRIN XL) 150 MG 24 hr tablet; Take 2 tablets (300 mg) by mouth every morning    Morbid obesity (H)  Weight gain now   Comorbid with dm and htn              BMI:   Estimated body mass index is 44.25 kg/m  as calculated from the following:    Height as of this encounter: 1.867 m (6' 1.5\").    Weight as of this encounter: 154.2 kg (340 lb).   Weight management plan: Discussed healthy diet and exercise guidelines        Maria Antonia Goodwin MD  [facility]    Sabi Joe is a 53 year old, presenting for the following health issues:  Depression, Diabetes, Lipids, Hypertension, and Vascular Disease        10/19/2023     8:08 AM   Additional Questions   Roomed by Shelli RODRIGUEZ   Accompanied by wife       History of Present Illness       Mental Health Follow-up:  Patient presents to follow-up on Depression.Patient's " depression since last visit has been:  Worse  The patient is having other symptoms associated with depression.      Any significant life events: relationship concerns, job concerns and financial concerns  Patient is not feeling anxious or having panic attacks.  Patient has no concerns about alcohol or drug use.    Diabetes:   He presents for follow up of diabetes.  He is checking home blood glucose a few times a month.   He checks blood glucose before and after meals.  Blood glucose is never over 200 and never under 70. He is aware of hypoglycemia symptoms including none.   He is concerned about other.   He is having numbness in feet and weight gain.            Hyperlipidemia:  He presents for follow up of hyperlipidemia.   He is taking medication to lower cholesterol. He is having myalgia or other side effects to statin medications.    Hypertension: He presents for follow up of hypertension.  He does check blood pressure  regularly outside of the clinic. Outpatient blood pressures have not been over 140/90. He follows a low salt diet.     Vascular Disease:  He presents for follow up of vascular disease.     He never takes nitroglycerin. He takes daily aspirin.    He eats 2-3 servings of fruits and vegetables daily.He consumes 1 sweetened beverage(s) daily.He exercises with enough effort to increase his heart rate 20 to 29 minutes per day.  He exercises with enough effort to increase his heart rate 3 or less days per week. He is missing 1 dose(s) of medications per week.  He is not taking prescribed medications regularly due to remembering to take.     Diabetes has been up and down   Weight gain of 30lbs off ozempic   Stopped this due to gi side affects     Depression has been much worse   Sig mood up swings   Very angry and aggressive   Is on wellbutrin 300mg now and that has not made any difference   He is really struggling             Review of Systems   Constitutional, HEENT, cardiovascular, pulmonary, gi and gu  "systems are negative, except as otherwise noted.      Objective    /82 (BP Location: Right arm, Patient Position: Sitting, Cuff Size: Adult Large)   Pulse 82   Resp 14   Ht 1.867 m (6' 1.5\")   Wt (!) 154.2 kg (340 lb)   SpO2 96%   BMI 44.25 kg/m    Body mass index is 44.25 kg/m .  Physical Exam   GENERAL APPEARANCE: healthy, alert, and no distress  RESP: lungs clear to auscultation - no rales, rhonchi or wheezes  CV: regular rates and rhythm, normal S1 S2, no S3 or S4, and no murmur, click or rub  PSYCH: mentation appears normal and affect normal/bright                      "

## 2023-10-20 ENCOUNTER — MYC MEDICAL ADVICE (OUTPATIENT)
Dept: FAMILY MEDICINE | Facility: CLINIC | Age: 53
End: 2023-10-20

## 2023-10-24 ENCOUNTER — TELEPHONE (OUTPATIENT)
Dept: ORTHOPEDICS | Facility: CLINIC | Age: 53
End: 2023-10-24

## 2023-10-24 DIAGNOSIS — M17.12 PRIMARY OSTEOARTHRITIS OF LEFT KNEE: Primary | ICD-10-CM

## 2023-10-24 DIAGNOSIS — M25.561 ACUTE PAIN OF RIGHT KNEE: ICD-10-CM

## 2023-10-24 NOTE — TELEPHONE ENCOUNTER
Patient scheduled for appointment on 11/7/23 @ [facility] [place] for discussion of viscosupplementation injection vs steroid injection of bilateral knee.        Left Knee Euflexxa injection last completed 4/12/22.  Patient reports relief for 6+ months, no injections in right knee.    No prior imaging of right knee to confirm presence of knee osteoarthritis.       Patient has failed trial of OTC NSAIDs/Pain Medication (ibuprofen, tylenol, naproxen,...):  Yes       Patient has completed trial of physical therapy: Yes      Prior authorization referral for Euflexxa injection pended.    Please advise    Alexander Jimenez ATC

## 2023-10-26 ENCOUNTER — OFFICE VISIT (OUTPATIENT)
Dept: PODIATRY | Facility: CLINIC | Age: 53
End: 2023-10-26
Payer: COMMERCIAL

## 2023-10-26 VITALS
BODY MASS INDEX: 40.43 KG/M2 | DIASTOLIC BLOOD PRESSURE: 80 MMHG | HEART RATE: 75 BPM | WEIGHT: 315 LBS | SYSTOLIC BLOOD PRESSURE: 125 MMHG | HEIGHT: 74 IN

## 2023-10-26 DIAGNOSIS — E11.43 TYPE II DIABETES MELLITUS WITH PERIPHERAL AUTONOMIC NEUROPATHY (H): ICD-10-CM

## 2023-10-26 DIAGNOSIS — M79.674 PAIN DUE TO ONYCHOMYCOSIS OF TOENAILS OF BOTH FEET: ICD-10-CM

## 2023-10-26 DIAGNOSIS — M20.42 ACQUIRED BILATERAL HAMMER TOES: Primary | ICD-10-CM

## 2023-10-26 DIAGNOSIS — M79.675 PAIN DUE TO ONYCHOMYCOSIS OF TOENAILS OF BOTH FEET: ICD-10-CM

## 2023-10-26 DIAGNOSIS — M72.2 PLANTAR FASCIITIS, RIGHT: ICD-10-CM

## 2023-10-26 DIAGNOSIS — M72.2 PLANTAR FASCIITIS, LEFT: ICD-10-CM

## 2023-10-26 DIAGNOSIS — B35.3 TINEA PEDIS OF BOTH FEET: ICD-10-CM

## 2023-10-26 DIAGNOSIS — M20.41 ACQUIRED BILATERAL HAMMER TOES: Primary | ICD-10-CM

## 2023-10-26 DIAGNOSIS — B35.1 PAIN DUE TO ONYCHOMYCOSIS OF TOENAILS OF BOTH FEET: ICD-10-CM

## 2023-10-26 PROCEDURE — 99203 OFFICE O/P NEW LOW 30 MIN: CPT | Performed by: PODIATRIST

## 2023-10-26 RX ORDER — PRENATAL VIT 91/IRON/FOLIC/DHA 28-975-200
COMBINATION PACKAGE (EA) ORAL 2 TIMES DAILY
Qty: 12 G | Refills: 1 | Status: SHIPPED | OUTPATIENT
Start: 2023-10-26

## 2023-10-26 RX ORDER — CICLOPIROX 80 MG/ML
SOLUTION TOPICAL DAILY
Qty: 6.6 ML | Refills: 1 | Status: SHIPPED | OUTPATIENT
Start: 2023-10-26 | End: 2024-09-05

## 2023-10-26 NOTE — PATIENT INSTRUCTIONS
Next Steps:      Support:  Wear supportive shoes, sandals, boots and/or inserts that have a rigid supportive sole.    This will offload the majority of tension forces that travel through your feet every step you take.    Skechers Max Cushioning Elite/Premier   Skechers Relax Fit D'Lux Walker  Reebok Walk Ultra 7 DMX MAX   Hoka Bondi walking shoes  Mesh [place] shoes/slippers (https://[employer]Food Reporter)  Sonnedix sandals (https://www."[employer]/us)  Superfeet inserts (www.superfeet.com)  It is important that you also wear supportive shoe wear in the house to continue providing support to your feet.    You may always use a cushioned liner for your shoes if that makes your feet feel better.  Stretching  Calf stretching is essential to offload the tension forces that travel through your feet every step you take  Preferred calf stretch is the Runner's Stretch  Place one foot behind the other foot, flat against the ground (it is important to keep the heel on the ground).  The back leg is the one that will be stretched.  Start with the knee straight and lean your hips into the wall, counter or whatever you are leaning into - count to ten.  Next, bend the knee.  You should feel the stretch lower in the calf muscle - count to ten.  Repeat this stretch once an hour to start off with.  When symptoms subside, I recommend performing the stretch 3 times daily to prevent any future problems.                Tissue Massage  It is important that you physically loosen the inflammation tissue to help your body heal the injured tissue.  I recommend soaking your foot in warm water to increase the microcirculation to the soft tissues.  You may add Epson salt to the water if you prefer.  You may apply an over-the-counter muscle rub, such as Voltaren gel, and deeply massage the injured tissue.  Reduce Inflammation  You can ice the injured tissue with an ice pack with a light cloth covering or soaking in ice water 20 minutes to reduce  "any acute inflammation, typically at the end of the day.      It is important to understand that most problems that develop in the foot and ankle are caused by excessive tension that cause microinjury to the soft tissues and inflammation in the foot and ankle.  By addressing the underlying causes with support and stretching as well as treating the current inflammatory conditions with tissue massage and anti-inflammatory treatments, most foot and ankle musculoskeletal conditions will resolve.  This may take time to heal.  However, if symptoms persist past 4 weeks you should return to the office for reevaluation to determine further treatment options.    DIABETES AND YOUR FEET  Diabetes can result in several problems in the feet including ulcers (open sores) and amputations. Two of the most important reasons why people develop foot problems when they have diabetes is : 1. Neuropathy (loss of feeling)  2. Vascular disease (loss or decrease of blood flow).    Neuropathy is a term used to describe a loss of nerve function.  Patients with diabetes are at risk of developing neuropathy if their sugars continue to run high and are above the normal value. One theory for neuropathy is that the \"extra\" sugar in the body enters the nerves and is broken down. These by-products build up in the nerve causing it to swell and impairing nerve function. Often times, this can be prevented by controlling your sugars, dieting and exercise.    When a person develops neuropathy, they usually begin to feel numbness or tingling in their feet and sometime in their legs.  Other symptoms may include painful burning or hot feet, tingling or feeling like insects or ants are crawling on your feet or legs.  If the diabetes is sever and the sugars run high for long periods of time, neuropathy can also occur in the hands.    Vascular disease  is a term used to describe a loss or decrease in circulation (blood flow). There is a problem in getting blood " and oxygen to areas that need it. Similar to neuropathy, sugars can build up in the walls of the arteries (blood vessels) and cause them to become swollen, thickened and hardened. This decreases the amount of blood that can go to an area that needs it. Though this is common in the legs of diabetic patients, it can also affect other arteries (blood vessels) in the body such as in the heart and eyes.    In the legs, vascular disease usually results in cramping. Patients who develop leg cramps after walking the same distance every time (i.e. One block, half a mile, ect.) need to let their doctors know so that their circulation may be checked. Cramps causing severe pain in the feet and/or legs while sleeping and the cramps go away when you stand or hang your legs off the side of the bed, may also be a sign of poor blood circulation.  Occasional cramping in cold weather or on rare occasions with activity may not be due to poor circulation, but you should inform your doctor.    PREVENTION OF THESE DISEASES  The key to prevention is good blood sugar control. Poor blood sugar control is a big reason many of these problems start. Physical activity (exercise) is a very good way to help decrease your blood sugars. Exercise can lower your blood sugar, blood pressure, and cholesterol. It also reduces your risk for heart disease and stroke, relieves stress, and strengthens your heart, muscles and bones.  In addition, regular activity helps insulin work better, improves your blood circulation, and keeps your joints flexible. If you're trying to lose weight, a combination of exercise and wise food choices can help you reach your target weight and maintain it.      PAIN MANAGEMENT (**Please speak with your primary doctor about any medications**)  1.Blood Sugar Control - Most important  2. Medications such as:  Amytriptylline, duloxetine, gabapentin, lyrica, tramadol (talk with your primary care doctor about this).     NUTRITION:   "Nutrition is also important to help with healing. If your body does not have what it needs, it can't heal.   Increasing your protein intake is important.  With wounds you need 60-90gm of protein a day to help with healing. Over the counter protein shakes such as Huy, Glucerna, Ensure, ect... can help to supplement your daily protein intake.   It is also important to take Vitamins to help with healing.  Vitamins such as B12, B6 and Vitamin D3 are important for healing. These can be gotten over the counter at pharmacies or at stores like [employer] or the [employer].    I can also prescribe a dietary supplement called \"Rheumate\" that has a lot of essential vitamins in one capsule.  This may not be covered by insurance though.     FOOT CARE RECOMMENDATIONS   1. Wash your feet with lukewarm water and a mild soap and then dry them thoroughly, especially between the toes.     2. Examine your feet daily looking for cuts, corns, blisters, cracks, ect, especially after wearing new shoes. Make sure to look between your toes. If you cannot see the bottom of your feet, set a mirror on the floor and hold your foot over it, or ask a spouse, friend or family member to examine your feet for you. Contact your doctor immediately if new problems are noted or if sores are not healing.     3. Immediately apply moisturizer to the tops and bottoms of your feet, avoiding areas between the toes. Hand lotion (Intesive Care, Mela, Eucerin, Neutrogena, Curel, ect) is sufficient unless your doctor prescribes a medicated lotion. Apply sunscreen to your feet when going swimming outside.     4. Use clean comfortable shoes, wear white socks (if you have any bleeding or drainage, you will see it on white socks). Socks should not have thick seams or cut off the circulation around the leg. Break in new shoes slowly and rotate with older shoes until broken in. Check the inside of your shoes with your hand to look for areas of irritation or objects that " may have fallen into your shoes.       5. Keep slippers by the side of your bed for use during the night.     6.  Shoes should be fitted by a professional and should not cause areas of irritation.  Check your feet regularly when wearing a new pair of shoes and replace them as needed.     7.  Talk to your doctor about proper exercise. Exercise and stretching stimulate blood flow to your feet and maintain proper glucose levels.     8.  Monitor your blood glucose level as instructed by your doctor. Notify your doctor immediately if your blood sugar is abnormally high or low.    9. Cut your nails straight across, but then gently round any sharp edges with a cardboard nail file. If you have neuropathy, peripheral vascular disease or cannot see that well to trim your own toenails contact [facility] ([phone]) or [facility] ([phone]).      THINGS TO AVOID DOING   1.  Do not soak your feet if you have an open sore. Use only lukewarm water and always check the temperature with your hand as hot water can easily burn your feet.       2.  Never use a hot water bottle or heating pad on your feet. Also do not apply cold compresses to your feet. With decreased sensation, you could burn or freeze your feet.       3.  Do not apply any of these to your feet:    -  Over the counter medicine for corns or warts    -  Harsh chemicals like boric acid    -  Do not self-treat corns, cuts, blisters or infections. Always consult your doctor.       4.  Do not wear sandals, slippers or walk barefoot, especially on hot sand or concrete or other harsh surfaces.     5.  If you smoke, stop!!!

## 2023-10-26 NOTE — PROGRESS NOTES
PATIENT HISTORY:  Heath Carpenter is a 53 year old male who presents to clinic with his wife for a diabetic foot evaluation.  The patient relates having heel pain on both feet.  The patient relates some numbness to the feet.  The patient denies any redness, swelling or open sores on both feet.  The patient relates blood sugars are within normal limits.  The patient relates having dry skin and brittle, discolored toenails on both feet.      REVIEW OF SYSTEMS:  Constitutional, HEENT, cardiovascular, pulmonary, GI, , musculoskeletal, neuro, skin, endocrine and psych systems are negative, except as otherwise noted.     PAST MEDICAL HISTORY:   Past Medical History:   Diagnosis Date    Cancer (H)     Skin    Depressive disorder     Diabetes (H)     Gastro-oesophageal reflux disease     Hypertension goal BP (blood pressure) < 140/90         PAST SURGICAL HISTORY:   Past Surgical History:   Procedure Laterality Date    BIOPSY      Skin cancer times 2    COLONOSCOPY      ESOPHAGOSCOPY, GASTROSCOPY, DUODENOSCOPY (EGD), COMBINED N/A 7/5/2023    Procedure: Esophagoscopy, gastroscopy, duodenoscopy, combined;  Surgeon: Jay Hoyt DO;  Location: [facility]    VASECTOMY          MEDICATIONS:   Current Outpatient Medications:     acetaminophen (TYLENOL) 500 MG tablet, Take 1,000 mg by mouth every 6 hours as needed for mild pain, Disp: , Rfl:     alcohol swab prep pads, Use to swab area of injection/andrzej as directed, Disp: 100 each, Rfl: 3    aspirin (ASA) 81 MG EC tablet, Take 1 tablet (81 mg) by mouth daily, Disp:  , Rfl:     atorvastatin (LIPITOR) 20 MG tablet, Take 1 tablet (20 mg) by mouth daily, Disp: 90 tablet, Rfl: 3    blood glucose (NO BRAND SPECIFIED) test strip, Use to test blood sugar 2 times daily or as directed. To accompany: Blood Glucose Monitor Brands: per insurance., Disp: 100 strip, Rfl: 6    buPROPion (WELLBUTRIN XL) 150 MG 24 hr tablet, Take 2 tablets (300 mg) by mouth every morning, Disp: 180 tablet,  Rfl: 3    ciclopirox (PENLAC) 8 % external solution, Apply topically daily Apply to affected nails daily.  Remove the residual build up weekly with nail polish remover or an [place] board., Disp: 6.6 mL, Rfl: 1    clindamycin (CLEOCIN T) 1 % external solution, Apply 10-15 drops to scalp at nighttime before bed, Disp: 60 mL, Rfl: 11    CONTOUR NEXT TEST test strip, USE TO TEST BLOOD SUGAR 1 TIME DAILY OR AS DIRECTED., Disp: 100 strip, Rfl: 3    lisinopril (ZESTRIL) 20 MG tablet, Take 1 tablet (20 mg) by mouth daily, Disp: 90 tablet, Rfl: 3    magnesium 250 MG tablet, Take 1 tablet by mouth daily, Disp: , Rfl:     Microlet Lancets MISC, , Disp: , Rfl:     Microlet Lancets MISC, USE TO TEST BLOOD SUGAR 1 TIME DAILY, Disp: 100 each, Rfl: 1    MULTIPLE VITAMIN PO, Take 1 tablet by mouth daily, Disp: , Rfl:     omeprazole (PRILOSEC) 40 MG DR capsule, Take 1 capsule (40 mg) by mouth daily, Disp: 90 capsule, Rfl: 1    terbinafine (LAMISIL AT) 1 % external cream, Apply topically 2 times daily, Disp: 12 g, Rfl: 1    thin (NO BRAND SPECIFIED) lancets, Use to test blood sugar 2 times daily or as directed. To accompany: Blood Glucose Monitor Brands: per insurance., Disp: 1 each, Rfl: 3    tirzepatide (MOUNJARO) 2.5 MG/0.5ML pen, Inject 2.5 mg Subcutaneous every 7 days for 28 days, Disp: 2 mL, Rfl: 0    [START ON 11/16/2023] tirzepatide (MOUNJARO) 5 MG/0.5ML pen, Inject 5 mg Subcutaneous every 7 days for 28 days, Disp: 2 mL, Rfl: 0    vitamin (B COMPLEX-C) tablet, Take 1 tablet by mouth daily, Disp: , Rfl:      ALLERGIES:    Allergies   Allergen Reactions    Molds & Smuts Other (See Comments)     Puffy eyes    No Clinical Screening - See Comments Other (See Comments)     Cigarette smoke        SOCIAL HISTORY:   Social History     Socioeconomic History    Marital status:      Spouse name: Not on file    Number of children: Not on file    Years of education: Not on file    Highest education level: Not on file   Occupational  History    Not on file   Tobacco Use    Smoking status: Never    Smokeless tobacco: Never   Vaping Use    Vaping Use: Never used   Substance and Sexual Activity    Alcohol use: Yes     Comment: rare    Drug use: No    Sexual activity: Yes     Partners: Female     Birth control/protection: Male Surgical   Other Topics Concern    Parent/sibling w/ CABG, MI or angioplasty before 65F 55M? No   Social History Narrative    Not on file     Social Determinants of Health     Financial Resource Strain: Low Risk  (10/13/2023)    Financial Resource Strain     Within the past 12 months, have you or your family members you live with been unable to get utilities (heat, electricity) when it was really needed?: No   Food Insecurity: Low Risk  (10/13/2023)    Food Insecurity     Within the past 12 months, did you worry that your food would run out before you got money to buy more?: No     Within the past 12 months, did the food you bought just not last and you didn t have money to get more?: No   Transportation Needs: Low Risk  (10/13/2023)    Transportation Needs     Within the past 12 months, has lack of transportation kept you from medical appointments, getting your medicines, non-medical meetings or appointments, work, or from getting things that you need?: No   Physical Activity: Not on file   Stress: Not on file   Social Connections: Not on file   Interpersonal Safety: Not on file   Housing Stability: Low Risk  (10/13/2023)    Housing Stability     Do you have housing? : Yes     Are you worried about losing your housing?: No        FAMILY HISTORY:   Family History   Problem Relation Age of Onset    Alcoholism Father     Prostate Cancer Father         He is getting tested further but he has a type 5 and 4 lesions    Lung Cancer Maternal Grandfather     Diabetes Maternal Grandfather     Hypertension Maternal Grandfather     Other Cancer Maternal Grandfather         Lung    Colon Cancer Maternal Grandmother         Vitals: BP  "125/80   Pulse 75   Ht 1.867 m (6' 1.5\")   Wt (!) 154.2 kg (340 lb)   BMI 44.25 kg/m         Lower Extremity Evaluation:     Dermatologic: Noted erythematous, xerotic skin on the soles of both feet.  Skin is otherwise intact to both lower extremities without significant lesions, rash or abrasion.  No paronychia or evidence of soft tissue infection is noted.  The nails are hypertrophic and discolored bilateral feet.     Vascular: DP & PT pulses are intact & regular bilaterally.   Capillary filling and skin temperature is normal to both lower extremities.  There are no varicosities, no edema and no trophic changes noted.      Neurologic:   No evidence of weakness in the lower extremities.  Noted evidence of neuropathy with diminished sensation bilaterally.       Musculoskeletal: Patient is ambulatory without assistive device or brace.  No gross ankle deformity noted.  No foot or ankle joint effusion is noted.  One notes hammertoe contracture of the lesser toes bilaterally.           Assessment:        ICD-10-CM    1. Acquired bilateral hammer toes  M20.41 [facility] D[facility] Orthotic; Foot Orthotics (functional rigid support); Bilateral    M20.42       2. Type II diabetes mellitus with peripheral autonomic neuropathy (H)  E11.43 ciclopirox (PENLAC) 8 % external solution     terbinafine (LAMISIL AT) 1 % external cream     [facility] DME Orthotic; Foot Orthotics (functional rigid support); Bilateral      3. Plantar fasciitis, right  M72.2 [facility] DME Orthotic; Foot Orthotics (functional rigid support); Bilateral      4. Plantar fasciitis, left  M72.2 [facility] DME Orthotic; Foot Orthotics (functional rigid support); Bilateral      5. Pain due to onychomycosis of toenails of both feet  B35.1 ciclopirox (PENLAC) 8 % external solution    M79.675     M79.674       6. Tinea pedis of both feet  B35.3 terbinafine (LAMISIL AT) 1 % external cream              Plan:  " I have explained to the patient the underlying condition affecting the feet.  At this point, the patient was prescribed topical Penlac 8% antifungal solution to be applied to the affected toenails daily.  The patient was prescribed topical Terbinafine 1% cream to be applied to the soles of both feet twice daily for two weeks.  The patient was referred to [facility] for custom orthotics that will aid in offloading the tension forces to the soft tissues and prevent further inflammation.    I have discussed with the patient the importance of diabetic foot care and daily inspection of the feet.  The patient was instructed to come in anytime if there is any concern about the feet with redness, swelling or drainage is noted.    Heath verbalized agreement with and understanding of the rational for the diagnosis and treatment plan.  All questions were answered to best of my ability and the patient's satisfaction. The patient was advised to contact the clinic with any questions that may arise after the clinic visit.      Disclaimer: This note consists of symbols derived from keyboarding, dictation and/or voice recognition software. As a result, there may be errors in the script that have gone undetected. Please consider this when interpreting information found in this chart.        RAVI Whitney D.P.M., FCHAN.[email or web address].S.

## 2023-10-26 NOTE — LETTER
10/26/2023         RE: Heath Carpenter  [address] [facility] [address]        Dear Colleague,    Thank you for referring your patient, Heath Carpenter, to the [facility] [place]. Please see a copy of my visit note below.    PATIENT HISTORY:  Heath Carpenter is a 53 year old male who presents to clinic with his wife for a diabetic foot evaluation.  The patient relates having heel pain on both feet.  The patient relates some numbness to the feet.  The patient denies any redness, swelling or open sores on both feet.  The patient relates blood sugars are within normal limits.  The patient relates having dry skin and brittle, discolored toenails on both feet.      REVIEW OF SYSTEMS:  Constitutional, HEENT, cardiovascular, pulmonary, GI, , musculoskeletal, neuro, skin, endocrine and psych systems are negative, except as otherwise noted.     PAST MEDICAL HISTORY:   Past Medical History:   Diagnosis Date     Cancer (H)     Skin     Depressive disorder      Diabetes (H)      Gastro-oesophageal reflux disease      Hypertension goal BP (blood pressure) < 140/90         PAST SURGICAL HISTORY:   Past Surgical History:   Procedure Laterality Date     BIOPSY      Skin cancer times 2     COLONOSCOPY       ESOPHAGOSCOPY, GASTROSCOPY, DUODENOSCOPY (EGD), COMBINED N/A 7/5/2023    Procedure: Esophagoscopy, gastroscopy, duodenoscopy, combined;  Surgeon: Jay Hoyt DO;  Location: [facility]     VASECTOMY          MEDICATIONS:   Current Outpatient Medications:      acetaminophen (TYLENOL) 500 MG tablet, Take 1,000 mg by mouth every 6 hours as needed for mild pain, Disp: , Rfl:      alcohol swab prep pads, Use to swab area of injection/andrzej as directed, Disp: 100 each, Rfl: 3     aspirin (ASA) 81 MG EC tablet, Take 1 tablet (81 mg) by mouth daily, Disp:  , Rfl:      atorvastatin (LIPITOR) 20 MG tablet, Take 1 tablet (20 mg) by mouth daily, Disp: 90 tablet, Rfl: 3     blood glucose (NO BRAND SPECIFIED)  test strip, Use to test blood sugar 2 times daily or as directed. To accompany: Blood Glucose Monitor Brands: per insurance., Disp: 100 strip, Rfl: 6     buPROPion (WELLBUTRIN XL) 150 MG 24 hr tablet, Take 2 tablets (300 mg) by mouth every morning, Disp: 180 tablet, Rfl: 3     ciclopirox (PENLAC) 8 % external solution, Apply topically daily Apply to affected nails daily.  Remove the residual build up weekly with nail polish remover or an [place] board., Disp: 6.6 mL, Rfl: 1     clindamycin (CLEOCIN T) 1 % external solution, Apply 10-15 drops to scalp at nighttime before bed, Disp: 60 mL, Rfl: 11     CONTOUR NEXT TEST test strip, USE TO TEST BLOOD SUGAR 1 TIME DAILY OR AS DIRECTED., Disp: 100 strip, Rfl: 3     lisinopril (ZESTRIL) 20 MG tablet, Take 1 tablet (20 mg) by mouth daily, Disp: 90 tablet, Rfl: 3     magnesium 250 MG tablet, Take 1 tablet by mouth daily, Disp: , Rfl:      Microlet Lancets MISC, , Disp: , Rfl:      Microlet Lancets MISC, USE TO TEST BLOOD SUGAR 1 TIME DAILY, Disp: 100 each, Rfl: 1     MULTIPLE VITAMIN PO, Take 1 tablet by mouth daily, Disp: , Rfl:      omeprazole (PRILOSEC) 40 MG DR capsule, Take 1 capsule (40 mg) by mouth daily, Disp: 90 capsule, Rfl: 1     terbinafine (LAMISIL AT) 1 % external cream, Apply topically 2 times daily, Disp: 12 g, Rfl: 1     thin (NO BRAND SPECIFIED) lancets, Use to test blood sugar 2 times daily or as directed. To accompany: Blood Glucose Monitor Brands: per insurance., Disp: 1 each, Rfl: 3     tirzepatide (MOUNJARO) 2.5 MG/0.5ML pen, Inject 2.5 mg Subcutaneous every 7 days for 28 days, Disp: 2 mL, Rfl: 0     [START ON 11/16/2023] tirzepatide (MOUNJARO) 5 MG/0.5ML pen, Inject 5 mg Subcutaneous every 7 days for 28 days, Disp: 2 mL, Rfl: 0     vitamin (B COMPLEX-C) tablet, Take 1 tablet by mouth daily, Disp: , Rfl:      ALLERGIES:    Allergies   Allergen Reactions     Molds & Smuts Other (See Comments)     Puffy eyes     No Clinical Screening - See Comments Other  (See Comments)     Cigarette smoke        SOCIAL HISTORY:   Social History     Socioeconomic History     Marital status:      Spouse name: Not on file     Number of children: Not on file     Years of education: Not on file     Highest education level: Not on file   Occupational History     Not on file   Tobacco Use     Smoking status: Never     Smokeless tobacco: Never   Vaping Use     Vaping Use: Never used   Substance and Sexual Activity     Alcohol use: Yes     Comment: rare     Drug use: No     Sexual activity: Yes     Partners: Female     Birth control/protection: Male Surgical   Other Topics Concern     Parent/sibling w/ CABG, MI or angioplasty before 65F 55M? No   Social History Narrative     Not on file     Social Determinants of Health     Financial Resource Strain: Low Risk  (10/13/2023)    Financial Resource Strain      Within the past 12 months, have you or your family members you live with been unable to get utilities (heat, electricity) when it was really needed?: No   Food Insecurity: Low Risk  (10/13/2023)    Food Insecurity      Within the past 12 months, did you worry that your food would run out before you got money to buy more?: No      Within the past 12 months, did the food you bought just not last and you didn t have money to get more?: No   Transportation Needs: Low Risk  (10/13/2023)    Transportation Needs      Within the past 12 months, has lack of transportation kept you from medical appointments, getting your medicines, non-medical meetings or appointments, work, or from getting things that you need?: No   Physical Activity: Not on file   Stress: Not on file   Social Connections: Not on file   Interpersonal Safety: Not on file   Housing Stability: Low Risk  (10/13/2023)    Housing Stability      Do you have housing? : Yes      Are you worried about losing your housing?: No        FAMILY HISTORY:   Family History   Problem Relation Age of Onset     Alcoholism Father      Prostate  "Cancer Father         He is getting tested further but he has a type 5 and 4 lesions     Lung Cancer Maternal Grandfather      Diabetes Maternal Grandfather      Hypertension Maternal Grandfather      Other Cancer Maternal Grandfather         Lung     Colon Cancer Maternal Grandmother         Vitals: /80   Pulse 75   Ht 1.867 m (6' 1.5\")   Wt (!) 154.2 kg (340 lb)   BMI 44.25 kg/m         Lower Extremity Evaluation:     Dermatologic: Noted erythematous, xerotic skin on the soles of both feet.  Skin is otherwise intact to both lower extremities without significant lesions, rash or abrasion.  No paronychia or evidence of soft tissue infection is noted.  The nails are hypertrophic and discolored bilateral feet.     Vascular: DP & PT pulses are intact & regular bilaterally.   Capillary filling and skin temperature is normal to both lower extremities.  There are no varicosities, no edema and no trophic changes noted.      Neurologic:   No evidence of weakness in the lower extremities.  Noted evidence of neuropathy with diminished sensation bilaterally.       Musculoskeletal: Patient is ambulatory without assistive device or brace.  No gross ankle deformity noted.  No foot or ankle joint effusion is noted.  One notes hammertoe contracture of the lesser toes bilaterally.           Assessment:        ICD-10-CM    1. Acquired bilateral hammer toes  M20.41 [facility] DME Orthotic; Foot Orthotics (functional rigid support); Bilateral    M20.42       2. Type II diabetes mellitus with peripheral autonomic neuropathy (H)  E11.43 ciclopirox (PENLAC) 8 % external solution     terbinafine (LAMISIL AT) 1 % external cream     [facility]st[facility] DME Orthotic; Foot Orthotics (functional rigid support); Bilateral      3. Plantar fasciitis, right  M72.2 [facility] DME Orthotic; Foot Orthotics (functional rigid support); Bilateral      4. Plantar fasciitis, left  M72.2 Orthotics and " Prosthetics DME Orthotic; Foot Orthotics (functional rigid support); Bilateral      5. Pain due to onychomycosis of toenails of both feet  B35.1 ciclopirox (PENLAC) 8 % external solution    M79.675     M79.674       6. Tinea pedis of both feet  B35.3 terbinafine (LAMISIL AT) 1 % external cream              Plan:  I have explained to the patient the underlying condition affecting the feet.  At this point, the patient was prescribed topical Penlac 8% antifungal solution to be applied to the affected toenails daily.  The patient was prescribed topical Terbinafine 1% cream to be applied to the soles of both feet twice daily for two weeks.  The patient was referred to [facility] for custom orthotics that will aid in offloading the tension forces to the soft tissues and prevent further inflammation.    I have discussed with the patient the importance of diabetic foot care and daily inspection of the feet.  The patient was instructed to come in anytime if there is any concern about the feet with redness, swelling or drainage is noted.    Heath verbalized agreement with and understanding of the rational for the diagnosis and treatment plan.  All questions were answered to best of my ability and the patient's satisfaction. The patient was advised to contact the clinic with any questions that may arise after the clinic visit.      Disclaimer: This note consists of symbols derived from keyboarding, dictation and/or voice recognition software. As a result, there may be errors in the script that have gone undetected. Please consider this when interpreting information found in this chart.        MICHAEL Travis.P.JULIANNE., F.A.C.F.A.S.          Again, thank you for allowing me to participate in the care of your patient.        Sincerely,        Dylan Whitney DPM

## 2023-10-26 NOTE — NURSING NOTE
"Chief Complaint   Patient presents with    Consult     Diabetic foot check- numbness, dry skin, and discuss toenails       Initial /80   Pulse 75   Ht 1.867 m (6' 1.5\")   Wt (!) 154.2 kg (340 lb)   BMI 44.25 kg/m   Estimated body mass index is 44.25 kg/m  as calculated from the following:    Height as of this encounter: 1.867 m (6' 1.5\").    Weight as of this encounter: 154.2 kg (340 lb).  Medications and allergies reviewed.      hSelli RODRIGUEZ MA    "

## 2023-10-29 ENCOUNTER — MYC MEDICAL ADVICE (OUTPATIENT)
Dept: FAMILY MEDICINE | Facility: CLINIC | Age: 53
End: 2023-10-29

## 2023-10-29 ENCOUNTER — APPOINTMENT (OUTPATIENT)
Dept: CT IMAGING | Facility: CLINIC | Age: 53
End: 2023-10-29
Attending: EMERGENCY MEDICINE
Payer: COMMERCIAL

## 2023-10-29 ENCOUNTER — HOSPITAL ENCOUNTER (EMERGENCY)
Facility: CLINIC | Age: 53
Discharge: HOME | End: 2023-10-29
Attending: EMERGENCY MEDICINE | Admitting: EMERGENCY MEDICINE
Payer: COMMERCIAL

## 2023-10-29 VITALS
SYSTOLIC BLOOD PRESSURE: 130 MMHG | RESPIRATION RATE: 16 BRPM | WEIGHT: 315 LBS | DIASTOLIC BLOOD PRESSURE: 82 MMHG | BODY MASS INDEX: 43.99 KG/M2 | HEART RATE: 97 BPM | OXYGEN SATURATION: 93 % | TEMPERATURE: 99.2 F

## 2023-10-29 DIAGNOSIS — M79.671 RIGHT FOOT PAIN: ICD-10-CM

## 2023-10-29 PROCEDURE — 99284 EMERGENCY DEPT VISIT MOD MDM: CPT | Performed by: EMERGENCY MEDICINE

## 2023-10-29 PROCEDURE — 73700 CT LOWER EXTREMITY W/O DYE: CPT | Mod: RT

## 2023-10-29 PROCEDURE — 99284 EMERGENCY DEPT VISIT MOD MDM: CPT | Mod: 25 | Performed by: EMERGENCY MEDICINE

## 2023-10-29 PROCEDURE — 250N000013 HC RX MED GY IP 250 OP 250 PS 637: Performed by: EMERGENCY MEDICINE

## 2023-10-29 RX ORDER — OXYCODONE AND ACETAMINOPHEN 5; 325 MG/1; MG/1
1 TABLET ORAL ONCE
Status: COMPLETED | OUTPATIENT
Start: 2023-10-29 | End: 2023-10-29

## 2023-10-29 RX ORDER — OXYCODONE AND ACETAMINOPHEN 5; 325 MG/1; MG/1
1 TABLET ORAL EVERY 6 HOURS PRN
Qty: 6 TABLET | Refills: 0 | Status: SHIPPED | OUTPATIENT
Start: 2023-10-29 | End: 2024-04-23 | Stop reason: ALTCHOICE

## 2023-10-29 RX ADMIN — OXYCODONE HYDROCHLORIDE AND ACETAMINOPHEN 1 TABLET: 5; 325 TABLET ORAL at 21:45

## 2023-10-29 ASSESSMENT — ACTIVITIES OF DAILY LIVING (ADL): ADLS_ACUITY_SCORE: 36

## 2023-10-30 NOTE — DISCHARGE INSTRUCTIONS
I'm not sure what is going on, but I think you need an MRI. I ordered it as an outpatient.     I also put in an urgent referral to podiatry.     I hope you feel better soon. I'm sorry that I don't know what this is.     I prescribed some pain medication. It is an opiate and do not mix it with alcohol, driving, Tylenol, other sedating medications, drugs, or machinery operation.     It will make you constipated, so please drink plenty of fluids and eat vegetables and fruit and beans when taking it. Avoid meat, which makes you more constipated. I would recommend taking MiraLax (which you can purchase over the counter) as  well as a stool softener like Colace (also over the counter) twice a day when taking it.     Repeated studies have shown that the longer you use opioid pain medications, the longer it is until you return to normal function. It is our recommendation that you taper off opioids as quickly as possible with the goal of returning to normal function or near normal function. Long term use of opioids quickly results in growing tolerance to the medication (less of the benefits) and increased dependence (more of the bad side effects).

## 2023-10-30 NOTE — ED PROVIDER NOTES
History     Chief Complaint   Patient presents with    Foot Pain     Left       HPI  Heath Carpenter is a 53 year old male who presents with right foot pain. patient has diabetic neuropathy in feet, heard a pop x2 in right foot on Friday while pushing something.  He saw podiatrist on Thursday.  He says that the underside/plantar surface of his right foot hurts when he walks on it.  He is not sure what happened exactly but he says he feels like there is a ball inside his foot.  When this happened he did not fall or lose his balance.  He did not roll his ankle.  He says that the pain all localizes to the plantar aspect of his foot.    His wife is at bedside and relates that the patient is a  and is on his feet frequently.  she says that he does have orthotics for his boots that he uses.      Allergies:  Allergies   Allergen Reactions    Molds & Smuts Other (See Comments)     Puffy eyes    No Clinical Screening - See Comments Other (See Comments)     Cigarette smoke       Problem List:    Patient Active Problem List    Diagnosis Date Noted    JONA (obstructive sleep apnea) 06/13/2023     Priority: Medium    Gastroesophageal reflux disease with esophagitis without hemorrhage 06/13/2023     Priority: Medium    Mild episode of recurrent major depressive disorder (H24) 06/09/2022     Priority: Medium    Morbid obesity (H) 10/27/2020     Priority: Medium    Type 2 diabetes mellitus with hyperglycemia, without long-term current use of insulin (H) 10/27/2020     Priority: Medium    Hypertension goal BP (blood pressure) < 140/90 10/27/2020     Priority: Medium        Past Medical History:    Past Medical History:   Diagnosis Date    Cancer (H)     Depressive disorder     Diabetes (H)     Gastro-oesophageal reflux disease     Hypertension goal BP (blood pressure) < 140/90        Past Surgical History:    Past Surgical History:   Procedure Laterality Date    BIOPSY      Skin cancer times 2    COLONOSCOPY       ESOPHAGOSCOPY, GASTROSCOPY, DUODENOSCOPY (EGD), COMBINED N/A 7/5/2023    Procedure: Esophagoscopy, gastroscopy, duodenoscopy, combined;  Surgeon: Jay Hoyt DO;  Location: [facility]    VASECTOMY         Family History:    Family History   Problem Relation Age of Onset    Alcoholism Father     Prostate Cancer Father         He is getting tested further but he has a type 5 and 4 lesions    Lung Cancer Maternal Grandfather     Diabetes Maternal Grandfather     Hypertension Maternal Grandfather     Other Cancer Maternal Grandfather         Lung    Colon Cancer Maternal Grandmother        Social History:  Marital Status:   [2]  Social History     Tobacco Use    Smoking status: Never    Smokeless tobacco: Never   Vaping Use    Vaping Use: Never used   Substance Use Topics    Alcohol use: Yes     Comment: rare    Drug use: No        Medications:    oxyCODONE-acetaminophen (PERCOCET) 5-325 MG tablet  acetaminophen (TYLENOL) 500 MG tablet  alcohol swab prep pads  aspirin (ASA) 81 MG EC tablet  atorvastatin (LIPITOR) 20 MG tablet  blood glucose (NO BRAND SPECIFIED) test strip  buPROPion (WELLBUTRIN XL) 150 MG 24 hr tablet  ciclopirox (PENLAC) 8 % external solution  clindamycin (CLEOCIN T) 1 % external solution  CONTOUR NEXT TEST test strip  lisinopril (ZESTRIL) 20 MG tablet  magnesium 250 MG tablet  Microlet Lancets MISC  Microlet Lancets MISC  MULTIPLE VITAMIN PO  omeprazole (PRILOSEC) 40 MG DR capsule  terbinafine (LAMISIL AT) 1 % external cream  thin (NO BRAND SPECIFIED) lancets  tirzepatide (MOUNJARO) 2.5 MG/0.5ML pen  [START ON 11/16/2023] tirzepatide (MOUNJARO) 5 MG/0.5ML pen  vitamin (B COMPLEX-C) tablet          Review of Systems    Physical Exam   BP: 130/82  Pulse: 97  Temp: 99.2  F (37.3  C)  Resp: 16  Weight: (!) 153.3 kg (338 lb)  SpO2: 93 %      Physical Exam  Constitutional:       General: He is not in acute distress.     Appearance: He is not toxic-appearing.   HENT:      Right Ear: External  ear normal.      Left Ear: External ear normal.      Nose: No congestion.   Musculoskeletal:      Comments: , no tenderness over the talus or malleolus bilaterally, the right foot might be slightly swollen versus the left but it is almost imperceptible    No deformity of bilateral foot or ankle    No appreciable discrete mass on plantar aspect of right foot    there is tenderness along the aspect of the plantar fascia and possibly some fullness    No ecchymoses on the underside of the foot or on the dorsal aspect of the foot, there are no skin breaks and his toes are all warm and well-perfused   Neurological:      Mental Status: He is alert.         ED Course              ED Course as of 10/29/23 2252   Sun Oct 29, 2023   2111 CT shows:      IMPRESSION:  1.  No fracture or joint malalignment.  2.  Advanced fatty atrophy of the abductor digiti minimi muscle, suggesting sequelae of chronic Tanner's neuropathy.  3.  If there is continued concern for acute myotendinous or soft tissue injury, MRI is more sensitive for this evaluation.     2153 I updated the patient.  I offered crutches or boot.  He declined.  He did ask for an Ace wrap.  I ordered this.  He is going to use in orthotics.  I put in an urgent referral to podiatry for follow-up.  I also ordered an outpatient MRI.  Unfortunately cannot do the MRI tonight but I think he needs one because of his diabetes.   2153 He requested some pain medicine and they ordered this.   2153 I apologized that I do not have a definitive answer.  He is very appreciative nevertheless.  He is wife is driving home.  We will discharge him at this time.     Procedures                Results for orders placed or performed during the hospital encounter of 10/29/23 (from the past 24 hour(s))   CT Foot Right w/o Contrast    Narrative    EXAM: CT FOOT RIGHT W/O CONTRAST  LOCATION: [facility]  DATE: 10/29/2023    INDICATION: 53-year-old patient with right plantar  foot pain.  COMPARISON: 07/12/2022 radiographs.  TECHNIQUE: Noncontrast. Axial, sagittal and coronal thin-section reconstruction. Dose reduction techniques were used.     FINDINGS:     BONES AND JOINTS:  -Normal joint spacing and alignment.   -No fracture or joint effusion.   -Small calcaneus enthesophytes.   -Tiny ossicle at the dorsal margin of the 5th metatarsal base.    SOFT TISSUES:  -No CT evidence of high-grade plantar fascial tear.   -Advanced fatty atrophy of the abductor digiti minimi muscle.   -No soft tissue fluid collection.      Impression    IMPRESSION:  1.  No fracture or joint malalignment.  2.  Advanced fatty atrophy of the abductor digiti minimi muscle, suggesting sequelae of chronic Tanner's neuropathy.  3.  If there is continued concern for acute myotendinous or soft tissue injury, MRI is more sensitive for this evaluation.         Medications   oxyCODONE-acetaminophen (PERCOCET) 5-325 MG per tablet 1 tablet (1 tablet Oral $Given 10/29/23 5364)       Assessments & Plan (with Medical Decision Making)     I am not totally sure what kind of injury this patient suffered.  Because of his diabetes status, I am concerned he could be developing a Charcot foot, though his exam is reassuring.  I am going to get a CT of his foot.  He may need an MRI but that is not available at this time.  I am going to give him some pain medicine here.  His wife says she will drive home.  I will reassess him after the CT scan.    I have reviewed the nursing notes.    I have reviewed the findings, diagnosis, plan and need for follow up with the patient.          Medical Decision Making  The patient's presentation was of moderate complexity (an undiagnosed new problem with uncertain diagnosis).    The patient's evaluation involved:  an assessment requiring an independent historian (see separate area of note for details)  ordering and/or review of 1 test(s) in this encounter (see separate area of note for details)    The  patient's management necessitated moderate risk (prescription drug management including medications given in the ED).        Discharge Medication List as of 10/29/2023  9:55 PM        START taking these medications    Details   oxyCODONE-acetaminophen (PERCOCET) 5-325 MG tablet Take 1 tablet by mouth every 6 hours as needed for severe pain, Disp-6 tablet, R-0, [facility]             Final diagnoses:   Right foot pain       10/29/2023   [facility] EMERGENCY DEPT       Jay Cummins MD  10/29/23 9023

## 2023-10-30 NOTE — ED TRIAGE NOTES
"Reports putting pressure on his right foot while pushing a heavy trailer on Friday, heard a \"pop\" and felt pain in the bottom of his foot. Noticed some swelling in his right foot today - stated his \"shoe felt tight.\" Baseline diabetic neuropathy and assesses his feet often.     Slight swelling in right foot, no obvious injury. Reports some pain upon palpation. CMS intact.     Triage Assessment (Adult)       Row Name 10/29/23 1912          Triage Assessment    Airway WDL WDL        Respiratory WDL    Respiratory WDL WDL        Skin Circulation/Temperature WDL    Skin Circulation/Temperature WDL WDL        Cardiac WDL    Cardiac WDL WDL        Peripheral/Neurovascular WDL    Peripheral Neurovascular WDL WDL        Cognitive/Neuro/Behavioral WDL    Cognitive/Neuro/Behavioral WDL WDL                     "

## 2023-11-06 ENCOUNTER — HOSPITAL ENCOUNTER (OUTPATIENT)
Dept: MRI IMAGING | Facility: CLINIC | Age: 53
Discharge: HOME | End: 2023-11-06
Attending: EMERGENCY MEDICINE | Admitting: EMERGENCY MEDICINE
Payer: COMMERCIAL

## 2023-11-06 DIAGNOSIS — M79.671 RIGHT FOOT PAIN: ICD-10-CM

## 2023-11-06 PROCEDURE — 73718 MRI LOWER EXTREMITY W/O DYE: CPT | Mod: 26 | Performed by: RADIOLOGY

## 2023-11-06 PROCEDURE — 73718 MRI LOWER EXTREMITY W/O DYE: CPT | Mod: RT

## 2023-11-08 ENCOUNTER — OFFICE VISIT (OUTPATIENT)
Dept: PODIATRY | Facility: CLINIC | Age: 53
End: 2023-11-08
Attending: EMERGENCY MEDICINE
Payer: COMMERCIAL

## 2023-11-08 DIAGNOSIS — E11.43 TYPE II DIABETES MELLITUS WITH PERIPHERAL AUTONOMIC NEUROPATHY (H): ICD-10-CM

## 2023-11-08 DIAGNOSIS — S86.311A PERONEAL TENDON RUPTURE, RIGHT, INITIAL ENCOUNTER: Primary | ICD-10-CM

## 2023-11-08 PROCEDURE — 99243 OFF/OP CNSLTJ NEW/EST LOW 30: CPT | Performed by: PODIATRIST

## 2023-11-08 NOTE — LETTER
11/8/2023         RE: Heath Carpenter  [address] [facility] [address]        Dear Colleague,    Thank you for referring your patient, Heath Carpenter, to the [facility]. Please see a copy of my visit note below.    Subjective:    Pt is seen today in consult from Dr. Rosado with the c/c of painful right foot.  On October 27 patient pushing heavy object.  Felt pop x2 right foot.  Started having pain and swelling.  Hard to localize.  Went to ED.  Had an x-ray and a CT scan which were unremarkable.  An MRI was ordered.  He had this a few days later and this showed partial rupture of the peroneus longus tendon.  Patient has been walking in his shoe.  He has diabetes with peripheral neuropathy.  Patient states numbness in ball of both feet.  He has had good control of his blood sugars.  Has recently changed his diabetes medications states numbness is getting better.  He works as a  for [employer] so he is walking at times.    ROS:  see above       Allergies   Allergen Reactions     Molds & Smuts Other (See Comments)     Puffy eyes     No Clinical Screening - See Comments Other (See Comments)     Cigarette smoke       Current Outpatient Medications   Medication Sig Dispense Refill     acetaminophen (TYLENOL) 500 MG tablet Take 1,000 mg by mouth every 6 hours as needed for mild pain       alcohol swab prep pads Use to swab area of injection/andrzej as directed 100 each 3     aspirin (ASA) 81 MG EC tablet Take 1 tablet (81 mg) by mouth daily       atorvastatin (LIPITOR) 20 MG tablet Take 1 tablet (20 mg) by mouth daily 90 tablet 3     blood glucose (NO BRAND SPECIFIED) test strip Use to test blood sugar 2 times daily or as directed. To accompany: Blood Glucose Monitor Brands: per insurance. 100 strip 6     buPROPion (WELLBUTRIN XL) 150 MG 24 hr tablet Take 2 tablets (300 mg) by mouth every morning 180 tablet 3     ciclopirox (PENLAC) 8 % external solution Apply topically daily  Apply to affected nails daily.  Remove the residual build up weekly with nail polish remover or an Ida board. 6.6 mL 1     clindamycin (CLEOCIN T) 1 % external solution Apply 10-15 drops to scalp at nighttime before bed 60 mL 11     CONTOUR NEXT TEST test strip USE TO TEST BLOOD SUGAR 1 TIME DAILY OR AS DIRECTED. 100 strip 3     lisinopril (ZESTRIL) 20 MG tablet Take 1 tablet (20 mg) by mouth daily 90 tablet 3     magnesium 250 MG tablet Take 1 tablet by mouth daily       Microlet Lancets MISC        Microlet Lancets MISC USE TO TEST BLOOD SUGAR 1 TIME DAILY 100 each 1     MULTIPLE VITAMIN PO Take 1 tablet by mouth daily       omeprazole (PRILOSEC) 40 MG DR capsule Take 1 capsule (40 mg) by mouth daily 90 capsule 1     oxyCODONE-acetaminophen (PERCOCET) 5-325 MG tablet Take 1 tablet by mouth every 6 hours as needed for severe pain 6 tablet 0     terbinafine (LAMISIL AT) 1 % external cream Apply topically 2 times daily 12 g 1     thin (NO BRAND SPECIFIED) lancets Use to test blood sugar 2 times daily or as directed. To accompany: Blood Glucose Monitor Brands: per insurance. 1 each 3     tirzepatide (MOUNJARO) 2.5 MG/0.5ML pen Inject 2.5 mg Subcutaneous every 7 days for 28 days 2 mL 0     [START ON 11/16/2023] tirzepatide (MOUNJARO) 5 MG/0.5ML pen Inject 5 mg Subcutaneous every 7 days for 28 days 2 mL 0     vitamin (B COMPLEX-C) tablet Take 1 tablet by mouth daily         Patient Active Problem List   Diagnosis     Morbid obesity (H)     Type 2 diabetes mellitus with hyperglycemia, without long-term current use of insulin (H)     Hypertension goal BP (blood pressure) < 140/90     Mild episode of recurrent major depressive disorder (H24)     JONA (obstructive sleep apnea)     Gastroesophageal reflux disease with esophagitis without hemorrhage       Past Medical History:   Diagnosis Date     Cancer (H)     Skin     Depressive disorder      Diabetes (H)      Gastro-oesophageal reflux disease      Hypertension goal BP  (blood pressure) < 140/90        Past Surgical History:   Procedure Laterality Date     BIOPSY      Skin cancer times 2     COLONOSCOPY       ESOPHAGOSCOPY, GASTROSCOPY, DUODENOSCOPY (EGD), COMBINED N/A 7/5/2023    Procedure: Esophagoscopy, gastroscopy, duodenoscopy, combined;  Surgeon: Jay Hoyt DO;  Location: [facility]     VASECTOMY         Family History   Problem Relation Age of Onset     Alcoholism Father      Prostate Cancer Father         He is getting tested further but he has a type 5 and 4 lesions     Lung Cancer Maternal Grandfather      Diabetes Maternal Grandfather      Hypertension Maternal Grandfather      Other Cancer Maternal Grandfather         Lung     Colon Cancer Maternal Grandmother        Social History     Tobacco Use     Smoking status: Never     Smokeless tobacco: Never   Substance Use Topics     Alcohol use: Yes     Comment: rare             O:  There were no vitals taken for this visit..     Constitutional/ general:  Pt is in no apparent distress, appears well-nourished.  Cooperative with history and physical exam.  Patient seen with wife today    Psych:  The patient answered questions appropriately.  Normal affect.  Seems to have reasonable expectations, in terms of treatment.     Lungs:  Non labored breathing, non labored speech. No cough.  No audible wheezing. Even, quiet breathing.       Vascular:  Pedal pulses are palpable bilaterally for both the DP and PT arteries.  CFT < 3 sec. lower extremity edema noted.  Pedal hair growth noted.     Neuro:  Alert and oriented x 3. Coordinated gait.  Light touch sensation is somewhat diminished on toes    Derm: Normal texture and turgor.  No erythema, ecchymosis, or cyanosis.  No open lesions.     Musculoskeletal:    Lower extremity muscle strength is normal.  Patient is ambulatory without an assistive device or brace.     Somewhat cavus foot with weightbearing bilateral.  No forefoot or rear foot deformities noted.  MS 5/5 all  compartments.  Normal ROM all fore foot and rearfoot joints with no pain or crepitus.  No equinus.  Pain centered over plantar lateral cuboid bone.  Edema noted.  No erythema or ecchymosis.  No crepitus.    Radiographic Exam:    Narrative & Impression   Exam: MRI of the right foot dated 11/6/2023.     COMPARISON: CT dated 10/29/2023.     CLINICAL HISTORY: Injury.     TECHNIQUE: Multiplanar, multisequence MR imaging of the right foot was  obtained using standard sequences in 3 orthogonal planes without  intravenous or intra-articular gadolinium contrast.     FINDINGS:     No marrow signal abnormalities to suggest fracture or marrow  infiltration. Very subtle bone marrow edema along the lateral aspect  of the cuboid, nonspecific.     No discrete soft tissue masses or fluid collections. No significant  joint effusion in the visualized right ankle.     Diffuse soft tissue edema in the forefoot, greatest in the plantar  musculature especially plantar to the first through third metatarsals.  Mild fatty atrophy within the musculature about the foot, with marked  fatty atrophy within the abductor digiti minimi muscle.     The Lisfranc ligament is intact and the Lisfranc joint is congruent.     There is a high-grade, partial-thickness tear of the peroneal longus  tendon as it passes the cuboid, for example long axis series 7 image  17; sagittal series 5 image 18, as it extends towards the base of the  first metatarsal . There is soft tissue edema within the abductor  hallucis and lateral head of the flexor hallucis muscles. Mild edema  in the interosseous muscles.                                                                      IMPRESSION:  1. High-grade tear to near full-thickness tear of the right foot  peroneal longus tendon as it extends under the cuboid towards the  first metatarsal. The attachment of the peroneal longus tendon to the  first metatarsal is intact.     2. Surrounding soft tissue edema in the region of  the peroneal longus  tendon tear, greatest within the abductor hallucis muscle, as well as  minimally within the lateral head of the flexor hallucis muscle. Mild  edema in the interosseous muscles at the second and third digit.     3. No marrow signal changes in the right foot to suggest fracture or  marrow infiltration.                  Component  Ref Range & Units 3 wk ago  (10/19/23) 4 mo ago  (6/13/23) 8 mo ago  (3/1/23) 1 yr ago  (9/6/22) 1 yr ago  (3/30/22) 2 yr ago  (6/18/21) 2 yr ago  (1/29/21)    Hemoglobin A1C  0.0 - 5.6 % 6.7 High  6.1 High  CM 6.1 High  CM 6.2 High  CM 6.7 High  CM 6.4 High  R, CM 6.6 High           A:   Diabetes mellitus with peripheral neuropathy  Peroneus longus partial tendon tear    Plan:  Personally reviewed past x-rays CT scan and MRI.  Explained mechanism of rupture of this tendon.  Discussed rupture very deep in foot and will be difficult to access surgically without significant dissection.  He is at higher risk for complications with diabetes and neuropathy.  Would recommend starting with conservative treatment.  We will dispense a plantarflexed Cam walker.  Patient to wear this at all times while walking.  When not walking patient will take this off and do ROM to prevent blood clot and joint stiffness.  The patient will plantar flex foot and massage Several times a day to avoid blood clot.  He will not dorsiflex foot or invert foot.  Will ice and use compression on this.  Discussed if chronically painful lateral may anastomose peroneus longus to peroneus brevis.  Return to the clinic in 3 weeks for reevaluation.  Thank you for allowing me participate in the care of this patient.          Joe Vasquez DPM, FACFAS         Again, thank you for allowing me to participate in the care of your patient.        Sincerely,        Joe Vasquez DPM

## 2023-11-08 NOTE — PATIENT INSTRUCTIONS
"We wish you continued good healing. If you have any questions or concerns, please do not hesitate to contact us at  [phone]    [facility] (secure e-mail communication and access to your chart) to send a message or to make an appointment.    Please remember to call and schedule a follow up appointment if one was recommended at your earliest convenience.     PODIATRY CLINIC HOURS  TELEPHONE NUMBER    Dr. Joe HILLPISMAEL FACFAS        Clinics:  [facility]  Tuesday 1PM-6PM  [place]  Wednesday 745AM-330PM  [place]  Monday 2nd,4th  830AM-4PM  Thursday/Friday 745AM-230PM     [facility]POINT[facility]  [phone]    [place] APPOINTMENTS  [phone]          If you need a medication refill, please contact us you may need lab work and/or a follow up visit prior to your refill (i.e. Antifungal medications).  If MRI needed please call Imaging at [phone]   HOW DO I GET MY KNEE SCOOTER? Knee scooters can be picked up at ANY Medical Supply stores with your knee scooter Prescription.  OR  Bring your signed prescription to an [facility] showroom.   Set up an appointment for your custom Orthotics. Call any Orthotics locations call [phone]         [employer]IR[employer] / CAM WALKING BOOT INSTRUCTIONS  - Do NOT drive with CAM walker on. This is due to safety and legal issues.   - Do NOT wear the CAM walker on long car/train rides or on an airplane.  - Remove the CAM walker several times a day and do ankle range of motion (ROM) exercises/wiggle toes.  - It is recommended that a thick-soled shoe be worn on the other foot to offset any created leg length issue.    - You can purchase an \"even up\" on [employer] to place under the other shoe to help too.  - The boot does not have to be worn at night.   - There is an increased risk of developing a blood clot with lower extremity immobilization. ROM exercises and knee-high compression " (tenso /ACE wrap) is recommended to lower that risk.   - You should seek medical attention if you experience calf swelling and/or pain, chest pain, or shortness of breath.   If you need to return or exchange the boot, please contact [facility] @ [phone]

## 2023-11-08 NOTE — LETTER
November 8, 2023      Heath Carpenter  [address] [facility] [address]        To Whom It May Concern:    Heath Carpenter was seen in our clinic. He may return to work with the following: limited to seated work only hour workday on or about 4 weeks.      Sincerely,        Dr. Joe Vasquez D.P.M FAC FAS/lld    (Electronically Signed)

## 2023-11-09 NOTE — PROGRESS NOTES
Subjective:    Pt is seen today in consult from Dr. Rosado with the c/c of painful right foot.  On October 27 patient pushing heavy object.  Felt pop x2 right foot.  Started having pain and swelling.  Hard to localize.  Went to ED.  Had an x-ray and a CT scan which were unremarkable.  An MRI was ordered.  He had this a few days later and this showed partial rupture of the peroneus longus tendon.  Patient has been walking in his shoe.  He has diabetes with peripheral neuropathy.  Patient states numbness in ball of both feet.  He has had good control of his blood sugars.  Has recently changed his diabetes medications states numbness is getting better.  He works as a  for [employer] so he is walking at times.    ROS:  see above       Allergies   Allergen Reactions    Molds & Smuts Other (See Comments)     Puffy eyes    No Clinical Screening - See Comments Other (See Comments)     Cigarette smoke       Current Outpatient Medications   Medication Sig Dispense Refill    acetaminophen (TYLENOL) 500 MG tablet Take 1,000 mg by mouth every 6 hours as needed for mild pain      alcohol swab prep pads Use to swab area of injection/andrzej as directed 100 each 3    aspirin (ASA) 81 MG EC tablet Take 1 tablet (81 mg) by mouth daily      atorvastatin (LIPITOR) 20 MG tablet Take 1 tablet (20 mg) by mouth daily 90 tablet 3    blood glucose (NO BRAND SPECIFIED) test strip Use to test blood sugar 2 times daily or as directed. To accompany: Blood Glucose Monitor Brands: per insurance. 100 strip 6    buPROPion (WELLBUTRIN XL) 150 MG 24 hr tablet Take 2 tablets (300 mg) by mouth every morning 180 tablet 3    ciclopirox (PENLAC) 8 % external solution Apply topically daily Apply to affected nails daily.  Remove the residual build up weekly with nail polish remover or an [place] board. 6.6 mL 1    clindamycin (CLEOCIN T) 1 % external solution Apply 10-15 drops to scalp at nighttime before bed 60 mL 11    CONTOUR NEXT TEST test  strip USE TO TEST BLOOD SUGAR 1 TIME DAILY OR AS DIRECTED. 100 strip 3    lisinopril (ZESTRIL) 20 MG tablet Take 1 tablet (20 mg) by mouth daily 90 tablet 3    magnesium 250 MG tablet Take 1 tablet by mouth daily      Microlet Lancets MISC       Microlet Lancets MISC USE TO TEST BLOOD SUGAR 1 TIME DAILY 100 each 1    MULTIPLE VITAMIN PO Take 1 tablet by mouth daily      omeprazole (PRILOSEC) 40 MG DR capsule Take 1 capsule (40 mg) by mouth daily 90 capsule 1    oxyCODONE-acetaminophen (PERCOCET) 5-325 MG tablet Take 1 tablet by mouth every 6 hours as needed for severe pain 6 tablet 0    terbinafine (LAMISIL AT) 1 % external cream Apply topically 2 times daily 12 g 1    thin (NO BRAND SPECIFIED) lancets Use to test blood sugar 2 times daily or as directed. To accompany: Blood Glucose Monitor Brands: per insurance. 1 each 3    tirzepatide (MOUNJARO) 2.5 MG/0.5ML pen Inject 2.5 mg Subcutaneous every 7 days for 28 days 2 mL 0    [START ON 11/16/2023] tirzepatide (MOUNJARO) 5 MG/0.5ML pen Inject 5 mg Subcutaneous every 7 days for 28 days 2 mL 0    vitamin (B COMPLEX-C) tablet Take 1 tablet by mouth daily         Patient Active Problem List   Diagnosis    Morbid obesity (H)    Type 2 diabetes mellitus with hyperglycemia, without long-term current use of insulin (H)    Hypertension goal BP (blood pressure) < 140/90    Mild episode of recurrent major depressive disorder (H24)    JONA (obstructive sleep apnea)    Gastroesophageal reflux disease with esophagitis without hemorrhage       Past Medical History:   Diagnosis Date    Cancer (H)     Skin    Depressive disorder     Diabetes (H)     Gastro-oesophageal reflux disease     Hypertension goal BP (blood pressure) < 140/90        Past Surgical History:   Procedure Laterality Date    BIOPSY      Skin cancer times 2    COLONOSCOPY      ESOPHAGOSCOPY, GASTROSCOPY, DUODENOSCOPY (EGD), COMBINED N/A 7/5/2023    Procedure: Esophagoscopy, gastroscopy, duodenoscopy, combined;   Surgeon: Jay Hoyt DO;  Location: [facility]    VASECTOMY         Family History   Problem Relation Age of Onset    Alcoholism Father     Prostate Cancer Father         He is getting tested further but he has a type 5 and 4 lesions    Lung Cancer Maternal Grandfather     Diabetes Maternal Grandfather     Hypertension Maternal Grandfather     Other Cancer Maternal Grandfather         Lung    Colon Cancer Maternal Grandmother        Social History     Tobacco Use    Smoking status: Never    Smokeless tobacco: Never   Substance Use Topics    Alcohol use: Yes     Comment: rare             O:  There were no vitals taken for this visit..     Constitutional/ general:  Pt is in no apparent distress, appears well-nourished.  Cooperative with history and physical exam.  Patient seen with wife today    Psych:  The patient answered questions appropriately.  Normal affect.  Seems to have reasonable expectations, in terms of treatment.     Lungs:  Non labored breathing, non labored speech. No cough.  No audible wheezing. Even, quiet breathing.       Vascular:  Pedal pulses are palpable bilaterally for both the DP and PT arteries.  CFT < 3 sec. lower extremity edema noted.  Pedal hair growth noted.     Neuro:  Alert and oriented x 3. Coordinated gait.  Light touch sensation is somewhat diminished on toes    Derm: Normal texture and turgor.  No erythema, ecchymosis, or cyanosis.  No open lesions.     Musculoskeletal:    Lower extremity muscle strength is normal.  Patient is ambulatory without an assistive device or brace.     Somewhat cavus foot with weightbearing bilateral.  No forefoot or rear foot deformities noted.  MS 5/5 all compartments.  Normal ROM all fore foot and rearfoot joints with no pain or crepitus.  No equinus.  Pain centered over plantar lateral cuboid bone.  Edema noted.  No erythema or ecchymosis.  No crepitus.    Radiographic Exam:    Narrative & Impression   Exam: MRI of the right foot dated  11/6/2023.     COMPARISON: CT dated 10/29/2023.     CLINICAL HISTORY: Injury.     TECHNIQUE: Multiplanar, multisequence MR imaging of the right foot was  obtained using standard sequences in 3 orthogonal planes without  intravenous or intra-articular gadolinium contrast.     FINDINGS:     No marrow signal abnormalities to suggest fracture or marrow  infiltration. Very subtle bone marrow edema along the lateral aspect  of the cuboid, nonspecific.     No discrete soft tissue masses or fluid collections. No significant  joint effusion in the visualized right ankle.     Diffuse soft tissue edema in the forefoot, greatest in the plantar  musculature especially plantar to the first through third metatarsals.  Mild fatty atrophy within the musculature about the foot, with marked  fatty atrophy within the abductor digiti minimi muscle.     The Lisfranc ligament is intact and the Lisfranc joint is congruent.     There is a high-grade, partial-thickness tear of the peroneal longus  tendon as it passes the cuboid, for example long axis series 7 image  17; sagittal series 5 image 18, as it extends towards the base of the  first metatarsal . There is soft tissue edema within the abductor  hallucis and lateral head of the flexor hallucis muscles. Mild edema  in the interosseous muscles.                                                                      IMPRESSION:  1. High-grade tear to near full-thickness tear of the right foot  peroneal longus tendon as it extends under the cuboid towards the  first metatarsal. The attachment of the peroneal longus tendon to the  first metatarsal is intact.     2. Surrounding soft tissue edema in the region of the peroneal longus  tendon tear, greatest within the abductor hallucis muscle, as well as  minimally within the lateral head of the flexor hallucis muscle. Mild  edema in the interosseous muscles at the second and third digit.     3. No marrow signal changes in the right foot to  suggest fracture or  marrow infiltration.                  Component  Ref Range & Units 3 wk ago  (10/19/23) 4 mo ago  (6/13/23) 8 mo ago  (3/1/23) 1 yr ago  (9/6/22) 1 yr ago  (3/30/22) 2 yr ago  (6/18/21) 2 yr ago  (1/29/21)    Hemoglobin A1C  0.0 - 5.6 % 6.7 High  6.1 High  CM 6.1 High  CM 6.2 High  CM 6.7 High  CM 6.4 High  R, CM 6.6 High           A:   Diabetes mellitus with peripheral neuropathy  Peroneus longus partial tendon tear    Plan:  Personally reviewed past x-rays CT scan and MRI.  Explained mechanism of rupture of this tendon.  Discussed rupture very deep in foot and will be difficult to access surgically without significant dissection.  He is at higher risk for complications with diabetes and neuropathy.  Would recommend starting with conservative treatment.  We will dispense a plantarflexed Cam walker.  Patient to wear this at all times while walking.  When not walking patient will take this off and do ROM to prevent blood clot and joint stiffness.  The patient will plantar flex foot and massage Several times a day to avoid blood clot.  He will not dorsiflex foot or invert foot.  Will ice and use compression on this.  Discussed if chronically painful lateral may anastomose peroneus longus to peroneus brevis.  Return to the clinic in 3 weeks for reevaluation.  Thank you for allowing me participate in the care of this patient.          Joe Vasquez, MIGUELITO, FACFAS

## 2023-12-07 DIAGNOSIS — E11.65 TYPE 2 DIABETES MELLITUS WITH HYPERGLYCEMIA, WITHOUT LONG-TERM CURRENT USE OF INSULIN (H): ICD-10-CM

## 2023-12-08 RX ORDER — TIRZEPATIDE 7.5 MG/.5ML
7.5 INJECTION, SOLUTION SUBCUTANEOUS
Qty: 2 ML | Refills: 0 | Status: SHIPPED | OUTPATIENT
Start: 2023-12-08 | End: 2024-01-10

## 2023-12-25 DIAGNOSIS — K21.00 GASTROESOPHAGEAL REFLUX DISEASE WITH ESOPHAGITIS WITHOUT HEMORRHAGE: ICD-10-CM

## 2023-12-26 RX ORDER — OMEPRAZOLE 40 MG/1
CAPSULE, DELAYED RELEASE ORAL
Qty: 90 CAPSULE | Refills: 1 | Status: SHIPPED | OUTPATIENT
Start: 2023-12-26 | End: 2024-04-23

## 2024-01-08 ENCOUNTER — MYC MEDICAL ADVICE (OUTPATIENT)
Dept: FAMILY MEDICINE | Facility: CLINIC | Age: 54
End: 2024-01-08
Payer: COMMERCIAL

## 2024-01-08 ENCOUNTER — NURSE TRIAGE (OUTPATIENT)
Dept: NURSING | Facility: CLINIC | Age: 54
End: 2024-01-08
Payer: COMMERCIAL

## 2024-01-09 NOTE — TELEPHONE ENCOUNTER
"Heath has tested Positive for Covid. He is interested in Antiviral tx.    Symptoms started on Sat, 1/6/24    - Fever - Temp = 102.1   - Facial/Sinus pressure - \"I thought I had a sinus infection\"  - Nasal congestion  - Runny nose  - Sneezing  - Headache  - Dry throat  - Body aches  - Cough  - Hoarse voice    Heath reports that he had a couple Tachycardia alarms from his Apple Watch today - -123 bpm at rest.     With at least 1 episode, the Alarm went off, he started Coughing and felt Lightheaded/Dizzy for ~5 seconds.    During Triage, I asked him to check his SpO2.  Initially SpO2 = 92%  I asked him to Cough and Deep Breath. Wheezing was heard.  SpO2 went up to 94% and then rapidly down to 90%    Per Protocol[facility]    Vernell Yang RN  [facility]      Reason for Disposition   Oxygen level (e.g., pulse oximetry) 90 percent or lower    Additional Information   Negative: SEVERE difficulty breathing (e.g., struggling for each breath, speaks in single words)   Negative: Difficult to awaken or acting confused (e.g., disoriented, slurred speech)   Negative: Bluish (or gray) lips or face now   Negative: Shock suspected (e.g., cold/pale/clammy skin, too weak to stand, low BP, rapid pulse)   Negative: Sounds like a life-threatening emergency to the triager   Negative: SEVERE or constant chest pain or pressure  (Exception: Mild central chest pain, present only when coughing.)   Negative: MODERATE difficulty breathing (e.g., speaks in phrases, SOB even at rest, pulse 100-120)   Negative: [1] Headache AND [2] stiff neck (can't touch chin to chest)    Protocols used: Coronavirus (COVID-19) Diagnosed or [id number]    "

## 2024-01-10 DIAGNOSIS — E11.65 TYPE 2 DIABETES MELLITUS WITH HYPERGLYCEMIA, WITHOUT LONG-TERM CURRENT USE OF INSULIN (H): ICD-10-CM

## 2024-01-10 RX ORDER — TIRZEPATIDE 10 MG/.5ML
10 INJECTION, SOLUTION SUBCUTANEOUS
Qty: 2 ML | Refills: 0 | Status: SHIPPED | OUTPATIENT
Start: 2024-01-10 | End: 2024-02-09

## 2024-01-10 NOTE — TELEPHONE ENCOUNTER
Patient returned call stating he is tolerating the mounjaro 7.5 mg injections. Relayed Dr. Maria Antonia Goodwin's message okay to increase mounjaro dose to 10 mg subcutaneous Q week.     RX pended and message routed to provider for consideration.     Julie Behrendt RN

## 2024-01-21 ENCOUNTER — MYC MEDICAL ADVICE (OUTPATIENT)
Dept: FAMILY MEDICINE | Facility: CLINIC | Age: 54
End: 2024-01-21
Payer: COMMERCIAL

## 2024-01-26 ENCOUNTER — TELEPHONE (OUTPATIENT)
Dept: FAMILY MEDICINE | Facility: CLINIC | Age: 54
End: 2024-01-26
Payer: COMMERCIAL

## 2024-01-26 NOTE — TELEPHONE ENCOUNTER
[facility] called to say they are dispensing bupropion to 300 mg tablets, 1x daily.  He was taking 2 , 150 mg tablets daily for a total of 300 mg.

## 2024-01-31 ENCOUNTER — PATIENT OUTREACH (OUTPATIENT)
Dept: CARE COORDINATION | Facility: CLINIC | Age: 54
End: 2024-01-31
Payer: COMMERCIAL

## 2024-02-01 ASSESSMENT — PATIENT HEALTH QUESTIONNAIRE - PHQ9
SUM OF ALL RESPONSES TO PHQ QUESTIONS 1-9: 5
SUM OF ALL RESPONSES TO PHQ QUESTIONS 1-9: 5
10. IF YOU CHECKED OFF ANY PROBLEMS, HOW DIFFICULT HAVE THESE PROBLEMS MADE IT FOR YOU TO DO YOUR WORK, TAKE CARE OF THINGS AT HOME, OR GET ALONG WITH OTHER PEOPLE: SOMEWHAT DIFFICULT

## 2024-02-02 ENCOUNTER — OFFICE VISIT (OUTPATIENT)
Dept: BEHAVIORAL HEALTH | Facility: CLINIC | Age: 54
End: 2024-02-02
Payer: COMMERCIAL

## 2024-02-02 DIAGNOSIS — F33.0 MILD EPISODE OF RECURRENT MAJOR DEPRESSIVE DISORDER (H): ICD-10-CM

## 2024-02-02 PROCEDURE — 90837 PSYTX W PT 60 MINUTES: CPT

## 2024-02-02 ASSESSMENT — COLUMBIA-SUICIDE SEVERITY RATING SCALE - C-SSRS
2. HAVE YOU ACTUALLY HAD ANY THOUGHTS OF KILLING YOURSELF?: NO
1. HAVE YOU WISHED YOU WERE DEAD OR WISHED YOU COULD GO TO SLEEP AND NOT WAKE UP?: NO

## 2024-02-02 NOTE — PROGRESS NOTES
[facility]: Integrated Behavioral Health  February 2, 2024    Behavioral Health Clinician Progress Note    Patient Name: Heath Carpenter       Service Type:  Individual      Service Location:   Face to Face in Clinic     Session Start Time: 9:00am  Session End Time: 10:00am      Session Length: 53 - 60      Attendees: Patient     Service Modality:  In-person    Visit Activities (Refresh list every visit): NEW, [facility] Only, and Referral - Mental Health    Diagnostic Assessment Date: Will complete in the next few sessions, not completed due to time constraints.    Treatment Plan Review Date: Will complete in the next few sessions, not completed due to time constraints.    See Flowsheets for today's PHQ-9 and RODRIGUE-7 results  Previous PHQ-9:       10/19/2023     8:05 AM 1/22/2024    12:02 AM 2/1/2024     9:21 AM   PHQ-9 SCORE   PHQ-9 Total Score MyChart 14 (Moderate depression) 8 (Mild depression) 5 (Mild depression)   PHQ-9 Total Score 14 8 5     Previous RODRIGUE-7:       6/8/2022     2:32 PM 10/19/2023     8:06 AM 1/22/2024    12:07 AM   RODRIGUE-7 SCORE   Total Score  8 (mild anxiety) 5 (mild anxiety)   Total Score 7 8 5     DATA  Extended Session (60+ minutes): No  Interactive Complexity: No  Crisis: No  [facility] Patient: No    Treatment Objective(s) Addressed in This Session:  Target Behavior(s): disease management/lifestyle changes ongoing relationship difficulties    Anger Management: will learn strategies to resolve conflict adaptively and will learn and practice positive anger management skills     Current Stressors / Issues:  Pt attended first session discussed his complex interpersonal dynamics and difficulties managing his anger.  He discussed his frustrations and ways he coped with them.  Brainstormed various ways he could work on managing his emotions and advocate for himself.  Discuss resources and prompt follow through.  Provided him with copies of fair fighting rules and will send  resources via [employer].      Progress on Treatment Objective(s) / Homework:  New Objective established this session - PREPARATION (Decided to change - considering how); Intervened by negotiating a change plan and determining options / strategies for behavior change, identifying triggers, exploring social supports, and working towards setting a date to begin behavior change    Motivational Interviewing    MI Intervention: Expressed Empathy/Understanding, Supported Autonomy, Collaboration, Evocation, Permission to raise concern or advise, Open-ended questions, and Reflections: simple and complex     Change Talk Expressed by the Patient: Desire to change Ability to change Reasons to change    Provider Response to Change Talk: A - Affirmed patient's thoughts, decisions, or attempts at behavior change, R - Reflected patient's change talk, and S - Summarized patient's change talk statements    Also provided psychoeducation about behavioral health condition, symptoms, and treatment options    Assessments completed prior to visit:  The following assessments were completed by patient for this visit:  PHQ9:       6/8/2022     2:07 PM 9/6/2022     8:38 AM 3/1/2023     7:31 AM 10/19/2023     8:05 AM 1/22/2024    12:02 AM 2/1/2024     9:21 AM   PHQ-9 SCORE   PHQ-9 Total Score [facility] 6 (Mild depression) 7 (Mild depression) 5 (Mild depression) 14 (Moderate depression) 8 (Mild depression) 5 (Mild depression)   PHQ-9 Total Score 6 7 5 14 8 5     GAD7:       6/8/2022     2:32 PM 10/19/2023     8:06 AM 1/22/2024    12:07 AM   RODRIGUE-7 SCORE   Total Score  8 (mild anxiety) 5 (mild anxiety)   Total Score 7 8 5     CAGE-AID:       1/26/2024     9:09 AM   CAGE-AID Total Score   Total Score 0   Total Score MyChart 0 (A total score of 2 or greater is considered clinically significant)     PROMIS 10-Global Health (only subscores and total score):       1/26/2024     9:08 AM   PROMIS-10 Scores Only   Global Mental Health Score 12   Global  Physical Health Score 11   PROMIS TOTAL - SUBSCORES 23     Jackson Suicide Severity Rating Scale (Lifetime/Recent)      2/2/2024    10:47 AM   Jackson Suicide Severity Rating (Lifetime/Recent)   Q1 Wish to be Dead (Lifetime) N   Q2 Non-Specific Active Suicidal Thoughts (Lifetime) N     Care Plan review completed: Yes    Medication Review:  No changes to current psychiatric medication(s)    Medication Compliance:  NA    Changes in Health Issues:   None reported    Chemical Use Review:   Substance Use: Chemical use reviewed, no active concerns identified      Tobacco Use: No current tobacco use.      Assessment: Current Emotional / Mental Status (status of significant symptoms):  Risk status (Self / Other harm or suicidal ideation)  Patient denies a history of suicidal ideation, suicide attempts, self-injurious behavior, homicidal ideation, homicidal behavior, and and other safety concerns  Patient denies current fears or concerns for personal safety.  Patient denies current or recent suicidal ideation or behaviors.  Patient denies current or recent homicidal ideation or behaviors.  Patient denies current or recent self injurious behavior or ideation.  Patient denies other safety concerns.  A safety and risk management plan has not been developed at this time, however patient was encouraged to call [place] [phone] should there be a change in any of these risk factors.    Appearance:   Appropriate   Eye Contact:   Fair   Psychomotor Behavior: Normal   Attitude:   Cooperative   Orientation:   All  Speech   Rate / Production: Normal    Volume:  Normal   Mood:    Normal  Affect:    Appropriate   Thought Content:  Clear   Thought Form:  Coherent  Logical   Insight:    Good     Diagnoses:  1. Mild episode of recurrent major depressive disorder (H24)      Collateral Reports Completed:  Not Applicable    Plan: (Homework, other):  Patient was given information about behavioral services and encouraged to schedule a follow  up appointment with the clinic [facility] in 2 weeks.  He was also given information about mental health symptoms and treatment options .  CD Recommendations: No indications of CD issues.     JOSIAH Cage 2/2/24  Answers submitted by the patient for this visit:  Patient Health Questionnaire (Submitted on 2/1/2024)  If you checked off any problems, how difficult have these problems made it for you to do your work, take care of things at home, or get along with other people?: Somewhat difficult  PHQ9 TOTAL SCORE: 5

## 2024-02-09 DIAGNOSIS — E11.65 TYPE 2 DIABETES MELLITUS WITH HYPERGLYCEMIA, WITHOUT LONG-TERM CURRENT USE OF INSULIN (H): ICD-10-CM

## 2024-02-09 RX ORDER — TIRZEPATIDE 10 MG/.5ML
10 INJECTION, SOLUTION SUBCUTANEOUS
Qty: 2 ML | Refills: 0 | Status: SHIPPED | OUTPATIENT
Start: 2024-02-09 | End: 2024-03-14

## 2024-02-14 ENCOUNTER — PATIENT OUTREACH (OUTPATIENT)
Dept: CARE COORDINATION | Facility: CLINIC | Age: 54
End: 2024-02-14
Payer: COMMERCIAL

## 2024-03-13 DIAGNOSIS — E11.65 TYPE 2 DIABETES MELLITUS WITH HYPERGLYCEMIA, WITHOUT LONG-TERM CURRENT USE OF INSULIN (H): ICD-10-CM

## 2024-03-14 ENCOUNTER — TELEPHONE (OUTPATIENT)
Dept: FAMILY MEDICINE | Facility: CLINIC | Age: 54
End: 2024-03-14
Payer: COMMERCIAL

## 2024-03-14 DIAGNOSIS — E11.65 TYPE 2 DIABETES MELLITUS WITH HYPERGLYCEMIA, WITHOUT LONG-TERM CURRENT USE OF INSULIN (H): Primary | ICD-10-CM

## 2024-03-14 RX ORDER — TIRZEPATIDE 10 MG/.5ML
INJECTION, SOLUTION SUBCUTANEOUS
Qty: 2 ML | Refills: 0 | Status: SHIPPED | OUTPATIENT
Start: 2024-03-14 | End: 2024-04-23 | Stop reason: ALTCHOICE

## 2024-03-14 NOTE — TELEPHONE ENCOUNTER
Fax received from pharmacy.     Mounjaro Is not available. Please send alternative     Ordered Status Priority Ordering User Department   03/14/24 Sent (none) Jay Mcrae PA-C [facility]     Order History  Outpatient  Date/Time Action Taken User Additional Information   03/13/24 1205 Pend Interface, Eprescribing    03/14/24 1104 Sign Jay Mcrae PA-C Reorder from Order:[id number]   03/14/24 1104 Taking Flag Checked Jya Mcrae PA-C [id number]     Outpatient Medication Detail     Disp Refills Start End SILVINO   tirzepatide (MOUNJARO) 10 MG/0.5ML pen 2 mL 0 3/14/2024 -- No   Sig: INJECT 10MG SUBCUTANEOUSLY ONCE WEEKLY   Sent to pharmacy as: Mounjaro 10 MG/0.5ML Subcutaneous Solution Pen-injector (tirzepatide)   Class: E-Prescribe   Order: [id number]   E-Prescribing Status: Receipt confirmed by pharmacy (3/14/2024 11:05 AM CDT)

## 2024-03-15 NOTE — TELEPHONE ENCOUNTER
I recommend a dose change to either 7.5 mg or 12.5 mg. Please call the patient and make sure this is okay with them. He may had to contact his pharmacy to see if they have one of these in stock.     Jay Mcrae PA-C

## 2024-03-20 DIAGNOSIS — E11.65 TYPE 2 DIABETES MELLITUS WITH HYPERGLYCEMIA, WITHOUT LONG-TERM CURRENT USE OF INSULIN (H): ICD-10-CM

## 2024-03-20 RX ORDER — TIRZEPATIDE 12.5 MG/.5ML
12.5 INJECTION, SOLUTION SUBCUTANEOUS
Qty: 2 ML | Refills: 11 | Status: SHIPPED | OUTPATIENT
Start: 2024-03-20 | End: 2024-04-23 | Stop reason: ALTCHOICE

## 2024-03-20 NOTE — TELEPHONE ENCOUNTER
Heath states the plan was to eventually titrate up to the 15 mg Mounjaro dose. He did miss last week's dose and is due today. His average blood sugar readings have been 110-115 with the highest being 135 right after eating. His weight is overall down. Next OV scheduled for 04/23/24 with PCP.   He would like to increase to the 12.5 mg dose. He was told pharmacy is out of the 10 mg and 15 mg dose.   Claudette CHEUNG RN

## 2024-03-21 RX ORDER — TIRZEPATIDE 2.5 MG/.5ML
2.5 INJECTION, SOLUTION SUBCUTANEOUS
OUTPATIENT
Start: 2024-03-21 | End: 2024-04-18

## 2024-03-23 ENCOUNTER — HEALTH MAINTENANCE LETTER (OUTPATIENT)
Age: 54
End: 2024-03-23

## 2024-04-09 ENCOUNTER — TRANSFERRED RECORDS (OUTPATIENT)
Dept: FAMILY MEDICINE | Facility: CLINIC | Age: 54
End: 2024-04-09
Payer: COMMERCIAL

## 2024-04-09 LAB — RETINOPATHY: POSITIVE

## 2024-04-10 ENCOUNTER — TRANSFERRED RECORDS (OUTPATIENT)
Dept: MULTI SPECIALTY CLINIC | Facility: CLINIC | Age: 54
End: 2024-04-10
Payer: COMMERCIAL

## 2024-04-10 LAB — RETINOPATHY: NORMAL

## 2024-04-23 ENCOUNTER — OFFICE VISIT (OUTPATIENT)
Dept: FAMILY MEDICINE | Facility: CLINIC | Age: 54
End: 2024-04-23
Attending: FAMILY MEDICINE
Payer: COMMERCIAL

## 2024-04-23 ENCOUNTER — TELEPHONE (OUTPATIENT)
Dept: FAMILY MEDICINE | Facility: CLINIC | Age: 54
End: 2024-04-23

## 2024-04-23 VITALS
RESPIRATION RATE: 16 BRPM | WEIGHT: 315 LBS | HEIGHT: 74 IN | TEMPERATURE: 98 F | SYSTOLIC BLOOD PRESSURE: 126 MMHG | OXYGEN SATURATION: 97 % | HEART RATE: 74 BPM | BODY MASS INDEX: 40.43 KG/M2 | DIASTOLIC BLOOD PRESSURE: 86 MMHG

## 2024-04-23 DIAGNOSIS — E78.5 HYPERLIPIDEMIA LDL GOAL <100: ICD-10-CM

## 2024-04-23 DIAGNOSIS — E11.43 TYPE II DIABETES MELLITUS WITH PERIPHERAL AUTONOMIC NEUROPATHY (H): ICD-10-CM

## 2024-04-23 DIAGNOSIS — K21.00 GASTROESOPHAGEAL REFLUX DISEASE WITH ESOPHAGITIS WITHOUT HEMORRHAGE: ICD-10-CM

## 2024-04-23 DIAGNOSIS — E11.65 TYPE 2 DIABETES MELLITUS WITH HYPERGLYCEMIA, WITHOUT LONG-TERM CURRENT USE OF INSULIN (H): Primary | ICD-10-CM

## 2024-04-23 DIAGNOSIS — I10 HYPERTENSION GOAL BP (BLOOD PRESSURE) < 140/90: ICD-10-CM

## 2024-04-23 DIAGNOSIS — F33.0 MILD EPISODE OF RECURRENT MAJOR DEPRESSIVE DISORDER (H): ICD-10-CM

## 2024-04-23 DIAGNOSIS — E66.01 MORBID OBESITY (H): ICD-10-CM

## 2024-04-23 LAB
CREAT UR-MCNC: 161.6 MG/DL
HBA1C MFR BLD: 6.2 % (ref 0–5.6)
MICROALBUMIN UR-MCNC: 13.6 MG/L
MICROALBUMIN/CREAT UR: 8.42 MG/G CR (ref 0–17)

## 2024-04-23 PROCEDURE — 99214 OFFICE O/P EST MOD 30 MIN: CPT | Performed by: FAMILY MEDICINE

## 2024-04-23 PROCEDURE — 82570 ASSAY OF URINE CREATININE: CPT | Performed by: FAMILY MEDICINE

## 2024-04-23 PROCEDURE — G2211 COMPLEX E/M VISIT ADD ON: HCPCS | Performed by: FAMILY MEDICINE

## 2024-04-23 PROCEDURE — 83036 HEMOGLOBIN GLYCOSYLATED A1C: CPT | Performed by: FAMILY MEDICINE

## 2024-04-23 PROCEDURE — 36415 COLL VENOUS BLD VENIPUNCTURE: CPT | Performed by: FAMILY MEDICINE

## 2024-04-23 PROCEDURE — 82043 UR ALBUMIN QUANTITATIVE: CPT | Performed by: FAMILY MEDICINE

## 2024-04-23 RX ORDER — CYANOCOBALAMIN (VITAMIN B-12) 500 MCG
400 LOZENGE ORAL DAILY
COMMUNITY

## 2024-04-23 RX ORDER — LANCETS
EACH MISCELLANEOUS
Qty: 100 EACH | Refills: 1 | Status: SHIPPED | OUTPATIENT
Start: 2024-04-23 | End: 2024-09-16

## 2024-04-23 RX ORDER — BUPROPION HYDROCHLORIDE 300 MG/1
300 TABLET ORAL EVERY MORNING
Qty: 90 TABLET | Refills: 3 | Status: SHIPPED | OUTPATIENT
Start: 2024-04-23 | End: 2024-06-13

## 2024-04-23 RX ORDER — OMEPRAZOLE 40 MG/1
40 CAPSULE, DELAYED RELEASE ORAL DAILY
Qty: 90 CAPSULE | Refills: 3 | Status: SHIPPED | OUTPATIENT
Start: 2024-04-23

## 2024-04-23 RX ORDER — ATORVASTATIN CALCIUM 20 MG/1
20 TABLET, FILM COATED ORAL DAILY
Qty: 90 TABLET | Refills: 3 | Status: SHIPPED | OUTPATIENT
Start: 2024-04-23

## 2024-04-23 RX ORDER — BUPROPION HYDROCHLORIDE 300 MG/1
300 TABLET ORAL EVERY MORNING
COMMUNITY
Start: 2024-01-26 | End: 2024-04-23

## 2024-04-23 RX ORDER — LISINOPRIL 20 MG/1
20 TABLET ORAL DAILY
Qty: 90 TABLET | Refills: 3 | Status: SHIPPED | OUTPATIENT
Start: 2024-04-23 | End: 2024-05-28

## 2024-04-23 ASSESSMENT — PAIN SCALES - GENERAL: PAINLEVEL: NO PAIN (0)

## 2024-04-23 ASSESSMENT — PATIENT HEALTH QUESTIONNAIRE - PHQ9
5. POOR APPETITE OR OVEREATING: SEVERAL DAYS
SUM OF ALL RESPONSES TO PHQ QUESTIONS 1-9: 9

## 2024-04-23 ASSESSMENT — ANXIETY QUESTIONNAIRES
2. NOT BEING ABLE TO STOP OR CONTROL WORRYING: NOT AT ALL
3. WORRYING TOO MUCH ABOUT DIFFERENT THINGS: NOT AT ALL
GAD7 TOTAL SCORE: 4
7. FEELING AFRAID AS IF SOMETHING AWFUL MIGHT HAPPEN: NOT AT ALL
1. FEELING NERVOUS, ANXIOUS, OR ON EDGE: SEVERAL DAYS
GAD7 TOTAL SCORE: 4
6. BECOMING EASILY ANNOYED OR IRRITABLE: MORE THAN HALF THE DAYS
IF YOU CHECKED OFF ANY PROBLEMS ON THIS QUESTIONNAIRE, HOW DIFFICULT HAVE THESE PROBLEMS MADE IT FOR YOU TO DO YOUR WORK, TAKE CARE OF THINGS AT HOME, OR GET ALONG WITH OTHER PEOPLE: SOMEWHAT DIFFICULT
5. BEING SO RESTLESS THAT IT IS HARD TO SIT STILL: NOT AT ALL

## 2024-04-23 NOTE — TELEPHONE ENCOUNTER
Fax received from pharmacy:    Mounjaro 5MG/0.5 is not available. Please advise      Preferred Pharmacy:   [facility] [address] [place] [address] [id number]  Phone: [phone] Fax: [phone]

## 2024-04-23 NOTE — NURSING NOTE
"Chief Complaint   Patient presents with    Hypertension    Diabetes    Lipids    Depression     /86   Pulse 74   Temp 98  F (36.7  C) (Tympanic)   Resp 16   Ht 1.867 m (6' 1.5\")   Wt (!) 151.5 kg (334 lb)   SpO2 97%   BMI 43.47 kg/m   Estimated body mass index is 43.47 kg/m  as calculated from the following:    Height as of this encounter: 1.867 m (6' 1.5\").    Weight as of this encounter: 151.5 kg (334 lb).  Patient presents to the clinic using No DME      Health Maintenance that is potentially due pending provider review:    Health Maintenance Due   Topic Date Due    HEPATITIS B IMMUNIZATION (1 of 3 - 19+ 3-dose series) Never done    DIABETIC FOOT EXAM  06/18/2022    A1C  01/19/2024    DEPRESSION 6 MO INDEX REPEAT PHQ-9  02/19/2024    YEARLY PREVENTIVE VISIT  03/01/2024    MICROALBUMIN  03/01/2024    ANNUAL REVIEW OF HM ORDERS  03/01/2024    EYE EXAM  04/19/2024        n/a          "

## 2024-04-23 NOTE — PROGRESS NOTES
"  Assessment & Plan     Type 2 diabetes mellitus with hyperglycemia, without long-term current use of insulin (H)  Adjust meds as indicated by above labs.   - HEMOGLOBIN A1C; Future  - Albumin Random Urine Quantitative with Creat Ratio; Future  - blood glucose (NO BRAND SPECIFIED) test strip; Use to test blood sugar 2 times daily or as directed. To accompany: Blood Glucose Monitor Brands: per insurance.  - Microlet Lancets MISC; Use to test blood sugar 1 time daily  - tirzepatide (MOUNJARO) 5 MG/0.5ML pen; Inject 5 mg Subcutaneous every 7 days    Type II diabetes mellitus with peripheral autonomic neuropathy (H)    - tirzepatide (MOUNJARO) 5 MG/0.5ML pen; Inject 5 mg Subcutaneous every 7 days    Hyperlipidemia LDL goal <100  Stable no change in treatment plan.   - atorvastatin (LIPITOR) 20 MG tablet; Take 1 tablet (20 mg) by mouth daily    Hypertension goal BP (blood pressure) < 140/90  Stable no change in treatment plan.   - lisinopril (ZESTRIL) 20 MG tablet; Take 1 tablet (20 mg) by mouth daily    Gastroesophageal reflux disease with esophagitis without hemorrhage  Stable no change in treatment plan.   - omeprazole (PRILOSEC) 40 MG DR capsule; Take 1 capsule (40 mg) by mouth daily    Morbid obesity (H)  Back up due to GLP1 not covered     Mild episode of recurrent major depressive disorder (H24)  See   - buPROPion (WELLBUTRIN XL) 300 MG 24 hr tablet; Take 1 tablet (300 mg) by mouth every morning      The longitudinal plan of care for the diagnosis(es)/condition(s) as documented were addressed during this visit. Due to the added complexity in care, I will continue to support Heath in the subsequent management and with ongoing continuity of care.        BMI  Estimated body mass index is 43.47 kg/m  as calculated from the following:    Height as of this encounter: 1.867 m (6' 1.5\").    Weight as of this encounter: 151.5 kg (334 lb).             Subjective   Heath is a 54 year old, presenting for the following health " issues:  Hypertension, Diabetes, Lipids, and Depression        4/23/2024     7:40 AM   Additional Questions   Roomed by Fern WHITAKER   Accompanied by Self         4/23/2024     7:40 AM   Patient Reported Additional Medications   Patient reports taking the following new medications .     Via the Health Maintenance questionnaire, the patient has reported the following services have been completed -Eye Exam, this information has been sent to the abstraction team.  History of Present Illness       Diabetes:   He presents for follow up of diabetes.  He is checking home blood glucose a few times a month.   He checks blood glucose before and after meals.  Blood glucose is never over 200 and never under 70. He is aware of hypoglycemia symptoms including weakness.   He is concerned about other.   He is having numbness in feet, burning in feet and weight gain.  The patient has had a diabetic eye exam in the last 12 months. Eye exam performed on 4/10/24. Location of last eye exam [facility].         Wt Readings from Last 4 Encounters:   04/23/24 (!) 151.5 kg (334 lb)   10/29/23 (!) 153.3 kg (338 lb)   10/26/23 (!) 154.2 kg (340 lb)   10/19/23 (!) 154.2 kg (340 lb)          Hyperlipidemia Follow-Up    Are you regularly taking any medication or supplement to lower your cholesterol?   Yes- Lipitor  Are you having muscle aches or other side effects that you think could be caused by your cholesterol lowering medication?  Yes- charley horses in calves, comes and goes - nothing recently     Hypertension Follow-up    Do you check your blood pressure regularly outside of the clinic? Yes   Are you following a low salt diet? Yes  Are your blood pressures ever more than 140 on the top number (systolic) OR more   than 90 on the bottom number (diastolic), for example 140/90? No    Depression   How are you doing with your depression since your last visit? Irritable really quick - scheduled to talk with Psychiatrist soon - May 2nd   Are you  "having other symptoms that might be associated with depression? No  Have you had a significant life event?  No   Are you feeling anxious or having panic attacks?   No  Do you have any concerns with your use of alcohol or other drugs? No    Social History     Tobacco Use    Smoking status: Never    Smokeless tobacco: Never   Vaping Use    Vaping status: Never Used   Substance Use Topics    Alcohol use: Yes     Comment: rare    Drug use: No         1/22/2024    12:02 AM 2/1/2024     9:21 AM 4/23/2024     8:28 AM   PHQ   PHQ-9 Total Score 8 5 9   Q9: Thoughts of better off dead/self-harm past 2 weeks Not at all Not at all Not at all         10/19/2023     8:06 AM 1/22/2024    12:07 AM 4/23/2024     8:28 AM   RODRIGUE-7 SCORE   Total Score 8 (mild anxiety) 5 (mild anxiety)    Total Score 8 5 4         Suicide Assessment Five-step Evaluation and Treatment (SAFE-T)            Review of Systems  Constitutional, HEENT, cardiovascular, pulmonary, gi and gu systems are negative, except as otherwise noted.      Objective    /86   Pulse 74   Temp 98  F (36.7  C) (Tympanic)   Resp 16   Ht 1.867 m (6' 1.5\")   Wt (!) 151.5 kg (334 lb)   SpO2 97%   BMI 43.47 kg/m    Body mass index is 43.47 kg/m .  Physical Exam   GENERAL: alert and no distress  RESP: lungs clear to auscultation - no rales, rhonchi or wheezes  CV: regular rate and rhythm, normal S1 S2, no S3 or S4, no murmur, click or rub, no peripheral edema   PSYCH: mentation appears normal, affect normal/bright            Signed Electronically by: Maria Antonia Goodwin MD    "

## 2024-04-23 NOTE — PROGRESS NOTES
Answers submitted by the patient for this visit:  Diabetes Visit (Submitted on 4/23/2024)  Chief Complaint: Chronic problems general questions HPI Form  Frequency of checking blood sugars:: a few times a month  What time of day are you checking your blood sugars : before and after meals  Have you had any blood sugars above 200?: No  Have you had any blood sugars below 70?: No  Hypoglycemia symptoms:: weakness  Diabetic concerns:: other  Paraesthesia present:: numbness in feet, burning in feet, weight gain  Have you had a diabetic eye exam within the last year?: Yes  Date of last eye exam: : 4/10/24  Where was this eye exam done?: [facility]

## 2024-04-23 NOTE — TELEPHONE ENCOUNTER
PA for Omperazole 40MG submitted to Cover my meds with key: [id number]    PA is denied due to patients age
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
## 2024-05-01 ASSESSMENT — PATIENT HEALTH QUESTIONNAIRE - PHQ9
SUM OF ALL RESPONSES TO PHQ QUESTIONS 1-9: 10
10. IF YOU CHECKED OFF ANY PROBLEMS, HOW DIFFICULT HAVE THESE PROBLEMS MADE IT FOR YOU TO DO YOUR WORK, TAKE CARE OF THINGS AT HOME, OR GET ALONG WITH OTHER PEOPLE: SOMEWHAT DIFFICULT
SUM OF ALL RESPONSES TO PHQ QUESTIONS 1-9: 10
10. IF YOU CHECKED OFF ANY PROBLEMS, HOW DIFFICULT HAVE THESE PROBLEMS MADE IT FOR YOU TO DO YOUR WORK, TAKE CARE OF THINGS AT HOME, OR GET ALONG WITH OTHER PEOPLE: SOMEWHAT DIFFICULT
SUM OF ALL RESPONSES TO PHQ QUESTIONS 1-9: 10
SUM OF ALL RESPONSES TO PHQ QUESTIONS 1-9: 10

## 2024-05-02 ENCOUNTER — VIRTUAL VISIT (OUTPATIENT)
Dept: BEHAVIORAL HEALTH | Facility: CLINIC | Age: 54
End: 2024-05-02
Payer: COMMERCIAL

## 2024-05-02 ENCOUNTER — VIRTUAL VISIT (OUTPATIENT)
Dept: PSYCHIATRY | Facility: CLINIC | Age: 54
End: 2024-05-02
Attending: FAMILY MEDICINE
Payer: COMMERCIAL

## 2024-05-02 DIAGNOSIS — F33.1 MODERATE EPISODE OF RECURRENT MAJOR DEPRESSIVE DISORDER (H): Primary | ICD-10-CM

## 2024-05-02 DIAGNOSIS — F33.0 MILD EPISODE OF RECURRENT MAJOR DEPRESSIVE DISORDER (H): Primary | ICD-10-CM

## 2024-05-02 PROCEDURE — 99204 OFFICE O/P NEW MOD 45 MIN: CPT | Mod: 95 | Performed by: PSYCHIATRY & NEUROLOGY

## 2024-05-02 PROCEDURE — 90791 PSYCH DIAGNOSTIC EVALUATION: CPT | Mod: 95 | Performed by: COUNSELOR

## 2024-05-02 RX ORDER — ESCITALOPRAM OXALATE 10 MG/1
10 TABLET ORAL DAILY
Qty: 30 TABLET | Refills: 1 | Status: SHIPPED | OUTPATIENT
Start: 2024-05-02 | End: 2024-07-19

## 2024-05-02 RX ORDER — BUPROPION HYDROCHLORIDE 150 MG/1
150 TABLET ORAL EVERY MORNING
Qty: 21 TABLET | Refills: 0 | Status: SHIPPED | OUTPATIENT
Start: 2024-05-02 | End: 2024-06-13

## 2024-05-02 ASSESSMENT — COLUMBIA-SUICIDE SEVERITY RATING SCALE - C-SSRS
TOTAL  NUMBER OF INTERRUPTED ATTEMPTS LIFETIME: NO
1. HAVE YOU WISHED YOU WERE DEAD OR WISHED YOU COULD GO TO SLEEP AND NOT WAKE UP?: NO
2. HAVE YOU ACTUALLY HAD ANY THOUGHTS OF KILLING YOURSELF?: NO
ATTEMPT LIFETIME: NO
TOTAL  NUMBER OF ABORTED OR SELF INTERRUPTED ATTEMPTS LIFETIME: NO
6. HAVE YOU EVER DONE ANYTHING, STARTED TO DO ANYTHING, OR PREPARED TO DO ANYTHING TO END YOUR LIFE?: NO
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
## 2024-05-02 NOTE — NURSING NOTE
Is the patient currently in the state of [place]? YES    Visit mode:VIDEO    If the visit is dropped, the patient can be reconnected by: VIDEO VISIT: Text to cell phone:   Telephone Information:   Mobile [phone]       Will anyone else be joining the visit? NO  (If patient encounters technical issues they should call [phone]    How would you like to obtain your AVS? MyChart    Are changes needed to the allergy or medication list? Pt stated no changes to allergies and Pt stated no med changes    Are refills needed on medications prescribed by this physician?     Reason for visit: Consult    Oralia Morris F      Care team has reviewed attendance agreement with patient. Patient advised that two failed appointments within 6 months may lead to termination of current episode of care.

## 2024-05-02 NOTE — PATIENT INSTRUCTIONS
"Patient Education   Collaborative Care Psychiatry Service  What to Expect  Here's what to expect from your Collaborative Care Psychiatry Service (CCPS).   About CCPS  CCPS means 2 people work together to help you get better. You'll meet with a behavioral health clinician and a psychiatric doctor. A behavioral health clinician helps people with mental health problems by talking with them. A psychiatric doctor helps people by giving them medicine.  How it works  At every visit, you'll see the behavioral health clinician (BHC) first. They'll talk with you about how you're doing and teach you how to feel better.   Then you'll see the psychiatric doctor. This doctor can help you deal with troubling thoughts and feelings by giving you medicine. They'll make sure you know the plan for your care.   CCPS usually takes 3 to 6 visits. If you need more visits, we may have you start seeing a different psychiatric doctor for ongoing care.  If you have any questions or concerns, we'll be glad to talk with you.  About visits  Be open  At your visits, please talk openly about your problems. It may feel hard, but it's the best way for us to help you.  Cancelling visits  If you can't come to your visit, please call us right away at [phone]. If you don't cancel at least 24 hours (1 full day) before your visit, that's \"late cancellation.\"  Being late to visits  Being very late is the same as not showing up. You will be a \"no show\" if:  Your appointment starts with a BHC, and you're more than 15 minutes late for a 30-minute (half hour) visit. This will also cancel your appointment with the psychiatric doctor.  Your appointment is with a psychiatric doctor only, and you're more than 15 minutes late for a 30-minute (half hour) visit.  Your appointment is with a psychiatric doctor only, and you're more than 30 minutes late for a 60-minute (full hour) visit.  If you cancel late or don't show up 2 times within 6 months, we may end your " care.   Getting help between visits  If you need help between visits, you can call us Monday to Friday from 8 a.m. to 4:30 p.m. at [phone].  Emergency care  Call 911 or go to the nearest emergency department if your life or someone else's life is in danger.  Call 988 anytime to reach the national Suicide and Crisis hotline.  Medicine refills  To refill your medicine, call your pharmacy. You can also call [facility]'s Behavioral Access at [phone], Monday to Friday, 8 a.m. to 4:30 p.m. It can take 1 to 3 business days to get a refill.   Forms, letters, and tests  You may have papers to fill out, like FMLA, short-term disability, and workability. We can help you with these forms at your visits, but you must have an appointment. You may need more than 1 visit for this, to be in an intensive therapy program, or both.  Before we can give you medicine for ADHD, we may refer you to get tested for it or confirm it another way.  We may not be able to give you an emotional support animal letter.  We don't do mental health checks ordered by the court.   We don't do mental health testing, but we can refer you to get tested.   Thank you for choosing us for your care.  For informational purposes only. Not to replace the advice of your health care provider. Copyright   2022 [facility]. All rights reserved. [employer] 048563 - 12/22.

## 2024-05-02 NOTE — Clinical Note
Dr. Goodwin,  Thank you for your consult and care of the patient.  Switching Wellbutrin to Lexapro as a trial.  Sincerely, Derick Edmondson M.D. Consultative Psychiatrist Program Medical Director, Lead Collaborative Care Psychiatry Service

## 2024-05-28 ENCOUNTER — OFFICE VISIT (OUTPATIENT)
Dept: FAMILY MEDICINE | Facility: CLINIC | Age: 54
End: 2024-05-28
Payer: COMMERCIAL

## 2024-05-28 ENCOUNTER — OFFICE VISIT (OUTPATIENT)
Dept: BEHAVIORAL HEALTH | Facility: CLINIC | Age: 54
End: 2024-05-28
Payer: COMMERCIAL

## 2024-05-28 VITALS
TEMPERATURE: 98.4 F | BODY MASS INDEX: 42.69 KG/M2 | SYSTOLIC BLOOD PRESSURE: 124 MMHG | DIASTOLIC BLOOD PRESSURE: 86 MMHG | RESPIRATION RATE: 14 BRPM | WEIGHT: 315 LBS | OXYGEN SATURATION: 98 % | HEART RATE: 69 BPM

## 2024-05-28 DIAGNOSIS — E11.43 TYPE II DIABETES MELLITUS WITH PERIPHERAL AUTONOMIC NEUROPATHY (H): ICD-10-CM

## 2024-05-28 DIAGNOSIS — F33.1 MODERATE EPISODE OF RECURRENT MAJOR DEPRESSIVE DISORDER (H): Primary | ICD-10-CM

## 2024-05-28 DIAGNOSIS — K52.9 COLITIS: Primary | ICD-10-CM

## 2024-05-28 DIAGNOSIS — I10 HYPERTENSION GOAL BP (BLOOD PRESSURE) < 140/90: ICD-10-CM

## 2024-05-28 LAB
ALBUMIN SERPL BCG-MCNC: 4.3 G/DL (ref 3.5–5.2)
ALP SERPL-CCNC: 75 U/L (ref 40–150)
ALT SERPL W P-5'-P-CCNC: 40 U/L (ref 0–70)
ANION GAP SERPL CALCULATED.3IONS-SCNC: 13 MMOL/L (ref 7–15)
AST SERPL W P-5'-P-CCNC: 28 U/L (ref 0–45)
BILIRUB SERPL-MCNC: 0.3 MG/DL
BUN SERPL-MCNC: 15.4 MG/DL (ref 6–20)
CALCIUM SERPL-MCNC: 9.3 MG/DL (ref 8.6–10)
CHLORIDE SERPL-SCNC: 102 MMOL/L (ref 98–107)
CREAT SERPL-MCNC: 0.94 MG/DL (ref 0.67–1.17)
DEPRECATED HCO3 PLAS-SCNC: 25 MMOL/L (ref 22–29)
EGFRCR SERPLBLD CKD-EPI 2021: >90 ML/MIN/1.73M2
ERYTHROCYTE [DISTWIDTH] IN BLOOD BY AUTOMATED COUNT: 13.8 % (ref 10–15)
GLUCOSE SERPL-MCNC: 130 MG/DL (ref 70–99)
HCT VFR BLD AUTO: 42.3 % (ref 40–53)
HGB BLD-MCNC: 13.5 G/DL (ref 13.3–17.7)
MCH RBC QN AUTO: 27.2 PG (ref 26.5–33)
MCHC RBC AUTO-ENTMCNC: 31.9 G/DL (ref 31.5–36.5)
MCV RBC AUTO: 85 FL (ref 78–100)
PLATELET # BLD AUTO: 272 10E3/UL (ref 150–450)
POTASSIUM SERPL-SCNC: 4.6 MMOL/L (ref 3.4–5.3)
PROT SERPL-MCNC: 8 G/DL (ref 6.4–8.3)
RBC # BLD AUTO: 4.97 10E6/UL (ref 4.4–5.9)
SODIUM SERPL-SCNC: 140 MMOL/L (ref 135–145)
WBC # BLD AUTO: 7.9 10E3/UL (ref 4–11)

## 2024-05-28 PROCEDURE — 80053 COMPREHEN METABOLIC PANEL: CPT | Performed by: FAMILY MEDICINE

## 2024-05-28 PROCEDURE — G2211 COMPLEX E/M VISIT ADD ON: HCPCS | Performed by: FAMILY MEDICINE

## 2024-05-28 PROCEDURE — 90837 PSYTX W PT 60 MINUTES: CPT

## 2024-05-28 PROCEDURE — 99214 OFFICE O/P EST MOD 30 MIN: CPT | Performed by: FAMILY MEDICINE

## 2024-05-28 PROCEDURE — 36415 COLL VENOUS BLD VENIPUNCTURE: CPT | Performed by: FAMILY MEDICINE

## 2024-05-28 PROCEDURE — 85027 COMPLETE CBC AUTOMATED: CPT | Performed by: FAMILY MEDICINE

## 2024-05-28 RX ORDER — LISINOPRIL 20 MG/1
20 TABLET ORAL DAILY
COMMUNITY
Start: 2024-05-28

## 2024-05-28 RX ORDER — LOPERAMIDE HCL 2 MG
CAPSULE ORAL
COMMUNITY
Start: 2024-05-23 | End: 2024-07-19

## 2024-05-28 ASSESSMENT — PATIENT HEALTH QUESTIONNAIRE - PHQ9
SUM OF ALL RESPONSES TO PHQ QUESTIONS 1-9: 8
10. IF YOU CHECKED OFF ANY PROBLEMS, HOW DIFFICULT HAVE THESE PROBLEMS MADE IT FOR YOU TO DO YOUR WORK, TAKE CARE OF THINGS AT HOME, OR GET ALONG WITH OTHER PEOPLE: SOMEWHAT DIFFICULT
SUM OF ALL RESPONSES TO PHQ QUESTIONS 1-9: 8
10. IF YOU CHECKED OFF ANY PROBLEMS, HOW DIFFICULT HAVE THESE PROBLEMS MADE IT FOR YOU TO DO YOUR WORK, TAKE CARE OF THINGS AT HOME, OR GET ALONG WITH OTHER PEOPLE: SOMEWHAT DIFFICULT

## 2024-05-28 ASSESSMENT — COLUMBIA-SUICIDE SEVERITY RATING SCALE - C-SSRS
2. HAVE YOU ACTUALLY HAD ANY THOUGHTS OF KILLING YOURSELF?: NO
1. SINCE LAST CONTACT, HAVE YOU WISHED YOU WERE DEAD OR WISHED YOU COULD GO TO SLEEP AND NOT WAKE UP?: NO

## 2024-05-28 ASSESSMENT — PAIN SCALES - GENERAL: PAINLEVEL: NO PAIN (0)

## 2024-05-28 NOTE — PROGRESS NOTES
"  Assessment & Plan     Colitis  Due to infection   Reviewed labs and scope and CT   He is better     Return to work note given   Imodium PRN     Type II diabetes mellitus with peripheral autonomic neuropathy (H)  Restart mounjaro   - tirzepatide (MOUNJARO) 5 MG/0.5ML pen; Inject 5 mg Subcutaneous every 7 days  - tirzepatide (MOUNJARO) 7.5 MG/0.5ML pen; Inject 7.5 mg Subcutaneous every 7 days    Hypertension goal BP (blood pressure) < 140/90  Restart lisinopril   - lisinopril (ZESTRIL) 20 MG tablet; Take 1 tablet (20 mg) by mouth daily  - CBC with platelets; Future  - Comprehensive metabolic panel; Future  - Comprehensive metabolic panel  - CBC with platelets    The longitudinal plan of care for the diagnosis(es)/condition(s) as documented were addressed during this visit. Due to the added complexity in care, I will continue to support Heath in the subsequent management and with ongoing continuity of care.     MED REC REQUIRED  Post Medication Reconciliation Status: discharge medications reconciled and changed, per note/orders  BMI  Estimated body mass index is 42.69 kg/m  as calculated from the following:    Height as of 4/23/24: 1.867 m (6' 1.5\").    Weight as of this encounter: 148.8 kg (328 lb).             Subjective   Heath is a 54 year old, presenting for the following health issues:  Hospital F/U        5/28/2024     8:37 AM   Additional Questions   Roomed by Fern WHITAKER   Accompanied by wife         5/28/2024     8:37 AM   Patient Reported Additional Medications   Patient reports taking the following new medications .     HPI         Hospital Follow-up Visit:    Hospital/Nursing Home/IP Rehab Facility: [facility]  Date of Admission: 5/18/24 - 5/21/24 and 5/21/24-5/23/24  Date of Discharge:   Reason(s) for Admission: Pancolitis  Was the patient in the ICU or did the patient experience delirium during hospitalization?  No  Do you have any other stressors you would like to " discuss with your provider? No    Problems taking medications regularly:  None  Medication changes since discharge: pt was stopped on lisinopril since BP in hospital was low and has been off mounjaro, started lexapro stopped wellbutrin - hasn't been using imodium  Problems adhering to non-medication therapy:  None    Summary of hospitalization:  CareEverywhere information obtained and reviewed  Diagnostic Tests/Treatments reviewed.  Follow up needed: none  Other Healthcare Providers Involved in Patient s Care:         None  Update since discharge: improved.         Plan of care communicated with patient           Diabetes Follow-up    How often are you checking your blood sugar? One time daily  What time of day are you checking your blood sugars (select all that apply)?  Before and after meals  Have you had any blood sugars above 200?  No  Have you had any blood sugars below 70?  No  What symptoms do you notice when your blood sugar is low?  Shaky  What concerns do you have today about your diabetes? None   Do you have any of these symptoms? (Select all that apply)  No numbness or tingling in feet.  No redness, sores or blisters on feet.  No complaints of excessive thirst.  No reports of blurry vision.  No significant changes to weight.      BP Readings from Last 2 Encounters:   05/28/24 124/86   04/23/24 126/86     Hemoglobin A1C (%)   Date Value   04/23/2024 6.2 (H)   10/19/2023 6.7 (H)   06/18/2021 6.4 (H)   01/29/2021 6.6 (H)     LDL Cholesterol Calculated (mg/dL)   Date Value   10/19/2023 61   09/06/2022 84   06/18/2021 125 (H)   01/29/2021 128 (H)             Hypertension Follow-up    Do you check your blood pressure regularly outside of the clinic? No   Are you following a low salt diet? Yes  Are your blood pressures ever more than 140 on the top number (systolic) OR more   than 90 on the bottom number (diastolic), for example 140/90? unsure          Review of Systems  Constitutional, HEENT, cardiovascular,  pulmonary, gi and gu systems are negative, except as otherwise noted.      Objective    /86   Pulse 69   Temp 98.4  F (36.9  C) (Tympanic)   Resp 14   Wt 148.8 kg (328 lb)   SpO2 98%   BMI 42.69 kg/m    Body mass index is 42.69 kg/m .  Physical Exam   GENERAL: alert and no distress  NECK: no adenopathy, no asymmetry, masses, or scars  RESP: lungs clear to auscultation - no rales, rhonchi or wheezes  CV: regular rate and rhythm, normal S1 S2, no S3 or S4, no murmur, click or rub, no peripheral edema   MS: no gross musculoskeletal defects noted, no edema  PSYCH: mentation appears normal, affect normal/bright            Signed Electronically by: Maria Antonia Goodwin MD    Answers submitted by the patient for this visit:  Patient Health Questionnaire (Submitted on 5/28/2024)  If you checked off any problems, how difficult have these problems made it for you to do your work, take care of things at home, or get along with other people?: Somewhat difficult  PHQ9 TOTAL SCORE: 8

## 2024-05-28 NOTE — LETTER
May 28, 2024      Heath RODRIGUEZ Pauline  [address] [facility] [address]        To Whom It May Concern:    Heath Carpenter was seen in our clinic. He may return to work without restrictions. May return to regular work duties May 29, 2024       Sincerely,        Maria Antonia Goodwin MD

## 2024-05-28 NOTE — PROGRESS NOTES
[facility] Primary Care: Integrated Behavioral Health  May 28, 2024    Behavioral Health Clinician Progress Note    Patient Name: Heath Carpenter       Service Type:  Individual      Service Location:   Face to Face in Clinic     Session Start Time: 1:35pm  Session End Time: 2:00pm      Session Length: 53 - 60      Attendees: Patient     Service Modality:  In-person    Visit Activities (Refresh list every visit): NEW, [facility], [place] Only, and Referral - Mental Health    Diagnostic Assessment Date: 5/2/24    Treatment Plan Review Date: Will complete in the next few sessions, not completed due to time constraints.    See Flowsheets for today's PHQ-9 and RODRIGUE-7 results  Previous PHQ-9:       4/23/2024     8:28 AM 5/1/2024     3:18 PM 5/28/2024     7:44 AM   PHQ-9 SCORE   PHQ-9 Total Score MyChart  10 (Moderate depression) 8 (Mild depression)   PHQ-9 Total Score 9 10    10 8    8     Previous RODRIGUE-7:       10/19/2023     8:06 AM 1/22/2024    12:07 AM 4/23/2024     8:28 AM   RODRIGUE-7 SCORE   Total Score 8 (mild anxiety) 5 (mild anxiety)    Total Score 8 5 4     DATA  Extended Session (60+ minutes): No  Interactive Complexity: No  Crisis: No  [facility] Patient: No    Treatment Objective(s) Addressed in This Session:  Target Behavior(s): disease management/lifestyle changes ongoing relationship difficulties    Anger Management: will learn strategies to resolve conflict adaptively and will learn and practice positive anger management skills     Current Stressors / Issues:  Pt stated that he was feeling better and continued to work on setting boundaries.  He stated that he would like to get scheduled with a long term therapist.  Provided education on process and agreed to putting in referral.      Progress on Treatment Objective(s) / Homework:  New Objective established this session - PREPARATION (Decided to change - considering how); Intervened by negotiating a change plan and determining options / strategies for behavior  change, identifying triggers, exploring social supports, and working towards setting a date to begin behavior change    Motivational Interviewing    MI Intervention: Expressed Empathy/Understanding, Supported Autonomy, Collaboration, Evocation, Permission to raise concern or advise, Open-ended questions, and Reflections: simple and complex     Change Talk Expressed by the Patient: Desire to change Ability to change Reasons to change    Provider Response to Change Talk: A - Affirmed patient's thoughts, decisions, or attempts at behavior change, R - Reflected patient's change talk, and S - Summarized patient's change talk statements    Also provided psychoeducation about behavioral health condition, symptoms, and treatment options    Assessments completed prior to visit:  The following assessments were completed by patient for this visit:  PHQ9:       3/1/2023     7:31 AM 10/19/2023     8:05 AM 1/22/2024    12:02 AM 2/1/2024     9:21 AM 4/23/2024     8:28 AM 5/1/2024     3:18 PM 5/28/2024     7:44 AM   PHQ-9 SCORE   PHQ-9 Total Score MyChart 5 (Mild depression) 14 (Moderate depression) 8 (Mild depression) 5 (Mild depression)  10 (Moderate depression) 8 (Mild depression)   PHQ-9 Total Score 5 14 8 5 9 10    10 8    8     GAD7:       6/8/2022     2:32 PM 10/19/2023     8:06 AM 1/22/2024    12:07 AM 4/23/2024     8:28 AM   RODRIGUE-7 SCORE   Total Score  8 (mild anxiety) 5 (mild anxiety)    Total Score 7 8 5 4     CAGE-AID:       1/26/2024     9:09 AM   CAGE-AID Total Score   Total Score 0   Total Score MyChart 0 (A total score of 2 or greater is considered clinically significant)     PROMIS 10-Global Health (only subscores and total score):       1/26/2024     9:08 AM 5/1/2024     3:20 PM 5/28/2024     7:47 AM   PROMIS-10 Scores Only   Global Mental Health Score 12 9    9 12   Global Physical Health Score 11 11    11 13   PROMIS TOTAL - SUBSCORES 23 20    20 25     [place] Suicide Severity Rating Scale (Lifetime/Recent)       2/2/2024    10:47 AM 5/2/2024     8:29 AM   [place] Suicide Severity Rating (Lifetime/Recent)   Q1 Wish to be Dead (Lifetime) N N   Q2 Non-Specific Active Suicidal Thoughts (Lifetime) N N   Actual Attempt (Lifetime)  N   Has subject engaged in non-suicidal self-injurious behavior? (Lifetime)  N   Interrupted Attempts (Lifetime)  N   Aborted or Self-Interrupted Attempt (Lifetime)  N   Preparatory Acts or Behavior (Lifetime)  N   Calculated C-SSRS Risk Score (Lifetime/Recent)  No Risk Indicated     Care Plan review completed: Yes    Medication Review:  No changes to current psychiatric medication(s)    Medication Compliance:  NA    Changes in Health Issues:   None reported    Chemical Use Review:   Substance Use: Chemical use reviewed, no active concerns identified      Tobacco Use: No current tobacco use.      Assessment: Current Emotional / Mental Status (status of significant symptoms):  Risk status (Self / Other harm or suicidal ideation)  Patient denies a history of suicidal ideation, suicide attempts, self-injurious behavior, homicidal ideation, homicidal behavior, and and other safety concerns  Patient denies current fears or concerns for personal safety.  Patient denies current or recent suicidal ideation or behaviors.  Patient denies current or recent homicidal ideation or behaviors.  Patient denies current or recent self injurious behavior or ideation.  Patient denies other safety concerns.  A safety and risk management plan has not been developed at this time, however patient was encouraged to call [place] [phone] should there be a change in any of these risk factors.    Appearance:   Appropriate   Eye Contact:   Fair   Psychomotor Behavior: Normal   Attitude:   Cooperative   Orientation:   All  Speech   Rate / Production: Normal    Volume:  Normal   Mood:    Normal  Affect:    Appropriate   Thought Content:  Clear   Thought Form:  Coherent  Logical   Insight:    Good     Diagnoses:  1. Moderate  episode of recurrent major depressive disorder (H)      Collateral Reports Completed:  Not Applicable    Plan: (Homework, other):  Patient was given information about behavioral services and encouraged to schedule a follow up appointment with the clinic [facility] as needed.  He was also given information about mental health symptoms and treatment options .  CD Recommendations: No indications of CD issues.     Anastasia Yuen [facility] 5/28/24    Answers submitted by the patient for this visit:  Patient Health Questionnaire (Submitted on 5/28/2024)  If you checked off any problems, how difficult have these problems made it for you to do your work, take care of things at home, or get along with other people?: Somewhat difficult  PHQ9 TOTAL SCORE: 8

## 2024-05-28 NOTE — NURSING NOTE
"Chief Complaint   Patient presents with    Hospital F/U     /86   Pulse 69   Temp 98.4  F (36.9  C) (Tympanic)   Resp 14   Wt 148.8 kg (328 lb)   SpO2 98%   BMI 42.69 kg/m   Estimated body mass index is 42.69 kg/m  as calculated from the following:    Height as of 4/23/24: 1.867 m (6' 1.5\").    Weight as of this encounter: 148.8 kg (328 lb).  Patient presents to the clinic using No DME      Health Maintenance that is potentially due pending provider review:    Health Maintenance Due   Topic Date Due    HEPATITIS B IMMUNIZATION (1 of 3 - 19+ 3-dose series) Never done    DIABETIC FOOT EXAM  06/18/2022    YEARLY PREVENTIVE VISIT  03/01/2024                  "

## 2024-06-01 ENCOUNTER — HEALTH MAINTENANCE LETTER (OUTPATIENT)
Age: 54
End: 2024-06-01

## 2024-06-09 ENCOUNTER — MYC MEDICAL ADVICE (OUTPATIENT)
Dept: FAMILY MEDICINE | Facility: CLINIC | Age: 54
End: 2024-06-09
Payer: COMMERCIAL

## 2024-06-09 DIAGNOSIS — R53.83 OTHER FATIGUE: Primary | ICD-10-CM

## 2024-06-13 ENCOUNTER — VIRTUAL VISIT (OUTPATIENT)
Dept: BEHAVIORAL HEALTH | Facility: CLINIC | Age: 54
End: 2024-06-13
Payer: COMMERCIAL

## 2024-06-13 ENCOUNTER — VIRTUAL VISIT (OUTPATIENT)
Dept: PSYCHIATRY | Facility: CLINIC | Age: 54
End: 2024-06-13
Payer: COMMERCIAL

## 2024-06-13 DIAGNOSIS — F33.0 MILD EPISODE OF RECURRENT MAJOR DEPRESSIVE DISORDER (H): Primary | ICD-10-CM

## 2024-06-13 PROCEDURE — 90832 PSYTX W PT 30 MINUTES: CPT | Mod: 95 | Performed by: COUNSELOR

## 2024-06-13 PROCEDURE — 99214 OFFICE O/P EST MOD 30 MIN: CPT | Mod: 95 | Performed by: PSYCHIATRY & NEUROLOGY

## 2024-06-13 NOTE — NURSING NOTE
Is the patient currently in the state of [place]? YES    Visit mode:VIDEO    If the visit is dropped, the patient can be reconnected by: VIDEO VISIT: Text to cell phone:   Telephone Information:   Mobile [phone]       Will anyone else be joining the visit? NO  (If patient encounters technical issues they should call [phone]    How would you like to obtain your AVS? MyChart    Are changes needed to the allergy or medication list? Pt stated no changes to allergies and Pt stated no med changes    Are refills needed on medications prescribed by this physician? NO    Reason for visit: TEE HOLLIS

## 2024-06-13 NOTE — PROGRESS NOTES
[facility] Dermatology Note  Encounter Date: Jun 14, 2024  Office Visit     Reviewed patients past medical history and pertinent chart review prior to patients visit today.     Dermatology Problem List:  Last skin check:  6/19/23    1. History of NMSC       - BCC, central upper back, s/p excision 3/29/21       - BCC, right upper arm, s/p ED&C 5/21/19  2. ISKs.   - Cryotherapy  3. AK.  -cryotherapy 06/14/24   4. Skin tags. LiqN2.   5. Folliculitis on back of scalp, presumed 2/2 to CPAP mask strap  6. Benign biopsy: dermatofibroma, left shoulder, s/p shave bx 9/13/21      Personal Hx: no personal history of skin cancer  Family Hx: No family history of skin cancer.   _________________________________________    Assessment & Plan:     # Cyst, midline posterior neck   - Will continue to monitor, patient defers punch biopsy today. Continue with observation.     # erythematous papule, right alar crease  - per patient this is new over the past few weeks. This is secondary to a new CPAP mask. If lesion does not heal over the next month, patient to return to clinic for re-evaluation.     #actinic keratoses  Actinic keratoses are pre-cancerous skin growths caused by sun exposure. Treatment is recommended and medically indicated. Treated with cryotherapy as outlined below.     Procedures performed:   - Cryotherapy procedure note, location(s): left cheek. After verbal consent and discussion of risks and benefits including, but not limited to, dyspigmentation/scar, blister, and pain, 2 lesion(s) was(were) treated with 1-2 mm freeze border for 1-2 cycles with liquid nitrogen. Post cryotherapy instructions were provided.      # Benign skin findings including: seborrheic keratoses, cherry angioma, lentigines and benign nevi.   - No further intervention required. Patient to report changes.   - Patient reassured of the benign nature of these lesions.    #Signs and Symptoms of non-melanoma skin cancer and ABCDEs of  melanoma reviewed with patient. Patient encouraged to perform monthly self skin exams and educated on how to perform them. UV precautions reviewed with patient. Patient was asked about new or changing moles/lesions on body.     #Reviewed Sunscreen: Apply 20 minutes prior to going outdoors and reapply every two hours, when wet or sweating. We recommend using an SPF 30 or higher, and to use one that is water resistant.       Follow-up:  6 months for follow up full body skin exam, prn for new or changing lesions or new concerns    Celi Pabon PA-C  [facility]  Dermatology     ____________________________________________    CC: Skin Check (FBSC- spots on right side of face that are itchy, spot on posterior neck near hairline, spot on right side of head in hair, recheck spot on back that is weird looking per patient. /Hx of NMSC. /)    HPI:  Mr. Heath Carpenter is a(n) 54 year old male who presents today as a return patient for a full body skin cancer screening. Patient has concerns today about his midline posterior neck.  This lesion has been present for several months and will wax and wane in size.. Patient reports being diligent with photoprotection.     Patient is otherwise feeling well, without additional skin concerns.     Physical Exam:  Vitals: There were no vitals taken for this visit.  SKIN: Total skin excluding the genitalia areas was performed. The exam included the head/face, neck, both arms, chest, back, abdomen, both legs, digits, mons pubis, buttock and nails.   -left cheek x 2 , pink macule(s) with overlying adherent scale consistent with an actinic keratosis    -several 1-2mm red dome shaped symmetric papules scattered on the trunk  -multiple tan/brown flat round macules and raised papules scattered throughout trunk, extremities and head. No worrisome features for malignancy noted on examination.  -scattered tan, homogenous macules scattered on sun exposed areas of trunk, extremities and  face.   -scattered waxy, stuck on tan/brown papules and patches on the trunk   - midline posterior neck, firm, mobile, nodule with an overlying punctum   - right alar crease, erythematous papule   - No other lesions of concern on areas examined.     Medications:  Current Outpatient Medications   Medication Sig Dispense Refill    acetaminophen (TYLENOL) 500 MG tablet Take 1,000 mg by mouth every 6 hours as needed for mild pain      alcohol swab prep pads Use to swab area of injection/andrzej as directed 100 each 3    aspirin (ASA) 81 MG EC tablet Take 1 tablet (81 mg) by mouth daily      atorvastatin (LIPITOR) 20 MG tablet Take 1 tablet (20 mg) by mouth daily 90 tablet 3    blood glucose (NO BRAND SPECIFIED) test strip Use to test blood sugar 2 times daily or as directed. To accompany: Blood Glucose Monitor Brands: per insurance. 100 strip 6    buPROPion (WELLBUTRIN XL) 150 MG 24 hr tablet Take 1 tablet (150 mg) by mouth every morning Take for 21 days and then discontinue taking 21 tablet 0    buPROPion (WELLBUTRIN XL) 300 MG 24 hr tablet Take 1 tablet (300 mg) by mouth every morning 90 tablet 3    ciclopirox (PENLAC) 8 % external solution Apply topically daily Apply to affected nails daily.  Remove the residual build up weekly with nail polish remover or an [place] board. 6.6 mL 1    clindamycin (CLEOCIN T) 1 % external solution Apply 10-15 drops to scalp at nighttime before bed 60 mL 11    CONTOUR NEXT TEST test strip USE TO TEST BLOOD SUGAR 1 TIME DAILY OR AS DIRECTED. 100 strip 3    escitalopram (LEXAPRO) 10 MG tablet Take 1 tablet (10 mg) by mouth daily 30 tablet 1    lisinopril (ZESTRIL) 20 MG tablet Take 1 tablet (20 mg) by mouth daily      loperamide (IMODIUM) 2 MG capsule Take 2 capsules (4mg) orally with 1st loose stool, then 1 capsule (2mg) with other loose stools. Max 16 mg in 24 hrs. (Patient not taking: Reported on 5/28/2024)      magnesium 250 MG tablet Take 1 tablet by mouth daily      Microlet Lancets  MISC Use to test blood sugar 1 time daily 100 each 1    Microlet Lancets MISC       MULTIPLE VITAMIN PO Take 1 tablet by mouth daily      omeprazole (PRILOSEC) 40 MG DR capsule Take 1 capsule (40 mg) by mouth daily 90 capsule 3    terbinafine (LAMISIL AT) 1 % external cream Apply topically 2 times daily 12 g 1    thin (NO BRAND SPECIFIED) lancets Use to test blood sugar 2 times daily or as directed. To accompany: Blood Glucose Monitor Brands: per insurance. 1 each 3    tirzepatide (MOUNJARO) 2.5 MG/0.5ML pen Inject 2.5 mg Subcutaneous every 7 days (Patient not taking: Reported on 5/28/2024) 2 mL 0    tirzepatide (MOUNJARO) 5 MG/0.5ML pen Inject 5 mg Subcutaneous every 7 days 2 mL 0    tirzepatide (MOUNJARO) 5 MG/0.5ML pen Inject 5 mg Subcutaneous every 7 days (Patient not taking: Reported on 5/28/2024) 2 mL 0    tirzepatide (MOUNJARO) 7.5 MG/0.5ML pen Inject 7.5 mg Subcutaneous every 7 days 2 mL 0    vitamin (B COMPLEX-C) tablet Take 1 tablet by mouth daily      vitamin E 400 units TABS Take 400 Units by mouth daily       No current facility-administered medications for this visit.      Past Medical History:   Patient Active Problem List   Diagnosis    Morbid obesity (H)    Type II diabetes mellitus with peripheral autonomic neuropathy (H)    Hypertension goal BP (blood pressure) < 140/90    Mild episode of recurrent major depressive disorder (H24)    JONA (obstructive sleep apnea)    Gastroesophageal reflux disease with esophagitis without hemorrhage    Hyperlipidemia LDL goal <100     Past Medical History:   Diagnosis Date    Cancer (H)     Skin    Depressive disorder     Diabetes (H)     Gastro-oesophageal reflux disease     Hypertension goal BP (blood pressure) < 140/90        CC Referred Self, MD  No address on file on close of this encounter.

## 2024-06-13 NOTE — PROGRESS NOTES
[facility]  June 13, 2024      Behavioral Health Clinician Progress Note    Patient Name: Heath Carpenter           Service Type:  Individual      Service Location:   MyChart / Email (patient reached)     Session Start Time: 311pm  Session End Time: 339pm      Session Length: 16 - 37      Attendees: Patient     Service Modality:  Video Visit:      Provider verified identity through the following two step process.  Patient provided:  Patient is known previously to provider and Patient was verified at admission/transfer    Telemedicine Visit: The patient's condition can be safely assessed and treated via synchronous audio and visual telemedicine encounter.      Reason for Telemedicine Visit: Patient has requested telehealth visit    Originating Site (Patient Location): Patient's home    Distant Site (Provider Location): Provider Remote Setting- Home Office    Consent:  The patient/guardian has verbally consented to: the potential risks and benefits of telemedicine (video visit) versus in person care; bill my insurance or make self-payment for services provided; and responsibility for payment of non-covered services.     Patient would like the video invitation sent by:  My Chart    Mode of Communication:  Video Conference via [facility]    Distant Location (Provider):  Off-site    As the provider I attest to compliance with applicable laws and regulations related to telemedicine.    Visit Activities (Refresh list every visit): [facility] Only    Diagnostic Assessment Date: 5/2/2024  Treatment Plan Review Date: in the next few visits  See Flowsheets for today's PHQ-9 and RODRIGUE-7 results  Previous PHQ-9:       5/1/2024     3:18 PM 5/28/2024     7:44 AM 6/13/2024    10:46 AM   PHQ-9 SCORE   PHQ-9 Total Score MyChart 10 (Moderate depression) 8 (Mild depression) 7 (Mild depression)   PHQ-9 Total Score 10    10 8    8 7     Previous RODRIGUE-7:       10/19/2023     8:06 AM 1/22/2024     12:07 AM 4/23/2024     8:28 AM   RODRIGUE-7 SCORE   Total Score 8 (mild anxiety) 5 (mild anxiety)    Total Score 8 5 4         DATA  Extended Session (60+ minutes): No  Interactive Complexity: No  Crisis: No  [facility] Patient: No    Treatment Objective(s) Addressed in This Session:  Target Behavior(s): disease management/lifestyle changes fatigue, has methods to relax and ease into the day    Depressed Mood: Increase interest, engagement, and pleasure in doing things  Decrease frequency and intensity of feeling down, depressed, hopeless  Feel less tired and more energy during the day   Improve concentration, focus, and mindfulness in daily activities     Current Stressors / Issues:  Questions/Thoughts for Dr. Edmondson: maybe wanting to keep medication the same until after vacation     Better/worse: experiencing fatigue    Current Symptoms: Pt spent a week and a half in the hospital due to his colon. He has been moving slower and has a lot of fatigue. He noticed medication can cause this and he isn't sure that this is related to the new med. Pt says that he noticed he has a lot of ACEs when he did this questionnaire. He says that it is hard to concentrate and have energy. He will go home and go to bed after work. Pt has a plan to relax and go to [place] to see family. Pt feels like the new medication is more helpful that the past medication. He has things he does to start his day to ease in and then after work he will highlight things about his day.     Side Effects:    Therapist: has long term therapy appt, he read her bio seems like they will get along  [facility] recommendations: meditations, mindfulness after work for a buffer         Progress on Treatment Objective(s) / Homework:  Minimal progress - ACTION (Actively working towards change); Intervened by reinforcing change plan / affirming steps taken    [facility] provides psychoeducation about trauma and stress and how it stored in the body.  [facility] also provides psychoeducation that is  because someone experiences what is considered a traumatic event according to the ACEs scale does not mean that they also saw this as a trauma.   To help manage irritability after coming home from work [facility] recommends patient to take 5 to 10 minutes to practice either meditations or mindfulness of noticing nature around him to help as a buffer before entering into his house.  Patient reports that they have different things that they do to help get started for the day as well as different things before they leave the office that they will practice help unwind from work.    Motivational Interviewing    MI Intervention: Expressed Empathy/Understanding, Supported Autonomy, Collaboration, Evocation, Permission to raise concern or advise, Open-ended questions, Reflections: simple and complex, Change talk (evoked), and Reframe     Change Talk Expressed by the Patient: Committment to change Activation Taking steps    Provider Response to Change Talk: E - Evoked more info from patient about behavior change, A - Affirmed patient's thoughts, decisions, or attempts at behavior change, R - Reflected patient's change talk, and S - Summarized patient's change talk statements    Also provided psychoeducation about behavioral health condition, symptoms, and treatment options    Assessments completed prior to visit:  The following assessments were completed by patient for this visit:  PHQ9:       10/19/2023     8:05 AM 1/22/2024    12:02 AM 2/1/2024     9:21 AM 4/23/2024     8:28 AM 5/1/2024     3:18 PM 5/28/2024     7:44 AM 6/13/2024    10:46 AM   PHQ-9 SCORE   PHQ-9 Total Score MyChart 14 (Moderate depression) 8 (Mild depression) 5 (Mild depression)  10 (Moderate depression) 8 (Mild depression) 7 (Mild depression)   PHQ-9 Total Score 14 8 5 9 10    10 8    8 7       Care Plan review completed: Yes    Medication Review:  Changes to psychiatric medications, see updated Medication List in EPIC.     Medication  Compliance:  Yes    Changes in Health Issues:   Yes: see chart    Chemical Use Review:   Substance Use: Chemical use reviewed, no active concerns identified      Tobacco Use: No current tobacco use.      Assessment: Current Emotional / Mental Status (status of significant symptoms):  Risk status (Self / Other harm or suicidal ideation)  Patient denies a history of suicidal ideation, suicide attempts, self-injurious behavior, homicidal ideation, homicidal behavior, and and other safety concerns  Patient denies current fears or concerns for personal safety.  Patient denies current or recent suicidal ideation or behaviors.  Patient denies current or recent homicidal ideation or behaviors.  Patient denies current or recent self injurious behavior or ideation.  Patient denies other safety concerns.  A safety and risk management plan has not been developed at this time, however patient was encouraged to call [place] [phone] should there be a change in any of these risk factors.    Appearance:   Appropriate   Eye Contact:   Good   Psychomotor Behavior: Normal   Attitude:   Cooperative  Interested Pleasant  Orientation:   All  Speech   Rate / Production: Normal    Volume:  Normal   Mood:    Sad   Affect:    Appropriate  Lethargic   Thought Content:  Clear   Thought Form:  Coherent  Logical   Insight:    Good     Diagnoses:  1. Mild episode of recurrent major depressive disorder (H24)        Collateral Reports Completed:  Communicated with: Derick Edmondson M.D.     Plan: (Homework, other):  Patient was given information about behavioral services and encouraged to schedule a follow up appointment with the clinic [facility] in 1 month.  Patient was also given information about mental health symptoms and treatment options .  CD Recommendations: No indications of CD issues.     Yajaira Marie, RIPSW    ______________________________________________________________________    Integrated Primary Care Behavioral Health Treatment  Plan    Patient's Name: Heath Carpenter  YOB: 1970    Date of Creation: in the next few visits  Date Treatment Plan Last Reviewed/Revised: In the next few visits    DSM5 Diagnoses:   1. Mild episode of recurrent major depressive disorder (H24)      Psychosocial / Contextual Factors: works as a , his wife of 14 years who has MS, has stepson with epilepsy, is dealing with depression, has made lifestyle changes   PROMIS (reviewed every 90 days):    PROMIS-10 Scores      1/26/2024     9:08 AM 5/1/2024     3:20 PM 5/28/2024     7:47 AM   PROMIS-10 Scores Only   Global Mental Health Score 12 9    9 12   Global Physical Health Score 11 11    11 13   PROMIS TOTAL - SUBSCORES 23 20    20 25         Referral / Collaboration:  Referral to another professional/service is not indicated at this time.    Anticipated number of session for this episode of care: 5-6  Anticipation frequency of session: Monthly  Anticipated Duration of each session: 16-37 minutes  Treatment plan will be reviewed in 90 days or when goals have been changed.         Patient has reviewed and agreed to the above plan.      Yajaira Marie, [facility]  June 13, 2024

## 2024-06-13 NOTE — PROGRESS NOTES
Virtual Visit Details    Type of service:  Video Visit     Originating Location (pt. Location): [place], [place]    Distant Location (provider location):  Off-site  Platform used for Video Visit: [facility] Psychiatry Consult Note    IDENTIFICATION   Name: Heath Carpenter   : 1970/54 year old      Sex:    @ male          Telemedicine Visit: The patient's condition can be safely assessed and treated via synchronous audio and visual telemedicine encounter.        Face to Face/patient Contact total time: 25 minutes  Pre Charting time: 1 minutes; Post charting time, communication and other activities: 4 minutes; Total time 30 minutes  3:44 PM-4:09 PM        SUBJECTIVE   Overall depression seems to be improved relatively speaking.  Having fewer reactions with Lexapro.  Able to think and pause with him and his wife going through medical challenges and more stress.  He is able to not react.  Depression overall has improved.  However focus and memory have not improved.  He is having fatigue in the context of colitis and hospitalization-the fatigue occurred with the colitis specifically and not specifically after starting Lexapro.  Unclear if Lexapro is causing sexual dysfunction yet.      The following assessments were completed by patient for this visit:  PROMIS 10-Global Health (only subscores and total score):       2024     9:08 AM 2024     3:20 PM 2024     7:47 AM   PROMIS-10 Scores Only   Global Mental Health Score 12 9    9 12   Global Physical Health Score 11 11    11 13   PROMIS TOTAL - SUBSCORES 23 20    20 25             2024     3:18 PM 2024     7:44 AM 2024    10:46 AM   PHQ   PHQ-9 Total Score 10    10 8    8 7   Q9: Thoughts of better off dead/self-harm past 2 weeks Not at all Not at all Not at all          10/19/2023     8:06 AM 2024    12:07 AM 2024     8:28 AM   RODRIGUE-7 SCORE   Total Score 8 (mild anxiety) 5 (mild anxiety)    Total Score 8 5 4         OBJECTIVE     Vital Signs:   There were no vitals taken for this visit.    Labs:    He will notify me of lab results, has appointment tomorrow morning        Current Medications:  Current Outpatient Medications   Medication Sig Dispense Refill    escitalopram (LEXAPRO) 10 MG tablet Take 1 tablet (10 mg) by mouth daily 30 tablet 1    acetaminophen (TYLENOL) 500 MG tablet Take 1,000 mg by mouth every 6 hours as needed for mild pain      alcohol swab prep pads Use to swab area of injection/andrzej as directed 100 each 3    aspirin (ASA) 81 MG EC tablet Take 1 tablet (81 mg) by mouth daily      atorvastatin (LIPITOR) 20 MG tablet Take 1 tablet (20 mg) by mouth daily 90 tablet 3    blood glucose (NO BRAND SPECIFIED) test strip Use to test blood sugar 2 times daily or as directed. To accompany: Blood Glucose Monitor Brands: per insurance. 100 strip 6    buPROPion (WELLBUTRIN XL) 150 MG 24 hr tablet Take 1 tablet (150 mg) by mouth every morning Take for 21 days and then discontinue taking 21 tablet 0    buPROPion (WELLBUTRIN XL) 300 MG 24 hr tablet Take 1 tablet (300 mg) by mouth every morning 90 tablet 3    ciclopirox (PENLAC) 8 % external solution Apply topically daily Apply to affected nails daily.  Remove the residual build up weekly with nail polish remover or an [place] board. 6.6 mL 1    clindamycin (CLEOCIN T) 1 % external solution Apply 10-15 drops to scalp at nighttime before bed 60 mL 11    CONTOUR NEXT TEST test strip USE TO TEST BLOOD SUGAR 1 TIME DAILY OR AS DIRECTED. 100 strip 3    lisinopril (ZESTRIL) 20 MG tablet Take 1 tablet (20 mg) by mouth daily      loperamide (IMODIUM) 2 MG capsule Take 2 capsules (4mg) orally with 1st loose stool, then 1 capsule (2mg) with other loose stools. Max 16 mg in 24 hrs. (Patient not taking: Reported on 5/28/2024)      magnesium 250 MG tablet Take 1 tablet by mouth daily      Microlet Lancets MISC Use to test blood sugar 1 time daily 100 each 1    Microlet Lancets MISC        MULTIPLE VITAMIN PO Take 1 tablet by mouth daily      omeprazole (PRILOSEC) 40 MG DR capsule Take 1 capsule (40 mg) by mouth daily 90 capsule 3    terbinafine (LAMISIL AT) 1 % external cream Apply topically 2 times daily 12 g 1    thin (NO BRAND SPECIFIED) lancets Use to test blood sugar 2 times daily or as directed. To accompany: Blood Glucose Monitor Brands: per insurance. 1 each 3    tirzepatide (MOUNJARO) 2.5 MG/0.5ML pen Inject 2.5 mg Subcutaneous every 7 days (Patient not taking: Reported on 5/28/2024) 2 mL 0    tirzepatide (MOUNJARO) 5 MG/0.5ML pen Inject 5 mg Subcutaneous every 7 days 2 mL 0    tirzepatide (MOUNJARO) 5 MG/0.5ML pen Inject 5 mg Subcutaneous every 7 days (Patient not taking: Reported on 5/28/2024) 2 mL 0    tirzepatide (MOUNJARO) 7.5 MG/0.5ML pen Inject 7.5 mg Subcutaneous every 7 days 2 mL 0    vitamin (B COMPLEX-C) tablet Take 1 tablet by mouth daily      vitamin E 400 units TABS Take 400 Units by mouth daily       No current facility-administered medications for this visit.            ADDED HISTORY   Status post [facility] hospitalization 5/21/2024-5/23/2024 for acute gastroenteritis/colitis.  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        MENTAL STATUS EXAMINATION:   Appearance: Good attention to grooming and hygiene, professional garb  Attitude: Cooperative  Eye Contact: Good  Gait and Station: Sitting  Psychomotor Behavior: Relatively less  Oriented to: Grossly person place and time  Attention Span and Concentration: Grossly fair  Speech: Mildly sad tone  Language: English  Mood: Fair  Affect: Mildly constricted  Associations:  no loose associations  Thought Process:  logical, linear and goal oriented  Thought Content: No evidence of delusions or suicidal or homicidal ideation plan or intent  Memory: Grossly intact  Fund of Knowledge: Good  Insight:  good  Judgment:  intact,  adequate for safety  Impulse Control:  intact    Physical Exam         DIAGNOSES:   Major depressive disorder, mild, recurrent  Obstructive sleep apnea  Diabetes      ASSESSMENT:   Patient dealing with current major depressive disorder episode, likely mild versus moderate.  There is psychosocial dysfunction hence use of medication management.  In past episodes symptoms were relatively better.  Utilize escitalopram.    Today Heath Carpenter reports no suicidal ideations. In addition, he has notable risk factors for self-harm, including access to firearm. However, risk is mitigated by commitment to family, [employer] beliefs, absence of past attempts, ability to volunteer a safety plan, and history of seeking help when needed. Therefore, based on all available evidence including the factors cited above, he does not appear to be at imminent risk for self-harm, does not meet criteria for a 72-hr hold, and therefore remains appropriate for ongoing outpatient level of care.       PLAN:     Patient advised of consultative model. Patient will continue to be seen for ongoing consultation and stabilization.  Does not meet criteria for involuntary treatment or hospitalization  Add Lexapro 5/2/2024 10 mg daily improved reactivity, overall relatively better depression, improved interpersonal communications, no side effects suspected her 06/13/2410 mg nightly-Risks, benefits and alternatives discussed.  Patient provides verbal consent to treatment.  Follow-up if fatigue is any better with bedtime dosing for Lexapro, likely due to gastroenteritis/colitis and inflammatory recovery.  Consider Viibryd, duloxetine, Luvox  Dc'd bupropion (lost efficacy, no improvements going from 150 to 300 mg)  Labs -no further labs indicated  [facility] referring to long term therapy  Return in 4-6      Administrative Billing:   Time spent with patient was greater than 50% of time and/or significant time was spent in counseling and coordination of care  regarding above diagnoses and treatment plan. Pre charting time and post charting time/documentation/coordination are done on date of service.      Signed:   Derick Edmondson M.D.  [facility] Psychiatry Service    Disclaimer: This note consists of symbols derived from keyboarding, dictation and/or voice recognition software. As a result, there may be errors in the script that have gone undetected. Please consider this when interpreting information found in this chart.    Consent was obtained from the patient to use an AI documentation tool in the creation of this note.     eoc

## 2024-06-14 ENCOUNTER — OFFICE VISIT (OUTPATIENT)
Dept: DERMATOLOGY | Facility: CLINIC | Age: 54
End: 2024-06-14
Payer: COMMERCIAL

## 2024-06-14 ENCOUNTER — LAB (OUTPATIENT)
Dept: LAB | Facility: CLINIC | Age: 54
End: 2024-06-14
Payer: COMMERCIAL

## 2024-06-14 DIAGNOSIS — D22.9 MULTIPLE BENIGN NEVI: ICD-10-CM

## 2024-06-14 DIAGNOSIS — D18.01 CHERRY ANGIOMA: ICD-10-CM

## 2024-06-14 DIAGNOSIS — Z85.828 HISTORY OF NONMELANOMA SKIN CANCER: Primary | ICD-10-CM

## 2024-06-14 DIAGNOSIS — R53.83 OTHER FATIGUE: ICD-10-CM

## 2024-06-14 DIAGNOSIS — L81.4 SOLAR LENTIGINOSIS: ICD-10-CM

## 2024-06-14 DIAGNOSIS — L82.1 SEBORRHEIC KERATOSES: ICD-10-CM

## 2024-06-14 DIAGNOSIS — L57.0 ACTINIC KERATOSES: ICD-10-CM

## 2024-06-14 LAB
ALBUMIN SERPL BCG-MCNC: 4.6 G/DL (ref 3.5–5.2)
ALP SERPL-CCNC: 85 U/L (ref 40–150)
ALT SERPL W P-5'-P-CCNC: 84 U/L (ref 0–70)
ANION GAP SERPL CALCULATED.3IONS-SCNC: 11 MMOL/L (ref 7–15)
AST SERPL W P-5'-P-CCNC: 62 U/L (ref 0–45)
BILIRUB SERPL-MCNC: 0.5 MG/DL
BUN SERPL-MCNC: 17.6 MG/DL (ref 6–20)
CALCIUM SERPL-MCNC: 10.2 MG/DL (ref 8.6–10)
CHLORIDE SERPL-SCNC: 99 MMOL/L (ref 98–107)
CREAT SERPL-MCNC: 1.16 MG/DL (ref 0.67–1.17)
DEPRECATED HCO3 PLAS-SCNC: 29 MMOL/L (ref 22–29)
EGFRCR SERPLBLD CKD-EPI 2021: 74 ML/MIN/1.73M2
FERRITIN SERPL-MCNC: 385 NG/ML (ref 31–409)
GLUCOSE SERPL-MCNC: 151 MG/DL (ref 70–99)
POTASSIUM SERPL-SCNC: 4.6 MMOL/L (ref 3.4–5.3)
PROT SERPL-MCNC: 9 G/DL (ref 6.4–8.3)
SODIUM SERPL-SCNC: 139 MMOL/L (ref 135–145)
TSH SERPL DL<=0.005 MIU/L-ACNC: 1.7 UIU/ML (ref 0.3–4.2)
VIT B12 SERPL-MCNC: 564 PG/ML (ref 232–1245)

## 2024-06-14 PROCEDURE — 82728 ASSAY OF FERRITIN: CPT

## 2024-06-14 PROCEDURE — 80053 COMPREHEN METABOLIC PANEL: CPT

## 2024-06-14 PROCEDURE — 84443 ASSAY THYROID STIM HORMONE: CPT

## 2024-06-14 PROCEDURE — 99213 OFFICE O/P EST LOW 20 MIN: CPT | Mod: 25 | Performed by: STUDENT IN AN ORGANIZED HEALTH CARE EDUCATION/TRAINING PROGRAM

## 2024-06-14 PROCEDURE — 17000 DESTRUCT PREMALG LESION: CPT | Performed by: STUDENT IN AN ORGANIZED HEALTH CARE EDUCATION/TRAINING PROGRAM

## 2024-06-14 PROCEDURE — 36415 COLL VENOUS BLD VENIPUNCTURE: CPT

## 2024-06-14 PROCEDURE — 82607 VITAMIN B-12: CPT

## 2024-06-14 PROCEDURE — 17003 DESTRUCT PREMALG LES 2-14: CPT | Performed by: STUDENT IN AN ORGANIZED HEALTH CARE EDUCATION/TRAINING PROGRAM

## 2024-06-14 ASSESSMENT — PAIN SCALES - GENERAL: PAINLEVEL: NO PAIN (0)

## 2024-06-14 NOTE — NURSING NOTE
Heath Carpenter's chief complaint for this visit includes:  Chief Complaint   Patient presents with    Skin Check     FBSC- spots on right side of face that are itchy, spot on posterior neck near hairline, spot on right side of head in hair, recheck spot on back that is weird looking per patient.   Hx of NMSC.        PCP: Maria Antonia Goodwin    Referring Provider:  Referred Self, MD  No address on file    There were no vitals taken for this visit.  No Pain (0)        Allergies   Allergen Reactions    Molds & Smuts Other (See Comments)     Puffy eyes    No Clinical Screening - See Comments Other (See Comments)     Cigarette smoke         Do you need any medication refills at today's visit?  No.       Janice Abraham RN on 6/14/2024 at 9:54 AM

## 2024-06-14 NOTE — LETTER
6/14/2024      Heath Carpenter  [address] [facility] [address]      Dear Colleague,    Thank you for referring your patient, Heath Carpenter, to the [facility]. Please see a copy of my visit note below.    [facility] Dermatology Note  Encounter Date: Jun 14, 2024  Office Visit     Reviewed patients past medical history and pertinent chart review prior to patients visit today.     Dermatology Problem List:  Last skin check:  6/19/23    1. History of NMSC       - BCC, central upper back, s/p excision 3/29/21       - BCC, right upper arm, s/p ED&C 5/21/19  2. ISKs.   - Cryotherapy  3. AK.  -cryotherapy 06/14/24   4. Skin tags. LiqN2.   5. Folliculitis on back of scalp, presumed 2/2 to CPAP mask strap  6. Benign biopsy: dermatofibroma, left shoulder, s/p shave bx 9/13/21      Personal Hx: no personal history of skin cancer  Family Hx: No family history of skin cancer.   _________________________________________    Assessment & Plan:     # Cyst, midline posterior neck   - Will continue to monitor, patient defers punch biopsy today. Continue with observation.     # erythematous papule, right alar crease  - per patient this is new over the past few weeks. This is secondary to a new CPAP mask. If lesion does not heal over the next month, patient to return to clinic for re-evaluation.     #actinic keratoses  Actinic keratoses are pre-cancerous skin growths caused by sun exposure. Treatment is recommended and medically indicated. Treated with cryotherapy as outlined below.     Procedures performed:   - Cryotherapy procedure note, location(s): left cheek. After verbal consent and discussion of risks and benefits including, but not limited to, dyspigmentation/scar, blister, and pain, 2 lesion(s) was(were) treated with 1-2 mm freeze border for 1-2 cycles with liquid nitrogen. Post cryotherapy instructions were provided.      # Benign skin findings including: seborrheic keratoses, cherry  angioma, lentigines and benign nevi.   - No further intervention required. Patient to report changes.   - Patient reassured of the benign nature of these lesions.    #Signs and Symptoms of non-melanoma skin cancer and ABCDEs of melanoma reviewed with patient. Patient encouraged to perform monthly self skin exams and educated on how to perform them. UV precautions reviewed with patient. Patient was asked about new or changing moles/lesions on body.     #Reviewed Sunscreen: Apply 20 minutes prior to going outdoors and reapply every two hours, when wet or sweating. We recommend using an SPF 30 or higher, and to use one that is water resistant.       Follow-up:  6 months for follow up full body skin exam, prn for new or changing lesions or new concerns    Celi Pabon PA-C  [facility]  Dermatology     ____________________________________________    CC: Skin Check (FBSC- spots on right side of face that are itchy, spot on posterior neck near hairline, spot on right side of head in hair, recheck spot on back that is weird looking per patient. /Hx of NMSC. /)    HPI:  Mr. Heath Carpenter is a(n) 54 year old male who presents today as a return patient for a full body skin cancer screening. Patient has concerns today about his midline posterior neck.  This lesion has been present for several months and will wax and wane in size.. Patient reports being diligent with photoprotection.     Patient is otherwise feeling well, without additional skin concerns.     Physical Exam:  Vitals: There were no vitals taken for this visit.  SKIN: Total skin excluding the genitalia areas was performed. The exam included the head/face, neck, both arms, chest, back, abdomen, both legs, digits, mons pubis, buttock and nails.   -left cheek x 2 , pink macule(s) with overlying adherent scale consistent with an actinic keratosis    -several 1-2mm red dome shaped symmetric papules scattered on the trunk  -multiple tan/brown flat round  macules and raised papules scattered throughout trunk, extremities and head. No worrisome features for malignancy noted on examination.  -scattered tan, homogenous macules scattered on sun exposed areas of trunk, extremities and face.   -scattered waxy, stuck on tan/brown papules and patches on the trunk   - midline posterior neck, firm, mobile, nodule with an overlying punctum   - right alar crease, erythematous papule   - No other lesions of concern on areas examined.     Medications:  Current Outpatient Medications   Medication Sig Dispense Refill     acetaminophen (TYLENOL) 500 MG tablet Take 1,000 mg by mouth every 6 hours as needed for mild pain       alcohol swab prep pads Use to swab area of injection/andrzej as directed 100 each 3     aspirin (ASA) 81 MG EC tablet Take 1 tablet (81 mg) by mouth daily       atorvastatin (LIPITOR) 20 MG tablet Take 1 tablet (20 mg) by mouth daily 90 tablet 3     blood glucose (NO BRAND SPECIFIED) test strip Use to test blood sugar 2 times daily or as directed. To accompany: Blood Glucose Monitor Brands: per insurance. 100 strip 6     buPROPion (WELLBUTRIN XL) 150 MG 24 hr tablet Take 1 tablet (150 mg) by mouth every morning Take for 21 days and then discontinue taking 21 tablet 0     buPROPion (WELLBUTRIN XL) 300 MG 24 hr tablet Take 1 tablet (300 mg) by mouth every morning 90 tablet 3     ciclopirox (PENLAC) 8 % external solution Apply topically daily Apply to affected nails daily.  Remove the residual build up weekly with nail polish remover or an [place] board. 6.6 mL 1     clindamycin (CLEOCIN T) 1 % external solution Apply 10-15 drops to scalp at nighttime before bed 60 mL 11     CONTOUR NEXT TEST test strip USE TO TEST BLOOD SUGAR 1 TIME DAILY OR AS DIRECTED. 100 strip 3     escitalopram (LEXAPRO) 10 MG tablet Take 1 tablet (10 mg) by mouth daily 30 tablet 1     lisinopril (ZESTRIL) 20 MG tablet Take 1 tablet (20 mg) by mouth daily       loperamide (IMODIUM) 2 MG capsule  Take 2 capsules (4mg) orally with 1st loose stool, then 1 capsule (2mg) with other loose stools. Max 16 mg in 24 hrs. (Patient not taking: Reported on 5/28/2024)       magnesium 250 MG tablet Take 1 tablet by mouth daily       Microlet Lancets MISC Use to test blood sugar 1 time daily 100 each 1     Microlet Lancets MISC        MULTIPLE VITAMIN PO Take 1 tablet by mouth daily       omeprazole (PRILOSEC) 40 MG DR capsule Take 1 capsule (40 mg) by mouth daily 90 capsule 3     terbinafine (LAMISIL AT) 1 % external cream Apply topically 2 times daily 12 g 1     thin (NO BRAND SPECIFIED) lancets Use to test blood sugar 2 times daily or as directed. To accompany: Blood Glucose Monitor Brands: per insurance. 1 each 3     tirzepatide (MOUNJARO) 2.5 MG/0.5ML pen Inject 2.5 mg Subcutaneous every 7 days (Patient not taking: Reported on 5/28/2024) 2 mL 0     tirzepatide (MOUNJARO) 5 MG/0.5ML pen Inject 5 mg Subcutaneous every 7 days 2 mL 0     tirzepatide (MOUNJARO) 5 MG/0.5ML pen Inject 5 mg Subcutaneous every 7 days (Patient not taking: Reported on 5/28/2024) 2 mL 0     tirzepatide (MOUNJARO) 7.5 MG/0.5ML pen Inject 7.5 mg Subcutaneous every 7 days 2 mL 0     vitamin (B COMPLEX-C) tablet Take 1 tablet by mouth daily       vitamin E 400 units TABS Take 400 Units by mouth daily       No current facility-administered medications for this visit.      Past Medical History:   Patient Active Problem List   Diagnosis     Morbid obesity (H)     Type II diabetes mellitus with peripheral autonomic neuropathy (H)     Hypertension goal BP (blood pressure) < 140/90     Mild episode of recurrent major depressive disorder (H24)     JONA (obstructive sleep apnea)     Gastroesophageal reflux disease with esophagitis without hemorrhage     Hyperlipidemia LDL goal <100     Past Medical History:   Diagnosis Date     Cancer (H)     Skin     Depressive disorder      Diabetes (H)      Gastro-oesophageal reflux disease      Hypertension goal BP (blood  pressure) < 140/90        CC Referred Self, MD  No address on file on close of this encounter.       Again, thank you for allowing me to participate in the care of your patient.        Sincerely,        Celi Pabon PA-C

## 2024-06-14 NOTE — PATIENT INSTRUCTIONS
Cryotherapy    What is it?  Use of a very cold liquid, such as liquid nitrogen, to freeze and destroy abnormal skin cells that need to be removed    What should I expect?  Tenderness and redness  A small blister that might grow and fill with dark purple blood. There may be crusting.  More than one treatment may be needed if the lesions do not go away.    How do I care for the treated area?  Gently wash the area with your hands when bathing.  Use a thin layer of Vaseline to help with healing. You may use a Band-Aid.   The area should heal within 7-10 days and may leave behind a pink or lighter color.   Do not use an antibiotic or Neosporin ointment.   You may take acetaminophen (Tylenol) for pain.     Call your doctor if you have:  Severe pain  Signs of infection (warmth, redness, cloudy yellow drainage, and or a bad smell)  Questions or concerns    Who should I call with questions?      [facility]: [phone]      [facility]: [phone]      For urgent needs outside of business hours call the [facility] [place] at [phone] and ask for the dermatology resident on call     The ABCDEs of Melanoma    Skin cancer can develop anywhere on the skin. Ask someone for help when checking your skin, especially in hard to see places. If you notice a mole different from others, or that changes, enlarges, itches, or bleeds (even if it is small), you should see a dermatologist.

## 2024-06-17 ENCOUNTER — VIRTUAL VISIT (OUTPATIENT)
Dept: PSYCHOLOGY | Facility: CLINIC | Age: 54
End: 2024-06-17
Payer: COMMERCIAL

## 2024-06-17 DIAGNOSIS — F33.1 MODERATE EPISODE OF RECURRENT MAJOR DEPRESSIVE DISORDER (H): ICD-10-CM

## 2024-06-17 PROCEDURE — 90837 PSYTX W PT 60 MINUTES: CPT | Mod: 95 | Performed by: COUNSELOR
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
## 2024-07-02 ENCOUNTER — VIRTUAL VISIT (OUTPATIENT)
Dept: PSYCHOLOGY | Facility: CLINIC | Age: 54
End: 2024-07-02
Payer: COMMERCIAL

## 2024-07-02 DIAGNOSIS — F33.1 MODERATE EPISODE OF RECURRENT MAJOR DEPRESSIVE DISORDER (H): Primary | ICD-10-CM

## 2024-07-02 PROCEDURE — 90837 PSYTX W PT 60 MINUTES: CPT | Mod: 95 | Performed by: COUNSELOR

## 2024-07-02 NOTE — PROGRESS NOTES
[facility]                                     Progress Note    Patient Name: Heath Carpenter  Date: 24         Service Type: Individual      Session Start Time: 7:00a  Session End Time: 7:55a     Session Length: 55 minutes    Session #: 2    Attendees: Client attended alone    Service Modality:  Video Visit:      Provider verified identity through the following two step process.  Patient provided:  Patient     Telemedicine Visit: The patient's condition can be safely assessed and treated via synchronous audio and visual telemedicine encounter.      Reason for Telemedicine Visit: Patient has requested telehealth visit    Originating Site (Patient Location): Patient's home    Distant Site (Provider Location): Provider Remote Setting- Home Office    Consent:  The patient/guardian has verbally consented to: the potential risks and benefits of telemedicine (video visit) versus in person care; bill my insurance or make self-payment for services provided; and responsibility for payment of non-covered services.     Patient would like the video invitation sent by:  My Chart    Mode of Communication:  Video Conference via Amwell    Distant Location (Provider):  Off-site    As the provider I attest to compliance with applicable laws and regulations related to telemedicine.    DATA  Interactive Complexity: No  Crisis: No        Progress Since Last Session (Related to Symptoms / Goals / Homework):   Symptoms: No change pt reported ongoing depressive symptoms    Homework: Achieved / completed to satisfaction      Episode of Care Goals: Satisfactory progress - PREPARATION (Decided to change - considering how); Intervened by negotiating a change plan and determining options / strategies for behavior change, identifying triggers, exploring social supports, and working towards setting a date to begin behavior change     Current / Ongoing Stressors and Concerns:  Today, pt reported he did his homework  around anger and realized that he gets upset when people are passive aggressive. Pt reported he is frustrated with his wife because she asked him to be 100% financially responsible for her son but does not allow pt to have any say in his parenting/discipline. Pt and writer discussed how he and his wife have very different  parenting styles. Pt and writer discussed finding equity in his relationships and healthy communication styles.       Treatment Objective(s) Addressed in This Session:   identify patterns of escalation  (i.e. tightness in chest, flushed face, increased heart rate, clenched hands, etc.)  Increase interest, engagement, and pleasure in doing things       Intervention:  Skills training  Explore skills useful to client in current situation  Skills include assertiveness, communication, conflict management, problem-solving, relaxation, etc.     Solution-Focused Therapy  Explore patterns in patient's relationships and discussed options for new behaviors  Explore patterns in patient's actions and choices and discussed options for new behaviors     Cognitive-behavioral Therapy  Discuss common cognitive distortions, identified them in patient's life  Explore ways to challenge, replace, and act against these cognitions     Mindfulness-Based Strategies  Discuss skills based on development and application of mindfulness  Skills drawn from dialectical behavior therapy, mindfulness-based stress reduction, mindfulness-based cognitive therapy, etc.      Assessments completed prior to visit:  The following assessments were completed by patient for this visit:  PHQ9:       10/19/2023     8:05 AM 1/22/2024    12:02 AM 2/1/2024     9:21 AM 4/23/2024     8:28 AM 5/1/2024     3:18 PM 5/28/2024     7:44 AM 6/13/2024    10:46 AM   PHQ-9 SCORE   PHQ-9 Total Score [place] 14 (Moderate depression) 8 (Mild depression) 5 (Mild depression)  10 (Moderate depression) 8 (Mild depression) 7 (Mild depression)   PHQ-9 Total Score 14 8  5 9 10    10 8    8 7     PROMIS 10-Global Health (all questions and answers displayed):       1/26/2024     9:08 AM 5/1/2024     3:20 PM 5/28/2024     7:47 AM   PROMIS 10   In general, would you say your health is: Good Good Good   In general, would you say your quality of life is: Good Good Good   In general, how would you rate your physical health? Good Good Good   In general, how would you rate your mental health, including your mood and your ability to think? Good Fair Good   In general, how would you rate your satisfaction with your social activities and relationships? Good Fair Good   In general, please rate how well you carry out your usual social activities and roles Good Fair Good   To what extent are you able to carry out your everyday physical activities such as walking, climbing stairs, carrying groceries, or moving a chair? Moderately Moderately Mostly   In the past 7 days, how often have you been bothered by emotional problems such as feeling anxious, depressed, or irritable? Sometimes Often Sometimes   In the past 7 days, how would you rate your fatigue on average? Severe Severe Moderate   In the past 7 days, how would you rate your pain on average, where 0 means no pain, and 10 means worst imaginable pain? 5 4 4   In general, would you say your health is: 3 3 3   In general, would you say your quality of life is: 3 3 3   In general, how would you rate your physical health? 3 3 3   In general, how would you rate your mental health, including your mood and your ability to think? 3 2 3   In general, how would you rate your satisfaction with your social activities and relationships? 3 2 3   In general, please rate how well you carry out your usual social activities and roles. (This includes activities at home, at work and in your community, and responsibilities as a parent, child, spouse, employee, friend, etc.) 3 2 3   To what extent are you able to carry out your everyday physical activities such as  walking, climbing stairs, carrying groceries, or moving a chair? 3 3 4   In the past 7 days, how often have you been bothered by emotional problems such as feeling anxious, depressed, or irritable? 3 4 3   In the past 7 days, how would you rate your fatigue on average? 4 4 3   In the past 7 days, how would you rate your pain on average, where 0 means no pain, and 10 means worst imaginable pain? 5 4 4   Global Mental Health Score 12 9    9 12   Global Physical Health Score 11 11    11 13   PROMIS TOTAL - SUBSCORES 23 20    20 25         ASSESSMENT: Current Emotional / Mental Status (status of significant symptoms):   Risk status (Self / Other harm or suicidal ideation)   Patient denies current fears or concerns for personal safety.   Patient denies current or recent suicidal ideation or behaviors.   Patient denies current or recent homicidal ideation or behaviors.   Patient denies current or recent self injurious behavior or ideation.   Patient denies other safety concerns.   Patient reports there has been no change in risk factors since their last session.     Patient reports there has been no change in protective factors since their last session.     Recommended that patient call 911 or go to the local ED should there be a change in any of these risk factors.     Appearance:   Appropriate    Eye Contact:   Good    Psychomotor Behavior: Normal    Attitude:   Cooperative  Interested   Orientation:   All   Speech    Rate / Production: Normal     Volume:  Normal    Mood:    Normal   Affect:    Appropriate    Thought Content:  Clear    Thought Form:  Coherent  Logical    Insight:    Fair      Medication Review:     Current Outpatient Medications   Medication Sig Dispense Refill    acetaminophen (TYLENOL) 500 MG tablet Take 1,000 mg by mouth every 6 hours as needed for mild pain      alcohol swab prep pads Use to swab area of injection/andrzej as directed 100 each 3    aspirin (ASA) 81 MG EC tablet Take 1 tablet (81 mg) by  mouth daily      atorvastatin (LIPITOR) 20 MG tablet Take 1 tablet (20 mg) by mouth daily 90 tablet 3    blood glucose (NO BRAND SPECIFIED) test strip Use to test blood sugar 2 times daily or as directed. To accompany: Blood Glucose Monitor Brands: per insurance. 100 strip 6    ciclopirox (PENLAC) 8 % external solution Apply topically daily Apply to affected nails daily.  Remove the residual build up weekly with nail polish remover or an everett board. 6.6 mL 1    clindamycin (CLEOCIN T) 1 % external solution Apply 10-15 drops to scalp at nighttime before bed 60 mL 11    CONTOUR NEXT TEST test strip USE TO TEST BLOOD SUGAR 1 TIME DAILY OR AS DIRECTED. 100 strip 3    escitalopram (LEXAPRO) 10 MG tablet Take 1 tablet (10 mg) by mouth daily 30 tablet 1    lisinopril (ZESTRIL) 20 MG tablet Take 1 tablet (20 mg) by mouth daily      loperamide (IMODIUM) 2 MG capsule Take 2 capsules (4mg) orally with 1st loose stool, then 1 capsule (2mg) with other loose stools. Max 16 mg in 24 hrs. (Patient not taking: Reported on 5/28/2024)      magnesium 250 MG tablet Take 1 tablet by mouth daily      Microlet Lancets MISC Use to test blood sugar 1 time daily 100 each 1    Microlet Lancets MISC       MULTIPLE VITAMIN PO Take 1 tablet by mouth daily      omeprazole (PRILOSEC) 40 MG DR capsule Take 1 capsule (40 mg) by mouth daily 90 capsule 3    terbinafine (LAMISIL AT) 1 % external cream Apply topically 2 times daily 12 g 1    thin (NO BRAND SPECIFIED) lancets Use to test blood sugar 2 times daily or as directed. To accompany: Blood Glucose Monitor Brands: per insurance. 1 each 3    tirzepatide (MOUNJARO) 2.5 MG/0.5ML pen Inject 2.5 mg Subcutaneous every 7 days (Patient not taking: Reported on 5/28/2024) 2 mL 0    tirzepatide (MOUNJARO) 5 MG/0.5ML pen Inject 5 mg Subcutaneous every 7 days 2 mL 0    tirzepatide (MOUNJARO) 5 MG/0.5ML pen Inject 5 mg Subcutaneous every 7 days (Patient not taking: Reported on 5/28/2024) 2 mL 0    tirzepatide  (MOUNJARO) 7.5 MG/0.5ML pen Inject 7.5 mg Subcutaneous every 7 days 2 mL 0    vitamin (B COMPLEX-C) tablet Take 1 tablet by mouth daily      vitamin E 400 units TABS Take 400 Units by mouth daily       No current facility-administered medications for this visit.         Medication Compliance:   Yes     Changes in Health Issues:   None reported     Chemical Use Review:   Substance Use: Chemical use reviewed, no active concerns identified      Tobacco Use: No current tobacco use.      Diagnosis:  1. Moderate episode of recurrent major depressive disorder (H)        Collateral Reports Completed:   Not Applicable    PLAN: (Patient Tasks / Therapist Tasks / Other)  Track anger - who, what, where, when  Have a safe word with your wife for time outs.         Lindsay Ramirez, [facility]                                                     ______________________________________________________________________    Individual Treatment Plan    Patient's Name: Heath Carpenter  YOB: 1970    Date of Creation: 6/17/24  Date Treatment Plan Last Reviewed/Revised: 6/17/24    DSM5 Diagnoses: 296.32 (F33.1) Major Depressive Disorder, Recurrent Episode, Moderate _  Psychosocial / Contextual Factors:  Patient works as a , his wife of 14 years who has MS, has stepson with epilepsy, is dealing with depression, has made lifestyle changes and is open to therapy.   PROMIS (reviewed every 90 days): 5/28/24    Referral / Collaboration:  Referral to another professional/service is not indicated at this time..    Anticipated number of session for this episode of care:  11-20 sessions  Anticipation frequency of session: Weekly  Anticipated Duration of each session: 53 or more minutes  Treatment plan will be reviewed in 90 days or when goals have been changed.       MeasurableTreatment Goal(s) related to diagnosis / functional impairment(s)  Goal-Depression: Client will decrease depressed mood    I will know I've met my goal when I am  less depressed.      Objective #A (Client Action)    Client will use identified behavioral and cognitive skills to challenge negative self talk 90% of the time.  Status: New - Date: 6/20/2024    Intervention(s)  Therapist will provide psychoeducation, behavioral activation, and cognitive restructuring.      Objective #B  Client will complete at least 10 minutes of ACE activities (e.g.Achievement, Closeness, and Enjoyment) or other Behavioral Activation goals per day.    Status: New - Date: 6/20/2024    Intervention(s)  Therapist will provide psychoeducation, behavioral activation, and cognitive restructuring.      Objective #C  Client will exercise 30 minutes 36 times in the next 12 weeks.  Status: New - Date: 6/20/2024    Intervention(s)  Therapist will provide psychoeducation, behavioral activation, and cognitive restructuring.      Patient has reviewed and agreed to the above plan.      Lindsay Ramirez, [facility]  June 20, 2024

## 2024-07-10 ENCOUNTER — LAB (OUTPATIENT)
Dept: LAB | Facility: CLINIC | Age: 54
End: 2024-07-10
Payer: COMMERCIAL

## 2024-07-10 DIAGNOSIS — R53.83 OTHER FATIGUE: ICD-10-CM

## 2024-07-10 LAB
ALBUMIN SERPL BCG-MCNC: 4.2 G/DL (ref 3.5–5.2)
ALP SERPL-CCNC: 77 U/L (ref 40–150)
ALT SERPL W P-5'-P-CCNC: 42 U/L (ref 0–70)
ANION GAP SERPL CALCULATED.3IONS-SCNC: 10 MMOL/L (ref 7–15)
AST SERPL W P-5'-P-CCNC: 28 U/L (ref 0–45)
BILIRUB SERPL-MCNC: 0.4 MG/DL
BUN SERPL-MCNC: 12.5 MG/DL (ref 6–20)
CALCIUM SERPL-MCNC: 9 MG/DL (ref 8.6–10)
CHLORIDE SERPL-SCNC: 102 MMOL/L (ref 98–107)
CREAT SERPL-MCNC: 0.82 MG/DL (ref 0.67–1.17)
DEPRECATED HCO3 PLAS-SCNC: 26 MMOL/L (ref 22–29)
EGFRCR SERPLBLD CKD-EPI 2021: >90 ML/MIN/1.73M2
FERRITIN SERPL-MCNC: 264 NG/ML (ref 31–409)
GLUCOSE SERPL-MCNC: 123 MG/DL (ref 70–99)
POTASSIUM SERPL-SCNC: 4.2 MMOL/L (ref 3.4–5.3)
PROT SERPL-MCNC: 7.6 G/DL (ref 6.4–8.3)
SODIUM SERPL-SCNC: 138 MMOL/L (ref 135–145)

## 2024-07-10 PROCEDURE — 36415 COLL VENOUS BLD VENIPUNCTURE: CPT

## 2024-07-10 PROCEDURE — 80053 COMPREHEN METABOLIC PANEL: CPT

## 2024-07-10 PROCEDURE — 82728 ASSAY OF FERRITIN: CPT

## 2024-07-19 ENCOUNTER — VIRTUAL VISIT (OUTPATIENT)
Dept: PSYCHIATRY | Facility: CLINIC | Age: 54
End: 2024-07-19
Payer: COMMERCIAL

## 2024-07-19 DIAGNOSIS — F33.0 MILD EPISODE OF RECURRENT MAJOR DEPRESSIVE DISORDER (H): Primary | ICD-10-CM

## 2024-07-19 PROCEDURE — 99214 OFFICE O/P EST MOD 30 MIN: CPT | Mod: 95 | Performed by: PSYCHIATRY & NEUROLOGY

## 2024-07-19 RX ORDER — ESCITALOPRAM OXALATE 10 MG/1
10 TABLET ORAL DAILY
Qty: 30 TABLET | Refills: 2 | Status: SHIPPED | OUTPATIENT
Start: 2024-07-19

## 2024-07-19 RX ORDER — ESCITALOPRAM OXALATE 5 MG/1
5 TABLET ORAL DAILY
Qty: 30 TABLET | Refills: 2 | Status: SHIPPED | OUTPATIENT
Start: 2024-07-19

## 2024-07-19 ASSESSMENT — PAIN SCALES - GENERAL: PAINLEVEL: NO PAIN (0)

## 2024-07-19 NOTE — PROGRESS NOTES
Virtual Visit Details    Type of service:  Video Visit     Originating Location (pt. Location): Other work    Distant Location (provider location):  On-site  Platform used for Video Visit: [facility] Psychiatry Consult Note    IDENTIFICATION   Name: Heath Carpenter   : 1970/54 year old      Sex:    @ male          Telemedicine Visit: The patient's condition can be safely assessed and treated via synchronous audio and visual telemedicine encounter.        Face to Face/patient Contact total time: 11 minutes  Pre Charting time: 1 minutes; Post charting time, communication and other activities: 1 minutes; Total time 13 minutes  2:38 PM - 2:49PM          SUBJECTIVE     History of Present Illness                  The following assessments were completed by patient for this visit:  PROMIS 10-Global Health (only subscores and total score):       2024     9:08 AM 2024     3:20 PM 2024     7:47 AM   PROMIS-10 Scores Only   Global Mental Health Score 12 9    9 12   Global Physical Health Score 11 11    11 13   PROMIS TOTAL - SUBSCORES 23 20    20 25             2024     7:44 AM 2024    10:46 AM 2024     1:42 PM   PHQ   PHQ-9 Total Score 8    8 7 8   Q9: Thoughts of better off dead/self-harm past 2 weeks Not at all Not at all Not at all          10/19/2023     8:06 AM 2024    12:07 AM 2024     8:28 AM   RODRIGUE-7 SCORE   Total Score 8 (mild anxiety) 5 (mild anxiety)    Total Score 8 5 4        OBJECTIVE     Vital Signs:   There were no vitals taken for this visit.    Labs:    Results                    Current Medications:  Current Outpatient Medications   Medication Sig Dispense Refill    acetaminophen (TYLENOL) 500 MG tablet Take 1,000 mg by mouth every 6 hours as needed for mild pain      alcohol swab prep pads Use to swab area of injection/andrzej as directed 100 each 3    aspirin (ASA) 81 MG EC tablet Take 1 tablet (81 mg) by mouth daily      atorvastatin (LIPITOR) 20 MG  tablet Take 1 tablet (20 mg) by mouth daily 90 tablet 3    blood glucose (NO BRAND SPECIFIED) test strip Use to test blood sugar 2 times daily or as directed. To accompany: Blood Glucose Monitor Brands: per insurance. 100 strip 6    ciclopirox (PENLAC) 8 % external solution Apply topically daily Apply to affected nails daily.  Remove the residual build up weekly with nail polish remover or an everett board. 6.6 mL 1    clindamycin (CLEOCIN T) 1 % external solution Apply 10-15 drops to scalp at nighttime before bed 60 mL 11    CONTOUR NEXT TEST test strip USE TO TEST BLOOD SUGAR 1 TIME DAILY OR AS DIRECTED. 100 strip 3    escitalopram (LEXAPRO) 10 MG tablet Take 1 tablet (10 mg) by mouth daily 30 tablet 1    lisinopril (ZESTRIL) 20 MG tablet Take 1 tablet (20 mg) by mouth daily      loperamide (IMODIUM) 2 MG capsule Take 2 capsules (4mg) orally with 1st loose stool, then 1 capsule (2mg) with other loose stools. Max 16 mg in 24 hrs. (Patient not taking: Reported on 5/28/2024)      magnesium 250 MG tablet Take 1 tablet by mouth daily      Microlet Lancets MISC Use to test blood sugar 1 time daily 100 each 1    Microlet Lancets MISC       MULTIPLE VITAMIN PO Take 1 tablet by mouth daily      omeprazole (PRILOSEC) 40 MG DR capsule Take 1 capsule (40 mg) by mouth daily 90 capsule 3    terbinafine (LAMISIL AT) 1 % external cream Apply topically 2 times daily 12 g 1    thin (NO BRAND SPECIFIED) lancets Use to test blood sugar 2 times daily or as directed. To accompany: Blood Glucose Monitor Brands: per insurance. 1 each 3    tirzepatide (MOUNJARO) 2.5 MG/0.5ML pen Inject 2.5 mg Subcutaneous every 7 days (Patient not taking: Reported on 5/28/2024) 2 mL 0    tirzepatide (MOUNJARO) 5 MG/0.5ML pen Inject 5 mg Subcutaneous every 7 days 2 mL 0    tirzepatide (MOUNJARO) 5 MG/0.5ML pen Inject 5 mg Subcutaneous every 7 days (Patient not taking: Reported on 5/28/2024) 2 mL 0    tirzepatide (MOUNJARO) 7.5 MG/0.5ML pen Inject 7.5 mg  Subcutaneous every 7 days 2 mL 0    vitamin (B COMPLEX-C) tablet Take 1 tablet by mouth daily      vitamin E 400 units TABS Take 400 Units by mouth daily       No current facility-administered medications for this visit.            ADDED HISTORY   Patient is  and is currently facing challenges in his relationship. He is working with a counselor to improve his relationship. He has been trying to increase his physical activity by going for walks after dinner.    Physical exam    Physical Exam                MENTAL STATUS EXAMINATION:   Appearance: Good attention to grooming and hygiene, professional garb  Attitude: Cooperative  Eye Contact: Good  Gait and Station: Sitting  Psychomotor Behavior: normal   Oriented to: Grossly person place and time  Attention Span and Concentration: Grossly fair  Speech: fair tone  Language: English  Mood: fair  Affect: Mildly constricted  Associations:  no loose associations  Thought Process:  logical, linear and goal oriented  Thought Content: No evidence of delusions or suicidal or homicidal ideation plan or intent  Memory: Grossly intact  Fund of Knowledge: Good  Insight:  good  Judgment:  intact, adequate for safety  Impulse Control:  intact    Physical Exam         DIAGNOSES:   Major depressive disorder, mild, recurrent  Obstructive sleep apnea  Diabetes      ASSESSMENT:   Patient dealing with current major depressive disorder episode, likely mild versus moderate.  There is psychosocial dysfunction hence use of medication management.  In past episodes symptoms were relatively better.  Utilize escitalopram.    Today Heath Carpenter reports no suicidal ideations. In addition, he has notable risk factors for self-harm, including access to firearm. However, risk is mitigated by commitment to family, [employer] beliefs, absence of past attempts, ability to volunteer a safety plan, and history of seeking help when needed. Therefore, based on all available evidence including the  factors cited above, he does not appear to be at imminent risk for self-harm, does not meet criteria for a 72-hr hold, and therefore remains appropriate for ongoing outpatient level of care.       PLAN:     Patient advised of consultative model. Patient will continue to be seen for ongoing consultation and stabilization.  Does not meet criteria for involuntary treatment or hospitalization  Add Lexapro 5/2/2024 10 mg daily improved reactivity, overall relatively better depression, improved interpersonal communications, no side effects suspected => 06/13/2410 mg nightly no change in fatigue, likely due to other factors, depression => 7/19/24 15 mg qam-Risks, benefits and alternatives discussed.  Patient provides verbal consent to treatment.  Follow-up if fatigue is any better with bedtime dosing for Lexapro, likely due to gastroenteritis/colitis and inflammatory recovery.  Consider Viibryd, duloxetine, Luvox  Dc'd bupropion (lost efficacy, no improvements going from 150 to 300 mg)  Labs -no further labs indicated  [facility] referring to long term therapy      Assessment & Plan                 Administrative Billing:   Time spent with patient was greater than 50% of time and/or significant time was spent in counseling and coordination of care regarding above diagnoses and treatment plan. Pre charting time and post charting time/documentation/coordination are done on date of service.      Signed:   Derick Edmondson M.D.  [facility] Psychiatry Service    Disclaimer: This note consists of symbols derived from keyboarding, dictation and/or voice recognition software. As a result, there may be errors in the script that have gone undetected. Please consider this when interpreting information found in this chart.    Consent was obtained from the patient to use an AI documentation tool in the creation of this note.     eoc

## 2024-07-19 NOTE — NURSING NOTE
Current patient location:  [facility] - personal office     Is the patient currently in the state of [place]? YES    Visit mode:VIDEO    If the visit is dropped, the patient can be reconnected by: VIDEO VISIT: Send to e-mail at: [email or web address]    Will anyone else be joining the visit? NO  (If patient encounters technical issues they should call [phone]    How would you like to obtain your AVS? MyChart    Are changes needed to the allergy or medication list? No    Are refills needed on medications prescribed by this physician? YES    Reason for visit: RECHSARAH HOLLIS

## 2024-07-24 ENCOUNTER — VIRTUAL VISIT (OUTPATIENT)
Dept: PSYCHOLOGY | Facility: CLINIC | Age: 54
End: 2024-07-24
Payer: COMMERCIAL

## 2024-07-24 DIAGNOSIS — F33.1 MODERATE EPISODE OF RECURRENT MAJOR DEPRESSIVE DISORDER (H): Primary | ICD-10-CM

## 2024-07-24 PROCEDURE — 90837 PSYTX W PT 60 MINUTES: CPT | Mod: 95 | Performed by: COUNSELOR

## 2024-07-24 ASSESSMENT — PATIENT HEALTH QUESTIONNAIRE - PHQ9
10. IF YOU CHECKED OFF ANY PROBLEMS, HOW DIFFICULT HAVE THESE PROBLEMS MADE IT FOR YOU TO DO YOUR WORK, TAKE CARE OF THINGS AT HOME, OR GET ALONG WITH OTHER PEOPLE: SOMEWHAT DIFFICULT
SUM OF ALL RESPONSES TO PHQ QUESTIONS 1-9: 7
SUM OF ALL RESPONSES TO PHQ QUESTIONS 1-9: 7

## 2024-07-24 NOTE — PROGRESS NOTES
[facility]                                     Progress Note    Patient Name: Heath Carpenter  Date: 24         Service Type: Individual      Session Start Time: 8:00a  Session End Time: 8:55a     Session Length: 55 minutes    Session #: 3    Attendees: Client attended alone    Service Modality:  Video Visit:      Provider verified identity through the following two step process.  Patient provided:  Patient     Telemedicine Visit: The patient's condition can be safely assessed and treated via synchronous audio and visual telemedicine encounter.      Reason for Telemedicine Visit: Patient has requested telehealth visit    Originating Site (Patient Location): Patient's home    Distant Site (Provider Location): Provider Remote Setting- Home Office    Consent:  The patient/guardian has verbally consented to: the potential risks and benefits of telemedicine (video visit) versus in person care; bill my insurance or make self-payment for services provided; and responsibility for payment of non-covered services.     Patient would like the video invitation sent by:  My Chart    Mode of Communication:  Video Conference via Amwell    Distant Location (Provider):  Off-site    As the provider I attest to compliance with applicable laws and regulations related to telemedicine.    DATA  Interactive Complexity: No  Crisis: No        Progress Since Last Session (Related to Symptoms / Goals / Homework):   Symptoms: Improving pt reported improved communication with his wife and less stress since our last session    Homework: Achieved / completed to satisfaction      Episode of Care Goals: Satisfactory progress - PREPARATION (Decided to change - considering how); Intervened by negotiating a change plan and determining options / strategies for behavior change, identifying triggers, exploring social supports, and working towards setting a date to begin behavior change     Current / Ongoing Stressors and  Concerns:  Today, pt reported the last few weeks had been good because their son was with his dad. Pt noted he came back yesterday and there was  instant tension in the house.  Pt reproted while he was gone, he and his wife did have some arguments while he was gone and noted feeling critiqued by his wife. Pt gave the example of a problem employee he has and his wife telling him what he should do. Pt reported setting a boundary with his wife and they were able to move on fairly quickly, which was new. Pt reported since starting therapy, he feels more willing to communicate his need, set boundaries, and not  fight back  as much. Pt noted he doesn t feel heard, and this puts his defenses up. Pt noted when he is angry and yells, he then feels guilty about this. Pt and writer discussed various communication styles and techniques that he can try. Pt reported he took on a new role at work and noted this has been helpful because he feels more excited about work as he is learning again.       Treatment Objective(s) Addressed in This Session:   identify patterns of escalation  (i.e. tightness in chest, flushed face, increased heart rate, clenched hands, etc.)  Increase interest, engagement, and pleasure in doing things       Intervention:  Skills training  Explore skills useful to client in current situation  Skills include assertiveness, communication, conflict management, problem-solving, relaxation, etc.     Solution-Focused Therapy  Explore patterns in patient's relationships and discussed options for new behaviors  Explore patterns in patient's actions and choices and discussed options for new behaviors     Cognitive-behavioral Therapy  Discuss common cognitive distortions, identified them in patient's life  Explore ways to challenge, replace, and act against these cognitions     Mindfulness-Based Strategies  Discuss skills based on development and application of mindfulness  Skills drawn from dialectical behavior  therapy, mindfulness-based stress reduction, mindfulness-based cognitive therapy, etc.      Assessments completed prior to visit:  The following assessments were completed by patient for this visit:  PHQ9:       2/1/2024     9:21 AM 4/23/2024     8:28 AM 5/1/2024     3:18 PM 5/28/2024     7:44 AM 6/13/2024    10:46 AM 7/19/2024     1:42 PM 7/24/2024     7:26 AM   PHQ-9 SCORE   PHQ-9 Total Score MyChart 5 (Mild depression)  10 (Moderate depression) 8 (Mild depression) 7 (Mild depression) 8 (Mild depression) 7 (Mild depression)   PHQ-9 Total Score 5 9 10    10 8    8 7 8 7     PROMIS 10-Global Health (all questions and answers displayed):       1/26/2024     9:08 AM 5/1/2024     3:20 PM 5/28/2024     7:47 AM   PROMIS 10   In general, would you say your health is: Good Good Good   In general, would you say your quality of life is: Good Good Good   In general, how would you rate your physical health? Good Good Good   In general, how would you rate your mental health, including your mood and your ability to think? Good Fair Good   In general, how would you rate your satisfaction with your social activities and relationships? Good Fair Good   In general, please rate how well you carry out your usual social activities and roles Good Fair Good   To what extent are you able to carry out your everyday physical activities such as walking, climbing stairs, carrying groceries, or moving a chair? Moderately Moderately Mostly   In the past 7 days, how often have you been bothered by emotional problems such as feeling anxious, depressed, or irritable? Sometimes Often Sometimes   In the past 7 days, how would you rate your fatigue on average? Severe Severe Moderate   In the past 7 days, how would you rate your pain on average, where 0 means no pain, and 10 means worst imaginable pain? 5 4 4   In general, would you say your health is: 3 3 3   In general, would you say your quality of life is: 3 3 3   In general, how would you rate  your physical health? 3 3 3   In general, how would you rate your mental health, including your mood and your ability to think? 3 2 3   In general, how would you rate your satisfaction with your social activities and relationships? 3 2 3   In general, please rate how well you carry out your usual social activities and roles. (This includes activities at home, at work and in your community, and responsibilities as a parent, child, spouse, employee, friend, etc.) 3 2 3   To what extent are you able to carry out your everyday physical activities such as walking, climbing stairs, carrying groceries, or moving a chair? 3 3 4   In the past 7 days, how often have you been bothered by emotional problems such as feeling anxious, depressed, or irritable? 3 4 3   In the past 7 days, how would you rate your fatigue on average? 4 4 3   In the past 7 days, how would you rate your pain on average, where 0 means no pain, and 10 means worst imaginable pain? 5 4 4   Global Mental Health Score 12 9    9 12   Global Physical Health Score 11 11    11 13   PROMIS TOTAL - SUBSCORES 23 20    20 25         ASSESSMENT: Current Emotional / Mental Status (status of significant symptoms):   Risk status (Self / Other harm or suicidal ideation)   Patient denies current fears or concerns for personal safety.   Patient denies current or recent suicidal ideation or behaviors.   Patient denies current or recent homicidal ideation or behaviors.   Patient denies current or recent self injurious behavior or ideation.   Patient denies other safety concerns.   Patient reports there has been no change in risk factors since their last session.     Patient reports there has been no change in protective factors since their last session.     Recommended that patient call 911 or go to the local ED should there be a change in any of these risk factors.     Appearance:   Appropriate    Eye Contact:   Good    Psychomotor Behavior: Normal    Attitude:   Cooperative   Interested   Orientation:   All   Speech    Rate / Production: Normal     Volume:  Normal    Mood:    Normal   Affect:    Appropriate    Thought Content:  Clear    Thought Form:  Coherent  Logical    Insight:    Fair      Medication Review:     Current Outpatient Medications   Medication Sig Dispense Refill    acetaminophen (TYLENOL) 500 MG tablet Take 1,000 mg by mouth every 6 hours as needed for mild pain      alcohol swab prep pads Use to swab area of injection/andrzej as directed 100 each 3    aspirin (ASA) 81 MG EC tablet Take 1 tablet (81 mg) by mouth daily      atorvastatin (LIPITOR) 20 MG tablet Take 1 tablet (20 mg) by mouth daily 90 tablet 3    blood glucose (NO BRAND SPECIFIED) test strip Use to test blood sugar 2 times daily or as directed. To accompany: Blood Glucose Monitor Brands: per insurance. 100 strip 6    ciclopirox (PENLAC) 8 % external solution Apply topically daily Apply to affected nails daily.  Remove the residual build up weekly with nail polish remover or an [place] board. 6.6 mL 1    clindamycin (CLEOCIN T) 1 % external solution Apply 10-15 drops to scalp at nighttime before bed 60 mL 11    CONTOUR NEXT TEST test strip USE TO TEST BLOOD SUGAR 1 TIME DAILY OR AS DIRECTED. 100 strip 3    escitalopram (LEXAPRO) 10 MG tablet Take 1 tablet (10 mg) by mouth daily Take along with a 5 mg tab for a total of 15 mg every day 30 tablet 2    escitalopram (LEXAPRO) 5 MG tablet Take 1 tablet (5 mg) by mouth daily Take along with a 10 mg tab for a total of 15 mg every day 30 tablet 2    lisinopril (ZESTRIL) 20 MG tablet Take 1 tablet (20 mg) by mouth daily      magnesium 250 MG tablet Take 1 tablet by mouth daily      Microlet Lancets MISC Use to test blood sugar 1 time daily 100 each 1    Microlet Lancets MISC       MULTIPLE VITAMIN PO Take 1 tablet by mouth daily      omeprazole (PRILOSEC) 40 MG DR capsule Take 1 capsule (40 mg) by mouth daily 90 capsule 3    terbinafine (LAMISIL AT) 1 % external cream  Apply topically 2 times daily 12 g 1    thin (NO BRAND SPECIFIED) lancets Use to test blood sugar 2 times daily or as directed. To accompany: Blood Glucose Monitor Brands: per insurance. 1 each 3    tirzepatide (MOUNJARO) 7.5 MG/0.5ML pen Inject 7.5 mg Subcutaneous every 7 days 2 mL 0    vitamin (B COMPLEX-C) tablet Take 1 tablet by mouth daily      vitamin E 400 units TABS Take 400 Units by mouth daily       No current facility-administered medications for this visit.         Medication Compliance:   Yes     Changes in Health Issues:   None reported     Chemical Use Review:   Substance Use: Chemical use reviewed, no active concerns identified      Tobacco Use: No current tobacco use.      Diagnosis:  1. Moderate episode of recurrent major depressive disorder (H)        Collateral Reports Completed:   Not Applicable    PLAN: (Patient Tasks / Therapist Tasks / Other)  Track anger - who, what, where, when  Have a safe word with your wife for time outs.         Lindsay Ramirez, [facility]                                                     ______________________________________________________________________    Individual Treatment Plan    Patient's Name: Heath Carpenter  YOB: 1970    Date of Creation: 6/17/24  Date Treatment Plan Last Reviewed/Revised: 6/17/24    DSM5 Diagnoses: 296.32 (F33.1) Major Depressive Disorder, Recurrent Episode, Moderate _  Psychosocial / Contextual Factors:  Patient works as a , his wife of 14 years who has MS, has stepson with epilepsy, is dealing with depression, has made lifestyle changes and is open to therapy.   PROMIS (reviewed every 90 days): 5/28/24    Referral / Collaboration:  Referral to another professional/service is not indicated at this time..    Anticipated number of session for this episode of care:  11-20 sessions  Anticipation frequency of session: Weekly  Anticipated Duration of each session: 53 or more minutes  Treatment plan will be reviewed in 90 days  or when goals have been changed.       MeasurableTreatment Goal(s) related to diagnosis / functional impairment(s)  Goal-Depression: Client will decrease depressed mood    I will know I've met my goal when I am less depressed.      Objective #A (Client Action)    Client will use identified behavioral and cognitive skills to challenge negative self talk 90% of the time.  Status: New - Date: 6/20/2024    Intervention(s)  Therapist will provide psychoeducation, behavioral activation, and cognitive restructuring.      Objective #B  Client will complete at least 10 minutes of ACE activities (e.g.Achievement, Closeness, and Enjoyment) or other Behavioral Activation goals per day.    Status: New - Date: 6/20/2024    Intervention(s)  Therapist will provide psychoeducation, behavioral activation, and cognitive restructuring.      Objective #C  Client will exercise 30 minutes 36 times in the next 12 weeks.  Status: New - Date: 6/20/2024    Intervention(s)  Therapist will provide psychoeducation, behavioral activation, and cognitive restructuring.      Patient has reviewed and agreed to the above plan.      Lindsay Ramirez [facility]ative[facility]  June 20, 2024   Answers submitted by the patient for this visit:  Patient Health Questionnaire (Submitted on 7/24/2024)  If you checked off any problems, how difficult have these problems made it for you to do your work, take care of things at home, or get along with other people?: Somewhat difficult  PHQ9 TOTAL SCORE: 7

## 2024-08-06 ENCOUNTER — VIRTUAL VISIT (OUTPATIENT)
Dept: PSYCHOLOGY | Facility: CLINIC | Age: 54
End: 2024-08-06
Payer: COMMERCIAL

## 2024-08-06 DIAGNOSIS — F33.1 MODERATE EPISODE OF RECURRENT MAJOR DEPRESSIVE DISORDER (H): Primary | ICD-10-CM

## 2024-08-06 PROCEDURE — 90837 PSYTX W PT 60 MINUTES: CPT | Mod: 95 | Performed by: COUNSELOR

## 2024-08-07 NOTE — PROGRESS NOTES
[facility]                                     Progress Note    Patient Name: Heath Carpenter  Date: 24         Service Type: Individual      Session Start Time: 2:00p  Session End Time: 2:55p     Session Length: 55 minutes    Session #: 4    Attendees: Client attended alone    Service Modality:  Video Visit:      Provider verified identity through the following two step process.  Patient provided:  Patient     Telemedicine Visit: The patient's condition can be safely assessed and treated via synchronous audio and visual telemedicine encounter.      Reason for Telemedicine Visit: Patient has requested telehealth visit    Originating Site (Patient Location): Patient's home    Distant Site (Provider Location): Provider Remote Setting- Home Office    Consent:  The patient/guardian has verbally consented to: the potential risks and benefits of telemedicine (video visit) versus in person care; bill my insurance or make self-payment for services provided; and responsibility for payment of non-covered services.     Patient would like the video invitation sent by:  My Chart    Mode of Communication:  Video Conference via [employer]well    Distant Location (Provider):  Off-site    As the provider I attest to compliance with applicable laws and regulations related to telemedicine.    DATA  Interactive Complexity: No  Crisis: No        Progress Since Last Session (Related to Symptoms / Goals / Homework):   Symptoms: Improving pt reported improved communication with his wife and less stress since our last session    Homework: Achieved / completed to satisfaction      Episode of Care Goals: Satisfactory progress - PREPARATION (Decided to change - considering how); Intervened by negotiating a change plan and determining options / strategies for behavior change, identifying triggers, exploring social supports, and working towards setting a date to begin behavior change     Current / Ongoing Stressors and  Concerns:  Today, pt reported things are going well at work, in his new role. Pt reported things were also going well at home and noted he and his wife have not had many issues over the last two weeks. Writer pointed out that this feels like a change and pt reported feeling as though he has been more vocal about his needs/boundaries and has been more communicative with her about the frustrations regarding her son. Pt reported feeling like she is more responsive and open when he tells her in real time that he is frustrated instead of  waiting for myself to blow up.  Pt noted they are also spending more quality time together which has also been helpful. Pt and writer discussed the relationship with his step son and the difficulties related to this relationship.       Treatment Objective(s) Addressed in This Session:   identify patterns of escalation  (i.e. tightness in chest, flushed face, increased heart rate, clenched hands, etc.)  Increase interest, engagement, and pleasure in doing things       Intervention:  Skills training  Explore skills useful to client in current situation  Skills include assertiveness, communication, conflict management, problem-solving, relaxation, etc.     Solution-Focused Therapy  Explore patterns in patient's relationships and discussed options for new behaviors  Explore patterns in patient's actions and choices and discussed options for new behaviors     Cognitive-behavioral Therapy  Discuss common cognitive distortions, identified them in patient's life  Explore ways to challenge, replace, and act against these cognitions     Mindfulness-Based Strategies  Discuss skills based on development and application of mindfulness  Skills drawn from dialectical behavior therapy, mindfulness-based stress reduction, mindfulness-based cognitive therapy, etc.      Assessments completed prior to visit:  The following assessments were completed by patient for this visit:  PHQ9:       2/1/2024     9:21  AM 4/23/2024     8:28 AM 5/1/2024     3:18 PM 5/28/2024     7:44 AM 6/13/2024    10:46 AM 7/19/2024     1:42 PM 7/24/2024     7:26 AM   PHQ-9 SCORE   PHQ-9 Total Score Adriant 5 (Mild depression)  10 (Moderate depression) 8 (Mild depression) 7 (Mild depression) 8 (Mild depression) 7 (Mild depression)   PHQ-9 Total Score 5 9 10    10 8    8 7 8 7     PROMIS 10-Global Health (all questions and answers displayed):       1/26/2024     9:08 AM 5/1/2024     3:20 PM 5/28/2024     7:47 AM   PROMIS 10   In general, would you say your health is: Good Good Good   In general, would you say your quality of life is: Good Good Good   In general, how would you rate your physical health? Good Good Good   In general, how would you rate your mental health, including your mood and your ability to think? Good Fair Good   In general, how would you rate your satisfaction with your social activities and relationships? Good Fair Good   In general, please rate how well you carry out your usual social activities and roles Good Fair Good   To what extent are you able to carry out your everyday physical activities such as walking, climbing stairs, carrying groceries, or moving a chair? Moderately Moderately Mostly   In the past 7 days, how often have you been bothered by emotional problems such as feeling anxious, depressed, or irritable? Sometimes Often Sometimes   In the past 7 days, how would you rate your fatigue on average? Severe Severe Moderate   In the past 7 days, how would you rate your pain on average, where 0 means no pain, and 10 means worst imaginable pain? 5 4 4   In general, would you say your health is: 3 3 3   In general, would you say your quality of life is: 3 3 3   In general, how would you rate your physical health? 3 3 3   In general, how would you rate your mental health, including your mood and your ability to think? 3 2 3   In general, how would you rate your satisfaction with your social activities and relationships?  3 2 3   In general, please rate how well you carry out your usual social activities and roles. (This includes activities at home, at work and in your community, and responsibilities as a parent, child, spouse, employee, friend, etc.) 3 2 3   To what extent are you able to carry out your everyday physical activities such as walking, climbing stairs, carrying groceries, or moving a chair? 3 3 4   In the past 7 days, how often have you been bothered by emotional problems such as feeling anxious, depressed, or irritable? 3 4 3   In the past 7 days, how would you rate your fatigue on average? 4 4 3   In the past 7 days, how would you rate your pain on average, where 0 means no pain, and 10 means worst imaginable pain? 5 4 4   Global Mental Health Score 12 9    9 12   Global Physical Health Score 11 11    11 13   PROMIS TOTAL - SUBSCORES 23 20    20 25         ASSESSMENT: Current Emotional / Mental Status (status of significant symptoms):   Risk status (Self / Other harm or suicidal ideation)   Patient denies current fears or concerns for personal safety.   Patient denies current or recent suicidal ideation or behaviors.   Patient denies current or recent homicidal ideation or behaviors.   Patient denies current or recent self injurious behavior or ideation.   Patient denies other safety concerns.   Patient reports there has been no change in risk factors since their last session.     Patient reports there has been no change in protective factors since their last session.     Recommended that patient call 911 or go to the local ED should there be a change in any of these risk factors.     Appearance:   Appropriate    Eye Contact:   Good    Psychomotor Behavior: Normal    Attitude:   Cooperative  Interested   Orientation:   All   Speech    Rate / Production: Normal     Volume:  Normal    Mood:    Normal   Affect:    Appropriate    Thought Content:  Clear    Thought Form:  Coherent  Logical    Insight:    Fair       Medication Review:     Current Outpatient Medications   Medication Sig Dispense Refill    acetaminophen (TYLENOL) 500 MG tablet Take 1,000 mg by mouth every 6 hours as needed for mild pain      alcohol swab prep pads Use to swab area of injection/andrzej as directed 100 each 3    aspirin (ASA) 81 MG EC tablet Take 1 tablet (81 mg) by mouth daily      atorvastatin (LIPITOR) 20 MG tablet Take 1 tablet (20 mg) by mouth daily 90 tablet 3    blood glucose (NO BRAND SPECIFIED) test strip Use to test blood sugar 2 times daily or as directed. To accompany: Blood Glucose Monitor Brands: per insurance. 100 strip 6    ciclopirox (PENLAC) 8 % external solution Apply topically daily Apply to affected nails daily.  Remove the residual build up weekly with nail polish remover or an [place] board. 6.6 mL 1    clindamycin (CLEOCIN T) 1 % external solution Apply 10-15 drops to scalp at nighttime before bed 60 mL 11    CONTOUR NEXT TEST test strip USE TO TEST BLOOD SUGAR 1 TIME DAILY OR AS DIRECTED. 100 strip 3    escitalopram (LEXAPRO) 10 MG tablet Take 1 tablet (10 mg) by mouth daily Take along with a 5 mg tab for a total of 15 mg every day 30 tablet 2    escitalopram (LEXAPRO) 5 MG tablet Take 1 tablet (5 mg) by mouth daily Take along with a 10 mg tab for a total of 15 mg every day 30 tablet 2    lisinopril (ZESTRIL) 20 MG tablet Take 1 tablet (20 mg) by mouth daily      magnesium 250 MG tablet Take 1 tablet by mouth daily      Microlet Lancets MISC Use to test blood sugar 1 time daily 100 each 1    Microlet Lancets MISC       MULTIPLE VITAMIN PO Take 1 tablet by mouth daily      omeprazole (PRILOSEC) 40 MG DR capsule Take 1 capsule (40 mg) by mouth daily 90 capsule 3    terbinafine (LAMISIL AT) 1 % external cream Apply topically 2 times daily 12 g 1    thin (NO BRAND SPECIFIED) lancets Use to test blood sugar 2 times daily or as directed. To accompany: Blood Glucose Monitor Brands: per insurance. 1 each 3    tirzepatide  (MOUNJARO) 7.5 MG/0.5ML pen Inject 7.5 mg Subcutaneous every 7 days 2 mL 0    vitamin (B COMPLEX-C) tablet Take 1 tablet by mouth daily      vitamin E 400 units TABS Take 400 Units by mouth daily       No current facility-administered medications for this visit.         Medication Compliance:   Yes     Changes in Health Issues:   None reported     Chemical Use Review:   Substance Use: Chemical use reviewed, no active concerns identified      Tobacco Use: No current tobacco use.      Diagnosis:  1. Moderate episode of recurrent major depressive disorder (H)        Collateral Reports Completed:   Not Applicable    PLAN: (Patient Tasks / Therapist Tasks / Other)  Track anger - who, what, where, when  Have a safe word with your wife for time outs.         Lindsay Ramirez, [facility]                                                     ______________________________________________________________________    Individual Treatment Plan    Patient's Name: Heath Carpenter  YOB: 1970    Date of Creation: 6/17/24  Date Treatment Plan Last Reviewed/Revised: 6/17/24    DSM5 Diagnoses: 296.32 (F33.1) Major Depressive Disorder, Recurrent Episode, Moderate _  Psychosocial / Contextual Factors:  Patient works as a , his wife of 14 years who has MS, has stepson with epilepsy, is dealing with depression, has made lifestyle changes and is open to therapy.   PROMIS (reviewed every 90 days): 5/28/24    Referral / Collaboration:  Referral to another professional/service is not indicated at this time..    Anticipated number of session for this episode of care:  11-20 sessions  Anticipation frequency of session: Weekly  Anticipated Duration of each session: 53 or more minutes  Treatment plan will be reviewed in 90 days or when goals have been changed.       MeasurableTreatment Goal(s) related to diagnosis / functional impairment(s)  Goal-Depression: Client will decrease depressed mood    I will know I've met my goal when I am  less depressed.      Objective #A (Client Action)    Client will use identified behavioral and cognitive skills to challenge negative self talk 90% of the time.  Status: New - Date: 6/20/2024    Intervention(s)  Therapist will provide psychoeducation, behavioral activation, and cognitive restructuring.      Objective #B  Client will complete at least 10 minutes of ACE activities (e.g.Achievement, Closeness, and Enjoyment) or other Behavioral Activation goals per day.    Status: New - Date: 6/20/2024    Intervention(s)  Therapist will provide psychoeducation, behavioral activation, and cognitive restructuring.      Objective #C  Client will exercise 30 minutes 36 times in the next 12 weeks.  Status: New - Date: 6/20/2024    Intervention(s)  Therapist will provide psychoeducation, behavioral activation, and cognitive restructuring.      Patient has reviewed and agreed to the above plan.      Lindsay Ramirez, [facility]  June 20, 2024   Answers submitted by the patient for this visit:  Patient Health Questionnaire (Submitted on 7/24/2024)  If you checked off any problems, how difficult have these problems made it for you to do your work, take care of things at home, or get along with other people?: Somewhat difficult  PHQ9 TOTAL SCORE: 7

## 2024-08-10 ENCOUNTER — HEALTH MAINTENANCE LETTER (OUTPATIENT)
Age: 54
End: 2024-08-10

## 2024-08-20 ENCOUNTER — VIRTUAL VISIT (OUTPATIENT)
Dept: PSYCHOLOGY | Facility: CLINIC | Age: 54
End: 2024-08-20
Payer: COMMERCIAL

## 2024-08-20 DIAGNOSIS — F33.1 MODERATE EPISODE OF RECURRENT MAJOR DEPRESSIVE DISORDER (H): Primary | ICD-10-CM

## 2024-08-20 PROCEDURE — 90837 PSYTX W PT 60 MINUTES: CPT | Mod: 95 | Performed by: COUNSELOR

## 2024-08-20 NOTE — PROGRESS NOTES
[facility]                                     Progress Note    Patient Name: Heath Carpenter  Date: 24         Service Type: Individual      Session Start Time: 2:00p  Session End Time: 2:55p     Session Length: 55 minutes    Session #: 5    Attendees: Client attended alone    Service Modality:  Video Visit:      Provider verified identity through the following two step process.  Patient provided:  Patient     Telemedicine Visit: The patient's condition can be safely assessed and treated via synchronous audio and visual telemedicine encounter.      Reason for Telemedicine Visit: Patient has requested telehealth visit    Originating Site (Patient Location): Patient's home    Distant Site (Provider Location): Provider Remote Setting- Home Office    Consent:  The patient/guardian has verbally consented to: the potential risks and benefits of telemedicine (video visit) versus in person care; bill my insurance or make self-payment for services provided; and responsibility for payment of non-covered services.     Patient would like the video invitation sent by:  My Chart    Mode of Communication:  Video Conference via Amwell    Distant Location (Provider):  Off-site    As the provider I attest to compliance with applicable laws and regulations related to telemedicine.    DATA  Interactive Complexity: No  Crisis: No        Progress Since Last Session (Related to Symptoms / Goals / Homework):   Symptoms: Improving pt reported decreased depression symptoms and more hope for the future    Homework: Achieved / completed to satisfaction      Episode of Care Goals: Satisfactory progress - PREPARATION (Decided to change - considering how); Intervened by negotiating a change plan and determining options / strategies for behavior change, identifying triggers, exploring social supports, and working towards setting a date to begin behavior change     Current / Ongoing Stressors and Concerns:  Today, pt  reported his wife will be having a hysterectomy next week.  Pt reported there will be 6 weeks of recovery which will be hard, as more responsibility will fall on him. Pt reported his step son will be starting school next week as well. Pt was able to ID that he is working to take back his power by focusing on the things he can control (own thoughts, feelings, behaviors). Pt reported he made a deal with his step son that he would give him money for school books if the step son promised to go on a family vacation with them. Writer noted that he is rewarding good behavior and asking for quality time. Pt noted his step son has responded well and is even doing chores around the house. Pt noted he has also taken this approach with his wife and has noted more vulnerable productive conversations and an increase in quality time. Pt noted that when we started working together, he felt like giving up on his marriage but today feels much more hopeful about family dynamics.       Treatment Objective(s) Addressed in This Session:   identify patterns of escalation  (i.e. tightness in chest, flushed face, increased heart rate, clenched hands, etc.)  Increase interest, engagement, and pleasure in doing things       Intervention:  Skills training  Explore skills useful to client in current situation  Skills include assertiveness, communication, conflict management, problem-solving, relaxation, etc.     Solution-Focused Therapy  Explore patterns in patient's relationships and discussed options for new behaviors  Explore patterns in patient's actions and choices and discussed options for new behaviors     Cognitive-behavioral Therapy  Discuss common cognitive distortions, identified them in patient's life  Explore ways to challenge, replace, and act against these cognitions     Mindfulness-Based Strategies  Discuss skills based on development and application of mindfulness  Skills drawn from dialectical behavior therapy,  mindfulness-based stress reduction, mindfulness-based cognitive therapy, etc.      Assessments completed prior to visit:  The following assessments were completed by patient for this visit:  PHQ9:       2/1/2024     9:21 AM 4/23/2024     8:28 AM 5/1/2024     3:18 PM 5/28/2024     7:44 AM 6/13/2024    10:46 AM 7/19/2024     1:42 PM 7/24/2024     7:26 AM   PHQ-9 SCORE   PHQ-9 Total Score MyChart 5 (Mild depression)  10 (Moderate depression) 8 (Mild depression) 7 (Mild depression) 8 (Mild depression) 7 (Mild depression)   PHQ-9 Total Score 5 9 10    10 8    8 7 8 7     PROMIS 10-Global Health (all questions and answers displayed):       1/26/2024     9:08 AM 5/1/2024     3:20 PM 5/28/2024     7:47 AM   PROMIS 10   In general, would you say your health is: Good Good Good   In general, would you say your quality of life is: Good Good Good   In general, how would you rate your physical health? Good Good Good   In general, how would you rate your mental health, including your mood and your ability to think? Good Fair Good   In general, how would you rate your satisfaction with your social activities and relationships? Good Fair Good   In general, please rate how well you carry out your usual social activities and roles Good Fair Good   To what extent are you able to carry out your everyday physical activities such as walking, climbing stairs, carrying groceries, or moving a chair? Moderately Moderately Mostly   In the past 7 days, how often have you been bothered by emotional problems such as feeling anxious, depressed, or irritable? Sometimes Often Sometimes   In the past 7 days, how would you rate your fatigue on average? Severe Severe Moderate   In the past 7 days, how would you rate your pain on average, where 0 means no pain, and 10 means worst imaginable pain? 5 4 4   In general, would you say your health is: 3 3 3   In general, would you say your quality of life is: 3 3 3   In general, how would you rate your  physical health? 3 3 3   In general, how would you rate your mental health, including your mood and your ability to think? 3 2 3   In general, how would you rate your satisfaction with your social activities and relationships? 3 2 3   In general, please rate how well you carry out your usual social activities and roles. (This includes activities at home, at work and in your community, and responsibilities as a parent, child, spouse, employee, friend, etc.) 3 2 3   To what extent are you able to carry out your everyday physical activities such as walking, climbing stairs, carrying groceries, or moving a chair? 3 3 4   In the past 7 days, how often have you been bothered by emotional problems such as feeling anxious, depressed, or irritable? 3 4 3   In the past 7 days, how would you rate your fatigue on average? 4 4 3   In the past 7 days, how would you rate your pain on average, where 0 means no pain, and 10 means worst imaginable pain? 5 4 4   Global Mental Health Score 12 9    9 12   Global Physical Health Score 11 11    11 13   PROMIS TOTAL - SUBSCORES 23 20    20 25         ASSESSMENT: Current Emotional / Mental Status (status of significant symptoms):   Risk status (Self / Other harm or suicidal ideation)   Patient denies current fears or concerns for personal safety.   Patient denies current or recent suicidal ideation or behaviors.   Patient denies current or recent homicidal ideation or behaviors.   Patient denies current or recent self injurious behavior or ideation.   Patient denies other safety concerns.   Patient reports there has been no change in risk factors since their last session.     Patient reports there has been no change in protective factors since their last session.     Recommended that patient call 911 or go to the local ED should there be a change in any of these risk factors.     Appearance:   Appropriate    Eye Contact:   Good    Psychomotor Behavior: Normal    Attitude:   Cooperative   Interested   Orientation:   All   Speech    Rate / Production: Normal     Volume:  Normal    Mood:    Normal   Affect:    Appropriate    Thought Content:  Clear    Thought Form:  Coherent  Logical    Insight:    Fair      Medication Review:     Current Outpatient Medications   Medication Sig Dispense Refill    acetaminophen (TYLENOL) 500 MG tablet Take 1,000 mg by mouth every 6 hours as needed for mild pain      alcohol swab prep pads Use to swab area of injection/andrzej as directed 100 each 3    aspirin (ASA) 81 MG EC tablet Take 1 tablet (81 mg) by mouth daily      atorvastatin (LIPITOR) 20 MG tablet Take 1 tablet (20 mg) by mouth daily 90 tablet 3    blood glucose (NO BRAND SPECIFIED) test strip Use to test blood sugar 2 times daily or as directed. To accompany: Blood Glucose Monitor Brands: per insurance. 100 strip 6    ciclopirox (PENLAC) 8 % external solution Apply topically daily Apply to affected nails daily.  Remove the residual build up weekly with nail polish remover or an Tivoli board. 6.6 mL 1    clindamycin (CLEOCIN T) 1 % external solution Apply 10-15 drops to scalp at nighttime before bed 60 mL 11    CONTOUR NEXT TEST test strip USE TO TEST BLOOD SUGAR 1 TIME DAILY OR AS DIRECTED. 100 strip 3    escitalopram (LEXAPRO) 10 MG tablet Take 1 tablet (10 mg) by mouth daily Take along with a 5 mg tab for a total of 15 mg every day 30 tablet 2    escitalopram (LEXAPRO) 5 MG tablet Take 1 tablet (5 mg) by mouth daily Take along with a 10 mg tab for a total of 15 mg every day 30 tablet 2    lisinopril (ZESTRIL) 20 MG tablet Take 1 tablet (20 mg) by mouth daily      magnesium 250 MG tablet Take 1 tablet by mouth daily      Microlet Lancets MISC Use to test blood sugar 1 time daily 100 each 1    Microlet Lancets MISC       MULTIPLE VITAMIN PO Take 1 tablet by mouth daily      omeprazole (PRILOSEC) 40 MG DR capsule Take 1 capsule (40 mg) by mouth daily 90 capsule 3    terbinafine (LAMISIL AT) 1 % external cream  Apply topically 2 times daily 12 g 1    thin (NO BRAND SPECIFIED) lancets Use to test blood sugar 2 times daily or as directed. To accompany: Blood Glucose Monitor Brands: per insurance. 1 each 3    tirzepatide (MOUNJARO) 7.5 MG/0.5ML pen Inject 7.5 mg Subcutaneous every 7 days 2 mL 0    vitamin (B COMPLEX-C) tablet Take 1 tablet by mouth daily      vitamin E 400 units TABS Take 400 Units by mouth daily       No current facility-administered medications for this visit.         Medication Compliance:   Yes     Changes in Health Issues:   None reported     Chemical Use Review:   Substance Use: Chemical use reviewed, no active concerns identified      Tobacco Use: No current tobacco use.      Diagnosis:  1. Moderate episode of recurrent major depressive disorder (H)        Collateral Reports Completed:   Not Applicable    PLAN: (Patient Tasks / Therapist Tasks / Other)  Track anger - who, what, where, when  Have a safe word with your wife for time outs.         Lindsay Ramirez, [facility]                                                     ______________________________________________________________________    Individual Treatment Plan    Patient's Name: Heath Carpenter  YOB: 1970    Date of Creation: 6/17/24  Date Treatment Plan Last Reviewed/Revised: 6/17/24    DSM5 Diagnoses: 296.32 (F33.1) Major Depressive Disorder, Recurrent Episode, Moderate _  Psychosocial / Contextual Factors:  Patient works as a , his wife of 14 years who has MS, has stepson with epilepsy, is dealing with depression, has made lifestyle changes and is open to therapy.   PROMIS (reviewed every 90 days): 5/28/24    Referral / Collaboration:  Referral to another professional/service is not indicated at this time..    Anticipated number of session for this episode of care:  11-20 sessions  Anticipation frequency of session: Weekly  Anticipated Duration of each session: 53 or more minutes  Treatment plan will be reviewed in 90 days  or when goals have been changed.       MeasurableTreatment Goal(s) related to diagnosis / functional impairment(s)  Goal-Depression: Client will decrease depressed mood    I will know I've met my goal when I am less depressed.      Objective #A (Client Action)    Client will use identified behavioral and cognitive skills to challenge negative self talk 90% of the time.  Status: New - Date: 6/20/2024    Intervention(s)  Therapist will provide psychoeducation, behavioral activation, and cognitive restructuring.      Objective #B  Client will complete at least 10 minutes of ACE activities (e.g.Achievement, Closeness, and Enjoyment) or other Behavioral Activation goals per day.    Status: New - Date: 6/20/2024    Intervention(s)  Therapist will provide psychoeducation, behavioral activation, and cognitive restructuring.      Objective #C  Client will exercise 30 minutes 36 times in the next 12 weeks.  Status: New - Date: 6/20/2024    Intervention(s)  Therapist will provide psychoeducation, behavioral activation, and cognitive restructuring.      Patient has reviewed and agreed to the above plan.      Lindsay Ramirez [facility]  June 20, 2024   Answers submitted by the patient for this visit:  Patient Health Questionnaire (Submitted on 7/24/2024)  If you checked off any problems, how difficult have these problems made it for you to do your work, take care of things at home, or get along with other people?: Somewhat difficult  PHQ9 TOTAL SCORE: 7

## 2024-08-28 ENCOUNTER — PATIENT OUTREACH (OUTPATIENT)
Dept: CARE COORDINATION | Facility: CLINIC | Age: 54
End: 2024-08-28
Payer: COMMERCIAL

## 2024-09-03 ENCOUNTER — VIRTUAL VISIT (OUTPATIENT)
Dept: PSYCHOLOGY | Facility: CLINIC | Age: 54
End: 2024-09-03
Payer: COMMERCIAL

## 2024-09-03 DIAGNOSIS — F33.1 MODERATE EPISODE OF RECURRENT MAJOR DEPRESSIVE DISORDER (H): Primary | ICD-10-CM

## 2024-09-03 PROCEDURE — 90837 PSYTX W PT 60 MINUTES: CPT | Mod: 95 | Performed by: COUNSELOR

## 2024-09-03 ASSESSMENT — PATIENT HEALTH QUESTIONNAIRE - PHQ9
SUM OF ALL RESPONSES TO PHQ QUESTIONS 1-9: 6
10. IF YOU CHECKED OFF ANY PROBLEMS, HOW DIFFICULT HAVE THESE PROBLEMS MADE IT FOR YOU TO DO YOUR WORK, TAKE CARE OF THINGS AT HOME, OR GET ALONG WITH OTHER PEOPLE: SOMEWHAT DIFFICULT
SUM OF ALL RESPONSES TO PHQ QUESTIONS 1-9: 6

## 2024-09-03 NOTE — PROGRESS NOTES
[facility]                                     Progress Note    Patient Name: Heath Carpenter  Date: 9/3/24         Service Type: Individual      Session Start Time: 2:00p  Session End Time: 2:55p     Session Length: 55 minutes    Session #: 6    Attendees: Client attended alone    Service Modality:  Video Visit:      Provider verified identity through the following two step process.  Patient provided:  Patient     Telemedicine Visit: The patient's condition can be safely assessed and treated via synchronous audio and visual telemedicine encounter.      Reason for Telemedicine Visit: Patient has requested telehealth visit    Originating Site (Patient Location): Patient's home    Distant Site (Provider Location): Provider Remote Setting- Home Office    Consent:  The patient/guardian has verbally consented to: the potential risks and benefits of telemedicine (video visit) versus in person care; bill my insurance or make self-payment for services provided; and responsibility for payment of non-covered services.     Patient would like the video invitation sent by:  My Chart    Mode of Communication:  Video Conference via Amwell    Distant Location (Provider):  Off-site    As the provider I attest to compliance with applicable laws and regulations related to telemedicine.    DATA  Interactive Complexity: No  Crisis: No        Progress Since Last Session (Related to Symptoms / Goals / Homework):   Symptoms: Improving pt reported decreased depression symptoms and more hope for the future    Homework: Achieved / completed to satisfaction      Episode of Care Goals: Satisfactory progress - PREPARATION (Decided to change - considering how); Intervened by negotiating a change plan and determining options / strategies for behavior change, identifying triggers, exploring social supports, and working towards setting a date to begin behavior change     Current / Ongoing Stressors and Concerns:  Today, pt  reported his wife has his surgery last week and is still in recovery so he is doing his best to support her. Pt reported his step son also started college and  I ve seen an improvement in him.  Pt was able to have a good conversation with him about him stepping up around the house.  Pt did note that he and his wife got into an argument about a purchase he made. Pt reported he did not understand why she was upset because they had discussed this. Pt and writer discussed locus of control and sides of the street as ways to manage his own emotions around these types of misunderstandings.       Treatment Objective(s) Addressed in This Session:   identify patterns of escalation  (i.e. tightness in chest, flushed face, increased heart rate, clenched hands, etc.)  Increase interest, engagement, and pleasure in doing things       Intervention:  Skills training  Explore skills useful to client in current situation  Skills include assertiveness, communication, conflict management, problem-solving, relaxation, etc.     Solution-Focused Therapy  Explore patterns in patient's relationships and discussed options for new behaviors  Explore patterns in patient's actions and choices and discussed options for new behaviors     Cognitive-behavioral Therapy  Discuss common cognitive distortions, identified them in patient's life  Explore ways to challenge, replace, and act against these cognitions     Mindfulness-Based Strategies  Discuss skills based on development and application of mindfulness  Skills drawn from dialectical behavior therapy, mindfulness-based stress reduction, mindfulness-based cognitive therapy, etc.      Assessments completed prior to visit:  The following assessments were completed by patient for this visit:  PHQ9:       4/23/2024     8:28 AM 5/1/2024     3:18 PM 5/28/2024     7:44 AM 6/13/2024    10:46 AM 7/19/2024     1:42 PM 7/24/2024     7:26 AM 9/3/2024     1:51 PM   PHQ-9 SCORE   PHQ-9 Total Score MyChart  10  (Moderate depression) 8 (Mild depression) 7 (Mild depression) 8 (Mild depression) 7 (Mild depression) 6 (Mild depression)   PHQ-9 Total Score 9 10    10 8    8 7 8 7 6     PROMIS 10-Global Health (all questions and answers displayed):       1/26/2024     9:08 AM 5/1/2024     3:20 PM 5/28/2024     7:47 AM 8/31/2024     9:57 AM   PROMIS 10   In general, would you say your health is: Good Good Good Good   In general, would you say your quality of life is: Good Good Good Good   In general, how would you rate your physical health? Good Good Good Good   In general, how would you rate your mental health, including your mood and your ability to think? Good Fair Good Good   In general, how would you rate your satisfaction with your social activities and relationships? Good Fair Good Fair   In general, please rate how well you carry out your usual social activities and roles Good Fair Good Good   To what extent are you able to carry out your everyday physical activities such as walking, climbing stairs, carrying groceries, or moving a chair? Moderately Moderately Mostly Mostly   In the past 7 days, how often have you been bothered by emotional problems such as feeling anxious, depressed, or irritable? Sometimes Often Sometimes Often   In the past 7 days, how would you rate your fatigue on average? Severe Severe Moderate Severe   In the past 7 days, how would you rate your pain on average, where 0 means no pain, and 10 means worst imaginable pain? 5 4 4 4   In general, would you say your health is: 3 3 3 3   In general, would you say your quality of life is: 3 3 3 3   In general, how would you rate your physical health? 3 3 3 3   In general, how would you rate your mental health, including your mood and your ability to think? 3 2 3 3   In general, how would you rate your satisfaction with your social activities and relationships? 3 2 3 2   In general, please rate how well you carry out your usual social activities and roles.  (This includes activities at home, at work and in your community, and responsibilities as a parent, child, spouse, employee, friend, etc.) 3 2 3 3   To what extent are you able to carry out your everyday physical activities such as walking, climbing stairs, carrying groceries, or moving a chair? 3 3 4 4   In the past 7 days, how often have you been bothered by emotional problems such as feeling anxious, depressed, or irritable? 3 4 3 4   In the past 7 days, how would you rate your fatigue on average? 4 4 3 4   In the past 7 days, how would you rate your pain on average, where 0 means no pain, and 10 means worst imaginable pain? 5 4 4 4   Global Mental Health Score 12 9    9 12 10   Global Physical Health Score 11 11    11 13 12   PROMIS TOTAL - SUBSCORES 23 20    20 25 22         ASSESSMENT: Current Emotional / Mental Status (status of significant symptoms):   Risk status (Self / Other harm or suicidal ideation)   Patient denies current fears or concerns for personal safety.   Patient denies current or recent suicidal ideation or behaviors.   Patient denies current or recent homicidal ideation or behaviors.   Patient denies current or recent self injurious behavior or ideation.   Patient denies other safety concerns.   Patient reports there has been no change in risk factors since their last session.     Patient reports there has been no change in protective factors since their last session.     Recommended that patient call 911 or go to the local ED should there be a change in any of these risk factors.     Appearance:   Appropriate    Eye Contact:   Good    Psychomotor Behavior: Normal    Attitude:   Cooperative  Interested   Orientation:   All   Speech    Rate / Production: Normal     Volume:  Normal    Mood:    Normal   Affect:    Appropriate    Thought Content:  Clear    Thought Form:  Coherent  Logical    Insight:    Fair      Medication Review:     Current Outpatient Medications   Medication Sig Dispense  Refill    acetaminophen (TYLENOL) 500 MG tablet Take 1,000 mg by mouth every 6 hours as needed for mild pain      alcohol swab prep pads Use to swab area of injection/andrzej as directed 100 each 3    aspirin (ASA) 81 MG EC tablet Take 1 tablet (81 mg) by mouth daily      atorvastatin (LIPITOR) 20 MG tablet Take 1 tablet (20 mg) by mouth daily 90 tablet 3    blood glucose (NO BRAND SPECIFIED) test strip Use to test blood sugar 2 times daily or as directed. To accompany: Blood Glucose Monitor Brands: per insurance. 100 strip 6    ciclopirox (PENLAC) 8 % external solution Apply topically daily Apply to affected nails daily.  Remove the residual build up weekly with nail polish remover or an [place] board. 6.6 mL 1    clindamycin (CLEOCIN T) 1 % external solution Apply 10-15 drops to scalp at nighttime before bed 60 mL 11    CONTOUR NEXT TEST test strip USE TO TEST BLOOD SUGAR 1 TIME DAILY OR AS DIRECTED. 100 strip 3    escitalopram (LEXAPRO) 10 MG tablet Take 1 tablet (10 mg) by mouth daily Take along with a 5 mg tab for a total of 15 mg every day 30 tablet 2    escitalopram (LEXAPRO) 5 MG tablet Take 1 tablet (5 mg) by mouth daily Take along with a 10 mg tab for a total of 15 mg every day 30 tablet 2    lisinopril (ZESTRIL) 20 MG tablet Take 1 tablet (20 mg) by mouth daily      magnesium 250 MG tablet Take 1 tablet by mouth daily      Microlet Lancets MISC Use to test blood sugar 1 time daily 100 each 1    Microlet Lancets MISC       MULTIPLE VITAMIN PO Take 1 tablet by mouth daily      omeprazole (PRILOSEC) 40 MG DR capsule Take 1 capsule (40 mg) by mouth daily 90 capsule 3    terbinafine (LAMISIL AT) 1 % external cream Apply topically 2 times daily 12 g 1    thin (NO BRAND SPECIFIED) lancets Use to test blood sugar 2 times daily or as directed. To accompany: Blood Glucose Monitor Brands: per insurance. 1 each 3    tirzepatide (MOUNJARO) 7.5 MG/0.5ML pen Inject 7.5 mg Subcutaneous every 7 days 2 mL 0    vitamin (B  COMPLEX-C) tablet Take 1 tablet by mouth daily      vitamin E 400 units TABS Take 400 Units by mouth daily       No current facility-administered medications for this visit.         Medication Compliance:   Yes     Changes in Health Issues:   None reported     Chemical Use Review:   Substance Use: Chemical use reviewed, no active concerns identified      Tobacco Use: No current tobacco use.      Diagnosis:  1. Moderate episode of recurrent major depressive disorder (H)        Collateral Reports Completed:   Not Applicable    PLAN: (Patient Tasks / Therapist Tasks / Other)  Track anger - who, what, where, when  Have a safe word with your wife for time outs.         Lindsay Ramirez, [facility]                                                     ______________________________________________________________________    Individual Treatment Plan    Patient's Name: Heath Carpenter  YOB: 1970    Date of Creation: 6/17/24  Date Treatment Plan Last Reviewed/Revised: 6/17/24    DSM5 Diagnoses: 296.32 (F33.1) Major Depressive Disorder, Recurrent Episode, Moderate _  Psychosocial / Contextual Factors:  Patient works as a , his wife of 14 years who has MS, has stepson with epilepsy, is dealing with depression, has made lifestyle changes and is open to therapy.   PROMIS (reviewed every 90 days): 5/28/24    Referral / Collaboration:  Referral to another professional/service is not indicated at this time..    Anticipated number of session for this episode of care:  11-20 sessions  Anticipation frequency of session: Weekly  Anticipated Duration of each session: 53 or more minutes  Treatment plan will be reviewed in 90 days or when goals have been changed.       MeasurableTreatment Goal(s) related to diagnosis / functional impairment(s)  Goal-Depression: Client will decrease depressed mood    I will know I've met my goal when I am less depressed.      Objective #A (Client Action)    Client will use identified behavioral  and cognitive skills to challenge negative self talk 90% of the time.  Status: New - Date: 6/20/2024    Intervention(s)  Therapist will provide psychoeducation, behavioral activation, and cognitive restructuring.      Objective #B  Client will complete at least 10 minutes of ACE activities (e.g.Achievement, Closeness, and Enjoyment) or other Behavioral Activation goals per day.    Status: New - Date: 6/20/2024    Intervention(s)  Therapist will provide psychoeducation, behavioral activation, and cognitive restructuring.      Objective #C  Client will exercise 30 minutes 36 times in the next 12 weeks.  Status: New - Date: 6/20/2024    Intervention(s)  Therapist will provide psychoeducation, behavioral activation, and cognitive restructuring.      Patient has reviewed and agreed to the above plan.      Lindsay Ramirez [facility]  June 20, 2024   Answers submitted by the patient for this visit:  Patient Health Questionnaire (Submitted on 7/24/2024)  If you checked off any problems, how difficult have these problems made it for you to do your work, take care of things at home, or get along with other people?: Somewhat difficult  PHQ9 TOTAL SCORE: 7    Answers submitted by the patient for this visit:  Patient Health Questionnaire (Submitted on 9/3/2024)  If you checked off any problems, how difficult have these problems made it for you to do your work, take care of things at home, or get along with other people?: Somewhat difficult  PHQ9 TOTAL SCORE: 6

## 2024-09-05 ENCOUNTER — MYC REFILL (OUTPATIENT)
Dept: PODIATRY | Facility: CLINIC | Age: 54
End: 2024-09-05
Payer: COMMERCIAL

## 2024-09-05 ENCOUNTER — MYC REFILL (OUTPATIENT)
Dept: FAMILY MEDICINE | Facility: CLINIC | Age: 54
End: 2024-09-05
Payer: COMMERCIAL

## 2024-09-05 DIAGNOSIS — E11.65 TYPE 2 DIABETES MELLITUS WITH HYPERGLYCEMIA, WITHOUT LONG-TERM CURRENT USE OF INSULIN (H): ICD-10-CM

## 2024-09-05 DIAGNOSIS — M79.674 PAIN DUE TO ONYCHOMYCOSIS OF TOENAILS OF BOTH FEET: ICD-10-CM

## 2024-09-05 DIAGNOSIS — E11.43 TYPE II DIABETES MELLITUS WITH PERIPHERAL AUTONOMIC NEUROPATHY (H): ICD-10-CM

## 2024-09-05 DIAGNOSIS — B35.1 PAIN DUE TO ONYCHOMYCOSIS OF TOENAILS OF BOTH FEET: ICD-10-CM

## 2024-09-05 DIAGNOSIS — M79.675 PAIN DUE TO ONYCHOMYCOSIS OF TOENAILS OF BOTH FEET: ICD-10-CM

## 2024-09-06 RX ORDER — CICLOPIROX 80 MG/ML
SOLUTION TOPICAL DAILY
Qty: 6.6 ML | Refills: 1 | Status: SHIPPED | OUTPATIENT
Start: 2024-09-06

## 2024-09-06 RX ORDER — GLUCOSAMINE HCL/CHONDROITIN SU 500-400 MG
CAPSULE ORAL
Qty: 100 EACH | Refills: 3 | Status: SHIPPED | OUTPATIENT
Start: 2024-09-06

## 2024-09-06 NOTE — TELEPHONE ENCOUNTER
Spoke with patient who states he just completed his 4th dose of mounjaro 5 mg, is titrating up to next dose of 7.5 mg per MD order. Patient states his BG levels on average are 110. Has follow up appointment with Dr. Goodwin on 11/9/24 for diabetic follow up.     Prescription approved per [facility] Refill Protocol.  Julie Behrendt RN

## 2024-09-14 ENCOUNTER — MYC MEDICAL ADVICE (OUTPATIENT)
Dept: FAMILY MEDICINE | Facility: CLINIC | Age: 54
End: 2024-09-14
Payer: COMMERCIAL

## 2024-09-14 DIAGNOSIS — E11.65 TYPE 2 DIABETES MELLITUS WITH HYPERGLYCEMIA, WITHOUT LONG-TERM CURRENT USE OF INSULIN (H): Primary | ICD-10-CM

## 2024-09-27 ENCOUNTER — MYC MEDICAL ADVICE (OUTPATIENT)
Dept: FAMILY MEDICINE | Facility: CLINIC | Age: 54
End: 2024-09-27
Payer: COMMERCIAL

## 2024-09-27 DIAGNOSIS — E11.43 TYPE II DIABETES MELLITUS WITH PERIPHERAL AUTONOMIC NEUROPATHY (H): Primary | ICD-10-CM

## 2024-09-27 RX ORDER — LANCETS
EACH MISCELLANEOUS
Qty: 102 EACH | Refills: 5 | Status: SHIPPED | OUTPATIENT
Start: 2024-09-27

## 2024-09-27 RX ORDER — LANCING DEVICE/LANCETS
KIT MISCELLANEOUS
Qty: 1 EACH | Refills: 0 | Status: SHIPPED | OUTPATIENT
Start: 2024-09-27

## 2024-09-27 RX ORDER — GLUCOSAMINE HCL/CHONDROITIN SU 500-400 MG
CAPSULE ORAL
Qty: 100 EACH | Refills: 6 | Status: SHIPPED | OUTPATIENT
Start: 2024-09-27

## 2024-09-27 NOTE — TELEPHONE ENCOUNTER
Pls see MyChart message below.     Lab Results   Component Value Date    A1C 6.2 04/23/2024    A1C 6.7 10/19/2023    A1C 6.1 06/13/2023    A1C 6.1 03/01/2023    A1C 6.2 09/06/2022    A1C 6.4 06/18/2021    A1C 6.6 01/29/2021    A1C 6.7 10/27/2020     Last office visit: 5/28/2024 ; last virtual visit: Visit date not found with prescribing provider:     Future Office Visit:   Next 5 appointments (look out 90 days)      Oct 09, 2024 2:30 PM  (Arrive by 2:10 PM)  Adult Preventative Visit with Maria Antonia Goodwin MD  [facility] ([facility] ) [address] [place] [id number]  [phone]           Julie Behrendt RN

## 2024-10-08 SDOH — HEALTH STABILITY: PHYSICAL HEALTH: ON AVERAGE, HOW MANY MINUTES DO YOU ENGAGE IN EXERCISE AT THIS LEVEL?: 30 MIN

## 2024-10-08 SDOH — HEALTH STABILITY: PHYSICAL HEALTH: ON AVERAGE, HOW MANY DAYS PER WEEK DO YOU ENGAGE IN MODERATE TO STRENUOUS EXERCISE (LIKE A BRISK WALK)?: 3 DAYS

## 2024-10-08 ASSESSMENT — ANXIETY QUESTIONNAIRES
4. TROUBLE RELAXING: SEVERAL DAYS
3. WORRYING TOO MUCH ABOUT DIFFERENT THINGS: NOT AT ALL
5. BEING SO RESTLESS THAT IT IS HARD TO SIT STILL: NOT AT ALL
GAD7 TOTAL SCORE: 4
IF YOU CHECKED OFF ANY PROBLEMS ON THIS QUESTIONNAIRE, HOW DIFFICULT HAVE THESE PROBLEMS MADE IT FOR YOU TO DO YOUR WORK, TAKE CARE OF THINGS AT HOME, OR GET ALONG WITH OTHER PEOPLE: SOMEWHAT DIFFICULT
GAD7 TOTAL SCORE: 4
6. BECOMING EASILY ANNOYED OR IRRITABLE: MORE THAN HALF THE DAYS
7. FEELING AFRAID AS IF SOMETHING AWFUL MIGHT HAPPEN: NOT AT ALL
2. NOT BEING ABLE TO STOP OR CONTROL WORRYING: NOT AT ALL
GAD7 TOTAL SCORE: 4
1. FEELING NERVOUS, ANXIOUS, OR ON EDGE: SEVERAL DAYS
8. IF YOU CHECKED OFF ANY PROBLEMS, HOW DIFFICULT HAVE THESE MADE IT FOR YOU TO DO YOUR WORK, TAKE CARE OF THINGS AT HOME, OR GET ALONG WITH OTHER PEOPLE?: SOMEWHAT DIFFICULT
7. FEELING AFRAID AS IF SOMETHING AWFUL MIGHT HAPPEN: NOT AT ALL

## 2024-10-08 ASSESSMENT — PATIENT HEALTH QUESTIONNAIRE - PHQ9
10. IF YOU CHECKED OFF ANY PROBLEMS, HOW DIFFICULT HAVE THESE PROBLEMS MADE IT FOR YOU TO DO YOUR WORK, TAKE CARE OF THINGS AT HOME, OR GET ALONG WITH OTHER PEOPLE: SOMEWHAT DIFFICULT
SUM OF ALL RESPONSES TO PHQ QUESTIONS 1-9: 9
SUM OF ALL RESPONSES TO PHQ QUESTIONS 1-9: 9

## 2024-10-08 ASSESSMENT — SOCIAL DETERMINANTS OF HEALTH (SDOH): HOW OFTEN DO YOU GET TOGETHER WITH FRIENDS OR RELATIVES?: ONCE A WEEK

## 2024-10-09 ENCOUNTER — OFFICE VISIT (OUTPATIENT)
Dept: FAMILY MEDICINE | Facility: CLINIC | Age: 54
End: 2024-10-09
Payer: COMMERCIAL

## 2024-10-09 VITALS
SYSTOLIC BLOOD PRESSURE: 130 MMHG | HEIGHT: 74 IN | TEMPERATURE: 98.2 F | RESPIRATION RATE: 14 BRPM | BODY MASS INDEX: 40.43 KG/M2 | HEART RATE: 80 BPM | OXYGEN SATURATION: 98 % | DIASTOLIC BLOOD PRESSURE: 88 MMHG | WEIGHT: 315 LBS

## 2024-10-09 DIAGNOSIS — R39.11 URINARY HESITANCY: ICD-10-CM

## 2024-10-09 DIAGNOSIS — E11.43 TYPE II DIABETES MELLITUS WITH PERIPHERAL AUTONOMIC NEUROPATHY (H): Primary | ICD-10-CM

## 2024-10-09 DIAGNOSIS — F33.0 MILD EPISODE OF RECURRENT MAJOR DEPRESSIVE DISORDER (H): ICD-10-CM

## 2024-10-09 DIAGNOSIS — I10 HYPERTENSION GOAL BP (BLOOD PRESSURE) < 140/90: ICD-10-CM

## 2024-10-09 DIAGNOSIS — Z00.00 ROUTINE GENERAL MEDICAL EXAMINATION AT A HEALTH CARE FACILITY: ICD-10-CM

## 2024-10-09 DIAGNOSIS — E78.5 HYPERLIPIDEMIA LDL GOAL <100: ICD-10-CM

## 2024-10-09 LAB
ALBUMIN SERPL BCG-MCNC: 4.5 G/DL (ref 3.5–5.2)
ALP SERPL-CCNC: 74 U/L (ref 40–150)
ALT SERPL W P-5'-P-CCNC: 49 U/L (ref 0–70)
ANION GAP SERPL CALCULATED.3IONS-SCNC: 15 MMOL/L (ref 7–15)
AST SERPL W P-5'-P-CCNC: 34 U/L (ref 0–45)
BILIRUB SERPL-MCNC: 0.4 MG/DL
BUN SERPL-MCNC: 12.7 MG/DL (ref 6–20)
CALCIUM SERPL-MCNC: 9.3 MG/DL (ref 8.8–10.4)
CHLORIDE SERPL-SCNC: 99 MMOL/L (ref 98–107)
CHOLEST SERPL-MCNC: 177 MG/DL
CREAT SERPL-MCNC: 0.86 MG/DL (ref 0.67–1.17)
EGFRCR SERPLBLD CKD-EPI 2021: >90 ML/MIN/1.73M2
EST. AVERAGE GLUCOSE BLD GHB EST-MCNC: 134 MG/DL
FASTING STATUS PATIENT QL REPORTED: YES
FASTING STATUS PATIENT QL REPORTED: YES
GLUCOSE SERPL-MCNC: 94 MG/DL (ref 70–99)
HBA1C MFR BLD: 6.3 % (ref 0–5.6)
HCO3 SERPL-SCNC: 24 MMOL/L (ref 22–29)
HDLC SERPL-MCNC: 37 MG/DL
LDLC SERPL CALC-MCNC: 109 MG/DL
MAGNESIUM SERPL-MCNC: 1.9 MG/DL (ref 1.7–2.3)
NONHDLC SERPL-MCNC: 140 MG/DL
POTASSIUM SERPL-SCNC: 3.9 MMOL/L (ref 3.4–5.3)
PROT SERPL-MCNC: 7.8 G/DL (ref 6.4–8.3)
PSA SERPL DL<=0.01 NG/ML-MCNC: 0.92 NG/ML (ref 0–3.5)
SODIUM SERPL-SCNC: 138 MMOL/L (ref 135–145)
TRIGL SERPL-MCNC: 156 MG/DL

## 2024-10-09 PROCEDURE — 83735 ASSAY OF MAGNESIUM: CPT | Performed by: FAMILY MEDICINE

## 2024-10-09 PROCEDURE — 83036 HEMOGLOBIN GLYCOSYLATED A1C: CPT | Performed by: FAMILY MEDICINE

## 2024-10-09 PROCEDURE — 80061 LIPID PANEL: CPT | Performed by: FAMILY MEDICINE

## 2024-10-09 PROCEDURE — 91320 SARSCV2 VAC 30MCG TRS-SUC IM: CPT | Performed by: FAMILY MEDICINE

## 2024-10-09 PROCEDURE — 99396 PREV VISIT EST AGE 40-64: CPT | Mod: 25 | Performed by: FAMILY MEDICINE

## 2024-10-09 PROCEDURE — G0103 PSA SCREENING: HCPCS | Performed by: FAMILY MEDICINE

## 2024-10-09 PROCEDURE — 96127 BRIEF EMOTIONAL/BEHAV ASSMT: CPT | Performed by: FAMILY MEDICINE

## 2024-10-09 PROCEDURE — 90480 ADMN SARSCOV2 VAC 1/ONLY CMP: CPT | Performed by: FAMILY MEDICINE

## 2024-10-09 PROCEDURE — 99214 OFFICE O/P EST MOD 30 MIN: CPT | Mod: 25 | Performed by: FAMILY MEDICINE

## 2024-10-09 PROCEDURE — 90471 IMMUNIZATION ADMIN: CPT | Performed by: FAMILY MEDICINE

## 2024-10-09 PROCEDURE — 90673 RIV3 VACCINE NO PRESERV IM: CPT | Performed by: FAMILY MEDICINE

## 2024-10-09 PROCEDURE — 80053 COMPREHEN METABOLIC PANEL: CPT | Performed by: FAMILY MEDICINE

## 2024-10-09 PROCEDURE — 36415 COLL VENOUS BLD VENIPUNCTURE: CPT | Performed by: FAMILY MEDICINE

## 2024-10-09 ASSESSMENT — PAIN SCALES - GENERAL: PAINLEVEL: NO PAIN (0)

## 2024-10-09 NOTE — PROGRESS NOTES
"Preventive Care Visit  [facility]  Maria Antonia Goodwin MD, Family Medicine  Oct 9, 2024      Assessment & Plan     Type II diabetes mellitus with peripheral autonomic neuropathy (H)    Lots of side affects on GLP1 no weight loss   Will stop   Monitor BS and will add meds depending on readings   - HEMOGLOBIN A1C; Future  - Lipid panel reflex to direct LDL Non-fasting; Future  - Comprehensive metabolic panel; Future  - Magnesium; Future  - HEMOGLOBIN A1C  - Lipid panel reflex to direct LDL Non-fasting  - Comprehensive metabolic panel  - Magnesium    Urinary hesitancy  Start evaluation with PSA   - PSA, screen; Future  - PSA, screen    Routine general medical examination at a health care facility      Hyperlipidemia LDL goal <100  Adjust meds as indicated by above labs.     Hypertension goal BP (blood pressure) < 140/90  Stable no change in treatment plan.     Mild episode of recurrent major depressive disorder (H)  Not well controlled now   Will have him continue with therapy   Rec couples therapy     Patient has been advised of split billing requirements and indicates understanding: Yes        BMI  Estimated body mass index is 43.47 kg/m  as calculated from the following:    Height as of this encounter: 1.867 m (6' 1.5\").    Weight as of this encounter: 151.5 kg (334 lb).       Counseling  Appropriate preventive services were addressed with this patient via screening, questionnaire, or discussion as appropriate for fall prevention, nutrition, physical activity, Tobacco-use cessation, social engagement, weight loss and cognition.  Checklist reviewing preventive services available has been given to the patient.  Reviewed patient's diet, addressing concerns and/or questions.   He is at risk for lack of exercise and has been provided with information to increase physical activity for the benefit of his well-being.   The patient's PHQ-9 score is consistent with mild depression. He was provided " with information regarding depression.           Sabi Joe is a 54 year old, presenting for the following:  Physical        10/9/2024     1:53 PM   Additional Questions   Roomed by Fern WHITAKER   Accompanied by Wife         10/9/2024     1:53 PM   Patient Reported Additional Medications   Patient reports taking the following new medications .        Health Care Directive  Patient does not have a Health Care Directive or Living Will: did not discuss     HPI      Diabetes Follow-up    How often are you checking your blood sugar? Two times daily  Blood sugar testing frequency justification:  Adjustment of medication(s)  What time of day are you checking your blood sugars (select all that apply)?  Before and after meals  Have you had any blood sugars above 200?  No  Have you had any blood sugars below 70?  No  What symptoms do you notice when your blood sugar is low?  None  What concerns do you have today about your diabetes? Other: neuropathy seems to be bothersome, gets irritable at times even though sugars are ok    Do you have any of these symptoms? (Select all that apply)  Numbness in feet and Burning in feet    Wt Readings from Last 4 Encounters:   10/09/24 (!) 151.5 kg (334 lb)   05/28/24 148.8 kg (328 lb)   04/23/24 (!) 151.5 kg (334 lb)   10/29/23 (!) 153.3 kg (338 lb)             Hyperlipidemia Follow-Up    Are you regularly taking any medication or supplement to lower your cholesterol?   Yes- Lipitor  Are you having muscle aches or other side effects that you think could be caused by your cholesterol lowering medication?  Yes- does get mireille horses at night    Hypertension Follow-up    Do you check your blood pressure regularly outside of the clinic? Yes   Are you following a low salt diet? Yes  Are your blood pressures ever more than 140 on the top number (systolic) OR more   than 90 on the bottom number (diastolic), for example 140/90? No    BP Readings from Last 2 Encounters:   10/09/24 130/88    05/28/24 124/86     Hemoglobin A1C (%)   Date Value   10/09/2024 6.3 (H)   04/23/2024 6.2 (H)   06/18/2021 6.4 (H)   01/29/2021 6.6 (H)     LDL Cholesterol Calculated (mg/dL)   Date Value   10/09/2024 109 (H)   10/19/2023 61   06/18/2021 125 (H)   01/29/2021 128 (H)         Depression   How are you doing with your depression since your last visit? Improved recently increased lexapro  Are you having other symptoms that might be associated with depression? Yes:  irritability  Have you had a significant life event?  No   Are you feeling anxious or having panic attacks?   No  Do you have any concerns with your use of alcohol or other drugs? No    Social History     Tobacco Use    Smoking status: Never    Smokeless tobacco: Never   Vaping Use    Vaping status: Never Used   Substance Use Topics    Alcohol use: Yes     Comment: rare    Drug use: No         9/3/2024     1:51 PM 9/19/2024    12:02 PM 10/8/2024     5:42 PM   PHQ   PHQ-9 Total Score 6 7 9   Q9: Thoughts of better off dead/self-harm past 2 weeks Not at all Not at all Not at all         1/22/2024    12:07 AM 4/23/2024     8:28 AM 10/8/2024     5:43 PM   RODRIGUE-7 SCORE   Total Score 5 (mild anxiety)  4 (minimal anxiety)   Total Score 5 4 4         Suicide Assessment Five-step Evaluation and Treatment (SAFE-T)              10/8/2024   General Health   How would you rate your overall physical health? Good   Feel stress (tense, anxious, or unable to sleep) Not at all            10/8/2024   Nutrition   Three or more servings of calcium each day? Yes   Diet: Regular (no restrictions)    Breakfast skipped   How many servings of fruit and vegetables per day? (!) 2-3   How many sweetened beverages each day? 0-1       Multiple values from one day are sorted in reverse-chronological order         10/8/2024   Exercise   Days per week of moderate/strenous exercise 3 days   Average minutes spent exercising at this level 30 min            10/8/2024   Social Factors   Frequency  of gathering with friends or relatives Once a week   Worry food won't last until get money to buy more No   Food not last or not have enough money for food? No   Do you have housing? (Housing is defined as stable permanent housing and does not include staying ouside in a car, in a tent, in an abandoned building, in an overnight shelter, or couch-surfing.) Yes   Are you worried about losing your housing? No   Lack of transportation? No   Unable to get utilities (heat,electricity)? No            10/8/2024   Fall Risk   Fallen 2 or more times in the past year? No   Trouble with walking or balance? No             10/8/2024   Dental   Dentist two times every year? Yes            10/8/2024   TB Screening   Were you born outside of the [place]? No          Today's PHQ-9 Score:       10/8/2024     5:42 PM   PHQ-9 SCORE   PHQ-9 Total Score MyChart 9 (Mild depression)   PHQ-9 Total Score 9         10/8/2024   Substance Use   Alcohol more than 3/day or more than 7/wk No   Do you use any other substances recreationally? No        Social History     Tobacco Use    Smoking status: Never    Smokeless tobacco: Never   Vaping Use    Vaping status: Never Used   Substance Use Topics    Alcohol use: Yes     Comment: rare    Drug use: No           10/8/2024   STI Screening   New sexual partner(s) since last STI/HIV test? No      Last PSA:   Prostate Specific Antigen Screen   Date Value Ref Range Status   10/09/2024 0.92 0.00 - 3.50 ng/mL Final     ASCVD Risk   The 10-year ASCVD risk score (Chel ZAMARRIPA, et al., 2019) is: 15.3%    Values used to calculate the score:      Age: 54 years      Sex: Male      Is Non- : No      Diabetic: Yes      Tobacco smoker: No      Systolic Blood Pressure: 130 mmHg      Is BP treated: Yes      HDL Cholesterol: 37 mg/dL      Total Cholesterol: 177 mg/dL           Reviewed and updated as needed this visit by Provider   Tobacco  Allergies  Meds  Problems  Med Hx  Surg Hx  Fam  "Hx                  Review of Systems  Constitutional, HEENT, cardiovascular, pulmonary, gi and gu systems are negative, except as otherwise noted.     Objective    Exam  /88   Pulse 80   Temp 98.2  F (36.8  C) (Tympanic)   Resp 14   Ht 1.867 m (6' 1.5\")   Wt (!) 151.5 kg (334 lb)   SpO2 98%   BMI 43.47 kg/m     Estimated body mass index is 43.47 kg/m  as calculated from the following:    Height as of this encounter: 1.867 m (6' 1.5\").    Weight as of this encounter: 151.5 kg (334 lb).    Physical Exam  GENERAL: alert and no distress  EYES: Eyes grossly normal to inspection, PERRL and conjunctivae and sclerae normal  HENT: ear canals and TM's normal, nose and mouth without ulcers or lesions  NECK: no adenopathy, no asymmetry, masses, or scars  RESP: lungs clear to auscultation - no rales, rhonchi or wheezes  CV: regular rate and rhythm, normal S1 S2, no S3 or S4, no murmur, click or rub, no peripheral edema  ABDOMEN: soft, nontender, no hepatosplenomegaly, no masses and bowel sounds normal  MS: no gross musculoskeletal defects noted, no edema  SKIN: no suspicious lesions or rashes  NEURO: Normal strength and tone, mentation intact and speech normal  PSYCH: mentation appears normal, affect normal/bright        Signed Electronically by: Maria Antonia Goodwin MD    Answers submitted by the patient for this visit:  Patient Health Questionnaire (Submitted on 10/8/2024)  If you checked off any problems, how difficult have these problems made it for you to do your work, take care of things at home, or get along with other people?: Somewhat difficult  PHQ9 TOTAL SCORE: 9  Patient Health Questionnaire (G7) (Submitted on 10/8/2024)  RODRIGUE 7 TOTAL SCORE: 4    "

## 2024-10-09 NOTE — NURSING NOTE
"Chief Complaint   Patient presents with    Physical     /88   Pulse 80   Temp 98.2  F (36.8  C) (Tympanic)   Resp 14   Ht 1.867 m (6' 1.5\")   Wt (!) 151.5 kg (334 lb)   SpO2 98%   BMI 43.47 kg/m   Estimated body mass index is 43.47 kg/m  as calculated from the following:    Height as of this encounter: 1.867 m (6' 1.5\").    Weight as of this encounter: 151.5 kg (334 lb).  Patient presents to the clinic using No DME      Health Maintenance that is potentially due pending provider review:    Health Maintenance Due   Topic Date Due    HEPATITIS B IMMUNIZATION (1 of 3 - 19+ 3-dose series) Never done    DIABETIC FOOT EXAM  06/18/2022    YEARLY PREVENTIVE VISIT  03/01/2024    A1C  07/23/2024    INFLUENZA VACCINE (1) 09/01/2024    COVID-19 Vaccine (4 - 2024-25 season) 09/01/2024    LIPID  10/19/2024        Will get flu and covid          "

## 2024-10-10 NOTE — PATIENT INSTRUCTIONS
Patient Education   Preventive Care Advice   This is general advice given by our system to help you stay healthy. However, your care team may have specific advice just for you. Please talk to your care team about your preventive care needs.  Nutrition  Eat 5 or more servings of fruits and vegetables each day.  Try wheat bread, brown rice and whole grain pasta (instead of white bread, rice, and pasta).  Get enough calcium and vitamin D. Check the label on foods and aim for 100% of the RDA (recommended daily allowance).  Lifestyle  Exercise at least 150 minutes each week  (30 minutes a day, 5 days a week).  Do muscle strengthening activities 2 days a week. These help control your weight and prevent disease.  No smoking.  Wear sunscreen to prevent skin cancer.  Have a dental exam and cleaning every 6 months.  Yearly exams  See your health care team every year to talk about:  Any changes in your health.  Any medicines your care team has prescribed.  Preventive care, family planning, and ways to prevent chronic diseases.  Shots (vaccines)   HPV shots (up to age 26), if you've never had them before.  Hepatitis B shots (up to age 59), if you've never had them before.  COVID-19 shot: Get this shot when it's due.  Flu shot: Get a flu shot every year.  Tetanus shot: Get a tetanus shot every 10 years.  Pneumococcal, hepatitis A, and RSV shots: Ask your care team if you need these based on your risk.  Shingles shot (for age 50 and up)  General health tests  Diabetes screening:  Starting at age 35, Get screened for diabetes at least every 3 years.  If you are younger than age 35, ask your care team if you should be screened for diabetes.  Cholesterol test: At age 39, start having a cholesterol test every 5 years, or more often if advised.  Bone density scan (DEXA): At age 50, ask your care team if you should have this scan for osteoporosis (brittle bones).  Hepatitis C: Get tested at least once in your life.  STIs (sexually  transmitted infections)  Before age 24: Ask your care team if you should be screened for STIs.  After age 24: Get screened for STIs if you're at risk. You are at risk for STIs (including HIV) if:  You are sexually active with more than one person.  You don't use condoms every time.  You or a partner was diagnosed with a sexually transmitted infection.  If you are at risk for HIV, ask about PrEP medicine to prevent HIV.  Get tested for HIV at least once in your life, whether you are at risk for HIV or not.  Cancer screening tests  Cervical cancer screening: If you have a cervix, begin getting regular cervical cancer screening tests starting at age 21.  Breast cancer scan (mammogram): If you've ever had breasts, begin having regular mammograms starting at age 40. This is a scan to check for breast cancer.  Colon cancer screening: It is important to start screening for colon cancer at age 45.  Have a colonoscopy test every 10 years (or more often if you're at risk) Or, ask your provider about stool tests like a FIT test every year or Cologuard test every 3 years.  To learn more about your testing options, visit:   .  For help making a decision, visit:   https://bit.ly/la62828.  Prostate cancer screening test: If you have a prostate, ask your care team if a prostate cancer screening test (PSA) at age 55 is right for you.  Lung cancer screening: If you are a current or former smoker ages 50 to 80, ask your care team if ongoing lung cancer screenings are right for you.  For informational purposes only. Not to replace the advice of your health care provider. Copyright   2023 [facility]. All rights reserved. Clinically reviewed by the [facility]. [employer] [id number] - REV 01/24.  Learning About Depression Screening  What is depression screening?  Depression screening is a way to see if you have depression symptoms. It may be done by a doctor or counselor. It's often part of a routine  "checkup. That's because your mental health is just as important as your physical health.  Depression is a mental health condition that affects how you feel, think, and act. You may:  Have less energy.  Lose interest in your daily activities.  Feel sad and grouchy for a long time.  Depression is very common. It affects people of all ages.  Many things can lead to depression. Some people become depressed after they have a stroke or find out they have a major illness like cancer or heart disease. The death of a loved one or a breakup may lead to depression. It can run in families. Most experts believe that a combination of inherited genes and stressful life events can cause it.  What happens during screening?  You may be asked to fill out a form about your depression symptoms. You and the doctor will discuss your answers. The doctor may ask you more questions to learn more about how you think, act, and feel.  What happens after screening?  If you have symptoms of depression, your doctor will talk to you about your options.  Doctors usually treat depression with medicines or counseling. Often, combining the two works best. Many people don't get help because they think that they'll get over the depression on their own. But people with depression may not get better unless they get treatment.  The cause of depression is not well understood. There may be many factors involved. But if you have depression, it's not your fault.  A serious symptom of depression is thinking about death or suicide. If you or someone you care about talks about this or about feeling hopeless, get help right away.  It's important to know that depression can be treated. Medicine, counseling, and self-care may help.  Where can you learn more?  Go to https://www.eZelleron.net/patiented  Enter T185 in the search box to learn more about \"Learning About Depression Screening.\"  Current as of: June 24, 2023  Content Version: 14.2 2024 [employer].   Care instructions adapted under license by your healthcare professional. If you have questions about a medical condition or this instruction, always ask your healthcare professional. [employer] disclaims any warranty or liability for your use of this information.

## 2024-10-29 ENCOUNTER — VIRTUAL VISIT (OUTPATIENT)
Dept: PSYCHOLOGY | Facility: CLINIC | Age: 54
End: 2024-10-29
Payer: COMMERCIAL

## 2024-10-29 DIAGNOSIS — F33.1 MODERATE EPISODE OF RECURRENT MAJOR DEPRESSIVE DISORDER (H): Primary | ICD-10-CM

## 2024-10-29 PROCEDURE — 90837 PSYTX W PT 60 MINUTES: CPT | Mod: 95 | Performed by: COUNSELOR

## 2024-10-29 ASSESSMENT — PATIENT HEALTH QUESTIONNAIRE - PHQ9
10. IF YOU CHECKED OFF ANY PROBLEMS, HOW DIFFICULT HAVE THESE PROBLEMS MADE IT FOR YOU TO DO YOUR WORK, TAKE CARE OF THINGS AT HOME, OR GET ALONG WITH OTHER PEOPLE: SOMEWHAT DIFFICULT
SUM OF ALL RESPONSES TO PHQ QUESTIONS 1-9: 5
SUM OF ALL RESPONSES TO PHQ QUESTIONS 1-9: 5

## 2024-10-29 NOTE — PROGRESS NOTES
[facility]                                     Progress Note    Patient Name: Heath Carpenter  Date: 10/29/24         Service Type: Individual      Session Start Time: 2:00p  Session End Time: 2:55p     Session Length: 55 minutes    Session #: 7    Attendees: Client attended alone    Service Modality:  Video Visit:      Provider verified identity through the following two step process.  Patient provided:  Patient     Telemedicine Visit: The patient's condition can be safely assessed and treated via synchronous audio and visual telemedicine encounter.      Reason for Telemedicine Visit: Patient has requested telehealth visit    Originating Site (Patient Location): Patient's home    Distant Site (Provider Location): Provider Remote Setting- Home Office    Consent:  The patient/guardian has verbally consented to: the potential risks and benefits of telemedicine (video visit) versus in person care; bill my insurance or make self-payment for services provided; and responsibility for payment of non-covered services.     Patient would like the video invitation sent by:  My Chart    Mode of Communication:  Video Conference via Amwell    Distant Location (Provider):  Off-site    As the provider I attest to compliance with applicable laws and regulations related to telemedicine.    DATA  Interactive Complexity: No  Crisis: No        Progress Since Last Session (Related to Symptoms / Goals / Homework):   Symptoms: Improving pt reported decreased depression symptoms and more hope for the future    Homework: Achieved / completed to satisfaction      Episode of Care Goals: Satisfactory progress - PREPARATION (Decided to change - considering how); Intervened by negotiating a change plan and determining options / strategies for behavior change, identifying triggers, exploring social supports, and working towards setting a date to begin behavior change     Current / Ongoing Stressors and Concerns:  Today, pt  reported things had been okay since our last visit. Pt reported his daughter moved to [place] so he and his wife took a vacation out there after helping her move. Pt reported things are going better with his step son now that he is in school. Pt reported he still has stays where he feels irritable.Pt reported feeling as though his wife wants him to fix for her instead of help her and this makes him feel taken advantage of. Pt reported also feeling criticized by her. Pt and writer discussed communication tools he can use. Writer inquired if pt would like a referral for marriage therapy in order to better assist in communication. Pt will ask his wife if she would like to join us for a session to see what it is like and writer will make a referral as needed following this. Pt reported he was on monjaro but was feeling really sick so they got off this. Pt reported his diabetes is well managed right now but he does not feel energized.      Treatment Objective(s) Addressed in This Session:   identify patterns of escalation  (i.e. tightness in chest, flushed face, increased heart rate, clenched hands, etc.)  Increase interest, engagement, and pleasure in doing things       Intervention:  Skills training  Explore skills useful to client in current situation  Skills include assertiveness, communication, conflict management, problem-solving, relaxation, etc.     Solution-Focused Therapy  Explore patterns in patient's relationships and discussed options for new behaviors  Explore patterns in patient's actions and choices and discussed options for new behaviors     Cognitive-behavioral Therapy  Discuss common cognitive distortions, identified them in patient's life  Explore ways to challenge, replace, and act against these cognitions     Mindfulness-Based Strategies  Discuss skills based on development and application of mindfulness  Skills drawn from dialectical behavior therapy, mindfulness-based stress reduction,  mindfulness-based cognitive therapy, etc.      Assessments completed prior to visit:  The following assessments were completed by patient for this visit:  PHQ9:       6/13/2024    10:46 AM 7/19/2024     1:42 PM 7/24/2024     7:26 AM 9/3/2024     1:51 PM 9/19/2024    12:02 PM 10/8/2024     5:42 PM 10/29/2024     1:59 PM   PHQ-9 SCORE   PHQ-9 Total Score Gabbyhart 7 (Mild depression) 8 (Mild depression) 7 (Mild depression) 6 (Mild depression) 7 (Mild depression) 9 (Mild depression) 5 (Mild depression)   PHQ-9 Total Score 7 8 7 6 7 9 5        Patient-reported     PROMIS 10-Global Health (all questions and answers displayed):       1/26/2024     9:08 AM 5/1/2024     3:20 PM 5/28/2024     7:47 AM 8/31/2024     9:57 AM 9/14/2024    11:51 AM 9/26/2024     7:17 PM 10/25/2024     3:48 PM   PROMIS 10   In general, would you say your health is: Good Good Good Good Good Very good Good   In general, would you say your quality of life is: Good Good Good Good Good Good Good   In general, how would you rate your physical health? Good Good Good Good Good Good Good   In general, how would you rate your mental health, including your mood and your ability to think? Good Fair Good Good Good Good Good   In general, how would you rate your satisfaction with your social activities and relationships? Good Fair Good Fair Good Good Good   In general, please rate how well you carry out your usual social activities and roles Good Fair Good Good Good Good Good   To what extent are you able to carry out your everyday physical activities such as walking, climbing stairs, carrying groceries, or moving a chair? Moderately Moderately Mostly Mostly Mostly Mostly Mostly   In the past 7 days, how often have you been bothered by emotional problems such as feeling anxious, depressed, or irritable? Sometimes Often Sometimes Often Sometimes Sometimes Sometimes   In the past 7 days, how would you rate your fatigue on average? Severe Severe Moderate Severe  Moderate Moderate Moderate   In the past 7 days, how would you rate your pain on average, where 0 means no pain, and 10 means worst imaginable pain? 5 4 4 4 4 6 4   In general, would you say your health is: 3  3 3  3  3  4  3    In general, would you say your quality of life is: 3  3 3  3  3  3  3    In general, how would you rate your physical health? 3  3 3  3  3  3  3    In general, how would you rate your mental health, including your mood and your ability to think? 3  2 3  3  3  3  3    In general, how would you rate your satisfaction with your social activities and relationships? 3  2 3  2  3  3  3    In general, please rate how well you carry out your usual social activities and roles. (This includes activities at home, at work and in your community, and responsibilities as a parent, child, spouse, employee, friend, etc.) 3  2 3  3  3  3  3    To what extent are you able to carry out your everyday physical activities such as walking, climbing stairs, carrying groceries, or moving a chair? 3  3 4  4  4  4  4    In the past 7 days, how often have you been bothered by emotional problems such as feeling anxious, depressed, or irritable? 3  4 3  4  3  3  3    In the past 7 days, how would you rate your fatigue on average? 4  4 3  4  3  3  3    In the past 7 days, how would you rate your pain on average, where 0 means no pain, and 10 means worst imaginable pain? 5  4 4  4  4  6  4    Global Mental Health Score 12 9    9 12 10 12    12    12 12 12    Global Physical Health Score 11 11    11 13 12 13    13    13 13 13    PROMIS TOTAL - SUBSCORES 23 20    20 25 22 25    25    25 25 25        Patient-reported    Multiple values from one day are sorted in reverse-chronological order         ASSESSMENT: Current Emotional / Mental Status (status of significant symptoms):   Risk status (Self / Other harm or suicidal ideation)   Patient denies current fears or concerns for personal safety.   Patient denies current or recent  suicidal ideation or behaviors.   Patient denies current or recent homicidal ideation or behaviors.   Patient denies current or recent self injurious behavior or ideation.   Patient denies other safety concerns.   Patient reports there has been no change in risk factors since their last session.     Patient reports there has been no change in protective factors since their last session.     Recommended that patient call 911 or go to the local ED should there be a change in any of these risk factors.     Appearance:   Appropriate    Eye Contact:   Good    Psychomotor Behavior: Normal    Attitude:   Cooperative  Interested   Orientation:   All   Speech    Rate / Production: Normal     Volume:  Normal    Mood:    Normal   Affect:    Appropriate    Thought Content:  Clear    Thought Form:  Coherent  Logical    Insight:    Fair      Medication Review:     Current Outpatient Medications   Medication Sig Dispense Refill    acetaminophen (TYLENOL) 500 MG tablet Take 1,000 mg by mouth every 6 hours as needed for mild pain      alcohol swab prep pads Use to swab area of injection/andrzej as directed. 100 each 6    alcohol swab prep pads Use to swab area of injection/andrzej as directed 100 each 3    aspirin (ASA) 81 MG EC tablet Take 1 tablet (81 mg) by mouth daily      atorvastatin (LIPITOR) 20 MG tablet Take 1 tablet (20 mg) by mouth daily 90 tablet 3    blood glucose (ACCU-CHEK FASTCLIX) lancing device Lancing device to be used with lancets. 1 each 0    blood glucose (NO BRAND SPECIFIED) test strip Use to test blood sugar 2 times daily or as directed. To accompany: Blood Glucose Monitor Brands: per insurance. 200 strip 1    blood glucose monitoring (ACCU-CHEK FASTCLIX) lancets Use to test blood sugar 2 times daily. 102 each 5    blood glucose monitoring (NO BRAND SPECIFIED) meter device kit Use to test blood sugar 2 times daily 1 kit 0    ciclopirox (PENLAC) 8 % external solution Apply topically daily. Apply to affected nails  daily.  Remove the residual build up weekly with nail polish remover or an everett board. 6.6 mL 1    clindamycin (CLEOCIN T) 1 % external solution Apply 10-15 drops to scalp at nighttime before bed 60 mL 11    escitalopram (LEXAPRO) 10 MG tablet Take 1 tablet (10 mg) by mouth daily Take along with a 5 mg tab for a total of 15 mg every day 30 tablet 2    escitalopram (LEXAPRO) 5 MG tablet Take 1 tablet (5 mg) by mouth daily Take along with a 10 mg tab for a total of 15 mg every day 30 tablet 2    lisinopril (ZESTRIL) 20 MG tablet Take 1 tablet (20 mg) by mouth daily      magnesium 250 MG tablet Take 1 tablet by mouth daily      MULTIPLE VITAMIN PO Take 1 tablet by mouth daily      omeprazole (PRILOSEC) 40 MG DR capsule Take 1 capsule (40 mg) by mouth daily 90 capsule 3    Probiotic Product (PROBIOTIC ADVANCED PO) Take by mouth. Pre/probiotic      terbinafine (LAMISIL AT) 1 % external cream Apply topically 2 times daily 12 g 1    tirzepatide (MOUNJARO) 7.5 MG/0.5ML pen Inject 7.5 mg subcutaneously every 7 days. 2 mL 0    vitamin (B COMPLEX-C) tablet Take 1 tablet by mouth daily      vitamin E 400 units TABS Take 400 Units by mouth daily       No current facility-administered medications for this visit.         Medication Compliance:   Yes     Changes in Health Issues:   None reported     Chemical Use Review:   Substance Use: Chemical use reviewed, no active concerns identified      Tobacco Use: No current tobacco use.      Diagnosis:  1. Moderate episode of recurrent major depressive disorder (H)        Collateral Reports Completed:   Not Applicable    PLAN: (Patient Tasks / Therapist Tasks / Other)  Track anger - who, what, where, when  Have a safe word with your wife for time outs.         Lindsay Ramirez, [facility]                                                     ______________________________________________________________________    Individual Treatment Plan    Patient's Name: Heath Carpenter  Date Of  Birth: 1970    Date of Creation: 6/17/24  Date Treatment Plan Last Reviewed/Revised: 6/17/24    DSM5 Diagnoses: 296.32 (F33.1) Major Depressive Disorder, Recurrent Episode, Moderate _  Psychosocial / Contextual Factors:  Patient works as a , his wife of 14 years who has MS, has stepson with epilepsy, is dealing with depression, has made lifestyle changes and is open to therapy.   PROMIS (reviewed every 90 days): 5/28/24    Referral / Collaboration:  Referral to another professional/service is not indicated at this time..    Anticipated number of session for this episode of care:  11-20 sessions  Anticipation frequency of session: Weekly  Anticipated Duration of each session: 53 or more minutes  Treatment plan will be reviewed in 90 days or when goals have been changed.       MeasurableTreatment Goal(s) related to diagnosis / functional impairment(s)  Goal-Depression: Client will decrease depressed mood    I will know I've met my goal when I am less depressed.      Objective #A (Client Action)    Client will use identified behavioral and cognitive skills to challenge negative self talk 90% of the time.  Status: New - Date: 6/20/2024    Intervention(s)  Therapist will provide psychoeducation, behavioral activation, and cognitive restructuring.      Objective #B  Client will complete at least 10 minutes of ACE activities (e.g.Achievement, Closeness, and Enjoyment) or other Behavioral Activation goals per day.    Status: New - Date: 6/20/2024    Intervention(s)  Therapist will provide psychoeducation, behavioral activation, and cognitive restructuring.      Objective #C  Client will exercise 30 minutes 36 times in the next 12 weeks.  Status: New - Date: 6/20/2024    Intervention(s)  Therapist will provide psychoeducation, behavioral activation, and cognitive restructuring.      Patient has reviewed and agreed to the above plan.      Lindsay Ramirez [facility]  June 20, 2024   Answers submitted by the patient for  this visit:  Patient Health Questionnaire (Submitted on 7/24/2024)  If you checked off any problems, how difficult have these problems made it for you to do your work, take care of things at home, or get along with other people?: Somewhat difficult  PHQ9 TOTAL SCORE: 7    Answers submitted by the patient for this visit:  Patient Health Questionnaire (Submitted on 9/3/2024)  If you checked off any problems, how difficult have these problems made it for you to do your work, take care of things at home, or get along with other people?: Somewhat difficult  PHQ9 TOTAL SCORE: 6    Answers submitted by the patient for this visit:  Patient Health Questionnaire (Submitted on 10/29/2024)  If you checked off any problems, how difficult have these problems made it for you to do your work, take care of things at home, or get along with other people?: Somewhat difficult  PHQ9 TOTAL SCORE: 5

## 2024-11-19 ENCOUNTER — VIRTUAL VISIT (OUTPATIENT)
Dept: PSYCHOLOGY | Facility: CLINIC | Age: 54
End: 2024-11-19
Payer: COMMERCIAL

## 2024-11-19 ENCOUNTER — TELEPHONE (OUTPATIENT)
Dept: ORTHOPEDICS | Facility: CLINIC | Age: 54
End: 2024-11-19
Payer: COMMERCIAL

## 2024-11-19 DIAGNOSIS — F33.1 MODERATE EPISODE OF RECURRENT MAJOR DEPRESSIVE DISORDER (H): Primary | ICD-10-CM

## 2024-11-19 DIAGNOSIS — M17.12 PRIMARY OSTEOARTHRITIS OF LEFT KNEE: Primary | ICD-10-CM

## 2024-11-19 PROCEDURE — 90837 PSYTX W PT 60 MINUTES: CPT | Mod: 95 | Performed by: COUNSELOR

## 2024-11-19 NOTE — TELEPHONE ENCOUNTER
Patient scheduled for appointment on 11/22/24 @ [facility] for discussion of viscosupplementation injection vs steroid injection of left knee.        Euflexxa injection last completed 04/2022.  Patient reports relief for 6+ months    Patient has failed 3 month trial of Pharmacologic Approach (e.g., topical NSAIDs, oral NSAIDs with or without oral proton pump inhibitors, BARRIENTOS-2 inhibitors, topical capsaicin, acetaminophen, tramadol, duloxetine, etc.):  Yes     Patient has completed 3 month trial of Non-Pharmacologic treatments (i.e., physical, psychosocial, or mind-body approach (e.g., exercise-land based or aquatic, physical therapy, leroy chi, yoga, weight management, cognitive behavioral therapy, knee brace or cane, etc).  Yes    Has patient had prior reaction to Synvisc/SynviscOne or any alternative HA product?: Yes    Prior authorization referral for Euflexxa injection place per protocol.    Alexander Jimenez, ATC

## 2024-11-19 NOTE — PROGRESS NOTES
[facility]                                     Progress Note    Patient Name: Heath Carpenter  Date: 24         Service Type: Individual      Session Start Time: 1:00p  Session End Time: 1:55p     Session Length: 55 minutes    Session #: 8    Attendees: Client attended alone    Service Modality:  Video Visit:      Provider verified identity through the following two step process.  Patient provided:  Patient     Telemedicine Visit: The patient's condition can be safely assessed and treated via synchronous audio and visual telemedicine encounter.      Reason for Telemedicine Visit: Patient has requested telehealth visit    Originating Site (Patient Location): Patient's home    Distant Site (Provider Location): Provider Remote Setting- Home Office    Consent:  The patient/guardian has verbally consented to: the potential risks and benefits of telemedicine (video visit) versus in person care; bill my insurance or make self-payment for services provided; and responsibility for payment of non-covered services.     Patient would like the video invitation sent by:  My Chart    Mode of Communication:  Video Conference via Amwell    Distant Location (Provider):  Off-site    As the provider I attest to compliance with applicable laws and regulations related to telemedicine.    DATA  Interactive Complexity: No  Crisis: No        Progress Since Last Session (Related to Symptoms / Goals / Homework):   Symptoms: Improving pt reported decreased depression symptoms and more hope for the future    Homework: Achieved / completed to satisfaction      Episode of Care Goals: Satisfactory progress - PREPARATION (Decided to change - considering how); Intervened by negotiating a change plan and determining options / strategies for behavior change, identifying triggers, exploring social supports, and working towards setting a date to begin behavior change     Current / Ongoing Stressors and Concerns:  Today, pt  "reported things had been okay over the last few weeks. Pt reported some discord with his wife and noted feeling as though she is \"critical of everything I do.\" Pt reported his prior communication style was to stuf things until he exploded. Now, he is trying to address things more rationally as they come up. Pt reported when he does this, he is still faced with her prior communication styles. Pt noted thinking that she doesn't fully trust him. Pt noted they both have experiences from their past in which partners cheated on them. Pt reported that he struggles to understand if some of her behaviors are related to her medical conditions or not and how to respond as a result. We discussed ways he can continue to set boundaries, practice healthy communication skills, and care for himself. We discussed the possibility of marriage therapy. Pt reported he will continue to talk to his wife about this.      Treatment Objective(s) Addressed in This Session:   identify patterns of escalation  (i.e. tightness in chest, flushed face, increased heart rate, clenched hands, etc.)  Increase interest, engagement, and pleasure in doing things       Intervention:  Skills training  Explore skills useful to client in current situation  Skills include assertiveness, communication, conflict management, problem-solving, relaxation, etc.     Solution-Focused Therapy  Explore patterns in patient's relationships and discussed options for new behaviors  Explore patterns in patient's actions and choices and discussed options for new behaviors     Cognitive-behavioral Therapy  Discuss common cognitive distortions, identified them in patient's life  Explore ways to challenge, replace, and act against these cognitions     Mindfulness-Based Strategies  Discuss skills based on development and application of mindfulness  Skills drawn from dialectical behavior therapy, mindfulness-based stress reduction, mindfulness-based cognitive therapy, etc.  "     Assessments completed prior to visit:  The following assessments were completed by patient for this visit:  PHQ9:       6/13/2024    10:46 AM 7/19/2024     1:42 PM 7/24/2024     7:26 AM 9/3/2024     1:51 PM 9/19/2024    12:02 PM 10/8/2024     5:42 PM 10/29/2024     1:59 PM   PHQ-9 SCORE   PHQ-9 Total Score MyChart 7 (Mild depression) 8 (Mild depression) 7 (Mild depression) 6 (Mild depression) 7 (Mild depression) 9 (Mild depression) 5 (Mild depression)   PHQ-9 Total Score 7 8 7 6 7 9 5        Patient-reported     PROMIS 10-Global Health (all questions and answers displayed):       1/26/2024     9:08 AM 5/1/2024     3:20 PM 5/28/2024     7:47 AM 8/31/2024     9:57 AM 9/14/2024    11:51 AM 9/26/2024     7:17 PM 10/25/2024     3:48 PM   PROMIS 10   In general, would you say your health is: Good Good Good Good Good Very good Good   In general, would you say your quality of life is: Good Good Good Good Good Good Good   In general, how would you rate your physical health? Good Good Good Good Good Good Good   In general, how would you rate your mental health, including your mood and your ability to think? Good Fair Good Good Good Good Good   In general, how would you rate your satisfaction with your social activities and relationships? Good Fair Good Fair Good Good Good   In general, please rate how well you carry out your usual social activities and roles Good Fair Good Good Good Good Good   To what extent are you able to carry out your everyday physical activities such as walking, climbing stairs, carrying groceries, or moving a chair? Moderately Moderately Mostly Mostly Mostly Mostly Mostly   In the past 7 days, how often have you been bothered by emotional problems such as feeling anxious, depressed, or irritable? Sometimes Often Sometimes Often Sometimes Sometimes Sometimes   In the past 7 days, how would you rate your fatigue on average? Severe Severe Moderate Severe Moderate Moderate Moderate   In the past 7  days, how would you rate your pain on average, where 0 means no pain, and 10 means worst imaginable pain? 5 4 4 4 4 6 4   In general, would you say your health is: 3  3 3  3  3  4  3    In general, would you say your quality of life is: 3  3 3  3  3  3  3    In general, how would you rate your physical health? 3  3 3  3  3  3  3    In general, how would you rate your mental health, including your mood and your ability to think? 3  2 3  3  3  3  3    In general, how would you rate your satisfaction with your social activities and relationships? 3  2 3  2  3  3  3    In general, please rate how well you carry out your usual social activities and roles. (This includes activities at home, at work and in your community, and responsibilities as a parent, child, spouse, employee, friend, etc.) 3  2 3  3  3  3  3    To what extent are you able to carry out your everyday physical activities such as walking, climbing stairs, carrying groceries, or moving a chair? 3  3 4  4  4  4  4    In the past 7 days, how often have you been bothered by emotional problems such as feeling anxious, depressed, or irritable? 3  4 3  4  3  3  3    In the past 7 days, how would you rate your fatigue on average? 4  4 3  4  3  3  3    In the past 7 days, how would you rate your pain on average, where 0 means no pain, and 10 means worst imaginable pain? 5  4 4  4  4  6  4    Global Mental Health Score 12 9    9 12 10 12    12    12 12 12    Global Physical Health Score 11 11    11 13 12 13    13    13 13 13    PROMIS TOTAL - SUBSCORES 23 20    20 25 22 25    25    25 25 25        Patient-reported    Multiple values from one day are sorted in reverse-chronological order         ASSESSMENT: Current Emotional / Mental Status (status of significant symptoms):   Risk status (Self / Other harm or suicidal ideation)   Patient denies current fears or concerns for personal safety.   Patient denies current or recent suicidal ideation or behaviors.   Patient  denies current or recent homicidal ideation or behaviors.   Patient denies current or recent self injurious behavior or ideation.   Patient denies other safety concerns.   Patient reports there has been no change in risk factors since their last session.     Patient reports there has been no change in protective factors since their last session.     Recommended that patient call 911 or go to the local ED should there be a change in any of these risk factors.     Appearance:   Appropriate    Eye Contact:   Good    Psychomotor Behavior: Normal    Attitude:   Cooperative  Interested   Orientation:   All   Speech    Rate / Production: Normal     Volume:  Normal    Mood:    Normal   Affect:    Appropriate    Thought Content:  Clear    Thought Form:  Coherent  Logical    Insight:    Fair      Medication Review:     Current Outpatient Medications   Medication Sig Dispense Refill    acetaminophen (TYLENOL) 500 MG tablet Take 1,000 mg by mouth every 6 hours as needed for mild pain      alcohol swab prep pads Use to swab area of injection/andrzej as directed. 100 each 6    alcohol swab prep pads Use to swab area of injection/andrzej as directed 100 each 3    aspirin (ASA) 81 MG EC tablet Take 1 tablet (81 mg) by mouth daily      atorvastatin (LIPITOR) 20 MG tablet Take 1 tablet (20 mg) by mouth daily 90 tablet 3    blood glucose (ACCU-CHEK FASTCLIX) lancing device Lancing device to be used with lancets. 1 each 0    blood glucose (NO BRAND SPECIFIED) test strip Use to test blood sugar 2 times daily or as directed. To accompany: Blood Glucose Monitor Brands: per insurance. 200 strip 1    blood glucose monitoring (ACCU-CHEK FASTCLIX) lancets Use to test blood sugar 2 times daily. 102 each 5    blood glucose monitoring (NO BRAND SPECIFIED) meter device kit Use to test blood sugar 2 times daily 1 kit 0    ciclopirox (PENLAC) 8 % external solution Apply topically daily. Apply to affected nails daily.  Remove the residual build up  weekly with nail polish remover or an [place] board. 6.6 mL 1    clindamycin (CLEOCIN T) 1 % external solution Apply 10-15 drops to scalp at nighttime before bed 60 mL 11    escitalopram (LEXAPRO) 10 MG tablet Take 1 tablet (10 mg) by mouth daily Take along with a 5 mg tab for a total of 15 mg every day 30 tablet 2    escitalopram (LEXAPRO) 5 MG tablet Take 1 tablet (5 mg) by mouth daily Take along with a 10 mg tab for a total of 15 mg every day 30 tablet 2    lisinopril (ZESTRIL) 20 MG tablet Take 1 tablet (20 mg) by mouth daily      magnesium 250 MG tablet Take 1 tablet by mouth daily      MULTIPLE VITAMIN PO Take 1 tablet by mouth daily      omeprazole (PRILOSEC) 40 MG DR capsule Take 1 capsule (40 mg) by mouth daily 90 capsule 3    Probiotic Product (PROBIOTIC ADVANCED PO) Take by mouth. Pre/probiotic      terbinafine (LAMISIL AT) 1 % external cream Apply topically 2 times daily 12 g 1    tirzepatide (MOUNJARO) 7.5 MG/0.5ML pen Inject 7.5 mg subcutaneously every 7 days. 2 mL 0    vitamin (B COMPLEX-C) tablet Take 1 tablet by mouth daily      vitamin E 400 units TABS Take 400 Units by mouth daily       No current facility-administered medications for this visit.         Medication Compliance:   Yes     Changes in Health Issues:   None reported     Chemical Use Review:   Substance Use: Chemical use reviewed, no active concerns identified      Tobacco Use: No current tobacco use.      Diagnosis:  No diagnosis found.      Collateral Reports Completed:   Not Applicable    PLAN: (Patient Tasks / Therapist Tasks / Other)  Track anger - who, what, where, when  Have a safe word with your wife for time outs.         Lindsay Ramirez, [facility]                                                     ______________________________________________________________________    Individual Treatment Plan    Patient's Name: Heath Carpenter  YOB: 1970    Date of Creation: 6/17/24  Date Treatment Plan Last Reviewed/Revised:  6/17/24    DSM5 Diagnoses: 296.32 (F33.1) Major Depressive Disorder, Recurrent Episode, Moderate _  Psychosocial / Contextual Factors:  Patient works as a , his wife of 14 years who has MS, has stepson with epilepsy, is dealing with depression, has made lifestyle changes and is open to therapy.   PROMIS (reviewed every 90 days): 5/28/24    Referral / Collaboration:  Referral to another professional/service is not indicated at this time..    Anticipated number of session for this episode of care:  11-20 sessions  Anticipation frequency of session: Weekly  Anticipated Duration of each session: 53 or more minutes  Treatment plan will be reviewed in 90 days or when goals have been changed.       MeasurableTreatment Goal(s) related to diagnosis / functional impairment(s)  Goal-Depression: Client will decrease depressed mood    I will know I've met my goal when I am less depressed.      Objective #A (Client Action)    Client will use identified behavioral and cognitive skills to challenge negative self talk 90% of the time.  Status: New - Date: 6/20/2024    Intervention(s)  Therapist will provide psychoeducation, behavioral activation, and cognitive restructuring.      Objective #B  Client will complete at least 10 minutes of ACE activities (e.g.Achievement, Closeness, and Enjoyment) or other Behavioral Activation goals per day.    Status: New - Date: 6/20/2024    Intervention(s)  Therapist will provide psychoeducation, behavioral activation, and cognitive restructuring.      Objective #C  Client will exercise 30 minutes 36 times in the next 12 weeks.  Status: New - Date: 6/20/2024    Intervention(s)  Therapist will provide psychoeducation, behavioral activation, and cognitive restructuring.      Patient has reviewed and agreed to the above plan.      Lindsay Ramirez [facility]  June 20, 2024   Answers submitted by the patient for this visit:  Patient Health Questionnaire (Submitted on 7/24/2024)  If you checked off any  problems, how difficult have these problems made it for you to do your work, take care of things at home, or get along with other people?: Somewhat difficult  PHQ9 TOTAL SCORE: 7    Answers submitted by the patient for this visit:  Patient Health Questionnaire (Submitted on 9/3/2024)  If you checked off any problems, how difficult have these problems made it for you to do your work, take care of things at home, or get along with other people?: Somewhat difficult  PHQ9 TOTAL SCORE: 6    Answers submitted by the patient for this visit:  Patient Health Questionnaire (Submitted on 10/29/2024)  If you checked off any problems, how difficult have these problems made it for you to do your work, take care of things at home, or get along with other people?: Somewhat difficult  PHQ9 TOTAL SCORE: 5

## 2024-11-22 ENCOUNTER — ANCILLARY PROCEDURE (OUTPATIENT)
Dept: GENERAL RADIOLOGY | Facility: CLINIC | Age: 54
End: 2024-11-22
Attending: FAMILY MEDICINE
Payer: COMMERCIAL

## 2024-11-22 DIAGNOSIS — M25.562 PAIN IN BOTH KNEES, UNSPECIFIED CHRONICITY: ICD-10-CM

## 2024-11-22 DIAGNOSIS — M25.561 PAIN IN BOTH KNEES, UNSPECIFIED CHRONICITY: ICD-10-CM

## 2024-11-22 PROCEDURE — 73562 X-RAY EXAM OF KNEE 3: CPT | Mod: TC | Performed by: RADIOLOGY

## 2024-11-27 ENCOUNTER — TELEPHONE (OUTPATIENT)
Dept: GASTROENTEROLOGY | Facility: CLINIC | Age: 54
End: 2024-11-27

## 2024-11-27 ENCOUNTER — OFFICE VISIT (OUTPATIENT)
Dept: ORTHOPEDICS | Facility: CLINIC | Age: 54
End: 2024-11-27
Payer: COMMERCIAL

## 2024-11-27 VITALS — WEIGHT: 315 LBS | HEIGHT: 74 IN | BODY MASS INDEX: 40.43 KG/M2

## 2024-11-27 DIAGNOSIS — M17.0 BILATERAL PRIMARY OSTEOARTHRITIS OF KNEE: Primary | ICD-10-CM

## 2024-11-27 PROCEDURE — 20611 DRAIN/INJ JOINT/BURSA W/US: CPT | Mod: LT | Performed by: FAMILY MEDICINE

## 2024-11-27 RX ORDER — HYALURONATE SODIUM 10 MG/ML
20 SYRINGE (ML) INTRAARTICULAR
Status: COMPLETED | OUTPATIENT
Start: 2024-11-27 | End: 2024-11-27

## 2024-11-27 RX ADMIN — Medication 20 MG: at 09:42

## 2024-11-27 NOTE — LETTER
2024      Heath Carpenter  [address] [facility] [address]      Dear Colleague,    Thank you for referring your patient, Heath Carpenter, to the [facility]. Please see a copy of my visit note below.    Heath Carpenter  :  1970  DOS: 2024  MRN: [id number]    Sports Medicine Clinic Procedure    Ultrasound Guided Left Intra-Articular Knee Euflexxa Injection #2 of 3, +/- Aspiration    Clinical History: Interim History - 2024  Since last visit on 2024 patient has moderate left knee pain, minimal improvement following initial Euflexxa injection.  Patient presents for injection #2 of 3 today.  No new injury in the interim.    Diagnosis:   1. Bilateral primary osteoarthritis of knee        Large Joint Injection/Arthocentesis: L knee joint    Date/Time: 2024 9:42 AM    Performed by: Geovany Bean DO  Authorized by: Geovany Bean DO    Indications:  Osteoarthritis  Needle Size:  21 G  Guidance: ultrasound    Approach:  Superolateral  Location:  Knee      Medications:  20 mg sodium hyaluronate 20 MG/2ML  Outcome:  Tolerated well, no immediate complications  Procedure discussed: discussed risks, benefits, and alternatives    Consent Given by:  Patient  Timeout: timeout called immediately prior to procedure    Prep: patient was prepped and draped in usual sterile fashion     Ultrasound images of procedure were permanently stored.      Impression:  Successful Left intra-articular knee Euflexxa injection.    Plan:  Follow up next week for injection #3 of 3  Expectations and limitations of Euflexxa were reviewed in detail  Often 4-6 weeks before full effect may be noticed  Usually covered up to every 6 months by insurance, but does not need to be repeated unless pain returns, at which point we would re-evaluate  Potential use of CSI in future for flares of pain reviewed in detail  Encouraged modified progressive pain-free activity as  tolerated  HEP and Supportive care reviewed  All questions were answered today  Contact us with additional questions or concerns  Signs and sx of concern reviewed      Geovany Bean DO, CAQ  Primary Care Sports Medicine  [facility]       Again, thank you for allowing me to participate in the care of your patient.        Sincerely,        Geovany Bean DO

## 2024-11-27 NOTE — PROGRESS NOTES
Heath Carpenter  :  1970  DOS: 2024  MRN: [id number]    Sports Medicine Clinic Procedure    Ultrasound Guided Left Intra-Articular Knee Euflexxa Injection #2 of 3, +/- Aspiration    Clinical History: Interim History - 2024  Since last visit on 2024 patient has moderate left knee pain, minimal improvement following initial Euflexxa injection.  Patient presents for injection #2 of 3 today.  No new injury in the interim.    Diagnosis:   1. Bilateral primary osteoarthritis of knee        Large Joint Injection/Arthocentesis: L knee joint    Date/Time: 2024 9:42 AM    Performed by: Geovany Bean DO  Authorized by: Geovany Bean DO    Indications:  Osteoarthritis  Needle Size:  21 G  Guidance: ultrasound    Approach:  Superolateral  Location:  Knee      Medications:  20 mg sodium hyaluronate 20 MG/2ML  Outcome:  Tolerated well, no immediate complications  Procedure discussed: discussed risks, benefits, and alternatives    Consent Given by:  Patient  Timeout: timeout called immediately prior to procedure    Prep: patient was prepped and draped in usual sterile fashion     Ultrasound images of procedure were permanently stored.      Impression:  Successful Left intra-articular knee Euflexxa injection.    Plan:  Follow up next week for injection #3 of 3  Expectations and limitations of Euflexxa were reviewed in detail  Often 4-6 weeks before full effect may be noticed  Usually covered up to every 6 months by insurance, but does not need to be repeated unless pain returns, at which point we would re-evaluate  Potential use of CSI in future for flares of pain reviewed in detail  Encouraged modified progressive pain-free activity as tolerated  HEP and Supportive care reviewed  All questions were answered today  Contact us with additional questions or concerns  Signs and sx of concern reviewed      Geovany Bean DO, CAQ  Primary Care Sports Medicine  [facility]

## 2024-11-27 NOTE — TELEPHONE ENCOUNTER
Endoscopy Scheduling Screen      What insurance is in the chart?  Other:  [employer]    Ordering/Referring Provider: Maria Antonia Goodwin MD    (If ordering provider performs procedure, schedule with ordering provider unless otherwise instructed. )    BMI: There is no height or weight on file to calculate BMI.     Sedation Ordered  general anesthesia.   BMI<= 45 45 < BMI <= 48 48 < BMI < = 50  BMI > 50   No Restrictions No [facility]  No [facility]  [facility] with exceptions Hospital Only OR Only       Do you have a history of malignant hyperthermia?  No    (Females) Are you currently pregnant?   No     Are you currently on dialysis?   No    Do you need assistance transferring?   No    BMI: There is no height or weight on file to calculate BMI.     Is patients BMI > 50?  No    BMI > 40?  Yes (Extended Prep)    Do you have a diagnosis of diabetes?  Yes (Golytely Prep)    Do you take an injectable medication for weight loss or diabetes (excluding insulin)?  No  PHENTERMINE  Do you take the medication Naltrexone?  No    Do you take blood thinners?  No     Prep   Are you currently on dialysis or do you have chronic kidney disease?  No    Do you have a diagnosis of cystic fibrosis (CF)?  No    On a regular basis do you go 3 -5 days between bowel movements?  No    Preferred Pharmacy:  [facility]    Final Scheduling Details     Procedure scheduled  Colonoscopy    Surgeon:  MYRIAM     Date of procedure:  1/13/24     Location  [facility] order.    What is your communication preference for Instructions and/or Bowel Prep?   [employer]    Patient Reminders:    You will receive a call from a Nurse to review instructions and health history.  This assessment must be completed prior to your procedure.  Failure to complete the Nurse assessment may result in the procedure being cancelled.       On the day of your procedure, please designate an adult(s) who can drive you home stay with you for the next 24 hours. The medicines used in the  exam will make you sleepy. You will not be able to drive.       You cannot take public transportation, ride share services, or non-medical taxi service without a responsible caregiver.  Medical transport services are allowed with the requirement that a responsible caregiver will receive you at your destination.  We require that drivers and caregivers are confirmed prior to your procedure.

## 2024-12-02 ASSESSMENT — PATIENT HEALTH QUESTIONNAIRE - PHQ9
SUM OF ALL RESPONSES TO PHQ QUESTIONS 1-9: 8
SUM OF ALL RESPONSES TO PHQ QUESTIONS 1-9: 8
10. IF YOU CHECKED OFF ANY PROBLEMS, HOW DIFFICULT HAVE THESE PROBLEMS MADE IT FOR YOU TO DO YOUR WORK, TAKE CARE OF THINGS AT HOME, OR GET ALONG WITH OTHER PEOPLE: SOMEWHAT DIFFICULT

## 2024-12-03 ENCOUNTER — VIRTUAL VISIT (OUTPATIENT)
Dept: PSYCHOLOGY | Facility: CLINIC | Age: 54
End: 2024-12-03
Payer: COMMERCIAL

## 2024-12-03 DIAGNOSIS — F33.1 MODERATE EPISODE OF RECURRENT MAJOR DEPRESSIVE DISORDER (H): Primary | ICD-10-CM

## 2024-12-03 PROCEDURE — 90837 PSYTX W PT 60 MINUTES: CPT | Mod: 95 | Performed by: COUNSELOR

## 2024-12-03 NOTE — PROGRESS NOTES
[facility]                                     Progress Note    Patient Name: Heath Carpenter  Date: 12/3/24         Service Type: Individual      Session Start Time: 2:00p  Session End Time: 2:55p     Session Length: 55 minutes    Session #: 9    Attendees: Client attended alone    Service Modality:  Video Visit:      Provider verified identity through the following two step process.  Patient provided:  Patient     Telemedicine Visit: The patient's condition can be safely assessed and treated via synchronous audio and visual telemedicine encounter.      Reason for Telemedicine Visit: Patient has requested telehealth visit    Originating Site (Patient Location): Patient's home    Distant Site (Provider Location): Provider Remote Setting- Home Office    Consent:  The patient/guardian has verbally consented to: the potential risks and benefits of telemedicine (video visit) versus in person care; bill my insurance or make self-payment for services provided; and responsibility for payment of non-covered services.     Patient would like the video invitation sent by:  My Chart    Mode of Communication:  Video Conference via Amwell    Distant Location (Provider):  Off-site    As the provider I attest to compliance with applicable laws and regulations related to telemedicine.    DATA  Interactive Complexity: No  Crisis: No        Progress Since Last Session (Related to Symptoms / Goals / Homework):   Symptoms: Improving pt reported decreased depression symptoms and more hope for the future    Homework: Achieved / completed to satisfaction      Episode of Care Goals: Satisfactory progress - PREPARATION (Decided to change - considering how); Intervened by negotiating a change plan and determining options / strategies for behavior change, identifying triggers, exploring social supports, and working towards setting a date to begin behavior change     Current / Ongoing Stressors and Concerns:  Today, pt  "reported the last few weeks have been going well and noted he has been setting boundaries with his wife and this seems to be going over well. Pt reported he did have discord with his step son and noted \"he acts life we've ended his world if we don't accommodate him.\"Pt reported he does not feel supported by his wife when he tries to set boundaries with her son. Pt noted that he has had some nice conversations with his step son lately and noted feeling more on the same page with him.        Treatment Objective(s) Addressed in This Session:   identify patterns of escalation  (i.e. tightness in chest, flushed face, increased heart rate, clenched hands, etc.)  Increase interest, engagement, and pleasure in doing things       Intervention:  Skills training  Explore skills useful to client in current situation  Skills include assertiveness, communication, conflict management, problem-solving, relaxation, etc.     Solution-Focused Therapy  Explore patterns in patient's relationships and discussed options for new behaviors  Explore patterns in patient's actions and choices and discussed options for new behaviors     Cognitive-behavioral Therapy  Discuss common cognitive distortions, identified them in patient's life  Explore ways to challenge, replace, and act against these cognitions     Mindfulness-Based Strategies  Discuss skills based on development and application of mindfulness  Skills drawn from dialectical behavior therapy, mindfulness-based stress reduction, mindfulness-based cognitive therapy, etc.      Assessments completed prior to visit:  The following assessments were completed by patient for this visit:  PHQ9:       7/19/2024     1:42 PM 7/24/2024     7:26 AM 9/3/2024     1:51 PM 9/19/2024    12:02 PM 10/8/2024     5:42 PM 10/29/2024     1:59 PM 12/2/2024     5:38 PM   PHQ-9 SCORE   PHQ-9 Total Score Pati 8 (Mild depression) 7 (Mild depression) 6 (Mild depression) 7 (Mild depression) 9 (Mild depression) 5 " (Mild depression) 8 (Mild depression)   PHQ-9 Total Score 8 7 6 7 9 5  8        Patient-reported     PROMIS 10-Global Health (all questions and answers displayed):       1/26/2024     9:08 AM 5/1/2024     3:20 PM 5/28/2024     7:47 AM 8/31/2024     9:57 AM 9/14/2024    11:51 AM 9/26/2024     7:17 PM 10/25/2024     3:48 PM   PROMIS 10   In general, would you say your health is: Good Good Good Good Good Very good Good   In general, would you say your quality of life is: Good Good Good Good Good Good Good   In general, how would you rate your physical health? Good Good Good Good Good Good Good   In general, how would you rate your mental health, including your mood and your ability to think? Good Fair Good Good Good Good Good   In general, how would you rate your satisfaction with your social activities and relationships? Good Fair Good Fair Good Good Good   In general, please rate how well you carry out your usual social activities and roles Good Fair Good Good Good Good Good   To what extent are you able to carry out your everyday physical activities such as walking, climbing stairs, carrying groceries, or moving a chair? Moderately Moderately Mostly Mostly Mostly Mostly Mostly   In the past 7 days, how often have you been bothered by emotional problems such as feeling anxious, depressed, or irritable? Sometimes Often Sometimes Often Sometimes Sometimes Sometimes   In the past 7 days, how would you rate your fatigue on average? Severe Severe Moderate Severe Moderate Moderate Moderate   In the past 7 days, how would you rate your pain on average, where 0 means no pain, and 10 means worst imaginable pain? 5 4 4 4 4 6 4   In general, would you say your health is: 3  3 3  3  3  4  3    In general, would you say your quality of life is: 3  3 3  3  3  3  3    In general, how would you rate your physical health? 3  3 3  3  3  3  3    In general, how would you rate your mental health, including your mood and your ability to  think? 3  2 3  3  3  3  3    In general, how would you rate your satisfaction with your social activities and relationships? 3  2 3  2  3  3  3    In general, please rate how well you carry out your usual social activities and roles. (This includes activities at home, at work and in your community, and responsibilities as a parent, child, spouse, employee, friend, etc.) 3  2 3  3  3  3  3    To what extent are you able to carry out your everyday physical activities such as walking, climbing stairs, carrying groceries, or moving a chair? 3  3 4  4  4  4  4    In the past 7 days, how often have you been bothered by emotional problems such as feeling anxious, depressed, or irritable? 3  4 3  4  3  3  3    In the past 7 days, how would you rate your fatigue on average? 4  4 3  4  3  3  3    In the past 7 days, how would you rate your pain on average, where 0 means no pain, and 10 means worst imaginable pain? 5  4 4  4  4  6  4    Global Mental Health Score 12 9    9 12 10 12    12    12 12 12    Global Physical Health Score 11 11    11 13 12 13    13    13 13 13    PROMIS TOTAL - SUBSCORES 23 20    20 25 22 25    25    25 25 25        Patient-reported    Multiple values from one day are sorted in reverse-chronological order         ASSESSMENT: Current Emotional / Mental Status (status of significant symptoms):   Risk status (Self / Other harm or suicidal ideation)   Patient denies current fears or concerns for personal safety.   Patient denies current or recent suicidal ideation or behaviors.   Patient denies current or recent homicidal ideation or behaviors.   Patient denies current or recent self injurious behavior or ideation.   Patient denies other safety concerns.   Patient reports there has been no change in risk factors since their last session.     Patient reports there has been no change in protective factors since their last session.     Recommended that patient call 911 or go to the local ED should there be a  change in any of these risk factors.     Appearance:   Appropriate    Eye Contact:   Good    Psychomotor Behavior: Normal    Attitude:   Cooperative  Interested   Orientation:   All   Speech    Rate / Production: Normal     Volume:  Normal    Mood:    Normal   Affect:    Appropriate    Thought Content:  Clear    Thought Form:  Coherent  Logical    Insight:    Fair      Medication Review:     Current Outpatient Medications   Medication Sig Dispense Refill    acetaminophen (TYLENOL) 500 MG tablet Take 1,000 mg by mouth every 6 hours as needed for mild pain      alcohol swab prep pads Use to swab area of injection/andrzej as directed. 100 each 6    alcohol swab prep pads Use to swab area of injection/andrzej as directed 100 each 3    aspirin (ASA) 81 MG EC tablet Take 1 tablet (81 mg) by mouth daily      atorvastatin (LIPITOR) 20 MG tablet Take 1 tablet (20 mg) by mouth daily 90 tablet 3    blood glucose (ACCU-CHEK FASTCLIX) lancing device Lancing device to be used with lancets. 1 each 0    blood glucose (NO BRAND SPECIFIED) test strip Use to test blood sugar 2 times daily or as directed. To accompany: Blood Glucose Monitor Brands: per insurance. 200 strip 1    blood glucose monitoring (ACCU-CHEK FASTCLIX) lancets Use to test blood sugar 2 times daily. 102 each 5    blood glucose monitoring (NO BRAND SPECIFIED) meter device kit Use to test blood sugar 2 times daily 1 kit 0    ciclopirox (PENLAC) 8 % external solution Apply topically daily. Apply to affected nails daily.  Remove the residual build up weekly with nail polish remover or an [place] board. 6.6 mL 1    clindamycin (CLEOCIN T) 1 % external solution Apply 10-15 drops to scalp at nighttime before bed 60 mL 11    escitalopram (LEXAPRO) 10 MG tablet Take 1 tablet (10 mg) by mouth daily Take along with a 5 mg tab for a total of 15 mg every day 30 tablet 2    escitalopram (LEXAPRO) 5 MG tablet Take 1 tablet (5 mg) by mouth daily Take along with a 10 mg tab for a total of  15 mg every day 30 tablet 2    lisinopril (ZESTRIL) 20 MG tablet Take 1 tablet (20 mg) by mouth daily      magnesium 250 MG tablet Take 1 tablet by mouth daily      MULTIPLE VITAMIN PO Take 1 tablet by mouth daily      omeprazole (PRILOSEC) 40 MG DR capsule Take 1 capsule (40 mg) by mouth daily 90 capsule 3    phentermine 30 MG capsule Take 1 capsule (30 mg) by mouth every morning. 30 capsule 5    Probiotic Product (PROBIOTIC ADVANCED PO) Take by mouth. Pre/probiotic      terbinafine (LAMISIL AT) 1 % external cream Apply topically 2 times daily 12 g 1    vitamin (B COMPLEX-C) tablet Take 1 tablet by mouth daily      vitamin E 400 units TABS Take 400 Units by mouth daily       No current facility-administered medications for this visit.         Medication Compliance:   Yes     Changes in Health Issues:   None reported     Chemical Use Review:   Substance Use: Chemical use reviewed, no active concerns identified      Tobacco Use: No current tobacco use.      Diagnosis:  1. Moderate episode of recurrent major depressive disorder (H)          Collateral Reports Completed:   Not Applicable    PLAN: (Patient Tasks / Therapist Tasks / Other)  Track anger - who, what, where, when  Have a safe word with your wife for time outs.         Lindsay Ramirez, [facility]                                                     ______________________________________________________________________    Individual Treatment Plan    Patient's Name: Heath Carpenter  YOB: 1970    Date of Creation: 6/17/24  Date Treatment Plan Last Reviewed/Revised: 6/17/24    DSM5 Diagnoses: 296.32 (F33.1) Major Depressive Disorder, Recurrent Episode, Moderate _  Psychosocial / Contextual Factors:  Patient works as a , his wife of 14 years who has MS, has stepson with epilepsy, is dealing with depression, has made lifestyle changes and is open to therapy.   PROMIS (reviewed every 90 days): 5/28/24    Referral / Collaboration:  Referral to another  professional/service is not indicated at this time..    Anticipated number of session for this episode of care:  11-20 sessions  Anticipation frequency of session: Weekly  Anticipated Duration of each session: 53 or more minutes  Treatment plan will be reviewed in 90 days or when goals have been changed.       MeasurableTreatment Goal(s) related to diagnosis / functional impairment(s)  Goal-Depression: Client will decrease depressed mood    I will know I've met my goal when I am less depressed.      Objective #A (Client Action)    Client will use identified behavioral and cognitive skills to challenge negative self talk 90% of the time.  Status: New - Date: 6/20/2024    Intervention(s)  Therapist will provide psychoeducation, behavioral activation, and cognitive restructuring.      Objective #B  Client will complete at least 10 minutes of ACE activities (e.g.Achievement, Closeness, and Enjoyment) or other Behavioral Activation goals per day.    Status: New - Date: 6/20/2024    Intervention(s)  Therapist will provide psychoeducation, behavioral activation, and cognitive restructuring.      Objective #C  Client will exercise 30 minutes 36 times in the next 12 weeks.  Status: New - Date: 6/20/2024    Intervention(s)  Therapist will provide psychoeducation, behavioral activation, and cognitive restructuring.      Patient has reviewed and agreed to the above plan.      Lindsay Ramirez [facility]  June 20, 2024   Answers submitted by the patient for this visit:  Patient Health Questionnaire (Submitted on 7/24/2024)  If you checked off any problems, how difficult have these problems made it for you to do your work, take care of things at home, or get along with other people?: Somewhat difficult  PHQ9 TOTAL SCORE: 7    Answers submitted by the patient for this visit:  Patient Health Questionnaire (Submitted on 9/3/2024)  If you checked off any problems, how difficult have these problems made it for you to do your work, take care of  things at home, or get along with other people?: Somewhat difficult  PHQ9 TOTAL SCORE: 6    Answers submitted by the patient for this visit:  Patient Health Questionnaire (Submitted on 10/29/2024)  If you checked off any problems, how difficult have these problems made it for you to do your work, take care of things at home, or get along with other people?: Somewhat difficult  PHQ9 TOTAL SCORE: 5

## 2024-12-05 ENCOUNTER — OFFICE VISIT (OUTPATIENT)
Dept: ORTHOPEDICS | Facility: CLINIC | Age: 54
End: 2024-12-05
Payer: COMMERCIAL

## 2024-12-05 VITALS — HEIGHT: 74 IN | BODY MASS INDEX: 40.43 KG/M2 | WEIGHT: 315 LBS

## 2024-12-05 DIAGNOSIS — M17.12 PRIMARY OSTEOARTHRITIS OF LEFT KNEE: Primary | ICD-10-CM

## 2024-12-05 RX ORDER — HYALURONATE SODIUM 10 MG/ML
20 SYRINGE (ML) INTRAARTICULAR
Status: COMPLETED | OUTPATIENT
Start: 2024-12-05 | End: 2024-12-05

## 2024-12-05 RX ADMIN — Medication 20 MG: at 09:00

## 2024-12-05 NOTE — LETTER
2024      Heath Carpenter  [address] [facility] [address]      Dear Colleague,    Thank you for referring your patient, Heath Carpenter, to the [facility] [place]. Please see a copy of my visit note below.    Heath Carpenter  :  1970  DOS: 24  MRN: [id number]    Sports Medicine Clinic Procedure    Ultrasound Guided Left Intra-Articular Knee Euflexxa Injection #3 of 3, +/- Aspiration    Clinical History: Interim History - 2024  Since last visit on 24 patient has mild Left knee.  Presents for injection #3 of 3 today.      Diagnosis:   1. Primary osteoarthritis of left knee      Large Joint Injection/Arthocentesis: L knee joint    Date/Time: 2024 9:00 AM    Performed by: Geovany Bean DO  Authorized by: Geovany Bean DO    Indications:  Osteoarthritis  Needle Size:  21 G  Guidance: ultrasound    Approach:  Superolateral  Location:  Knee      Medications:  20 mg sodium hyaluronate 20 MG/2ML  Outcome:  Tolerated well, no immediate complications  Procedure discussed: discussed risks, benefits, and alternatives    Consent Given by:  Patient  Timeout: timeout called immediately prior to procedure    Prep: patient was prepped and draped in usual sterile fashion     Ultrasound images of procedure were permanently stored.      Impression:  Successful Left intra-articular knee Euflexxa injection.    Plan:  Follow up as needed based on clinical progress  Expectations and limitations of Euflexxa were reviewed in detail  Often 4-6 weeks before full effect may be noticed  Usually covered up to every 6 months by insurance, but does not need to be repeated unless pain returns, at which point we would re-evaluate  Potential use of CSI in future for flares of pain reviewed in detail  Encouraged modified progressive pain-free activity as tolerated  HEP and Supportive care reviewed  All questions were answered today  Contact us with additional  questions or concerns  Signs and sx of concern reviewed      Geovany Bean DO, CAQ  Primary Care Sports Medicine  [facility]       Again, thank you for allowing me to participate in the care of your patient.        Sincerely,        Geovany Bean DO

## 2024-12-05 NOTE — PROGRESS NOTES
Heath MICHAEL Carpenter  :  1970  DOS: 24  MRN: [id number]    Sports Medicine Clinic Procedure    Ultrasound Guided Left Intra-Articular Knee Euflexxa Injection #3 of 3, +/- Aspiration    Clinical History: Interim History - 2024  Since last visit on 24 patient has mild Left knee.  Presents for injection #3 of 3 today.      Diagnosis:   1. Primary osteoarthritis of left knee      Large Joint Injection/Arthocentesis: L knee joint    Date/Time: 2024 9:00 AM    Performed by: Geovany Bean DO  Authorized by: Geovany Bean DO    Indications:  Osteoarthritis  Needle Size:  21 G  Guidance: ultrasound    Approach:  Superolateral  Location:  Knee      Medications:  20 mg sodium hyaluronate 20 MG/2ML  Outcome:  Tolerated well, no immediate complications  Procedure discussed: discussed risks, benefits, and alternatives    Consent Given by:  Patient  Timeout: timeout called immediately prior to procedure    Prep: patient was prepped and draped in usual sterile fashion     Ultrasound images of procedure were permanently stored.      Impression:  Successful Left intra-articular knee Euflexxa injection.    Plan:  Follow up as needed based on clinical progress  Expectations and limitations of Euflexxa were reviewed in detail  Often 4-6 weeks before full effect may be noticed  Usually covered up to every 6 months by insurance, but does not need to be repeated unless pain returns, at which point we would re-evaluate  Potential use of CSI in future for flares of pain reviewed in detail  Encouraged modified progressive pain-free activity as tolerated  HEP and Supportive care reviewed  All questions were answered today  Contact us with additional questions or concerns  Signs and sx of concern reviewed      Geovany Bean DO, CAQ  Primary Care Sports Medicine  [facility]

## 2024-12-11 NOTE — PROGRESS NOTES
[facility] Dermatology Note  Encounter Date: Dec 19, 2024  Office Visit     Reviewed patients past medical history and pertinent chart review prior to patients visit today.     Dermatology Problem List:  Last skin check: 12/19/2024  1. History of NMSC       - BCC, central upper back, s/p excision 3/29/21       - BCC, right upper arm, s/p ED&C 5/21/19  2. ISKs.   - Cryotherapy  3. AK.  -cryotherapy 06/14/24   4. Skin tags. LiqN2.   5. Folliculitis   - Tx: clindamycin solution, ketoconazole shampoo  6. Benign biopsy:   -dermatofibroma, left shoulder, s/p shave bx 9/13/21      Family Hx: No family history of skin cancer.   ____________________________________________    Assessment & Plan:     Folliculitis, scalp  - Patient to restart clindamycin solution.  This should be applied to affected areas on scalp once daily when lesions are active.  Additionally, patient to begin washing scalp with ketoconazole shampoo 3 times weekly.  Patient to let shampoo lather and sit for 5 minutes prior to rinsing    # actinic keratoses  Actinic keratoses are pre-cancerous skin growths caused by sun exposure. Treatment is recommended and medically indicated. Treated with cryotherapy as outlined below.     Procedures performed:   - Cryotherapy procedure note, location(s): face x 2 . After verbal consent and discussion of risks and benefits including, but not limited to, dyspigmentation/scar, blister, and pain, 2 lesion(s) were  treated with 1-2 mm freeze border for 1-2 cycles with liquid nitrogen. Post cryotherapy instructions were provided.      # Personal history of nonmelanoma skin cancer  - No signs of recurrence. Continued observation recommended.   - Sun protection: Counseled SPF 30+ sunscreen, UPF clothing, sun avoidance, tanning bed avoidance.    # Multiple nevi, trunk and extremities  # Solar lentigines  - Nevi demonstrate no concerning features on dermoscopy. We discussed the importance of self exams at home.    - ABCDEs: Counseled ABCDEs of melanoma: Asymmetry, Border (irregularity), Color (not uniform, changes in color), Diameter (greater than 6 mm which is about the size of a pencil eraser), and Evolving (any changes in preexisting moles).    # Cherry angiomas  # Seborrheic keratoses  - We discussed the benign nature of the skin lesions. No treatment required. Continued observation recommended. Follow up with any concerns.        Follow-up:  1 year  for follow up full body skin exam, as needed for new or changing lesions or new concerns    All risks, benefits and alternatives were discussed with patient.  Patient is in agreement and understands the assessment and plan.  All questions were answered.  Celi Pabon PA-C  [facility] Dermatology    ____________________________________________    CC: Skin Check (FBSC- right cheek (previously treated with LN2), hairline (breakout with zits, was previously prescribed clindamycin solution), upper paraspinal back. Hx of NMSC. )    HPI:  Mr. Heath Carpenter is a(n) 54 year old male who presents today as a return patient for a full body skin cancer screening. The patient has a history of nonmelanoma skin cancer.  The patient has lesion concern on his right cheek.  This lesion has been present for several months and is rough in nature.  He believes it has been treated with cryotherapy in the past.  He is wondering if it needs additional treatment today.     Physical Exam:  Vitals: There were no vitals taken for this visit.   SKIN: Total skin excluding the genitalia areas was performed. The exam included the scalp, face, neck, bilateral arms, chest, back, abdomen, bilateral legs, digits, mons pubis, buttocks, and nails.   - Del Cid II.  -Face x 2 , pink macule(s) with overlying adherent scale consistent with an actinic keratosis    -Scalp, scattered inflammatory papules and pustules   -Upper paraspinal back, waxy, stuck on tan/brown papules and patches   - Multiple  tan/brown macules and papules scattered throughout exam, consistent with benign nevi. No concerning features on dermoscopy.   - Scattered tan, homogenous macules scattered on sun exposed skin, consistent with solar lentigines.   - Scattered waxy, stuck on appearing papules and patches, consistent with seborrheic keratoses.  - Several 1-2 mm red dome shaped symmetric papules, consistent with cherry angiomas.     - No other lesions of concern on areas examined.     Medications:  Current Outpatient Medications   Medication Sig Dispense Refill    acetaminophen (TYLENOL) 500 MG tablet Take 1,000 mg by mouth every 6 hours as needed for mild pain      alcohol swab prep pads Use to swab area of injection/andrzej as directed. 100 each 6    alcohol swab prep pads Use to swab area of injection/andrzej as directed 100 each 3    aspirin (ASA) 81 MG EC tablet Take 1 tablet (81 mg) by mouth daily      atorvastatin (LIPITOR) 20 MG tablet Take 1 tablet (20 mg) by mouth daily 90 tablet 3    blood glucose (ACCU-CHEK FASTCLIX) lancing device Lancing device to be used with lancets. 1 each 0    blood glucose (NO BRAND SPECIFIED) test strip Use to test blood sugar 2 times daily or as directed. To accompany: Blood Glucose Monitor Brands: per insurance. 200 strip 1    blood glucose monitoring (ACCU-CHEK FASTCLIX) lancets Use to test blood sugar 2 times daily. 102 each 5    blood glucose monitoring (NO BRAND SPECIFIED) meter device kit Use to test blood sugar 2 times daily 1 kit 0    ciclopirox (PENLAC) 8 % external solution Apply topically daily. Apply to affected nails daily.  Remove the residual build up weekly with nail polish remover or an [place] board. 6.6 mL 1    clindamycin (CLEOCIN T) 1 % external solution Apply 10-15 drops to scalp at nighttime before bed 60 mL 11    escitalopram (LEXAPRO) 10 MG tablet Take 1 tablet (10 mg) by mouth daily Take along with a 5 mg tab for a total of 15 mg every day 30 tablet 2    escitalopram (LEXAPRO) 5  MG tablet Take 1 tablet (5 mg) by mouth daily Take along with a 10 mg tab for a total of 15 mg every day 30 tablet 2    lisinopril (ZESTRIL) 20 MG tablet Take 1 tablet (20 mg) by mouth daily      magnesium 250 MG tablet Take 1 tablet by mouth daily      MULTIPLE VITAMIN PO Take 1 tablet by mouth daily      omeprazole (PRILOSEC) 40 MG DR capsule Take 1 capsule (40 mg) by mouth daily 90 capsule 3    phentermine 30 MG capsule Take 1 capsule (30 mg) by mouth every morning. 30 capsule 5    Probiotic Product (PROBIOTIC ADVANCED PO) Take by mouth. Pre/probiotic      terbinafine (LAMISIL AT) 1 % external cream Apply topically 2 times daily 12 g 1    vitamin (B COMPLEX-C) tablet Take 1 tablet by mouth daily      vitamin E 400 units TABS Take 400 Units by mouth daily       No current facility-administered medications for this visit.      Past Medical History:   Patient Active Problem List   Diagnosis    Morbid obesity (H)    Type II diabetes mellitus with peripheral autonomic neuropathy (H)    Hypertension goal BP (blood pressure) < 140/90    Mild episode of recurrent major depressive disorder (H)    JONA (obstructive sleep apnea)    Gastroesophageal reflux disease with esophagitis without hemorrhage    Hyperlipidemia LDL goal <100     Past Medical History:   Diagnosis Date    Cancer (H)     Skin    Depressive disorder     Diabetes (H)     Gastro-oesophageal reflux disease     Hypertension goal BP (blood pressure) < 140/90        CC Referred Self, MD  No address on file on close of this encounter.

## 2024-12-12 ENCOUNTER — TELEPHONE (OUTPATIENT)
Dept: FAMILY MEDICINE | Facility: CLINIC | Age: 54
End: 2024-12-12
Payer: COMMERCIAL

## 2024-12-12 NOTE — TELEPHONE ENCOUNTER
Patient Quality Outreach    Patient is due for the following:   Depression  -  PHQ-9 needed    Action(s) Taken:   Patient was assigned appropriate questionnaire to complete    Type of outreach:    Sent Errund message.    Questions for provider review:    None           Bailee Kahler

## 2024-12-16 ASSESSMENT — PATIENT HEALTH QUESTIONNAIRE - PHQ9
10. IF YOU CHECKED OFF ANY PROBLEMS, HOW DIFFICULT HAVE THESE PROBLEMS MADE IT FOR YOU TO DO YOUR WORK, TAKE CARE OF THINGS AT HOME, OR GET ALONG WITH OTHER PEOPLE: SOMEWHAT DIFFICULT
SUM OF ALL RESPONSES TO PHQ QUESTIONS 1-9: 6
SUM OF ALL RESPONSES TO PHQ QUESTIONS 1-9: 6

## 2024-12-17 ENCOUNTER — OFFICE VISIT (OUTPATIENT)
Dept: FAMILY MEDICINE | Facility: CLINIC | Age: 54
End: 2024-12-17
Payer: COMMERCIAL

## 2024-12-17 VITALS
TEMPERATURE: 98.1 F | WEIGHT: 315 LBS | OXYGEN SATURATION: 97 % | HEART RATE: 77 BPM | SYSTOLIC BLOOD PRESSURE: 124 MMHG | RESPIRATION RATE: 14 BRPM | BODY MASS INDEX: 43.99 KG/M2 | DIASTOLIC BLOOD PRESSURE: 84 MMHG

## 2024-12-17 DIAGNOSIS — E78.5 HYPERLIPIDEMIA LDL GOAL <100: ICD-10-CM

## 2024-12-17 DIAGNOSIS — E66.01 MORBID OBESITY (H): ICD-10-CM

## 2024-12-17 DIAGNOSIS — E11.43 TYPE II DIABETES MELLITUS WITH PERIPHERAL AUTONOMIC NEUROPATHY (H): Primary | ICD-10-CM

## 2024-12-17 DIAGNOSIS — I10 HYPERTENSION GOAL BP (BLOOD PRESSURE) < 140/90: ICD-10-CM

## 2024-12-17 PROCEDURE — G2211 COMPLEX E/M VISIT ADD ON: HCPCS | Performed by: FAMILY MEDICINE

## 2024-12-17 PROCEDURE — 99214 OFFICE O/P EST MOD 30 MIN: CPT | Performed by: FAMILY MEDICINE

## 2024-12-17 ASSESSMENT — PAIN SCALES - GENERAL: PAINLEVEL_OUTOF10: NO PAIN (0)

## 2024-12-17 NOTE — NURSING NOTE
"Chief Complaint   Patient presents with    Diabetes     /84   Pulse 77   Temp 98.1  F (36.7  C) (Tympanic)   Resp 14   Wt (!) 153.3 kg (338 lb)   SpO2 97%   BMI 43.99 kg/m   Estimated body mass index is 43.99 kg/m  as calculated from the following:    Height as of 12/5/24: 1.867 m (6' 1.5\").    Weight as of this encounter: 153.3 kg (338 lb).  Patient presents to the clinic using No DME      Health Maintenance that is potentially due pending provider review:    Health Maintenance Due   Topic Date Due    HEPATITIS B IMMUNIZATION (1 of 3 - 19+ 3-dose series) Never done    DIABETIC FOOT EXAM  06/18/2022                  "

## 2024-12-17 NOTE — PROGRESS NOTES
Assessment & Plan     Type II diabetes mellitus with peripheral autonomic neuropathy (H)  Stable no change in treatment plan.     Morbid obesity (H)  Continue on the phentermine  Reduce portions in the evening   Work on more activity     Hypertension goal BP (blood pressure) < 140/90  Stable no change in treatment plan.     Hyperlipidemia LDL goal <100  Stable no change in treatment plan.     The longitudinal plan of care for the diagnosis(es)/condition(s) as documented were addressed during this visit. Due to the added complexity in care, I will continue to support Heath in the subsequent management and with ongoing continuity of care.                  Subjective   Heath is a 54 year old, presenting for the following health issues:  Diabetes        12/17/2024     7:38 AM   Additional Questions   Roomed by Fern WHITAKER   Accompanied by Self         12/17/2024     7:38 AM   Patient Reported Additional Medications   Patient reports taking the following new medications .     History of Present Illness       Diabetes:   He presents for follow up of diabetes.  He is checking home blood glucose one time daily.   He checks blood glucose before and after meals.  Blood glucose is never over 200 and never under 70.  When his blood glucose is low, the patient is asymptomatic for confusion, blurred vision, lethargy and reports not feeling dizzy, shaky, or weak.  He is concerned about other.   He is having numbness in feet, burning in feet and excessive thirst.            Hyperlipidemia:  He presents for follow up of hyperlipidemia.   He is taking medication to lower cholesterol. He is having myalgia or other side effects to statin medications.    Hypertension: He presents for follow up of hypertension.  He does check blood pressure  regularly outside of the clinic. Outside blood pressures have been over 140/90. He follows a low salt diet.     He eats 2-3 servings of fruits and vegetables daily.He consumes 0 sweetened beverage(s)  daily.He exercises with enough effort to increase his heart rate 9 or less minutes per day.  He exercises with enough effort to increase his heart rate 4 days per week. He is missing 1 dose(s) of medications per week.  He is not taking prescribed medications regularly due to remembering to take.     Highest Blood sugar is 140 in am  Average is 122 for the last 90 days        Medication Followup of Phentermine  Taking Medication as prescribed: yes  Side Effects:  seems a little more irritable  Medication Helping Symptoms:  weight goes up and down  Wt Readings from Last 4 Encounters:   12/17/24 (!) 153.3 kg (338 lb)   12/05/24 (!) 156.5 kg (345 lb)   11/27/24 (!) 156.5 kg (345 lb)   11/22/24 (!) 152.4 kg (336 lb)                 Review of Systems  Constitutional, HEENT, cardiovascular, pulmonary, gi and gu systems are negative, except as otherwise noted.      Objective    /84   Pulse 77   Temp 98.1  F (36.7  C) (Tympanic)   Resp 14   Wt (!) 153.3 kg (338 lb)   SpO2 97%   BMI 43.99 kg/m    Body mass index is 43.99 kg/m .  Physical Exam   GENERAL: alert and no distress  PSYCH: mentation appears normal, affect normal/bright            Signed Electronically by: Maria Antonia Goodwin MD

## 2024-12-19 ENCOUNTER — OFFICE VISIT (OUTPATIENT)
Dept: DERMATOLOGY | Facility: CLINIC | Age: 54
End: 2024-12-19
Payer: COMMERCIAL

## 2024-12-19 DIAGNOSIS — D22.9 MULTIPLE BENIGN NEVI: ICD-10-CM

## 2024-12-19 DIAGNOSIS — L57.0 ACTINIC KERATOSIS: ICD-10-CM

## 2024-12-19 DIAGNOSIS — L57.0 ACTINIC KERATOSES: ICD-10-CM

## 2024-12-19 DIAGNOSIS — L82.1 SEBORRHEIC KERATOSIS: ICD-10-CM

## 2024-12-19 DIAGNOSIS — D18.01 CHERRY ANGIOMA: ICD-10-CM

## 2024-12-19 DIAGNOSIS — Z85.828 HISTORY OF NONMELANOMA SKIN CANCER: Primary | ICD-10-CM

## 2024-12-19 DIAGNOSIS — L82.1 SEBORRHEIC KERATOSES: ICD-10-CM

## 2024-12-19 DIAGNOSIS — L81.4 SOLAR LENTIGINOSIS: ICD-10-CM

## 2024-12-19 DIAGNOSIS — L73.9 FOLLICULITIS: ICD-10-CM

## 2024-12-19 RX ORDER — KETOCONAZOLE 20 MG/ML
SHAMPOO, SUSPENSION TOPICAL
Qty: 120 ML | Refills: 3 | Status: SHIPPED | OUTPATIENT
Start: 2024-12-19

## 2024-12-19 RX ORDER — CLINDAMYCIN PHOSPHATE 11.9 MG/ML
SOLUTION TOPICAL
Qty: 60 ML | Refills: 11 | Status: SHIPPED | OUTPATIENT
Start: 2024-12-19

## 2024-12-19 ASSESSMENT — PAIN SCALES - GENERAL: PAINLEVEL_OUTOF10: NO PAIN (0)

## 2024-12-19 NOTE — LETTER
12/19/2024      Heath Carpenter  [address] [facility] [address]      Dear Colleague,    Thank you for referring your patient, Heath Carpenter, to the [facility]. Please see a copy of my visit note below.    [facility] Dermatology Note  Encounter Date: Dec 19, 2024  Office Visit     Reviewed patients past medical history and pertinent chart review prior to patients visit today.     Dermatology Problem List:  Last skin check: 12/19/2024  1. History of NMSC       - BCC, central upper back, s/p excision 3/29/21       - BCC, right upper arm, s/p ED&C 5/21/19  2. ISKs.   - Cryotherapy  3. AK.  -cryotherapy 06/14/24   4. Skin tags. LiqN2.   5. Folliculitis   - Tx: clindamycin solution, ketoconazole shampoo  6. Benign biopsy:   -dermatofibroma, left shoulder, s/p shave bx 9/13/21      Family Hx: No family history of skin cancer.   ____________________________________________    Assessment & Plan:     Folliculitis, scalp  - Patient to restart clindamycin solution.  This should be applied to affected areas on scalp once daily when lesions are active.  Additionally, patient to begin washing scalp with ketoconazole shampoo 3 times weekly.  Patient to let shampoo lather and sit for 5 minutes prior to rinsing    # actinic keratoses  Actinic keratoses are pre-cancerous skin growths caused by sun exposure. Treatment is recommended and medically indicated. Treated with cryotherapy as outlined below.     Procedures performed:   - Cryotherapy procedure note, location(s): face x 2 . After verbal consent and discussion of risks and benefits including, but not limited to, dyspigmentation/scar, blister, and pain, 2 lesion(s) were  treated with 1-2 mm freeze border for 1-2 cycles with liquid nitrogen. Post cryotherapy instructions were provided.      # Personal history of nonmelanoma skin cancer  - No signs of recurrence. Continued observation recommended.   - Sun protection: Counseled SPF 30+  sunscreen, UPF clothing, sun avoidance, tanning bed avoidance.    # Multiple nevi, trunk and extremities  # Solar lentigines  - Nevi demonstrate no concerning features on dermoscopy. We discussed the importance of self exams at home.   - ABCDEs: Counseled ABCDEs of melanoma: Asymmetry, Border (irregularity), Color (not uniform, changes in color), Diameter (greater than 6 mm which is about the size of a pencil eraser), and Evolving (any changes in preexisting moles).    # Cherry angiomas  # Seborrheic keratoses  - We discussed the benign nature of the skin lesions. No treatment required. Continued observation recommended. Follow up with any concerns.        Follow-up:  1 year  for follow up full body skin exam, as needed for new or changing lesions or new concerns    All risks, benefits and alternatives were discussed with patient.  Patient is in agreement and understands the assessment and plan.  All questions were answered.  Celi Pabon PA-C  [facility] Dermatology    ____________________________________________    CC: Skin Check (FBSC- right cheek (previously treated with LN2), hairline (breakout with zits, was previously prescribed clindamycin solution), upper paraspinal back. Hx of NMSC. )    HPI:  Mr. Heath Carpenter is a(n) 54 year old male who presents today as a return patient for a full body skin cancer screening. The patient has a history of nonmelanoma skin cancer.  The patient has lesion concern on his right cheek.  This lesion has been present for several months and is rough in nature.  He believes it has been treated with cryotherapy in the past.  He is wondering if it needs additional treatment today.     Physical Exam:  Vitals: There were no vitals taken for this visit.   SKIN: Total skin excluding the genitalia areas was performed. The exam included the scalp, face, neck, bilateral arms, chest, back, abdomen, bilateral legs, digits, mons pubis, buttocks, and nails.   - Del Cid  II.  -Face x 2 , pink macule(s) with overlying adherent scale consistent with an actinic keratosis    -Scalp, scattered inflammatory papules and pustules   -Upper paraspinal back, waxy, stuck on tan/brown papules and patches   - Multiple tan/brown macules and papules scattered throughout exam, consistent with benign nevi. No concerning features on dermoscopy.   - Scattered tan, homogenous macules scattered on sun exposed skin, consistent with solar lentigines.   - Scattered waxy, stuck on appearing papules and patches, consistent with seborrheic keratoses.  - Several 1-2 mm red dome shaped symmetric papules, consistent with cherry angiomas.     - No other lesions of concern on areas examined.     Medications:  Current Outpatient Medications   Medication Sig Dispense Refill     acetaminophen (TYLENOL) 500 MG tablet Take 1,000 mg by mouth every 6 hours as needed for mild pain       alcohol swab prep pads Use to swab area of injection/andrzej as directed. 100 each 6     alcohol swab prep pads Use to swab area of injection/andrzej as directed 100 each 3     aspirin (ASA) 81 MG EC tablet Take 1 tablet (81 mg) by mouth daily       atorvastatin (LIPITOR) 20 MG tablet Take 1 tablet (20 mg) by mouth daily 90 tablet 3     blood glucose (ACCU-CHEK FASTCLIX) lancing device Lancing device to be used with lancets. 1 each 0     blood glucose (NO BRAND SPECIFIED) test strip Use to test blood sugar 2 times daily or as directed. To accompany: Blood Glucose Monitor Brands: per insurance. 200 strip 1     blood glucose monitoring (ACCU-CHEK FASTCLIX) lancets Use to test blood sugar 2 times daily. 102 each 5     blood glucose monitoring (NO BRAND SPECIFIED) meter device kit Use to test blood sugar 2 times daily 1 kit 0     ciclopirox (PENLAC) 8 % external solution Apply topically daily. Apply to affected nails daily.  Remove the residual build up weekly with nail polish remover or an [place] board. 6.6 mL 1     clindamycin (CLEOCIN T) 1 %  external solution Apply 10-15 drops to scalp at nighttime before bed 60 mL 11     escitalopram (LEXAPRO) 10 MG tablet Take 1 tablet (10 mg) by mouth daily Take along with a 5 mg tab for a total of 15 mg every day 30 tablet 2     escitalopram (LEXAPRO) 5 MG tablet Take 1 tablet (5 mg) by mouth daily Take along with a 10 mg tab for a total of 15 mg every day 30 tablet 2     lisinopril (ZESTRIL) 20 MG tablet Take 1 tablet (20 mg) by mouth daily       magnesium 250 MG tablet Take 1 tablet by mouth daily       MULTIPLE VITAMIN PO Take 1 tablet by mouth daily       omeprazole (PRILOSEC) 40 MG DR capsule Take 1 capsule (40 mg) by mouth daily 90 capsule 3     phentermine 30 MG capsule Take 1 capsule (30 mg) by mouth every morning. 30 capsule 5     Probiotic Product (PROBIOTIC ADVANCED PO) Take by mouth. Pre/probiotic       terbinafine (LAMISIL AT) 1 % external cream Apply topically 2 times daily 12 g 1     vitamin (B COMPLEX-C) tablet Take 1 tablet by mouth daily       vitamin E 400 units TABS Take 400 Units by mouth daily       No current facility-administered medications for this visit.      Past Medical History:   Patient Active Problem List   Diagnosis     Morbid obesity (H)     Type II diabetes mellitus with peripheral autonomic neuropathy (H)     Hypertension goal BP (blood pressure) < 140/90     Mild episode of recurrent major depressive disorder (H)     JONA (obstructive sleep apnea)     Gastroesophageal reflux disease with esophagitis without hemorrhage     Hyperlipidemia LDL goal <100     Past Medical History:   Diagnosis Date     Cancer (H)     Skin     Depressive disorder      Diabetes (H)      Gastro-oesophageal reflux disease      Hypertension goal BP (blood pressure) < 140/90        CC Referred Self, MD  No address on file on close of this encounter.      Again, thank you for allowing me to participate in the care of your patient.        Sincerely,        Celi Pabon PA-C

## 2024-12-26 ENCOUNTER — TELEPHONE (OUTPATIENT)
Dept: PSYCHIATRY | Facility: CLINIC | Age: 54
End: 2024-12-26
Payer: COMMERCIAL

## 2024-12-26 DIAGNOSIS — F33.0 MILD EPISODE OF RECURRENT MAJOR DEPRESSIVE DISORDER (H): ICD-10-CM

## 2024-12-26 NOTE — TELEPHONE ENCOUNTER
Reason for call:  Medication   If this is a refill request, has the caller requested the refill from the pharmacy already? Yes  Will the patient be using a [facility]? No  Name of the pharmacy and phone number for the current request:     [facility] [address] [place] [address] [facility] (Pharmacy) [phone] (Work)    Name of the medication requested:    escitalopram (LEXAPRO)       Other request: Pharmacist calling to get med refill     Phone number to reach patient:  Home number on file [phone] (home)    Best Time:  sap     Can we leave a detailed message on this number?  YES    Travel screening: Not Applicable

## 2024-12-27 RX ORDER — ESCITALOPRAM OXALATE 10 MG/1
10 TABLET ORAL DAILY
Qty: 30 TABLET | Refills: 0 | Status: SHIPPED | OUTPATIENT
Start: 2024-12-27

## 2024-12-27 RX ORDER — ESCITALOPRAM OXALATE 5 MG/1
5 TABLET ORAL DAILY
Qty: 30 TABLET | Refills: 0 | Status: SHIPPED | OUTPATIENT
Start: 2024-12-27

## 2024-12-27 NOTE — TELEPHONE ENCOUNTER
Called and LVM for patient that he can start lexapro at 5 mg or 10 mg for 5 days and then increase to 15 mg as tolerated.     Also told him I would have A call him to schedule an appt with another [facility] provider and also provided him the number to call to schedule.     Tamica Heart RN on 12/27/2024 at 12:14 PM

## 2024-12-27 NOTE — TELEPHONE ENCOUNTER
Patient requesting a refill of his Lexapro. Patient hasn't been seen since 7/19/24. He said that he has been trying to call to set up an appt and leaves messages and hasn't received a return call. His primary even sent a message to Dr. Edmondson directly. I let him know that Dr. Edmondson was out unexpectedly right now.     I asked him if he would like to continue with one of our other [facility] providers or go back to his primary. He said he would like to follow a psych provider and his primary wanted him to see a psych provider for his med mgmt.     He has also been out of the Lexapro for at least a month. He had been taking Lexapro 15 mg. I told him we would probably start him at 10 mg again. But I would send a message to one of the covering providers.     Last visit:   Add Lexapro 5/2/2024 10 mg daily improved reactivity, overall relatively better depression, improved interpersonal communications, no side effects suspected => 06/13/2410 mg nightly no change in fatigue, likely due to other factors, depression => 7/19/24 15 mg qam-Risks, benefits and alternatives discussed.  Patient provides verbal consent to treatment.  Follow-up if fatigue is any better with bedtime dosing for Lexapro, likely due to gastroenteritis/colitis and inflammatory recovery.  Consider Viibryd, duloxetine, Luvox  Dc'd bupropion (lost efficacy, no improvements going from 150 to 300 mg)  Labs -no further labs indicated  [facility] [place] referring to long term therapy    Tamica Heart RN on 12/27/2024 at 9:58 AM

## 2024-12-30 NOTE — TELEPHONE ENCOUNTER
No appointment is place as of yet. Sent MyC message with schedulers information to help facilitate.    Shikha Staley RN on 12/30/2024 at 10:47 AM

## 2025-01-02 NOTE — PROGRESS NOTES
[facility]    Behavioral Health Clinician Progress Note   Mental Health & Addiction Services      2025    Patient Name: Heath aCrpenter       Service Type:  Individual   Service Location:  MyChart / Email (patient reached)   Visit Start Time: 3:31 PM  Visit End Time:  3:51 PM   Session Length: 16 - 37    Attendees: Patient   Service Modality: Video Visit:      Provider verified identity through the following two step process.  Patient provided:  Patient  and Patient address    Telemedicine Visit: The patient's condition can be safely assessed and treated via synchronous audio and visual telemedicine encounter.      Reason for Telemedicine Visit: Patient convenience (e.g. access to timely appointments / distance to available provider)    Originating Site (Patient Location): Patient's home    Distant Site (Provider Location): [facility] [place] [facility]    Consent:  The patient/guardian has verbally consented to: the potential risks and benefits of telemedicine (video visit) versus in person care; bill my insurance or make self-payment for services provided; and responsibility for payment of non-covered services.     Patient would like the video invitation sent by:  My Chart    Mode of Communication:  Video Conference via [facility]    Distant Location (Provider):  On-site    As the provider I attest to compliance with applicable laws and regulations related to telemedicine.   Visit number: 1    [facility] Visit Activities (Refresh list every visit): [facility] Only     [place] Diagnostic Assessment: 2024  Treatment Plan Review Date: 2025      DATA:    Extended Session (60+ minutes): No   Interactive Complexity: No   Crisis: No   [facility] Patient: No     Assessments completed prior to visit:   PHQ2:       2023     9:57 PM 2022     8:41 AM 2022     2:07 PM 3/27/2022     6:54 PM 2021    12:52 PM 3/8/2021      1:42 PM 1/29/2021    11:07 AM   PHQ-2 ( 1999 [employer])   Q1: Little interest or pleasure in doing things 1   1 0 0 0   Q2: Feeling down, depressed or hopeless 1   1 0 0 0   PHQ-2 Score 2   2 0 0 0   PHQ-2 Total Score (12-17 Years)- Positive if 3 or more points; Administer PHQ-A if positive     0 0 0   Q1: Little interest or pleasure in doing things Several days   Several days      Q2: Feeling down, depressed or hopeless Several days   Several days      PHQ-2 Score 2 Incomplete Incomplete 2        PHQ9:       9/3/2024     1:51 PM 9/19/2024    12:02 PM 10/8/2024     5:42 PM 10/29/2024     1:59 PM 12/2/2024     5:38 PM 12/12/2024     5:05 PM 12/16/2024    10:13 PM   PHQ-9 SCORE   PHQ-9 Total Score MyChart 6 (Mild depression) 7 (Mild depression) 9 (Mild depression) 5 (Mild depression) 8 (Mild depression) 7 (Mild depression) 6 (Mild depression)   PHQ-9 Total Score 6 7 9 5  8  7  6        Patient-reported     GAD2:       5/1/2024     3:18 PM 5/28/2024     7:46 AM 8/31/2024     9:56 AM 9/14/2024    11:50 AM 9/26/2024     7:16 PM 10/25/2024     3:47 PM   RODRIGUE-2   Feeling nervous, anxious, or on edge 1 1 1 1 1 1   Not being able to stop or control worrying 0 0 0 0 0 0   RODRIGUE-2 Total Score 1    1 1 1 1    1    1 1 1        Patient-reported     GAD7:       6/8/2022     2:32 PM 10/19/2023     8:06 AM 1/22/2024    12:07 AM 4/23/2024     8:28 AM 10/8/2024     5:43 PM 12/12/2024     5:05 PM   RODRIGUE-7 SCORE   Total Score  8 (mild anxiety) 5 (mild anxiety)  4 (minimal anxiety) 4 (minimal anxiety)   Total Score 7 8 5 4 4 4        Patient-reported         Reason for Visit/Presenting Concern:  Depression    Current Stressors / Issues:   MH update: Pt is a transfer. [facility] reviewed assessment and assisted pt in developing goals. Pt reports that his mood has improved. He has diabetes and his PCP started him on fentramine for weight loss. He irritability has increased. He suspects that the combination of pysch meds and fetramine may be  responsible. His in therapy and he is finidng int helpful especially in improving communication in his marriage. They often disagree with how to parent her 20 yo son who has epilepsy.  They have done marriage counseling before but it wasn't helpful. He's trying to improve himself for himself and hopefully create a catalyst to doing marriage counseling again. He would like her to go to individual counseling. His wife has MS which is effecting her memory which sometimes causes arguemtn.s [facility] and pt explored strategies to help this. He reports that they use a shared calendar and shared planner. C positively reinformced.    Stresses: marital stress  Appetite: unremarkable  Sleep: unremarkable  [place] [facility] [place]ESSA: No  Preg: NA  Most important: medication update, is the combination of fetramine and psych meds causing his irritability     Therapeutic Interventions:  Cognitive Behavioral Therapy (CBT): Identified and challenged maladaptive patterns of behavior and thinking that interfere with patients life  Psycho-education: Provided psycho-education about patient's behavioral health condition and symptoms.  Mindfulness: Provided psycho-education around developing mindfulness strategies.     Response to treatment interventions:   Patient was receptive to interventions utilized.  Patient was engaged in the therapy process.       Progress on Treatment Objective(s) / Homework:   Unable to assess-first appointment      Medication Review:   No changes to current psychiatric medication(s)     Medication Compliance:   Yes     Chemical Use Review:  Substance Use: Chemical use reviewed, no active concerns identified      Tobacco Use: No current tobacco use.       Assessment: Current Emotional / Mental Status (status of significant symptoms):    Risk status (Self / Other harm or suicidal ideation)   Patient denies a history of suicidal ideation, suicide attempts, self-injurious behavior, homicidal ideation, homicidal  behavior, and and other safety concerns   Patient denies current fears or concerns for personal safety.   Patient denies current or recent suicidal ideation or behaviors.   Patient denies current or recent homicidal ideation or behaviors.   Patient denies current or recent self injurious behavior or ideation.   Patient denies other safety concerns.   Recommended that patient call 911 or go to the local ED should there be a change in any of these risk factors      ASSESSMENT:   Mental Status:     Appearance:   Appropriate    Eye Contact:   Good    Psychomotor Behavior: Normal    Attitude:   Cooperative    Orientation:   All   Speech Rate / Production: Normal    Volume:   Normal    Mood:    Normal   Affect:    Appropriate    Thought Content:  Clear    Thought Form:  Coherent  Logical    Insight:    Good          Diagnoses:   Mild episode of recurrent major depressive disorder (H)    Collateral Reports Completed:   Collaborated with [facility] team        Plan: (Homework, other):   Patient was provided No indications of CD issues  Patient was given information about behavioral services and encouraged to schedule a follow up appointment with the clinic [facility] in 1 month.         Shelli Fuentes, JOSIAH, [facility]     _____________________________________________________________________________________________________________________________________                                              Individual Treatment Plan    Patient's Name: Heath Carpenter   YOB: 1970  Date of Creation: 01/03/2025  Date Treatment Plan Last Reviewed/Revised: pending    DSM5 Diagnoses: 296.32 (F33.1) Major Depressive Disorder, Recurrent Episode, Moderate With anxious distress  Psychosocial / Contextual Factors: Relationship Concerns  PROMIS (reviewed every 90 days):   The following assessments were completed by patient for this visit:  PROMIS 10-Global Health (all questions and answers displayed):       1/26/2024     9:08 AM 5/1/2024     3:20  PM 5/28/2024     7:47 AM 8/31/2024     9:57 AM 9/14/2024    11:51 AM 9/26/2024     7:17 PM 10/25/2024     3:48 PM   PROMIS 10   In general, would you say your health is: Good Good Good Good Good Very good Good   In general, would you say your quality of life is: Good Good Good Good Good Good Good   In general, how would you rate your physical health? Good Good Good Good Good Good Good   In general, how would you rate your mental health, including your mood and your ability to think? Good Fair Good Good Good Good Good   In general, how would you rate your satisfaction with your social activities and relationships? Good Fair Good Fair Good Good Good   In general, please rate how well you carry out your usual social activities and roles Good Fair Good Good Good Good Good   To what extent are you able to carry out your everyday physical activities such as walking, climbing stairs, carrying groceries, or moving a chair? Moderately Moderately Mostly Mostly Mostly Mostly Mostly   In the past 7 days, how often have you been bothered by emotional problems such as feeling anxious, depressed, or irritable? Sometimes Often Sometimes Often Sometimes Sometimes Sometimes   In the past 7 days, how would you rate your fatigue on average? Severe Severe Moderate Severe Moderate Moderate Moderate   In the past 7 days, how would you rate your pain on average, where 0 means no pain, and 10 means worst imaginable pain? 5 4 4 4 4 6 4   In general, would you say your health is: 3 3 3 3 3 4 3   In general, would you say your quality of life is: 3 3 3 3 3 3 3   In general, how would you rate your physical health? 3 3 3 3 3 3 3   In general, how would you rate your mental health, including your mood and your ability to think? 3 2 3 3 3 3 3   In general, how would you rate your satisfaction with your social activities and relationships? 3 2 3 2 3 3 3   In general, please rate how well you carry out your usual social activities and roles. (This  includes activities at home, at work and in your community, and responsibilities as a parent, child, spouse, employee, friend, etc.) 3 2 3 3 3 3 3   To what extent are you able to carry out your everyday physical activities such as walking, climbing stairs, carrying groceries, or moving a chair? 3 3 4 4 4 4 4   In the past 7 days, how often have you been bothered by emotional problems such as feeling anxious, depressed, or irritable? 3 4 3 4 3 3 3   In the past 7 days, how would you rate your fatigue on average? 4 4 3 4 3 3 3   In the past 7 days, how would you rate your pain on average, where 0 means no pain, and 10 means worst imaginable pain? 5 4 4 4 4 6 4   Global Mental Health Score 12 9    9 12 10 12    12    12 12 12    Global Physical Health Score 11 11    11 13 12 13    13    13 13 13    PROMIS TOTAL - SUBSCORES 23 20    20 25 22 25    25    25 25 25        Patient-reported     PROMIS 10-Global Health (only subscores and total score):       1/26/2024     9:08 AM 5/1/2024     3:20 PM 5/28/2024     7:47 AM 8/31/2024     9:57 AM 9/14/2024    11:51 AM 9/26/2024     7:17 PM 10/25/2024     3:48 PM   PROMIS-10 Scores Only   Global Mental Health Score 12 9    9 12 10 12    12    12 12 12    Global Physical Health Score 11 11    11 13 12 13    13    13 13 13    PROMIS TOTAL - SUBSCORES 23 20    20 25 22 25    25    25 25 25        Patient-reported        Referral / Collaboration:  Collaborated with [facility] team .    Anticipated number of session for this episode of care:  6-9 sessions  Anticipation frequency of session: Monthly  Anticipated Duration of each session: 16-37 minutes  Treatment plan will be reviewed in 90 days or when goals have been changed.       MeasurableTreatment Goal(s) related to diagnosis / functional impairment(s)  Goal 1: Patient will reduce frequency and intensity of depressive episode(s).    I will know I've met my goal when I have more energy.      Status: New - Date: 01/03/2025       Intervention(s)  [facility] will Cognitive Behavioral Therapy (CBT): Identify and challenge maladaptive patterns of behavior and thinking that interfere with patient's life  Psycho-education: Provide psycho-education about patient's behavioral health condition and symptoms.  Mindfulness: Provide psycho-education around developing mindfulness strategies.        Patient has reviewed and agreed to the above plan.     Written by  Shelli Fuentes LICSUGAR, [facility]

## 2025-01-03 ENCOUNTER — VIRTUAL VISIT (OUTPATIENT)
Dept: BEHAVIORAL HEALTH | Facility: CLINIC | Age: 55
End: 2025-01-03
Payer: COMMERCIAL

## 2025-01-03 DIAGNOSIS — F33.0 MILD EPISODE OF RECURRENT MAJOR DEPRESSIVE DISORDER: Primary | ICD-10-CM

## 2025-01-03 PROCEDURE — 90832 PSYTX W PT 30 MINUTES: CPT | Mod: 95 | Performed by: SOCIAL WORKER

## 2025-01-06 RX ORDER — BISACODYL 5 MG/1
TABLET, DELAYED RELEASE ORAL
Qty: 4 TABLET | Refills: 0 | Status: SHIPPED | OUTPATIENT
Start: 2025-01-06

## 2025-01-10 ENCOUNTER — ANESTHESIA EVENT (OUTPATIENT)
Dept: GASTROENTEROLOGY | Facility: CLINIC | Age: 55
End: 2025-01-10
Payer: COMMERCIAL

## 2025-01-10 RX ORDER — ONDANSETRON 4 MG/1
4 TABLET, ORALLY DISINTEGRATING ORAL EVERY 30 MIN PRN
Status: CANCELLED | OUTPATIENT
Start: 2025-01-10

## 2025-01-10 RX ORDER — ONDANSETRON 2 MG/ML
4 INJECTION INTRAMUSCULAR; INTRAVENOUS EVERY 30 MIN PRN
Status: CANCELLED | OUTPATIENT
Start: 2025-01-10

## 2025-01-10 NOTE — ANESTHESIA PREPROCEDURE EVALUATION
Anesthesia Pre-Procedure Evaluation    Patient: Heath Carpenter   MRN: [id number] : 1970        Procedure : Procedure(s):  Colonoscopy  Esophagoscopy, gastroscopy, duodenoscopy (EGD), combined          Past Medical History:   Diagnosis Date    Cancer (H)     Skin    Depressive disorder     Diabetes (H)     Gastro-oesophageal reflux disease     Hypertension goal BP (blood pressure) < 140/90       Past Surgical History:   Procedure Laterality Date    BIOPSY      Skin cancer times 2    COLONOSCOPY      ESOPHAGOSCOPY, GASTROSCOPY, DUODENOSCOPY (EGD), COMBINED N/A 2023    Procedure: Esophagoscopy, gastroscopy, duodenoscopy, combined;  Surgeon: Jay Hoyt, ;  Location: [facility]    VASECTOMY        Allergies   Allergen Reactions    Molds & Smuts Other (See Comments)     Puffy eyes    No Clinical Screening - See Comments Other (See Comments)     Cigarette smoke      Social History     Tobacco Use    Smoking status: Never    Smokeless tobacco: Never   Substance Use Topics    Alcohol use: Yes     Comment: rare      Wt Readings from Last 1 Encounters:   24 (!) 153.3 kg (338 lb)        Anesthesia Evaluation   Pt has had prior anesthetic. Type: MAC.        ROS/MED HX  ENT/Pulmonary:     (+) sleep apnea,                                       Neurologic:  - neg neurologic ROS     Cardiovascular:     (+)  hypertension- -   -  - -                                      METS/Exercise Tolerance:     Hematologic:  - neg hematologic  ROS     Musculoskeletal:  - neg musculoskeletal ROS     GI/Hepatic:     (+) GERD,                   Renal/Genitourinary:  - neg Renal ROS     Endo:     (+)  type II DM,             Obesity,       Psychiatric/Substance Use:     (+) psychiatric history depression       Infectious Disease:  - neg infectious disease ROS     Malignancy:   (+) Malignancy,     Other:  - neg other ROS          Physical Exam    Airway        Mallampati: II   TM distance: > 3 FB   Neck ROM: full   Mouth  "opening: > 3 cm    Respiratory Devices and Support         Dental       (+) Minor Abnormalities - some fillings, tiny chips      Cardiovascular   cardiovascular exam normal       Rhythm and rate: regular and normal     Pulmonary   pulmonary exam normal        breath sounds clear to auscultation           OUTSIDE LABS:  CBC:   Lab Results   Component Value Date    WBC 7.9 05/28/2024    WBC 4.8 05/22/2021    HGB 13.5 05/28/2024    HGB 13.2 (L) 05/22/2021    HCT 42.3 05/28/2024    HCT 40.5 05/22/2021     05/28/2024     05/22/2021     BMP:   Lab Results   Component Value Date     10/09/2024     07/10/2024    POTASSIUM 3.9 10/09/2024    POTASSIUM 4.2 07/10/2024    CHLORIDE 99 10/09/2024    CHLORIDE 102 07/10/2024    CO2 24 10/09/2024    CO2 26 07/10/2024    BUN 12.7 10/09/2024    BUN 12.5 07/10/2024    CR 0.86 10/09/2024    CR 0.82 07/10/2024    GLC 94 10/09/2024     (H) 07/10/2024     COAGS:   Lab Results   Component Value Date    PTT 32 05/22/2021    INR 1.10 05/22/2021     POC: No results found for: \"BGM\", \"HCG\", \"HCGS\"  HEPATIC:   Lab Results   Component Value Date    ALBUMIN 4.5 10/09/2024    PROTTOTAL 7.8 10/09/2024    ALT 49 10/09/2024    AST 34 10/09/2024    ALKPHOS 74 10/09/2024    BILITOTAL 0.4 10/09/2024     OTHER:   Lab Results   Component Value Date    LACT 1.2 05/22/2021    A1C 6.3 (H) 10/09/2024    ANCELMO 9.3 10/09/2024    MAG 1.9 10/09/2024    TSH 1.70 06/14/2024    T4 0.91 10/19/2016       Anesthesia Plan    ASA Status:  3    NPO Status:  NPO Appropriate    Anesthesia Type: General.     - Airway: Native airway   Induction: Intravenous, Propofol.   Maintenance: TIVA.        Consents    Anesthesia Plan(s) and associated risks, benefits, and realistic alternatives discussed. Questions answered and patient/representative(s) expressed understanding.     - Discussed: Risks, Benefits and Alternatives for BOTH SEDATION and the PROCEDURE were discussed     - Discussed with:  Patient  "           Postoperative Care       PONV prophylaxis: Background Propofol Infusion     Comments:               ASHLIE Zelaya CRNA    I have reviewed the pertinent notes and labs in the chart from the past 30 days and (re)examined the patient.  Any updates or changes from those notes are reflected in this note.               # Hypertension: Noted on problem list

## 2025-01-13 ENCOUNTER — ANESTHESIA (OUTPATIENT)
Dept: GASTROENTEROLOGY | Facility: CLINIC | Age: 55
End: 2025-01-13
Payer: COMMERCIAL

## 2025-01-25 ENCOUNTER — HEALTH MAINTENANCE LETTER (OUTPATIENT)
Age: 55
End: 2025-01-25

## 2025-01-28 ENCOUNTER — HOSPITAL ENCOUNTER (OUTPATIENT)
Facility: CLINIC | Age: 55
Discharge: HOME | End: 2025-01-28
Attending: STUDENT IN AN ORGANIZED HEALTH CARE EDUCATION/TRAINING PROGRAM | Admitting: STUDENT IN AN ORGANIZED HEALTH CARE EDUCATION/TRAINING PROGRAM
Payer: COMMERCIAL

## 2025-01-28 VITALS
RESPIRATION RATE: 15 BRPM | DIASTOLIC BLOOD PRESSURE: 95 MMHG | OXYGEN SATURATION: 92 % | SYSTOLIC BLOOD PRESSURE: 134 MMHG | HEART RATE: 86 BPM | TEMPERATURE: 98.3 F

## 2025-01-28 DIAGNOSIS — Z12.11 SPECIAL SCREENING FOR MALIGNANT NEOPLASMS, COLON: Primary | ICD-10-CM

## 2025-01-28 LAB
COLONOSCOPY: NORMAL
UPPER GI ENDOSCOPY: NORMAL

## 2025-01-28 PROCEDURE — 250N000011 HC RX IP 250 OP 636

## 2025-01-28 PROCEDURE — 45378 DIAGNOSTIC COLONOSCOPY: CPT | Performed by: STUDENT IN AN ORGANIZED HEALTH CARE EDUCATION/TRAINING PROGRAM

## 2025-01-28 PROCEDURE — 258N000003 HC RX IP 258 OP 636: Performed by: STUDENT IN AN ORGANIZED HEALTH CARE EDUCATION/TRAINING PROGRAM

## 2025-01-28 PROCEDURE — 43239 EGD BIOPSY SINGLE/MULTIPLE: CPT | Performed by: STUDENT IN AN ORGANIZED HEALTH CARE EDUCATION/TRAINING PROGRAM

## 2025-01-28 PROCEDURE — 370N000017 HC ANESTHESIA TECHNICAL FEE, PER MIN: Performed by: STUDENT IN AN ORGANIZED HEALTH CARE EDUCATION/TRAINING PROGRAM

## 2025-01-28 PROCEDURE — 250N000009 HC RX 250: Performed by: PHYSICIAN ASSISTANT

## 2025-01-28 PROCEDURE — 45385 COLONOSCOPY W/LESION REMOVAL: CPT | Mod: PT | Performed by: STUDENT IN AN ORGANIZED HEALTH CARE EDUCATION/TRAINING PROGRAM

## 2025-01-28 PROCEDURE — 88305 TISSUE EXAM BY PATHOLOGIST: CPT | Mod: TC | Performed by: STUDENT IN AN ORGANIZED HEALTH CARE EDUCATION/TRAINING PROGRAM

## 2025-01-28 PROCEDURE — 250N000009 HC RX 250

## 2025-01-28 PROCEDURE — 88305 TISSUE EXAM BY PATHOLOGIST: CPT | Mod: 26 | Performed by: PATHOLOGY

## 2025-01-28 RX ORDER — PROPOFOL 10 MG/ML
INJECTION, EMULSION INTRAVENOUS CONTINUOUS PRN
Status: DISCONTINUED | OUTPATIENT
Start: 2025-01-28 | End: 2025-01-28

## 2025-01-28 RX ORDER — GLYCOPYRROLATE 0.2 MG/ML
INJECTION, SOLUTION INTRAMUSCULAR; INTRAVENOUS PRN
Status: DISCONTINUED | OUTPATIENT
Start: 2025-01-28 | End: 2025-01-28

## 2025-01-28 RX ORDER — LIDOCAINE HYDROCHLORIDE 20 MG/ML
INJECTION, SOLUTION INFILTRATION; PERINEURAL PRN
Status: DISCONTINUED | OUTPATIENT
Start: 2025-01-28 | End: 2025-01-28

## 2025-01-28 RX ORDER — LIDOCAINE 40 MG/G
CREAM TOPICAL
Status: DISCONTINUED | OUTPATIENT
Start: 2025-01-28 | End: 2025-01-28 | Stop reason: HOSPADM

## 2025-01-28 RX ORDER — PROPOFOL 10 MG/ML
INJECTION, EMULSION INTRAVENOUS PRN
Status: DISCONTINUED | OUTPATIENT
Start: 2025-01-28 | End: 2025-01-28

## 2025-01-28 RX ORDER — SODIUM CHLORIDE, SODIUM LACTATE, POTASSIUM CHLORIDE, CALCIUM CHLORIDE 600; 310; 30; 20 MG/100ML; MG/100ML; MG/100ML; MG/100ML
INJECTION, SOLUTION INTRAVENOUS CONTINUOUS
Status: DISCONTINUED | OUTPATIENT
Start: 2025-01-28 | End: 2025-01-28 | Stop reason: HOSPADM

## 2025-01-28 RX ADMIN — LIDOCAINE HYDROCHLORIDE 0.2 ML: 10 INJECTION, SOLUTION EPIDURAL; INFILTRATION; INTRACAUDAL; PERINEURAL at 11:52

## 2025-01-28 RX ADMIN — LIDOCAINE HYDROCHLORIDE 100 MG: 20 INJECTION, SOLUTION INFILTRATION; PERINEURAL at 13:41

## 2025-01-28 RX ADMIN — PROPOFOL 200 MCG/KG/MIN: 10 INJECTION, EMULSION INTRAVENOUS at 13:46

## 2025-01-28 RX ADMIN — PROPOFOL 200 MG: 10 INJECTION, EMULSION INTRAVENOUS at 13:43

## 2025-01-28 RX ADMIN — SODIUM CHLORIDE, POTASSIUM CHLORIDE, SODIUM LACTATE AND CALCIUM CHLORIDE: 600; 310; 30; 20 INJECTION, SOLUTION INTRAVENOUS at 13:40

## 2025-01-28 RX ADMIN — GLYCOPYRROLATE 0.2 MG: 0.2 INJECTION, SOLUTION INTRAMUSCULAR; INTRAVENOUS at 13:41

## 2025-01-28 RX ADMIN — SODIUM CHLORIDE, POTASSIUM CHLORIDE, SODIUM LACTATE AND CALCIUM CHLORIDE: 600; 310; 30; 20 INJECTION, SOLUTION INTRAVENOUS at 11:52

## 2025-01-28 ASSESSMENT — ACTIVITIES OF DAILY LIVING (ADL)
ADLS_ACUITY_SCORE: 41

## 2025-01-28 NOTE — PROGRESS NOTES
[facility] DISCHARGE NOTE    Patient discharged to home at 3:12 PM via ambulation. Accompanied by spouse and staff. Discharge instructions reviewed with patient and spouse, opportunity offered to ask questions. Prescriptions - None ordered for discharge. All belongings sent with patient.    Roro Bardales RN

## 2025-01-28 NOTE — H&P
[facility]    Pre-Endoscopy History and Physical     Heath Carpenter MRN# [id number]   YOB: 1970 Age: 54 year old     Date of Procedure: 1/28/2025  Primary care provider: Maria Antonia Goodwin  Type of Endoscopy: Colonoscopy with possible biopsy, possible polypectomy and Esophagogastroduodenoscopy with possible biopsy, possible dilation, possible foreign body removal  Reason for Procedure: GERD, screening colonoscopy  Type of Anesthesia Anticipated: Conscious Sedation    HPI:    Heath is a 54 year old male who will be undergoing the above procedure.      A history and physical has been performed. The patient's medications and allergies have been reviewed. The risks and benefits of the procedure and the sedation options and risks were discussed with the patient.  All questions were answered and informed consent was obtained.      He denies a personal or family history of anesthesia complications or bleeding disorders.     EGD and colonoscopy  EGD for reflux, last one 2023 with no biopsies taken  Colonoscopy for screening. Last one 2018. Family hx of colon cancer. Maternal grandmother with colon cancer. No AC.     Patient Active Problem List   Diagnosis    Morbid obesity (H)    Type II diabetes mellitus with peripheral autonomic neuropathy (H)    Hypertension goal BP (blood pressure) < 140/90    Mild episode of recurrent major depressive disorder    JONA (obstructive sleep apnea)    Gastroesophageal reflux disease with esophagitis without hemorrhage    Hyperlipidemia LDL goal <100        Past Medical History:   Diagnosis Date    Cancer (H)     Skin    Depressive disorder     Diabetes (H)     Gastro-oesophageal reflux disease     Hypertension goal BP (blood pressure) < 140/90         Past Surgical History:   Procedure Laterality Date    BIOPSY      Skin cancer times 2    COLONOSCOPY      ESOPHAGOSCOPY, GASTROSCOPY, DUODENOSCOPY (EGD), COMBINED N/A 7/5/2023    Procedure: Esophagoscopy,  gastroscopy, duodenoscopy, combined;  Surgeon: Jay Hoyt DO;  Location: [facility]    VASECTOMY         Social History     Tobacco Use    Smoking status: Never    Smokeless tobacco: Never   Substance Use Topics    Alcohol use: Yes     Comment: rare       Family History   Problem Relation Age of Onset    Alcoholism Father     Prostate Cancer Father         He is getting tested further but he has a type 5 and 4 lesions    Lung Cancer Maternal Grandfather     Diabetes Maternal Grandfather     Hypertension Maternal Grandfather     Other Cancer Maternal Grandfather         Lung    Colon Cancer Maternal Grandmother        Prior to Admission medications    Medication Sig Start Date End Date Taking? Authorizing Provider   aspirin (ASA) 81 MG EC tablet Take 1 tablet (81 mg) by mouth daily 10/27/20  Yes Fatemeh Stevens MD   atorvastatin (LIPITOR) 20 MG tablet Take 1 tablet (20 mg) by mouth daily 4/23/24  Yes Maria Antonia Goodwin MD   bisacodyl (DULCOLAX) 5 MG EC tablet 2 days prior to procedure, take 2 tablets at 4 pm. 1 day prior to procedure, take 2 tablets at 4 pm. For additional instructions refer to your colonoscopy prep instructions. 1/6/25  Yes Herb Kennedy MD   ciclopirox (PENLAC) 8 % external solution Apply topically daily. Apply to affected nails daily.  Remove the residual build up weekly with nail polish remover or an [place] board. 9/6/24  Yes Dylan Whitney DPM   clindamycin (CLEOCIN T) 1 % external solution Apply to affected area on scalp once daily when lesions are active 12/19/24  Yes Celi Pabon PA-C   escitalopram (LEXAPRO) 10 MG tablet Take 1 tablet (10 mg) by mouth daily. Take along with a 5 mg tab for a total of 15 mg every day 1/3/25  Yes Courtney Walsh DNP   ketoconazole (NIZORAL) 2 % external shampoo Use to wash scalp 3x weekly. Let lather sit for 5 minutes prior to rinsing. 12/19/24  Yes Celi Pabon PA-C   lisinopril (ZESTRIL) 20 MG tablet Take 1 tablet  (20 mg) by mouth daily 5/28/24  Yes Maria Antonia Goodwin MD   magnesium 250 MG tablet Take 1 tablet by mouth daily   Yes Reported, Patient   MULTIPLE VITAMIN PO Take 1 tablet by mouth daily   Yes Reported, Patient   omeprazole (PRILOSEC) 40 MG DR capsule Take 1 capsule (40 mg) by mouth daily 4/23/24  Yes Maria Antonia Goodwin MD   phentermine 30 MG capsule Take 1 capsule (30 mg) by mouth every morning. 11/20/24  Yes Maria Antonia Goodwin MD   polyethylene glycol (GOLYTELY) 236 g suspension 2 days prior at 5pm, mix and drink half of a jug of Golytely. Drink an 8 oz. glass of Golytely every 15 minutes until half of the jug is gone. Place remainder of Golytely in the refrigerator. 1 day prior at 5 pm, drink the 2nd half of a jug of Golytely bowel prep. 6 hours before your check-in time, drink an 8 oz. glass of Golytely every 15 minutes until half of the 2nd jug of Golytely is gone. Discard remainder of second jug. 1/6/25  Yes Herb Kennedy MD   Probiotic Product (PROBIOTIC ADVANCED PO) Take by mouth. Pre/probiotic   Yes Reported, Patient   terbinafine (LAMISIL AT) 1 % external cream Apply topically 2 times daily 10/26/23  Yes Dylan Whitney DPM   vitamin (B COMPLEX-C) tablet Take 1 tablet by mouth daily   Yes Reported, Patient   vitamin E 400 units TABS Take 400 Units by mouth daily   Yes Reported, Patient   acetaminophen (TYLENOL) 500 MG tablet Take 1,000 mg by mouth every 6 hours as needed for mild pain    Reported, Patient   alcohol swab prep pads Use to swab area of injection/andrzej as directed. 9/27/24   Maria Antonia Goodwin MD   alcohol swab prep pads Use to swab area of injection/andrzej as directed 9/6/24   Maria Antonia Goodwin MD   blood glucose (ACCU-CHEK FASTCLIX) lancing device Lancing device to be used with lancets. 9/27/24   Maria Antonia Goodwin MD   blood glucose (NO BRAND SPECIFIED) test strip Use to test blood sugar 2 times daily or as directed. To accompany: Blood Glucose Monitor  "Brands: per insurance. 9/17/24   Maria Antonia Goodwin MD   blood glucose monitoring (ACCU-CHEK FASTCLIX) lancets Use to test blood sugar 2 times daily. 9/27/24   Maria Antonia Goodwin MD   blood glucose monitoring (NO BRAND SPECIFIED) meter device kit Use to test blood sugar 2 times daily 9/16/24   Maria Antonia Goodwin MD       Allergies   Allergen Reactions    Molds & Smuts Other (See Comments)     Puffy eyes    No Clinical Screening - See Comments Other (See Comments)     Cigarette smoke        REVIEW OF SYSTEMS:   5 point ROS negative except as noted above in HPI, including Gen., Resp., CV, GI &  system review.    PHYSICAL EXAM:   There were no vitals taken for this visit. Estimated body mass index is 43.99 kg/m  as calculated from the following:    Height as of 12/5/24: 1.867 m (6' 1.5\").    Weight as of 12/17/24: 153.3 kg (338 lb).   Constitutional: Awake, alert, no acute distress.  Eyes: No scleral icterus.  Conjunctiva are without injection.  ENMT: Mucous membranes moist, dentition and gums are intact.   Neck: Soft, supple, trachea midline.    Endocrine: n/a   Lymphatic: There is no cervical, submandibularadenopathy.  Respiratory: normal efforts on room air  Cardiovascular: extremities warm and well perfused  Abdomen: Non-distended, non-tender,  No masses,  Musculoskeletal: Full range of motion in the upper and lower extremities.    Skin: No skin rashes or lesions to inspection.  No petechia.    Neurologic: alerted and oriented 3x  Psychiatric: The patient's affect is not blunted and mood is appropriate.  DIAGNOSTICS:    Not indicated    IMPRESSION   ASA Class 2 - Mild systemic disease    PLAN:   Plan for Colonoscopy with possible biopsy, possible polypectomy and Esophagogastroduodenoscopy with possible biopsy, possible dilation, possible foreign body removal. We discussed the risks, benefits and alternatives and the patient wished to proceed.  Patient is cleared for the above procedure.    The above " has been forwarded to the consulting provider.    Vince Pastor MD on 1/28/2025 at 11:04 AM  [facility]

## 2025-01-28 NOTE — ANESTHESIA CARE TRANSFER NOTE
Patient: Heath Carpenter    Procedure: Procedure(s):  Colonoscopy  Esophagoscopy, gastroscopy, duodenoscopy (EGD), combined       Diagnosis: Family history of colon cancer [Z80.0]  Diagnosis Additional Information: No value filed.    Anesthesia Type:   General     Note:    Oropharynx: oropharynx clear of all foreign objects and spontaneously breathing  Level of Consciousness: drowsy  Oxygen Supplementation: room air    Independent Airway: airway patency satisfactory and stable  Dentition: dentition unchanged  Vital Signs Stable: post-procedure vital signs reviewed and stable  Report to RN Given: handoff report given  Patient transferred to: Phase II    Handoff Report: Identifed the Patient, Identified the Reponsible Provider, Reviewed the pertinent medical history, Discussed the surgical course, Reviewed Intra-OP anesthesia mangement and issues during anesthesia, Set expectations for post-procedure period and Allowed opportunity for questions and acknowledgement of understanding      Vitals:  Vitals Value Taken Time   /68 01/28/25 1409   Temp     Pulse 101 01/28/25 1409   Resp     SpO2 91 % 01/28/25 1415   Vitals shown include unfiled device data.    Electronically Signed By: ASHLIE Garcia CRNA  January 28, 2025  2:16 PM

## 2025-01-28 NOTE — ANESTHESIA POSTPROCEDURE EVALUATION
Patient: Heath Carpenter    Procedure: Procedure(s):  Colonoscopy  Esophagoscopy, gastroscopy, duodenoscopy (EGD), combined       Anesthesia Type:  General    Note:  Disposition: Outpatient   Postop Pain Control: Uneventful            Sign Out: Well controlled pain   PONV: No   Neuro/Psych: Uneventful            Sign Out: Acceptable/Baseline neuro status   Airway/Respiratory: Uneventful            Sign Out: Acceptable/Baseline resp. status   CV/Hemodynamics: Uneventful            Sign Out: Acceptable CV status; No obvious hypovolemia; No obvious fluid overload   Other NRE: NONE   DID A NON-ROUTINE EVENT OCCUR? No           Last vitals:  Vitals Value Taken Time   /68 01/28/25 1409   Temp     Pulse 101 01/28/25 1409   Resp     SpO2 91 % 01/28/25 1415   Vitals shown include unfiled device data.    Electronically Signed By: ASHLIE Garcia CRNA  January 28, 2025  2:16 PM

## 2025-01-30 LAB
PATH REPORT.COMMENTS IMP SPEC: NORMAL
PATH REPORT.COMMENTS IMP SPEC: NORMAL
PATH REPORT.FINAL DX SPEC: NORMAL
PATH REPORT.GROSS SPEC: NORMAL
PATH REPORT.MICROSCOPIC SPEC OTHER STN: NORMAL
PATH REPORT.RELEVANT HX SPEC: NORMAL
PHOTO IMAGE: NORMAL

## 2025-02-11 PROBLEM — D12.6 COLON ADENOMA: Status: ACTIVE | Noted: 2025-02-11

## 2025-02-12 ENCOUNTER — PATIENT OUTREACH (OUTPATIENT)
Dept: GASTROENTEROLOGY | Facility: CLINIC | Age: 55
End: 2025-02-12
Payer: COMMERCIAL

## 2025-02-19 NOTE — PROGRESS NOTES
[facility]    Behavioral Health Clinician Progress Note   Mental Health & Addiction Services      2025    Patient Name: Heath Carpenter       Service Type:  Individual   Service Location:  [facility] / Email (patient reached)   Visit Start Time: 130 PM  Visit End Time:  151 PM   Session Length: 16 - 37    Attendees: Patient   Service Modality: Video Visit:      Provider verified identity through the following two step process.  Patient provided:  Patient  and Patient address    Telemedicine Visit: The patient's condition can be safely assessed and treated via synchronous audio and visual telemedicine encounter.      Reason for Telemedicine Visit: Patient convenience (e.g. access to timely appointments / distance to available provider)    Originating Site (Patient Location): Patient's home    Distant Site (Provider Location): Provider Remote Setting- Home Office    Consent:  The patient/guardian has verbally consented to: the potential risks and benefits of telemedicine (video visit) versus in person care; bill my insurance or make self-payment for services provided; and responsibility for payment of non-covered services.     Patient would like the video invitation sent by:  My Chart    Mode of Communication:  [facility]    Distant Location (Provider):  Off-site    As the provider I attest to compliance with applicable laws and regulations related to telemedicine.   Visit number: 2    [facility] Visit Activities (Refresh list every visit): [facility] Only     Foley Diagnostic Assessment: 2024  Treatment Plan Review Date: 2025      DATA:    Extended Session (60+ minutes): No   Interactive Complexity: No   Crisis: No   [facility] Patient: No     Assessments completed prior to visit:   PHQ2:       2023     9:57 PM 2022     8:41 AM 2022     2:07 PM 3/27/2022     6:54 PM 2021    12:52 PM 3/8/2021     1:42 PM 2021    11:07 AM  "  PHQ-2 ( 1999 [employer])   Q1: Little interest or pleasure in doing things 1   1 0 0 0   Q2: Feeling down, depressed or hopeless 1   1 0 0 0   PHQ-2 Score 2   2 0 0 0   PHQ-2 Total Score (12-17 Years)- Positive if 3 or more points; Administer PHQ-A if positive     0 0 0   Q1: Little interest or pleasure in doing things Several days   Several days      Q2: Feeling down, depressed or hopeless Several days   Several days      PHQ-2 Score 2 Incomplete Incomplete 2        PHQ9:       9/19/2024    12:02 PM 10/8/2024     5:42 PM 10/29/2024     1:59 PM 12/2/2024     5:38 PM 12/12/2024     5:05 PM 12/16/2024    10:13 PM 2/20/2025     1:07 PM   PHQ-9 SCORE   PHQ-9 Total Score [facility] 7 (Mild depression) 9 (Mild depression) 5 (Mild depression) 8 (Mild depression) 7 (Mild depression) 6 (Mild depression) 6 (Mild depression)   PHQ-9 Total Score 7 9 5  8  7  6  6        Patient-reported     GAD2:       5/1/2024     3:18 PM 5/28/2024     7:46 AM 8/31/2024     9:56 AM 9/14/2024    11:50 AM 9/26/2024     7:16 PM 10/25/2024     3:47 PM 2/20/2025     1:07 PM   RODRIGUE-2   Feeling nervous, anxious, or on edge 1 1 1 1 1 1 1   Not being able to stop or control worrying 0 0 0 0 0 0 0   RODRIGUE-2 Total Score 1    1 1 1 1    1    1 1 1  1        Patient-reported     GAD7:       6/8/2022     2:32 PM 10/19/2023     8:06 AM 1/22/2024    12:07 AM 4/23/2024     8:28 AM 10/8/2024     5:43 PM 12/12/2024     5:05 PM   RODRIGUE-7 SCORE   Total Score  8 (mild anxiety) 5 (mild anxiety)  4 (minimal anxiety) 4 (minimal anxiety)   Total Score 7 8 5 4 4 4        Patient-reported         Reason for Visit/Presenting Concern:  Depression    Current Stressors / Issues:   MH update: Pt reports that he was off medication for 2 weeks for a medical procedure. While off of it he noticed that he was very fatigued, \"in a funk\", low motivation, tendency to isolate. He's been back on the medication since Jan 28th. He reports mood swings, over-reacting, more easily irritated. Primary " stress continues be marital stress particularly around parenting her adult son who lives with them because of his medical situation. Pt would like him to get into some independent living but wife says she agrees but then delays. Pt feels that his wife enables son's dependency on them. [facility] explored possibility that she may be afraid of something bad happening to him. They disagree on how to facilitate son's independence.    Stresses: marital conflict, wife's health, step son's health  Appetite: unremarkable  Sleep: unremarkable  [place] [facility]'s needing to get rescheduled.   VANNESSA: No  Preg: NA  Most important: medication update        Therapeutic Interventions:  Cognitive Behavioral Therapy (CBT): Identified and challenged maladaptive patterns of behavior and thinking that interfere with patients life  Psycho-education: Provided psycho-education about patient's behavioral health condition and symptoms.  Mindfulness: Provided psycho-education around developing mindfulness strategies.     Response to treatment interventions:   Patient was receptive to interventions utilized.  Patient was engaged in the therapy process.       Progress on Treatment Objective(s) / Homework:   Unable to assess-first appointment      Medication Review:   No changes to current psychiatric medication(s)     Medication Compliance:   Yes     Chemical Use Review:  Substance Use: Chemical use reviewed, no active concerns identified      Tobacco Use: No current tobacco use.       Assessment: Current Emotional / Mental Status (status of significant symptoms):    Risk status (Self / Other harm or suicidal ideation)   Patient denies a history of suicidal ideation, suicide attempts, self-injurious behavior, homicidal ideation, homicidal behavior, and and other safety concerns   Patient denies current fears or concerns for personal safety.   Patient denies current or recent suicidal ideation or behaviors.   Patient denies current or recent  homicidal ideation or behaviors.   Patient denies current or recent self injurious behavior or ideation.   Patient denies other safety concerns.   Recommended that patient call 911 or go to the local ED should there be a change in any of these risk factors      ASSESSMENT:   Mental Status:     Appearance:   Appropriate    Eye Contact:   Good    Psychomotor Behavior: Normal    Attitude:   Cooperative    Orientation:   All   Speech Rate / Production: Normal    Volume:   Normal    Mood:    Depressed    Affect:    Appropriate    Thought Content:  Clear    Thought Form:  Coherent  Logical    Insight:    Good          Diagnoses:   Mild episode of recurrent major depressive disorder    Collateral Reports Completed:   Collaborated with [facility] team        Plan: (Homework, other):   Patient was provided No indications of CD issues  Patient was given information about behavioral services and encouraged to schedule a follow up appointment with the clinic [facility] in 1 month.         JOSIAH Abbott, [facility]     _____________________________________________________________________________________________________________________________________                                              Individual Treatment Plan    Patient's Name: Heath Carpenter   YOB: 1970  Date of Creation: 01/03/2025  Date Treatment Plan Last Reviewed/Revised: pending    DSM5 Diagnoses: 296.32 (F33.1) Major Depressive Disorder, Recurrent Episode, Moderate With anxious distress  Psychosocial / Contextual Factors: Relationship Concerns  PROMIS (reviewed every 90 days):   The following assessments were completed by patient for this visit:  PROMIS 10-Global Health (all questions and answers displayed):       5/1/2024     3:20 PM 5/28/2024     7:47 AM 8/31/2024     9:57 AM 9/14/2024    11:51 AM 9/26/2024     7:17 PM 10/25/2024     3:48 PM 2/20/2025     1:08 PM   PROMIS 10   In general, would you say your health is: Good Good Good Good Very good Good  Good   In general, would you say your quality of life is: Good Good Good Good Good Good Good   In general, how would you rate your physical health? Good Good Good Good Good Good Fair   In general, how would you rate your mental health, including your mood and your ability to think? Fair Good Good Good Good Good Fair   In general, how would you rate your satisfaction with your social activities and relationships? Fair Good Fair Good Good Good Good   In general, please rate how well you carry out your usual social activities and roles Fair Good Good Good Good Good Good   To what extent are you able to carry out your everyday physical activities such as walking, climbing stairs, carrying groceries, or moving a chair? Moderately Mostly Mostly Mostly Mostly Mostly Mostly   In the past 7 days, how often have you been bothered by emotional problems such as feeling anxious, depressed, or irritable? Often Sometimes Often Sometimes Sometimes Sometimes Sometimes   In the past 7 days, how would you rate your fatigue on average? Severe Moderate Severe Moderate Moderate Moderate Moderate   In the past 7 days, how would you rate your pain on average, where 0 means no pain, and 10 means worst imaginable pain? 4 4 4 4 6 4 5   In general, would you say your health is: 3 3 3 3 4 3 3   In general, would you say your quality of life is: 3 3 3 3 3 3 3   In general, how would you rate your physical health? 3 3 3 3 3 3 2   In general, how would you rate your mental health, including your mood and your ability to think? 2 3 3 3 3 3 2   In general, how would you rate your satisfaction with your social activities and relationships? 2 3 2 3 3 3 3   In general, please rate how well you carry out your usual social activities and roles. (This includes activities at home, at work and in your community, and responsibilities as a parent, child, spouse, employee, friend, etc.) 2 3 3 3 3 3 3   To what extent are you able to carry out your everyday  physical activities such as walking, climbing stairs, carrying groceries, or moving a chair? 3 4 4 4 4 4 4   In the past 7 days, how often have you been bothered by emotional problems such as feeling anxious, depressed, or irritable? 4 3 4 3 3 3 3   In the past 7 days, how would you rate your fatigue on average? 4 3 4 3 3 3 3   In the past 7 days, how would you rate your pain on average, where 0 means no pain, and 10 means worst imaginable pain? 4 4 4 4 6 4 5   Global Mental Health Score 9    9 12 10 12    12    12 12 12  11    Global Physical Health Score 11    11 13 12 13    13    13 13 13  12    PROMIS TOTAL - SUBSCORES 20    20 25 22 25    25    25 25 25  23        Patient-reported     PROMIS 10-Global Health (only subscores and total score):       5/1/2024     3:20 PM 5/28/2024     7:47 AM 8/31/2024     9:57 AM 9/14/2024    11:51 AM 9/26/2024     7:17 PM 10/25/2024     3:48 PM 2/20/2025     1:08 PM   PROMIS-10 Scores Only   Global Mental Health Score 9    9 12 10 12    12    12 12 12  11    Global Physical Health Score 11    11 13 12 13    13    13 13 13  12    PROMIS TOTAL - SUBSCORES 20    20 25 22 25    25    25 25 25  23        Patient-reported        Referral / Collaboration:  Collaborated with [facility] team .    Anticipated number of session for this episode of care:  6-9 sessions  Anticipation frequency of session: Monthly  Anticipated Duration of each session: 16-37 minutes  Treatment plan will be reviewed in 90 days or when goals have been changed.       MeasurableTreatment Goal(s) related to diagnosis / functional impairment(s)  Goal 1: Patient will reduce frequency and intensity of depressive episode(s).    I will know I've met my goal when I have more energy.      Status: New - Date: 01/03/2025      Intervention(s)  [facility] will Cognitive Behavioral Therapy (CBT): Identify and challenge maladaptive patterns of behavior and thinking that interfere with patient's life  Psycho-education: Provide  psycho-education about patient's behavioral health condition and symptoms.  Mindfulness: Provide psycho-education around developing mindfulness strategies.        Patient has reviewed and agreed to the above plan.     Written by  RIP Abbott, [facility]

## 2025-02-20 ENCOUNTER — VIRTUAL VISIT (OUTPATIENT)
Dept: BEHAVIORAL HEALTH | Facility: CLINIC | Age: 55
End: 2025-02-20
Payer: COMMERCIAL

## 2025-02-20 ENCOUNTER — VIRTUAL VISIT (OUTPATIENT)
Dept: PSYCHIATRY | Facility: CLINIC | Age: 55
End: 2025-02-20
Payer: COMMERCIAL

## 2025-02-20 DIAGNOSIS — F33.0 MILD EPISODE OF RECURRENT MAJOR DEPRESSIVE DISORDER: Primary | ICD-10-CM

## 2025-02-20 DIAGNOSIS — F33.0 MILD EPISODE OF RECURRENT MAJOR DEPRESSIVE DISORDER: ICD-10-CM

## 2025-02-20 RX ORDER — ESCITALOPRAM OXALATE 10 MG/1
10 TABLET ORAL DAILY
Qty: 30 TABLET | Refills: 1 | Status: SHIPPED | OUTPATIENT
Start: 2025-02-20

## 2025-02-20 RX ORDER — ESCITALOPRAM OXALATE 10 MG/1
10 TABLET ORAL DAILY
Qty: 30 TABLET | Refills: 0 | Status: SHIPPED | OUTPATIENT
Start: 2025-02-20 | End: 2025-02-20

## 2025-02-20 ASSESSMENT — PAIN SCALES - GENERAL: PAINLEVEL_OUTOF10: MODERATE PAIN (6)

## 2025-02-20 ASSESSMENT — PATIENT HEALTH QUESTIONNAIRE - PHQ9
SUM OF ALL RESPONSES TO PHQ QUESTIONS 1-9: 6
SUM OF ALL RESPONSES TO PHQ QUESTIONS 1-9: 6
10. IF YOU CHECKED OFF ANY PROBLEMS, HOW DIFFICULT HAVE THESE PROBLEMS MADE IT FOR YOU TO DO YOUR WORK, TAKE CARE OF THINGS AT HOME, OR GET ALONG WITH OTHER PEOPLE: SOMEWHAT DIFFICULT

## 2025-02-20 NOTE — NURSING NOTE
Current patient location: [address] [place] [id number]    Is the patient currently in the state of [place]? YES    Visit mode: VIDEO    If the visit is dropped, the patient can be reconnected by:VIDEO VISIT: Text to cell phone:   Telephone Information:   Mobile [phone]    and VIDEO VISIT: Send to e-mail at: [email or web address]    Will anyone else be joining the visit? NO  (If patient encounters technical issues they should call [phone])    Are changes needed to the allergy or medication list? No    Are refills needed on medications prescribed by this physician? YES    escitalopram (LEXAPRO) 10 MG tablet    Rooming Documentation:  Questionnaire(s) completed    Reason for visit: RECHECK    Samreen HOLLIS

## 2025-02-20 NOTE — PROGRESS NOTES
Virtual Visit Details    Type of service:  Video Visit     Originating Location (pt. Location): Other at work in office    Distant Location (provider location):  Off-site  Platform used for Video Visit: Survios      PSYCHIATRIC MEDICATION FOLLOW UP APPT     Name:  Heath Carpenter  : 1970    Patient Care Team:  Maria Antonia Goodwin MD as PCP - General (Family Medicine)  Mirna Ellison RD as Diabetes Educator (Dietitian, Registered)  Gia Brown RD as Diabetes Educator (Dietitian, Registered)  Maria Antonia Goodwin MD as Assigned PCP  Celi Pabon PA-C (Dermatology)  Delores Ramirez LPCC as  (Licensed Mental Health)  Celi Pabon PA-C as Assigned Surgical Provider  Delores Ramirez LPCC as Assigned Behavioral Health Provider  Geovany Bean DO as Assigned Musculoskeletal Provider  Courtney Walsh DNP as Assigned Neuroscience Provider  Therapist:  VIVIANA Ramirez. [facility]     Patient attended the phone/video session alone.    Last seen for outpatient psychiatry Return Visit on 1/3/2025     FOLLOWING PLAN PUT INTO PLACE: Initially felt the the 15 mg of escitalopram significantly reduced his reactivity and ability to stay calm in the moment. However once phentermine was added this has become an issue again and he is more reactive. Both phentermine and escitalopram increase the toxicity of 1 another and therefore will decrease the Lexapro to 10 mg to evaluate if this is dose dependent before considering transitioning to a different medication patient verbalized agreement and will follow up in 6 weeks       INTERIM HISTORY     COMMUNICATIONS FROM PATIENT VIA:  none noted     RECORDS AVAILABLE FOR REVIEW: EHR records through [employer]  and previous psychiatric progress note.  In addition, reviewed the assessment completed by Shelli Fuentes [facility], dated today        HISTORY OF PRESENT ILLNESS   CCPS referral for psychiatric medication consult in may  2024.  Initially started treatment with Dr. Derick Edmondson.  Reports history of depression.  Prior to [facility] he had been experiencing significant depressive symptoms for the past 1.5 years, which have progressively worsened.   Denies prior psychiatric hospitalizations. No history of suicidal thoughts or attempts. No history of self-injurious behaviors.  Exposed to multiple Adverse childhood experiences (ACEs). ACEs are strongly related to the development and prevalence of a wide range of health problems throughout a person s lifespan, including those associated with substance misuse. These events are likely playing into the clinical picture.      FAMILY, MEDICAL, SURGICAL HISTORY REVIEWED.  MEDICATION HAVE BEEN REVIEWED AND ARE CURRENT TO THE BEST OF MY KNOWLEDGE AND ABILITY.   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    MEDICATIONS                                                                                                Current Outpatient Medications   Medication Sig Dispense Refill    acetaminophen (TYLENOL) 500 MG tablet Take 1,000 mg by mouth every 6 hours as needed for mild pain      alcohol swab prep pads Use to swab area of injection/andrzej as directed. 100 each 6    alcohol swab prep pads Use to swab area of injection/andrzej as directed 100 each 3    aspirin (ASA) 81 MG EC tablet Take 1 tablet (81 mg) by mouth daily      atorvastatin (LIPITOR) 20 MG tablet Take 1 tablet (20 mg) by mouth daily 90 tablet 3    bisacodyl (DULCOLAX) 5 MG EC tablet 2 days prior to procedure, take 2 tablets at 4 pm. 1 day prior to procedure, take 2 tablets at 4 pm. For additional instructions refer to your colonoscopy prep instructions. 4 tablet 0    blood glucose (ACCU-CHEK FASTCLIX)  lancing device Lancing device to be used with lancets. 1 each 0    blood glucose (NO BRAND SPECIFIED) test strip Use to test blood sugar 2 times daily or as directed. To accompany: Blood Glucose Monitor Brands: per insurance. 200 strip 1    blood glucose monitoring (ACCU-CHEK FASTCLIX) lancets Use to test blood sugar 2 times daily. 102 each 5    blood glucose monitoring (NO BRAND SPECIFIED) meter device kit Use to test blood sugar 2 times daily 1 kit 0    ciclopirox (PENLAC) 8 % external solution Apply topically daily. Apply to affected nails daily.  Remove the residual build up weekly with nail polish remover or an [place] board. 6.6 mL 1    clindamycin (CLEOCIN T) 1 % external solution Apply to affected area on scalp once daily when lesions are active 60 mL 11    escitalopram (LEXAPRO) 10 MG tablet Take 1 tablet (10 mg) by mouth daily. Take along with a 5 mg tab for a total of 15 mg every day 30 tablet 0    ketoconazole (NIZORAL) 2 % external shampoo Use to wash scalp 3x weekly. Let lather sit for 5 minutes prior to rinsing. 120 mL 3    lisinopril (ZESTRIL) 20 MG tablet Take 1 tablet (20 mg) by mouth daily      magnesium 250 MG tablet Take 1 tablet by mouth daily      MULTIPLE VITAMIN PO Take 1 tablet by mouth daily      omeprazole (PRILOSEC) 40 MG DR capsule Take 1 capsule (40 mg) by mouth daily 90 capsule 3    phentermine 30 MG capsule Take 1 capsule (30 mg) by mouth every morning. 30 capsule 5    polyethylene glycol (GOLYTELY) 236 g suspension 2 days prior at 5pm, mix and drink half of a jug of Golytely. Drink an 8 oz. glass of Golytely every 15 minutes until half of the jug is gone. Place remainder of Golytely in the refrigerator. 1 day prior at 5 pm, drink the 2nd half of a jug of Golytely bowel prep. 6 hours before your check-in time, drink an 8 oz. glass of Golytely every 15 minutes until half of the 2nd jug of Golytely is gone. Discard remainder of second jug. 8000 mL 0    Probiotic Product (PROBIOTIC  "ADVANCED PO) Take by mouth. Pre/probiotic      terbinafine (LAMISIL AT) 1 % external cream Apply topically 2 times daily 12 g 1    vitamin (B COMPLEX-C) tablet Take 1 tablet by mouth daily      vitamin E 400 units TABS Take 400 Units by mouth daily       No current facility-administered medications for this visit.       [place] Prescription Monitoring Program [] review was not needed today.        NOTES ABOUT CURRENT PSYCHOTROPIC MEDICATIONS:   Escitalopram 10 mg    [place], gastrointestinal  issues and off for a month.  Weight came back up and then phentermine.  When went on the phentermine irritable, quick to react to minimal triggers is exacerbated.    Supplements: mg     PAST PSYCHOTROPIC MEDICATIONS:   fluoxetine, flat  Bupropion   Sertraline, zombie  Bupropion up to 300 mg, not effective    Drug Interactions:  per Med Scape  Serious - Use Alternative  escitalopram + phentermine  escitalopram, phentermine. Either increases toxicity of the other by Mechanism: unknown. Avoid or Use Alternate Drug. Risk of serotonin syndrome.    TODAY PATIENT REPORTS THE FOLLOWING PSYCHIATRIC ROS:   Per [facility], Shelli Fuentes, during today's team-based visit:  \" update: Pt reports that he was off medication for 2 weeks for a medical procedure. While off of it he noticed that he was very fatigued, \"in a funk\", low motivation, tendency to isolate. He's been back on the medication since Jan 28th. He reports mood swings, over-reacting, more easily irritated. Primary stress continues be marital stress particularly around parenting her adult son who lives with them because of his medical situation. Pt would like him to get into some independent living but wife says she agrees but then delays. Pt feels that his wife enables son's dependency on them. [facility] explored possibility that she may be afraid of something bad happening to him. They disagree on how to facilitate son's independence.    Stresses: marital conflict, wife's health, step " "son's health  Appetite: unremarkable  Sleep: unremarkable  Therapy: [facility] at [facility], he's needing to get rescheduled.   VANNESSA: No  Preg: NA  Most important: medication update\"     EXERCISE: Adequate  SIDE EFFECTS:   Feels possible increase in irritability and quick to react to minimal triggers with the addition of the phentermine into the left citalopram   COMPLIANCE:   states Adherent to medication regimen  REPORTS THE FOLLOWING NEW MEDICAL ISSUES:   none    Heath Carpenter is a 54 year old male who presents with mood swings and emotional lability.    He experiences significant mood swings and emotional lability, describing himself as 'quick to react' and 'hyper responsive' to situations that previously would not have bothered him. Small issues seem larger, and he is more reactive at home, particularly with his wife. He attempts to manage these symptoms by staying aware and using techniques like swallowing and breathing to calm himself.    He is currently taking escitalopram, which was previously increased, and phentermine. There is a concern that the combination might be affecting his mood. He recalls a period when the escitalopram dose was reduced to 10 mg, which did not worsen his symptoms, but he cannot recall if it significantly improved them either. He is currently on 10 mg of escitalopram and has enough medication to last until the end of the week.    He has a history of gastrointestinal issues, including a significant event last summer when his colon 'exploded with just blood.' A sigmoidoscopy was performed, but a full colonoscopy was not possible due to inflammation. 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 During an upper GI procedure, it was noted that he still had food in his stomach despite preparation, leading to extra precautions and a temporary cessation of all medications. He resumed his medications three days after the procedure.    PROBLEM: DEPRESSION: " "Improving but does note he is more reactionary        2/20/2025     1:07 PM   Last PHQ-9   1.  Little interest or pleasure in doing things 1   2.  Feeling down, depressed, or hopeless 1   3.  Trouble falling or staying asleep, or sleeping too much 1   4.  Feeling tired or having little energy 1   5.  Poor appetite or overeating 1   6.  Feeling bad about yourself 0   7.  Trouble concentrating 1   8.  Moving slowly or restless 0   Q9: Thoughts of better off dead/self-harm past 2 weeks 0   PHQ-9 Total Score 6        Patient-reported         12/12/2024     5:05 PM 12/16/2024    10:13 PM 2/20/2025     1:07 PM   PHQ-9 SCORE   PHQ-9 Total Score MyChart 7 (Mild depression) 6 (Mild depression) 6 (Mild depression)   PHQ-9 Total Score 7  6  6        Patient-reported     PHQ9 score is 6 indicating mild depression.   Suicidal ideation:  No     PROBLEM: ANXIETY:  Primary symptom is feeling more irritable and quick to react to minimal triggers .     GAD7 score is Not completed today      4/23/2024     8:28 AM 10/8/2024     5:43 PM 12/12/2024     5:05 PM   RODRIGUE-7 SCORE   Total Score  4 (minimal anxiety) 4 (minimal anxiety)   Total Score 4 4 4        Patient-reported       PROBLEM: SLEEP/INSOMNIA: sleep apnea with a CPAP machine and sleeps approximately 8 hours per night           PERTINENT PAST MEDICAL AND SURGICAL HISTORY     Past Medical History:   Diagnosis Date    Cancer (H)     Skin    Depressive disorder     Diabetes (H)     Gastro-oesophageal reflux disease     Hypertension goal BP (blood pressure) < 140/90        VITALS   There were no vitals taken for this visit.     BP Readings from Last 1 Encounters:   01/28/25 (!) 134/95     Pulse Readings from Last 1 Encounters:   01/28/25 86     Wt Readings from Last 1 Encounters:   12/17/24 (!) 153.3 kg (338 lb)     Ht Readings from Last 1 Encounters:   12/05/24 1.867 m (6' 1.5\")     Estimated body mass index is 43.99 kg/m  as calculated from the following:    Height as of 12/5/24: " "1.867 m (6' 1.5\").    Weight as of 12/17/24: 153.3 kg (338 lb).    LABS & IMAGING                                                                                                                   Recent Labs   Lab Test 05/28/24  0914 05/22/21 2029   WBC 7.9 4.8   HGB 13.5 13.2*   HCT 42.3 40.5   MCV 85 85    164   ANEU  --  3.1     Recent Labs   Lab Test 10/09/24  1519      POTASSIUM 3.9   CHLORIDE 99   CO2 24   GLC 94   ANCELMO 9.3   MAG 1.9   BUN 12.7   CR 0.86   GFRESTIMATED >90   ALBUMIN 4.5   PROTTOTAL 7.8   AST 34   ALT 49   ALKPHOS 74   BILITOTAL 0.4     Recent Labs   Lab Test 10/09/24  1519   CHOL 177   *   HDL 37*   TRIG 156*   A1C 6.3*     Recent Labs   Lab Test 06/14/24  0931   TSH 1.70         ALLERGY & IMMUNIZATIONS       Allergies   Allergen Reactions    Molds & Smuts Other (See Comments)     Puffy eyes    No Clinical Screening - See Comments Other (See Comments)     Cigarette smoke       MEDICAL REVIEW OF SYSTEMS:   Ten system review was completed with pertinent positives noted     MENTAL STATUS EXAM:    General/Constitutional:  Appearance:   awake, alert, adequately groomed, appeared stated age and no apparent distress  Attitude:    cooperative   Eye Contact:  good  Musculoskeletal:  Psychomotor Behavior:  no evidence of tardive dyskinesia, dystonia, or tics from the head up  Psychiatric:  Speech:  clear, coherent, regular rate, rhythm, and volume,   No pressure speech noted.  Associations:  no loose associations  Thought Process:   logical, linear and goal oriented  Thought Content:    No evidence of suicidal ideation or homicidal ideation, no evidence of psychotic thought, no auditory hallucinations present and no visual hallucinations present  Mood:  irritable, quick to react to minimal triggers   Affect:  full range/stable (normal variation of emotions during exam) and was congruent to speech content.  Insight:  good  Judgment:  intact, adequate for safety  Impulse Control:  " intact  Neurological:  Oriented to:  person, place, time, and situation  Attention Span and Concentration:  Able to attend to the interview      Language: intact     Recent and Remote Memory:  Intact to interview. Not formally assessed. No amnesia.    Fund of Knowledge: appropriate         SAFETY   Feels safe in home: YES     Suicidal ideation: Denies  History of suicide attempts:  No   Hx of impulsivity: No   Hope for the future: present    Hx of Command hallucinations or current psychosis: None endorsed    History of Self-injurious behaviors: denies    Family member  by suicide:  not discussed      SAFETY ASSESSMENT:   Based on all available evidence including the factors cited above, overall Risk for harm is low and is appropriate for outpatient level of care.   Recommended that patient call 911 or go to the local ED should there be a change in any of these risk factors.       DSM 5 DIAGNOSIS:   Major depressive disorder, mild, recurrent  Obstructive sleep apnea  Diabetes    MEDICAL COMORBIDITY IMPACTING CLINICAL PICTURE: Diabetes.  Known issue that I take into account for their medical decisions, no current exacerbations or new concerns      ASSESSMENT AND PLAN         Problem List Items Addressed This Visit          Behavioral     Clinical picture impacted by medical comorbidities (gastrointestinal).  Tolerating the escitalopram dose reduction to 10 mg.  Will continue and allow the medical issues to stabilize.    -Continue Escitalopram 10mg daily.  There is interactions with the escitalopram and phentermine that include increasing the toxicity of the each other.  Therefore, would look at other options if needed before increasing the escitalopram at all  -follow-up in 4 weeks to reassess symptoms and medication effectiveness.         Mild episode of recurrent major depressive disorder    Relevant Medications    escitalopram (LEXAPRO) 10 MG tablet            CONSULTS/REFERRALS:   Continue therapy   Coordinate  care with therapist as needed      MEDICAL:   None at this time  Coordinate care with PCP (Maria Antonia Goodwin) as needed  Follow up with primary care provider as planned or for acute medical concerns.    PSYCHOEDUCATION:  Medication side effects and alternatives reviewed. Health promotion activities recommended and reviewed today. All questions addressed. Education and counseling completed regarding risks and benefits of medications and psychotherapy options.  Consent provided by patient/guardian  Call the psychiatric nurse line with medication questions or concerns at [phone].  [employer] may be used to communicate with your provider, but this is not intended to be used for emergencies.  Medlineplus.gov is information for patients.  It is run by the [employer] and it contains information about all disorders, diseases and all medications.      COMMUNITY RESOURCES:    CRISIS NUMBERS: Provided in AVS 1/3/2025  National Suicide Prevention Lifeline: 1-775-208-TALK (255-455-8330)  [employer]/resources for a list of additional resources (SOS)            [facility] - [phone]   Urgent Care Adult Mental [place]-[phone] mobile unit/ 24/7 crisis line  [facility] -[phone]   COPE 24/7 [place] Mobile Team -[phone] (adults)/ [phone] (child)  Poison Control Center - 1-964.240.7754    OR  go to nearest ER  Crisis Text Line for any crisis 24/7 send this-   To: 858800   [facility] ([facility]  [phone]    National Suicide Prevention Lifeline: 960.466.9705 (TTY: 417.866.2081). Call anytime for help.  (www.suicidepreventionlifeline.org)  National [place] on Mental Illness (www.xochitl.org): 827.973.1525 or 270-184-0816.   Mental Health Association (www.mentalhealth.org): 755.806.4269 or 257-783-5821.  [place] Crisis Text Line: Text [place] to 619313  Suicide LifeLine Chat: suicideIP Ghosterline.org/chat    ADMINISTRATIVE  BILLING:   Level of Medical Decision Making:   - At least 1 chronic problem that is not stable  - Engaged in prescription drug management during visit (discussed any medication benefits, side effects, alternatives, etc.)            The longitudinal plan of care for the diagnosis(es)/condition(s) as documented were addressed during this visit. Due to the added complexity in care, I will continue to support Heath in the subsequent management and with ongoing continuity of care.    Patient Status:  Patient will continue to be seen for ongoing consultation and stabilization.     Signed:   Courtney Walsh, STEVE, APRN, FMSAUMYAP-[facility] ([facility])   Chart documentation done in part with Dragon Voice Recognition software.  Although reviewed after completion, some word and grammatical errors may remain.

## 2025-02-22 ENCOUNTER — MYC MEDICAL ADVICE (OUTPATIENT)
Dept: FAMILY MEDICINE | Facility: CLINIC | Age: 55
End: 2025-02-22
Payer: COMMERCIAL

## 2025-02-23 NOTE — PATIENT INSTRUCTIONS
Continue the escitalopram at current dose of 10 mg      **For crisis resources, please see the information at the end of this document**     Thank you for coming to the [facility] MENTAL HEALTH & ADDICTION Portal CLINIC.      -PSYCHOEDUCATION:      CONSULTS/REFERRALS:   Continue therapy     LABS/PROCEDURES:    Please call your [facility] clinic and ask for a lab only appointment at your earliest convenience.  If your results are reassuring or normal they will be mailed to you or sent through [email or web address] within 7 days. If the lab tests need quick action we will call you with the results. The phone number we will call with results is # [phone].      FOLLOW UP: Schedule an appointment with me in four weeks  or sooner as needed.  The intake team should be calling you to schedule.  If you dont hear from them, or they were unable to reach you, please call [phone] to schedule.  Follow up with primary care provider as planned or for acute medical concerns.  Call the psychiatric nurse line with medication questions or concerns at [phone].      Medication Refills:  If you need any refills please call your pharmacy and they will contact us. Our fax number for refills is [phone]. Please allow three business for refill processing. If you need to  your refill at a new pharmacy, please contact the new pharmacy directly. The new pharmacy will help you get your medications transferred.     [facility] Assistance [phone]  Financial Assistance [phone]  [facility]illing [phone]  Central Billing Office, [facility]: [phone]  [facility] Billing [phone]  Medical Records [phone]  [facility] Patient Bill of Rights [email or web address]       MENTAL HEALTH CRISIS RESOURCES:  For a emergency help, please call 911 or go to the nearest Emergency Department.     Emergency Walk-In Options:   EmPATH Unit @ [facility] ([place]):  [phone] - Specialized mental health emergency area designed to be calming  [facility] ([place]): [phone]  [facility] ([place]): [phone]  [facility] ([place]): [phone]    National Crisis Information: Call 988 Suicide and Crisis Lifeline  Crisis Text Line (free 24/7):  call **CRISIS (**919538) Crisis or use the texting option by texting 511611.   National Suicide Prevention Lifeline: Call 988  Poison Control Center: 9-451-617-6714  Trans Lifeline: 3-754-364-7396 - Hotline for transgender people of all ages  The [employer]: 0-014-455-4530 - Hotline for LGBT youth   List of all [facility] resources:   https://[place].gov/dhs/people-we-serve/adults/health-care/mental-health/resources/crisis-contacts.jsp    For Non-Emergency Support:   Fast Tracker: Mental Health & Substance Use Disorder Resources -   https://www.fasttrackermn.org/       Again thank you for choosing [facility] and please let us know how we can best partner with you to improve you and your family's health.    You may be receiving a survey regarding this appointment. We would love to have your feedback, both positive and negative. The survey is done by an external company, so your answers are anonymous.    Patient Education   Collaborative Care Psychiatry Service  What to Expect  Here's what to expect from your Collaborative Care Psychiatry Service (CCPS).   About CCPS  CCPS means 2 people work together to help you get better. You'll meet with a behavioral health clinician and a psychiatric doctor. A behavioral health clinician helps people with mental health problems by talking with them. A psychiatric doctor helps people by giving them medicine.  How it works  At every visit, you'll see the behavioral health clinician ([facility]HC) first. They'll talk with you about how you're doing and teach you how to feel better.   Then you'll see the  "psychiatric doctor. This doctor can help you deal with troubling thoughts and feelings by giving you medicine. They'll make sure you know the plan for your care.   CCPS usually takes 3 to 6 visits. If you need more visits, we may have you start seeing a different psychiatric doctor for ongoing care.  If you have any questions or concerns, we'll be glad to talk with you.  About visits  Be open  At your visits, please talk openly about your problems. It may feel hard, but it's the best way for us to help you.  Cancelling visits  If you can't come to your visit, please call us right away at [phone]. If you don't cancel at least 24 hours (1 full day) before your visit, that's \"late cancellation.\"  Being late to visits  Being very late is the same as not showing up. You will be a \"no show\" if:  Your appointment starts with a [facility], and you're more than 15 minutes late for a 30-minute (half hour) visit. This will also cancel your appointment with the psychiatric doctor.  Your appointment is with a psychiatric doctor only, and you're more than 15 minutes late for a 30-minute (half hour) visit.  Your appointment is with a psychiatric doctor only, and you're more than 30 minutes late for a 60-minute (full hour) visit.  If you cancel late or don't show up 2 times within 6 months, we may end your care.   Getting help between visits  If you need help between visits, you can call us Monday to Friday from 8 a.m. to 4:30 p.m. at [phone].  Emergency care  Call 911 or go to the nearest emergency department if your life or someone else's life is in danger.  Call 988 anytime to reach the national Suicide and Crisis hotline.  Medicine refills  To refill your medicine, call your pharmacy. You can also call [facility]'s Behavioral Access at [phone], Monday to Friday, 8 a.m. to 4:30 p.m. It can take 1 to 3 business days to get a refill.   Forms, letters, and tests  You may have papers to fill out, like FMLA, " short-term disability, and workability. We can help you with these forms at your visits, but you must have an appointment. You may need more than 1 visit for this, to be in an intensive therapy program, or both.  Before we can give you medicine for ADHD, we may refer you to get tested for it or confirm it another way.  We may not be able to give you an emotional support animal letter.  We don't do mental health checks ordered by the court.   We don't do mental health testing, but we can refer you to get tested.   Thank you for choosing us for your care.  For informational purposes only. Not to replace the advice of your health care provider. Copyright   2022 [facility]. All rights reserved. [employer] [id number] - Rev 11/24.

## 2025-02-23 NOTE — ASSESSMENT & PLAN NOTE
Clinical picture impacted by medical comorbidities (gastrointestinal).  Tolerating the escitalopram dose reduction to 10 mg.  Will continue and allow the medical issues to stabilize.    -Continue Escitalopram 10mg daily.  There is interactions with the escitalopram and phentermine that include increasing the toxicity of the each other.  Therefore, would look at other options if needed before increasing the escitalopram at all  -follow-up in 4 weeks to reassess symptoms and medication effectiveness.

## 2025-02-24 NOTE — TELEPHONE ENCOUNTER
Pls see [employer] message from 2/22/25.    Final Diagnosis  A. Stomach, biopsies:  --Gastric mucosa with mild chronic inactive gastritis.  --Negative for Helicobacter pylori organisms or dysplasia.  B. Esophagus, distal, biopsies:  --No significant histopathologic change.  C. Colon, descending, polypectomy:  --Tubular adenoma.  Electronically signed by Geovany Montoya MD on 1/30/2025 at 9:01 AM    Patient has follow up appointment scheduled with Dr. Maria Antonia Goodwin on 4/1/25. Message routed to PCP to advise.     Julie Behrendt RN

## 2025-03-06 ENCOUNTER — MYC REFILL (OUTPATIENT)
Dept: FAMILY MEDICINE | Facility: CLINIC | Age: 55
End: 2025-03-06
Payer: COMMERCIAL

## 2025-03-06 DIAGNOSIS — E66.01 MORBID OBESITY (H): ICD-10-CM

## 2025-03-06 RX ORDER — PHENTERMINE HYDROCHLORIDE 30 MG/1
30 CAPSULE ORAL EVERY MORNING
Qty: 30 CAPSULE | Refills: 5 | OUTPATIENT
Start: 2025-03-06

## 2025-03-13 ENCOUNTER — HOSPITAL ENCOUNTER (EMERGENCY)
Facility: CLINIC | Age: 55
Discharge: HOME | End: 2025-03-13
Payer: COMMERCIAL

## 2025-03-13 ENCOUNTER — APPOINTMENT (OUTPATIENT)
Dept: CT IMAGING | Facility: CLINIC | Age: 55
End: 2025-03-13
Payer: COMMERCIAL

## 2025-03-13 VITALS
OXYGEN SATURATION: 95 % | WEIGHT: 315 LBS | HEIGHT: 74 IN | BODY MASS INDEX: 40.43 KG/M2 | DIASTOLIC BLOOD PRESSURE: 103 MMHG | TEMPERATURE: 98.2 F | RESPIRATION RATE: 18 BRPM | HEART RATE: 89 BPM | SYSTOLIC BLOOD PRESSURE: 152 MMHG

## 2025-03-13 DIAGNOSIS — K63.89 EPIPLOIC APPENDAGITIS: ICD-10-CM

## 2025-03-13 DIAGNOSIS — R10.30 LOWER ABDOMINAL PAIN: ICD-10-CM

## 2025-03-13 LAB
ALBUMIN SERPL BCG-MCNC: 4.5 G/DL (ref 3.5–5.2)
ALBUMIN UR-MCNC: 10 MG/DL
ALP SERPL-CCNC: 80 U/L (ref 40–150)
ALT SERPL W P-5'-P-CCNC: 42 U/L (ref 0–70)
ANION GAP SERPL CALCULATED.3IONS-SCNC: 12 MMOL/L (ref 7–15)
APPEARANCE UR: CLEAR
AST SERPL W P-5'-P-CCNC: 38 U/L (ref 0–45)
BASOPHILS # BLD AUTO: 0.1 10E3/UL (ref 0–0.2)
BASOPHILS NFR BLD AUTO: 1 %
BILIRUB SERPL-MCNC: 0.5 MG/DL
BILIRUB UR QL STRIP: NEGATIVE
BUN SERPL-MCNC: 12 MG/DL (ref 6–20)
CALCIUM SERPL-MCNC: 9.3 MG/DL (ref 8.8–10.4)
CHLORIDE SERPL-SCNC: 102 MMOL/L (ref 98–107)
COLOR UR AUTO: YELLOW
CREAT SERPL-MCNC: 0.81 MG/DL (ref 0.67–1.17)
EGFRCR SERPLBLD CKD-EPI 2021: >90 ML/MIN/1.73M2
EOSINOPHIL # BLD AUTO: 0.1 10E3/UL (ref 0–0.7)
EOSINOPHIL NFR BLD AUTO: 1 %
ERYTHROCYTE [DISTWIDTH] IN BLOOD BY AUTOMATED COUNT: 14.2 % (ref 10–15)
GLUCOSE SERPL-MCNC: 99 MG/DL (ref 70–99)
GLUCOSE UR STRIP-MCNC: NEGATIVE MG/DL
HCO3 SERPL-SCNC: 25 MMOL/L (ref 22–29)
HCT VFR BLD AUTO: 42.8 % (ref 40–53)
HGB BLD-MCNC: 14.1 G/DL (ref 13.3–17.7)
HGB UR QL STRIP: NEGATIVE
IMM GRANULOCYTES # BLD: 0 10E3/UL
IMM GRANULOCYTES NFR BLD: 1 %
KETONES UR STRIP-MCNC: NEGATIVE MG/DL
LEUKOCYTE ESTERASE UR QL STRIP: NEGATIVE
LIPASE SERPL-CCNC: 40 U/L (ref 13–60)
LYMPHOCYTES # BLD AUTO: 1.8 10E3/UL (ref 0.8–5.3)
LYMPHOCYTES NFR BLD AUTO: 23 %
MCH RBC QN AUTO: 27.4 PG (ref 26.5–33)
MCHC RBC AUTO-ENTMCNC: 32.9 G/DL (ref 31.5–36.5)
MCV RBC AUTO: 83 FL (ref 78–100)
MONOCYTES # BLD AUTO: 0.5 10E3/UL (ref 0–1.3)
MONOCYTES NFR BLD AUTO: 6 %
MUCOUS THREADS #/AREA URNS LPF: PRESENT /LPF
NEUTROPHILS # BLD AUTO: 5.4 10E3/UL (ref 1.6–8.3)
NEUTROPHILS NFR BLD AUTO: 69 %
NITRATE UR QL: NEGATIVE
NRBC # BLD AUTO: 0 10E3/UL
NRBC BLD AUTO-RTO: 0 /100
PH UR STRIP: 5 [PH] (ref 5–7)
PLATELET # BLD AUTO: 216 10E3/UL (ref 150–450)
POTASSIUM SERPL-SCNC: 4.2 MMOL/L (ref 3.4–5.3)
PROT SERPL-MCNC: 8 G/DL (ref 6.4–8.3)
RBC # BLD AUTO: 5.14 10E6/UL (ref 4.4–5.9)
RBC URINE: 0 /HPF
SODIUM SERPL-SCNC: 139 MMOL/L (ref 135–145)
SP GR UR STRIP: 1.02 (ref 1–1.03)
SQUAMOUS EPITHELIAL: <1 /HPF
UROBILINOGEN UR STRIP-MCNC: NORMAL MG/DL
WBC # BLD AUTO: 7.8 10E3/UL (ref 4–11)
WBC URINE: 1 /HPF

## 2025-03-13 PROCEDURE — 250N000011 HC RX IP 250 OP 636

## 2025-03-13 PROCEDURE — 83690 ASSAY OF LIPASE: CPT

## 2025-03-13 PROCEDURE — 81003 URINALYSIS AUTO W/O SCOPE: CPT

## 2025-03-13 PROCEDURE — 85049 AUTOMATED PLATELET COUNT: CPT

## 2025-03-13 PROCEDURE — 85004 AUTOMATED DIFF WBC COUNT: CPT

## 2025-03-13 PROCEDURE — 80053 COMPREHEN METABOLIC PANEL: CPT

## 2025-03-13 PROCEDURE — 74177 CT ABD & PELVIS W/CONTRAST: CPT

## 2025-03-13 PROCEDURE — 96374 THER/PROPH/DIAG INJ IV PUSH: CPT | Mod: 59

## 2025-03-13 PROCEDURE — 99284 EMERGENCY DEPT VISIT MOD MDM: CPT

## 2025-03-13 PROCEDURE — 85018 HEMOGLOBIN: CPT

## 2025-03-13 PROCEDURE — 99285 EMERGENCY DEPT VISIT HI MDM: CPT | Mod: 25

## 2025-03-13 PROCEDURE — 250N000009 HC RX 250

## 2025-03-13 PROCEDURE — 36415 COLL VENOUS BLD VENIPUNCTURE: CPT

## 2025-03-13 RX ORDER — KETOROLAC TROMETHAMINE 15 MG/ML
15 INJECTION, SOLUTION INTRAMUSCULAR; INTRAVENOUS ONCE
Status: COMPLETED | OUTPATIENT
Start: 2025-03-13 | End: 2025-03-13

## 2025-03-13 RX ORDER — IOPAMIDOL 755 MG/ML
149 INJECTION, SOLUTION INTRAVASCULAR ONCE
Status: COMPLETED | OUTPATIENT
Start: 2025-03-13 | End: 2025-03-13

## 2025-03-13 RX ORDER — HYDROMORPHONE HYDROCHLORIDE 1 MG/ML
0.5 INJECTION, SOLUTION INTRAMUSCULAR; INTRAVENOUS; SUBCUTANEOUS ONCE
Status: DISCONTINUED | OUTPATIENT
Start: 2025-03-13 | End: 2025-03-13 | Stop reason: HOSPADM

## 2025-03-13 RX ADMIN — SODIUM CHLORIDE 77 ML: 9 INJECTION, SOLUTION INTRAVENOUS at 14:15

## 2025-03-13 RX ADMIN — KETOROLAC TROMETHAMINE 15 MG: 15 INJECTION, SOLUTION INTRAMUSCULAR; INTRAVENOUS at 13:04

## 2025-03-13 RX ADMIN — IOPAMIDOL 149 ML: 755 INJECTION, SOLUTION INTRAVENOUS at 14:15

## 2025-03-13 ASSESSMENT — ACTIVITIES OF DAILY LIVING (ADL)
ADLS_ACUITY_SCORE: 41

## 2025-03-13 ASSESSMENT — COLUMBIA-SUICIDE SEVERITY RATING SCALE - C-SSRS
2. HAVE YOU ACTUALLY HAD ANY THOUGHTS OF KILLING YOURSELF IN THE PAST MONTH?: NO
1. IN THE PAST MONTH, HAVE YOU WISHED YOU WERE DEAD OR WISHED YOU COULD GO TO SLEEP AND NOT WAKE UP?: NO
6. HAVE YOU EVER DONE ANYTHING, STARTED TO DO ANYTHING, OR PREPARED TO DO ANYTHING TO END YOUR LIFE?: NO

## 2025-03-13 NOTE — ED PROVIDER NOTES
"BP (!) 152/103   Pulse 89   Temp 98.2  F (36.8  C) (Oral)   Ht 1.88 m (6' 2\")   Wt (!) 149.7 kg (330 lb)   SpO2 97%   BMI 42.37 kg/m      Heath Carpenter is a 55-year-old male with history of type 2 diabetes mellitus, hypertension, GERD, colon adenoma presenting with complaints of central mid to lower abdominal pain since yesterday.  Thought he was initially constipated but has taken medication for this and has been having bowel movements without any change in the pain.  Patient states he has a history of \"bowel issues\" and most recently had a colonoscopy on 1/28/2025.  Given patient's history, I recommended he be evaluated in the emergency department.  We discussed that he will likely require further testing and/or treatment beyond the capabilities of the current urgent care setting including labs and potential imaging.  Patient expresses understanding of this and agreement with the plan.      Nona Beaulieu PA-C  03/13/25 1239       Nona Beaulieu PA-C  03/13/25 1239    "

## 2025-03-13 NOTE — DISCHARGE INSTRUCTIONS
"Your CT scan showed something called \"Epiploic Appendagitis\" which is inflammation of the fatty pouches that hang off the large intestine. The inflamed appendage is located next to your bladder and is likely causing the pain you experience with urination.    We treat this conservatively with anti-inflammatory medications like Ibuprofen. You should take Ibuprofen 600mg every 8 hours for the next 4-6 days. Take with food. You can also take Tylenol 500-1000mg every 6-8 hours in addition to the Ibuprofen for more pain control.     Symptoms usually get better on their own within one week. If you develop worsening pain, nausea/vomiting, fever, or if other concerning symptoms develop please return to the ER right away.  "

## 2025-03-13 NOTE — ED PROVIDER NOTES
History     Chief Complaint   Patient presents with    Abdominal Pain       Heath Carpenter is a 55 year old male with significant pmhx of JONA, HTN, T2DM, HLD, GERD, depression who presents for evaluation of abdominal pain.  Patient states yesterday morning he started to develop mid lower abdominal pain that has remained constant and is waxing and waning in severity.  At first he thought he may be constipated so he took some over-the-counter medication, he subsequently had 3 bowel movements but no relief of pain.  He denies black or bloody stools.  He also endorses some pain in this area that increases when he urinates, but also seems to provide some relief.  He finds that sitting or lying down helps relieve the pain but getting up and moving around makes the pain worse.    Allergies:  Allergies   Allergen Reactions    Molds & Smuts Other (See Comments)     Puffy eyes    No Clinical Screening - See Comments Other (See Comments)     Cigarette smoke       Problem List:    Patient Active Problem List    Diagnosis Date Noted    Colon adenoma 02/11/2025     Priority: Medium    Hyperlipidemia LDL goal <100 04/23/2024     Priority: Medium    JONA (obstructive sleep apnea) 06/13/2023     Priority: Medium    Gastroesophageal reflux disease with esophagitis without hemorrhage 06/13/2023     Priority: Medium    Mild episode of recurrent major depressive disorder 06/09/2022     Priority: Medium    Morbid obesity (H) 10/27/2020     Priority: Medium    Type II diabetes mellitus with peripheral autonomic neuropathy (H) 10/27/2020     Priority: Medium    Hypertension goal BP (blood pressure) < 140/90 10/27/2020     Priority: Medium        Past Medical History:    Past Medical History:   Diagnosis Date    Cancer (H)     Depressive disorder     Diabetes (H)     Gastro-oesophageal reflux disease     Hypertension goal BP (blood pressure) < 140/90        Past Surgical History:    Past Surgical History:   Procedure Laterality Date    BIOPSY       Skin cancer times 2    COLONOSCOPY      COLONOSCOPY N/A 1/28/2025    Procedure: Colonoscopy;  Surgeon: Vince Pastor MD;  Location: [facility]    ESOPHAGOSCOPY, GASTROSCOPY, DUODENOSCOPY (EGD), COMBINED N/A 7/5/2023    Procedure: Esophagoscopy, gastroscopy, duodenoscopy, combined;  Surgeon: Jay Hoyt DO;  Location: [facility]    ESOPHAGOSCOPY, GASTROSCOPY, DUODENOSCOPY (EGD), COMBINED N/A 1/28/2025    Procedure: Esophagoscopy, gastroscopy, duodenoscopy, combined;  Surgeon: Vince Pastor MD;  Location: [facility]    VASECTOMY         Family History:    Family History   Problem Relation Age of Onset    Alcoholism Father     Prostate Cancer Father         He is getting tested further but he has a type 5 and 4 lesions    Lung Cancer Maternal Grandfather     Diabetes Maternal Grandfather     Hypertension Maternal Grandfather     Other Cancer Maternal Grandfather         Lung    Colon Cancer Maternal Grandmother        Social History:  Marital Status:  Single [1]  Social History     Tobacco Use    Smoking status: Never    Smokeless tobacco: Never   Vaping Use    Vaping status: Never Used   Substance Use Topics    Alcohol use: Yes     Comment: rare    Drug use: No        Medications:    acetaminophen (TYLENOL) 500 MG tablet  alcohol swab prep pads  alcohol swab prep pads  aspirin (ASA) 81 MG EC tablet  atorvastatin (LIPITOR) 20 MG tablet  bisacodyl (DULCOLAX) 5 MG EC tablet  blood glucose (ACCU-CHEK FASTCLIX) lancing device  blood glucose (NO BRAND SPECIFIED) test strip  blood glucose monitoring (ACCU-CHEK FASTCLIX) lancets  blood glucose monitoring (NO BRAND SPECIFIED) meter device kit  ciclopirox (PENLAC) 8 % external solution  clindamycin (CLEOCIN T) 1 % external solution  escitalopram (LEXAPRO) 10 MG tablet  ketoconazole (NIZORAL) 2 % external shampoo  lisinopril (ZESTRIL) 20 MG tablet  magnesium 250 MG tablet  MULTIPLE VITAMIN PO  omeprazole (PRILOSEC) 40 MG DR capsule  phentermine 30 MG  "capsule  polyethylene glycol (GOLYTELY) 236 g suspension  Probiotic Product (PROBIOTIC ADVANCED PO)  terbinafine (LAMISIL AT) 1 % external cream  vitamin (B COMPLEX-C) tablet  vitamin E 400 units TABS          Physical Exam   BP: (!) 152/103  Pulse: 89  Temp: 98.2  F (36.8  C)  Resp: 18  Height: 188 cm (6' 2\")  Weight: (!) 149.7 kg (330 lb)  SpO2: 97 %      Physical Exam  Vitals and nursing note reviewed.   Constitutional:       General: He is not in acute distress.     Appearance: He is not ill-appearing, toxic-appearing or diaphoretic.   HENT:      Head: Normocephalic and atraumatic.   Eyes:      Extraocular Movements: Extraocular movements intact.      Pupils: Pupils are equal, round, and reactive to light.   Pulmonary:      Effort: Pulmonary effort is normal.   Abdominal:      Palpations: Abdomen is soft.      Tenderness: There is abdominal tenderness in the right lower quadrant and suprapubic area. There is no guarding or rebound. Negative signs include Caruso's sign.   Musculoskeletal:         General: Normal range of motion.      Cervical back: Normal range of motion.   Neurological:      General: No focal deficit present.      Mental Status: He is alert and oriented to person, place, and time.   Psychiatric:         Mood and Affect: Mood normal.         Behavior: Behavior normal.         ED Course        Procedures                    Results for orders placed or performed during the hospital encounter of 03/13/25 (from the past 24 hours)   CBC with platelets differential    Narrative    The following orders were created for panel order CBC with platelets differential.  Procedure                               Abnormality         Status                     ---------                               -----------         ------                     CBC with platelets and ...[[id number]]                      Final result                 Please view results for these tests on the individual orders.   Comprehensive " metabolic panel   Result Value Ref Range    Sodium 139 135 - 145 mmol/L    Potassium 4.2 3.4 - 5.3 mmol/L    Carbon Dioxide (CO2) 25 22 - 29 mmol/L    Anion Gap 12 7 - 15 mmol/L    Urea Nitrogen 12.0 6.0 - 20.0 mg/dL    Creatinine 0.81 0.67 - 1.17 mg/dL    GFR Estimate >90 >60 mL/min/1.73m2    Calcium 9.3 8.8 - 10.4 mg/dL    Chloride 102 98 - 107 mmol/L    Glucose 99 70 - 99 mg/dL    Alkaline Phosphatase 80 40 - 150 U/L    AST 38 0 - 45 U/L    ALT 42 0 - 70 U/L    Protein Total 8.0 6.4 - 8.3 g/dL    Albumin 4.5 3.5 - 5.2 g/dL    Bilirubin Total 0.5 <=1.2 mg/dL   Lipase   Result Value Ref Range    Lipase 40 13 - 60 U/L   CBC with platelets and differential   Result Value Ref Range    WBC Count 7.8 4.0 - 11.0 10e3/uL    RBC Count 5.14 4.40 - 5.90 10e6/uL    Hemoglobin 14.1 13.3 - 17.7 g/dL    Hematocrit 42.8 40.0 - 53.0 %    MCV 83 78 - 100 fL    MCH 27.4 26.5 - 33.0 pg    MCHC 32.9 31.5 - 36.5 g/dL    RDW 14.2 10.0 - 15.0 %    Platelet Count 216 150 - 450 10e3/uL    % Neutrophils 69 %    % Lymphocytes 23 %    % Monocytes 6 %    % Eosinophils 1 %    % Basophils 1 %    % Immature Granulocytes 1 %    NRBCs per 100 WBC 0 <1 /100    Absolute Neutrophils 5.4 1.6 - 8.3 10e3/uL    Absolute Lymphocytes 1.8 0.8 - 5.3 10e3/uL    Absolute Monocytes 0.5 0.0 - 1.3 10e3/uL    Absolute Eosinophils 0.1 0.0 - 0.7 10e3/uL    Absolute Basophils 0.1 0.0 - 0.2 10e3/uL    Absolute Immature Granulocytes 0.0 <=0.4 10e3/uL    Absolute NRBCs 0.0 10e3/uL   UA with Microscopic reflex to Culture    Specimen: Urine, Midstream   Result Value Ref Range    Color Urine Yellow Colorless, Straw, Light Yellow, Yellow    Appearance Urine Clear Clear    Glucose Urine Negative Negative mg/dL    Bilirubin Urine Negative Negative    Ketones Urine Negative Negative mg/dL    Specific Gravity Urine 1.025 1.003 - 1.035    Blood Urine Negative Negative    pH Urine 5.0 5.0 - 7.0    Protein Albumin Urine 10 (A) Negative mg/dL    Urobilinogen Urine Normal Normal, 2.0  mg/dL    Nitrite Urine Negative Negative    Leukocyte Esterase Urine Negative Negative    Mucus Urine Present (A) None Seen /LPF    RBC Urine 0 <=2 /HPF    WBC Urine 1 <=5 /HPF    Squamous Epithelials Urine <1 <=1 /HPF    Narrative    Urine Culture not indicated   CT Abdomen Pelvis w Contrast    Narrative    EXAM: CT ABDOMEN PELVIS W CONTRAST  LOCATION: [facility]  DATE: 3/13/2025    INDICATION: Midline and RLQ abdominal pain  COMPARISON: CT chest 01/27/2018.  TECHNIQUE: CT scan of the abdomen and pelvis was performed following injection of IV contrast. Multiplanar reformats were obtained. Dose reduction techniques were used.  CONTRAST: 149mL isovue 370    FINDINGS:   LOWER CHEST: Small hiatal hernia. Partially imaged coronary artery calcifications.    HEPATOBILIARY: Diffuse hepatic steatosis. No radiopaque gallstone. No biliary ductal dilatation.    PANCREAS: Normal.    SPLEEN: Normal.    ADRENAL GLANDS: Unchanged subcentimeter left adrenal adenoma. Right adrenal is unremarkable.    KIDNEYS/BLADDER: Symmetric renal enhancement. No hydronephrosis. Diffuse bladder wall thickening.    BOWEL: Fat-containing structure adjacent to and likely arising from the sigmoid colon with marked surrounding inflammatory stranding (series 4, image 48) and adjacent short segment circumferential wall thickening of the sigmoid colon. No evidence of   bowel obstruction. Normal appendix.    LYMPH NODES: Normal.    VASCULATURE: No abdominal aortic aneurysm. Retroaortic left renal vein.    PELVIC ORGANS: Unremarkable prostate. Trace amount of free fluid within the pelvis, likely reactive.    MUSCULOSKELETAL: Degenerative changes of the spine.      Impression    IMPRESSION:   1.  Torsed epiploic appendage of the sigmoid colon with associated inflammatory change and trace volume reactive free fluid.  2.  Diffuse bladder wall thickening which may be reactive to the adjacent inflammatory process. This could be  correlated with urinalysis to exclude infectious cystitis.  3.  Hepatic steatosis.         Medications   HYDROmorphone (PF) (DILAUDID) injection 0.5 mg (has no administration in time range)   ketorolac (TORADOL) injection 15 mg (15 mg Intravenous $Given 3/13/25 1304)   iopamidol (ISOVUE-370) solution 149 mL (149 mLs Intravenous $Given 3/13/25 1415)   sodium chloride 0.9 % bag 500mL for CT scan flush use (77 mLs Intravenous $Given 3/13/25 1415)       Assessments & Plan (with Medical Decision Making)     I have reviewed the nursing notes.    I have reviewed the findings, diagnosis, plan and need for follow up with the patient.    Medical Decision Making  Heath Carpenter is a 55 year old male with significant pmhx of JONA, HTN, T2DM, HLD, GERD, depression who presents for evaluation of abdominal pain.  Differential diagnoses include cystitis, pyelonephritis, urolithiasis, appendicitis, colitis, diverticulitis, hernia.  Vital signs with hypertension at 152/103.  Patient is afebrile, he is not tachycardic, and he is satting 97% on room air with a regular respiratory rate and effort.    On examination patient is alert, oriented, well-appearing.  He is lying in the bed comfortably.  He does have some pain with movement on the bed.  Abdomen is soft to palpation, but there is tenderness in the right upper quadrant, right lower quadrant, and suprapubic areas.  No palpable masses. No guarding or rebound. CBC negative for leukocytosis or anemia. Lipase is WNL which lowers suspicion for acute pancreatic process. CMP negative for electrolyte abnormality, renal dysfunction, or liver dysfunction. UA negative for signs of infection or RBC. CT AP was obtained given notable tenderness on exam, and this revealed a torsed epiploic appendagitis adjacent to the bladder with diffuse bladder wall thickening likely due to adjacent inflammation. UA today negative for any signs of infection to suggest cystitis. This appendagitis next to the  bladder is likely what is causing his increased discomfort with urination.     Findings discussed with the patient. He reported some pain improvement following IV toradol. Reassuringly he is afebrile, no n/v, no elevated inflammatory markers on work up. We discussed management for this condition is typically conservative with scheduled NSAIDs for the next 4-6 days, and symptoms usually resolve within 7-10 days without intervention. He was given strict return precautions should he develop worsening pain, fever, vomiting, or if other concerning symptoms develop. Patient voiced understanding of the plan and had no further questions.        New Prescriptions    No medications on file       Final diagnoses:   Epiploic appendagitis   Lower abdominal pain       Gloria Galvan PA-C  March 13, 2025  [facility] EMERGENCY DEPT     Gloria Galvan PA-C  03/13/25 5680

## 2025-03-27 ENCOUNTER — VIRTUAL VISIT (OUTPATIENT)
Dept: PSYCHIATRY | Facility: CLINIC | Age: 55
End: 2025-03-27
Payer: COMMERCIAL

## 2025-03-27 DIAGNOSIS — F33.0 MILD EPISODE OF RECURRENT MAJOR DEPRESSIVE DISORDER: ICD-10-CM

## 2025-03-27 RX ORDER — ESCITALOPRAM OXALATE 10 MG/1
10 TABLET ORAL DAILY
Qty: 90 TABLET | Refills: 0 | Status: SHIPPED | OUTPATIENT
Start: 2025-03-27

## 2025-03-27 ASSESSMENT — PAIN SCALES - GENERAL: PAINLEVEL_OUTOF10: NO PAIN (0)

## 2025-03-27 NOTE — PROGRESS NOTES
Virtual Visit Details    Type of service:  Video Visit     Originating Location (pt. Location): Other in his [facility]    Distant Location (provider location):  Off-site  Platform used for Video Visit: [facility]      PSYCHIATRIC MEDICATION FOLLOW UP APPT     Name:  Heath Carpenter  : 1970    Patient Care Team:  Maria Antonia Goodwin MD as PCP - General (Family Medicine)  Mirna Ellison RD as Diabetes Educator (Dietitian, Registered)  Gia Brown RD as Diabetes Educator (Dietitian, Registered)  Maria Antonia Goodwin MD as Assigned PCP  Celi Pabon PA-C (Dermatology)  Delores Ramirez LPCC as  (Licensed Mental Health)  Delores Ramirez LPCC as Assigned Behavioral Health Provider  Geovany Bean DO as Assigned Musculoskeletal Provider  Courtney Walsh DNP as Assigned Neuroscience Provider  Joe Vasquez DPM as Assigned Surgical Provider  Celi Pabon PA-C as Assigned Dermatology Provider  Therapist:  VIVIANA Ramirez. [facility]     Patient attended the phone/video session alone.    Last seen for outpatient psychiatry Return Visit on 2025     FOLLOWING PLAN PUT INTO PLACE:   Clinical picture impacted by medical comorbidities (gastrointestinal).  Tolerating the escitalopram dose reduction to 10 mg.  Will continue and allow the medical issues to stabilize.     -Continue Escitalopram 10mg daily.  There is interactions with the escitalopram and phentermine that include increasing the toxicity of the each other.  Therefore, would look at other options if needed before increasing the escitalopram at all  -follow-up in 4 weeks to reassess symptoms and medication effectiveness.     INTERIM HISTORY     COMMUNICATIONS FROM PATIENT VIA:  none noted     RECORDS AVAILABLE FOR REVIEW: EHR records through [facility]  and previous psychiatric progress note.  In addition, reviewed the assessment completed by Shelli Fuentes [facility], dated today        HISTORY OF  PRESENT ILLNESS   CCPS referral for psychiatric medication consult in may 2024.  Initially started treatment with Dr. Derick Edmondson.  Reports history of depression.  Prior to [facility] he had been experiencing significant depressive symptoms for the past 1.5 years, which have progressively worsened.   Denies prior psychiatric hospitalizations. No history of suicidal thoughts or attempts. No history of self-injurious behaviors.  Exposed to multiple Adverse childhood experiences (ACEs). ACEs are strongly related to the development and prevalence of a wide range of health problems throughout a person s lifespan, including those associated with substance misuse. These events are likely playing into the clinical picture.      FAMILY, MEDICAL, SURGICAL HISTORY REVIEWED.  MEDICATION HAVE BEEN REVIEWED AND ARE CURRENT TO THE BEST OF MY KNOWLEDGE AND ABILITY.   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    MEDICATIONS                                                                                                Current Outpatient Medications   Medication Sig Dispense Refill    acetaminophen (TYLENOL) 500 MG tablet Take 1,000 mg by mouth every 6 hours as needed for mild pain      alcohol swab prep pads Use to swab area of injection/andrzej as directed. 100 each 6    alcohol swab prep pads Use to swab area of injection/andrzej as directed 100 each 3    aspirin (ASA) 81 MG EC tablet Take 1 tablet (81 mg) by mouth daily      atorvastatin (LIPITOR) 20 MG tablet Take 1 tablet (20 mg) by mouth daily 90 tablet 3    bisacodyl (DULCOLAX) 5 MG EC tablet 2 days prior to procedure, take 2 tablets at 4 pm. 1 day prior to procedure, take 2 tablets at 4 pm. For additional instructions refer to your colonoscopy  prep instructions. 4 tablet 0    blood glucose (ACCU-CHEK FASTCLIX) lancing device Lancing device to be used with lancets. 1 each 0    blood glucose (NO BRAND SPECIFIED) test strip Use to test blood sugar 2 times daily or as directed. To accompany: Blood Glucose Monitor Brands: per insurance. 200 strip 1    blood glucose monitoring (ACCU-CHEK FASTCLIX) lancets Use to test blood sugar 2 times daily. 102 each 5    blood glucose monitoring (NO BRAND SPECIFIED) meter device kit Use to test blood sugar 2 times daily 1 kit 0    ciclopirox (PENLAC) 8 % external solution Apply topically daily. Apply to affected nails daily.  Remove the residual build up weekly with nail polish remover or an Vardaman board. 6.6 mL 1    clindamycin (CLEOCIN T) 1 % external solution Apply to affected area on scalp once daily when lesions are active 60 mL 11    escitalopram (LEXAPRO) 10 MG tablet Take 1 tablet (10 mg) by mouth daily. 30 tablet 1    ketoconazole (NIZORAL) 2 % external shampoo Use to wash scalp 3x weekly. Let lather sit for 5 minutes prior to rinsing. 120 mL 3    lisinopril (ZESTRIL) 20 MG tablet Take 1 tablet (20 mg) by mouth daily      magnesium 250 MG tablet Take 1 tablet by mouth daily      MULTIPLE VITAMIN PO Take 1 tablet by mouth daily      omeprazole (PRILOSEC) 40 MG DR capsule Take 1 capsule (40 mg) by mouth daily 90 capsule 3    phentermine 30 MG capsule Take 1 capsule (30 mg) by mouth every morning. 30 capsule 5    polyethylene glycol (GOLYTELY) 236 g suspension 2 days prior at 5pm, mix and drink half of a jug of Golytely. Drink an 8 oz. glass of Golytely every 15 minutes until half of the jug is gone. Place remainder of Golytely in the refrigerator. 1 day prior at 5 pm, drink the 2nd half of a jug of Golytely bowel prep. 6 hours before your check-in time, drink an 8 oz. glass of Golytely every 15 minutes until half of the 2nd jug of Golytely is gone. Discard remainder of second jug. 8000 mL 0    Probiotic Product  "(PROBIOTIC ADVANCED PO) Take by mouth. Pre/probiotic      terbinafine (LAMISIL AT) 1 % external cream Apply topically 2 times daily 12 g 1    vitamin (B COMPLEX-C) tablet Take 1 tablet by mouth daily      vitamin E 400 units TABS Take 400 Units by mouth daily       No current facility-administered medications for this visit.           NOTES ABOUT CURRENT PSYCHOTROPIC MEDICATIONS:   Escitalopram 10 mg     Phentermine  Supplements: mg     PAST PSYCHOTROPIC MEDICATIONS:   fluoxetine, flat  Bupropion   Sertraline, zombie  Bupropion up to 300 mg, not effective    Drug Interactions:  per Med Scape  Serious - Use Alternative  escitalopram + phentermine  escitalopram, phentermine. Either increases toxicity of the other by Mechanism: unknown. Avoid or Use Alternate Drug. Risk of serotonin syndrome.    TODAY PATIENT REPORTS THE FOLLOWING PSYCHIATRIC ROS:   Per [facility], Shelli Fuentes, during today's team-based visit:  \" update: pt reports that the medication has been helpful. He's having some medical issues that he is working to get resolved. His home life has been \"decent\" lately. No conflicts between pt and wife about her son who lives with them but no real changes either. Pt has been trying to compartmentalize and lower his expectations. He has also been setting boundaries with step son. [facility] positively reinforced. He and other's have suspected shyla may have ASD but he won't make arrangements to get tested.   Stresses: wife has MS, relationship with step son  Appetite: Reports  has been tracking his food intake and calorie consumption due to weight loss challenges, despite efforts to manage his weight.  Sleep: unremarkable  Therapy: he had been seeing Lindsay at [facility] but missed their last appointment because he was in the ED, he has the phone number to scheduling  VANNESSA: No  Preg: NA  Most important: medication update\"     EXERCISE: Adequate  SIDE EFFECTS:   denies   COMPLIANCE:   states Adherent to medication " regimen  REPORTS THE FOLLOWING NEW MEDICAL ISSUES:   diagnosed with PIPLOIC Appendagitis:  - Inflammation and twisting of fatty patches (epiploic appendages) on the large intestine, causing pain when the bladder or colon is full.  This  has caused severe pain when the bladder or colon is full, with symptoms persisting despite dietary adjustments. In addition, he experiences pain in the knees, back, and stomach, which intensifies under physical stress.  He has an upcoming neurological appointment on April 29th and an endocrinologist appointment at the end of May to address potential diabetic neuropathy and weight loss issues.    PROBLEM: DEPRESSION: Improving however, there are days he continues with irritable, quick to react to minimal triggers and most times it is in relation to his pain       3/27/2025     3:59 PM   Last PHQ-9   1.  Little interest or pleasure in doing things 1   2.  Feeling down, depressed, or hopeless 1   3.  Trouble falling or staying asleep, or sleeping too much 1   4.  Feeling tired or having little energy 2   5.  Poor appetite or overeating 2   6.  Feeling bad about yourself 0   7.  Trouble concentrating 1   8.  Moving slowly or restless 0   Q9: Thoughts of better off dead/self-harm past 2 weeks 0   PHQ-9 Total Score 8        Patient-reported         12/16/2024    10:13 PM 2/20/2025     1:07 PM 3/27/2025     3:59 PM   PHQ-9 SCORE   PHQ-9 Total Score MyChart 6 (Mild depression) 6 (Mild depression) 8 (Mild depression)   PHQ-9 Total Score 6  6  8        Patient-reported     PHQ9 score is 8 indicating mild depression.   Suicidal ideation:  No     PROBLEM: ANXIETY:  Primary symptom is feeling more irritable and quick to react to minimal triggers .      GAD7 score is Not completed today       4/23/2024     8:28 AM 10/8/2024     5:43 PM 12/12/2024     5:05 PM   RODRIGUE-7 SCORE   Total Score  4 (minimal anxiety) 4 (minimal anxiety)   Total Score 4 4 4        Patient-reported       PROBLEM:  "SLEEP/INSOMNIA: sleep apnea with a CPAP machine and sleeps approximately 8 hours per night       PERTINENT PAST MEDICAL AND SURGICAL HISTORY     Past Medical History:   Diagnosis Date    Cancer (H)     Skin    Depressive disorder     Diabetes (H)     Gastro-oesophageal reflux disease     Hypertension goal BP (blood pressure) < 140/90        VITALS   There were no vitals taken for this visit.     BP Readings from Last 1 Encounters:   03/13/25 (!) 152/103     Pulse Readings from Last 1 Encounters:   03/13/25 89     Wt Readings from Last 1 Encounters:   03/13/25 (!) 149.7 kg (330 lb)     Ht Readings from Last 1 Encounters:   03/13/25 1.88 m (6' 2\")     Estimated body mass index is 42.37 kg/m  as calculated from the following:    Height as of 3/13/25: 1.88 m (6' 2\").    Weight as of 3/13/25: 149.7 kg (330 lb).    LABS & IMAGING                                                                                                                   Recent Labs   Lab Test 03/13/25  1300 05/28/24  0914 05/22/21 2029   WBC 7.8   < > 4.8   HGB 14.1   < > 13.2*   HCT 42.8   < > 40.5   MCV 83   < > 85      < > 164   ANEU  --   --  3.1    < > = values in this interval not displayed.     Recent Labs   Lab Test 03/13/25  1300 10/09/24  1519    138   POTASSIUM 4.2 3.9   CHLORIDE 102 99   CO2 25 24   GLC 99 94   ANCELMO 9.3 9.3   MAG  --  1.9   BUN 12.0 12.7   CR 0.81 0.86   GFRESTIMATED >90 >90   ALBUMIN 4.5 4.5   PROTTOTAL 8.0 7.8   AST 38 34   ALT 42 49   ALKPHOS 80 74   BILITOTAL 0.5 0.4     Recent Labs   Lab Test 10/09/24  1519   CHOL 177   *   HDL 37*   TRIG 156*   A1C 6.3*     Recent Labs   Lab Test 06/14/24  0931   TSH 1.70         ALLERGY & IMMUNIZATIONS       Allergies   Allergen Reactions    Molds & Smuts Other (See Comments)     Puffy eyes    No Clinical Screening - See Comments Other (See Comments)     Cigarette smoke       MEDICAL REVIEW OF SYSTEMS:   Ten system review was completed with pertinent positives " noted     MENTAL STATUS EXAM:    General/Constitutional:  Appearance:   awake, alert, adequately groomed, appeared stated age and no apparent distress  Attitude:    cooperative   Eye Contact:  good  Musculoskeletal:  Psychomotor Behavior:  no evidence of tardive dyskinesia, dystonia, or tics from the head up  Psychiatric:  Speech:  clear, coherent, regular rate, rhythm, and volume,   No pressure speech noted.  Associations:  no loose associations  Thought Process:   logical, linear and goal oriented  Thought Content:    No evidence of suicidal ideation or homicidal ideation, no evidence of psychotic thought, no auditory hallucinations present and no visual hallucinations present  Mood:  irritable, quick to react to minimal triggers improving partially  Affect:  full range/stable (normal variation of emotions during exam) and was congruent to speech content.  Insight:  good  Judgment:  intact, adequate for safety  Impulse Control:  intact  Neurological:  Oriented to:  person, place, time, and situation  Attention Span and Concentration:  Able to attend to the interview      Language: intact     Recent and Remote Memory:  Intact to interview. Not formally assessed. No amnesia.    Fund of Knowledge: appropriate         SAFETY   Feels safe in home: YES     Suicidal ideation: Denies  History of suicide attempts:  No   Hx of impulsivity: No   Hope for the future: present    Hx of Command hallucinations or current psychosis: None endorsed    History of Self-injurious behaviors: denies    Family member  by suicide:  not discussed      SAFETY ASSESSMENT:   Based on all available evidence including the factors cited above, overall Risk for harm is low and is appropriate for outpatient level of care.   Recommended that patient call 911 or go to the local ED should there be a change in any of these risk factors.       DSM 5 DIAGNOSIS:   Major depressive disorder, mild, recurrent  Obstructive sleep apnea  Diabetes    MEDICAL  COMORBIDITY IMPACTING CLINICAL PICTURE: Diabetes.  Known issue that I take into account for their medical decisions, no current exacerbations or new concerns      ASSESSMENT AND PLAN    Assessment & Plan  Improving mood overall.  Medical comorbidities being evaluated. At this time will  - Current use of Lexapro and phentermine; unable to increase Lexapro dosage due to phentermine interaction  - Continue current dosing and reassess after specialist consultations. Consider medication changes or augmentation if necessary after further evaluation.     Follow-up with this provider end of June      Problem List Items Addressed This Visit    None             CONSULTS/REFERRALS:   Continue therapy   Coordinate care with therapist as needed      MEDICAL:   None at this time  Coordinate care with PCP (Maria Antonia Goodwin) as needed  Follow up with primary care provider as planned or for acute medical concerns.    PSYCHOEDUCATION:  Medication side effects and alternatives reviewed. Health promotion activities recommended and reviewed today. All questions addressed. Education and counseling completed regarding risks and benefits of medications and psychotherapy options.  Consent provided by patient/guardian  Call the psychiatric nurse line with medication questions or concerns at [phone].  Hop Skip Connect may be used to communicate with your provider, but this is not intended to be used for emergencies.  Medlineplus.gov is information for patients.  It is run by the National Library of Medicine and it contains information about all disorders, diseases and all medications.      COMMUNITY RESOURCES:    CRISIS NUMBERS: Provided in AVS 1/3/2025  National Suicide Prevention Lifeline: 3-094-775-TALK (394-354-9207)  FoundationDB/resources for a list of additional resources (SOS)            [facility] - [phone]   Urgent Care Adult Mental Pbjqun-[phone] mobile unit/ 24/7 crisis line  [facility] -[phone]   COPE 24/7 [facility] Mobile Team -[phone] (adults)/ [phone] (child)  Poison Control Center - [phone]    OR  go to nearest ER  Crisis Text Line for any crisis 24/7 send this-   To: 602641   [facility] ([facility]) [facility]  [phone]    National Suicide Prevention Lifeline: 519.568.6425 (TTY: 643.866.9998). Call anytime for help.  (www.suicidepreventionlifeline.org)  National [place] on Mental Illness (www.xochitl.org): 640.697.5944 or 936-138-9756.   Mental Health Association (www.mentalhealth.org): 967.901.8510 or 749-884-9193.  [place] Crisis Text Line: Text [place] to 317595  Suicide LifeLine Chat: [employer].org/chat    ADMINISTRATIVE BILLING:   Level of Medical Decision Making:    - At least 1 chronic problem that is not stable  - Engaged in prescription drug management during visit (discussed any medication benefits, side effects, alternatives, etc.)            The longitudinal plan of care for the diagnosis(es)/condition(s) as documented were addressed during this visit. Due to the added complexity in care, I will continue to support Heath in the subsequent management and with ongoing continuity of care.    Patient Status:  Patient will continue to be seen for ongoing consultation and stabilization.      Signed:   Courtney Walsh, STEVE, APRN, FMHNP-[facility] ([facility])   Chart documentation done in part with Dragon Voice Recognition software.  Although reviewed after completion, some word and grammatical errors may remain.

## 2025-03-27 NOTE — PATIENT INSTRUCTIONS
Based on our discussion, I have outlined the following instructions for you:    - - Continue taking your current medications, Lexapro and phentermine, at the same doses.  - Attend your neurologist appointment on April 29, 2025.  - Attend your endocrinologist appointment at the end of May 2025 and June 2025.  - follow-up with Shelli Fuentes [facility], Behavioral Health Consultant and I June 26th after your specialist appts to reevaluate mental health medications.     Thank you again for your visit, and we look forward to supporting you in your journey to better health.     **For crisis resources, please see the information at the end of this document**     Thank you for coming to the [facility].     -PSYCHOEDUCATION:      CONSULTS/REFERRALS:   None at this time    LABS/PROCEDURES:    Please call your [facility] and ask for a lab only appointment at your earliest convenience.  If your results are reassuring or normal they will be mailed to you or sent through [employer] within 7 days. If the lab tests need quick action we will call you with the results. The phone number we will call with results is # [phone].      FOLLOW UP: Schedule an appointment with me in 3 months or sooner as needed.  The intake team should be calling you to schedule.  If you dont hear from them, or they were unable to reach you, please call [phone] to schedule.  Follow up with primary care provider as planned or for acute medical concerns.  Call the psychiatric nurse line with medication questions or concerns at [phone].      Medication Refills:  If you need any refills please call your pharmacy and they will contact us. Our fax number for refills is [phone]. Please allow three business for refill processing. If you need to  your refill at a new pharmacy, please contact the new pharmacy directly. The new pharmacy will help you get your medications transferred.      [place] Assistance [phone]  Financial Assistance [phone]  [facility] Billing [phone]  Central Billing Office, [facility]: [phone]  [facility] Billing [phone]  Medical Records [phone]  [facility] Patient Bill of Rights [email or web address]       MENTAL HEALTH CRISIS RESOURCES:  For a emergency help, please call 911 or go to the nearest Emergency Department.     Emergency Walk-In Options:   EmPATH Unit @ [facility] ([place]): [phone] - Specialized mental health emergency area designed to be calming  [facility] ([place]): [phone]  [facility] ([place]): [phone]  [facility]): [phone]    National Crisis Information: Call 988 Suicide and Crisis Lifeline  Crisis Text Line (free 24/7):  call **CRISIS (**887460) Crisis or use the texting option by texting 126064.   National Suicide Prevention Lifeline: Call 988  Poison Control Center: 2-511-332-6565  Trans Lifeline: 2-151-275-7433 - Hotline for transgender people of all ages  The [employer] Project: 6-205-771-0936 - Hotline for LGBT youth   List of all [place] county resources:   [place]://[place].gov/dhs/people-we-serve/adults/health-care/mental-health/resources/crisis-contacts.jsp    For Non-Emergency Support:   Fast Tracker: Mental Health & Substance Use Disorder Resources -   https://www.fasttrackermn.org/       Again thank you for choosing [facility] and please let us know how we can best partner with you to improve you and your family's health.    You may be receiving a survey regarding this appointment. We would love to have your feedback, both positive and negative. The survey is done by an external company, so your answers are anonymous.    Patient Education   Collaborative Care Psychiatry Service  What to Expect  Here's what to expect from your  "Collaborative Care Psychiatry Service (CCPS).   About CCPS  CCPS means 2 people work together to help you get better. You'll meet with a behavioral health clinician and a psychiatric doctor. A behavioral health clinician helps people with mental health problems by talking with them. A psychiatric doctor helps people by giving them medicine.  How it works  At every visit, you'll see the behavioral health clinician (BHC) first. They'll talk with you about how you're doing and teach you how to feel better.   Then you'll see the psychiatric doctor. This doctor can help you deal with troubling thoughts and feelings by giving you medicine. They'll make sure you know the plan for your care.   CCPS usually takes 3 to 6 visits. If you need more visits, we may have you start seeing a different psychiatric doctor for ongoing care.  If you have any questions or concerns, we'll be glad to talk with you.  About visits  Be open  At your visits, please talk openly about your problems. It may feel hard, but it's the best way for us to help you.  Cancelling visits  If you can't come to your visit, please call us right away at [phone]. If you don't cancel at least 24 hours (1 full day) before your visit, that's \"late cancellation.\"  Being late to visits  Being very late is the same as not showing up. You will be a \"no show\" if:  Your appointment starts with a BHC, and you're more than 15 minutes late for a 30-minute (half hour) visit. This will also cancel your appointment with the psychiatric doctor.  Your appointment is with a psychiatric doctor only, and you're more than 15 minutes late for a 30-minute (half hour) visit.  Your appointment is with a psychiatric doctor only, and you're more than 30 minutes late for a 60-minute (full hour) visit.  If you cancel late or don't show up 2 times within 6 months, we may end your care.   Getting help between visits  If you need help between visits, you can call us Monday to Friday " from 8 a.m. to 4:30 p.m. at [phone].  Emergency care  Call 911 or go to the nearest emergency department if your life or someone else's life is in danger.  Call 988 anytime to reach the national Suicide and Crisis hotline.  Medicine refills  To refill your medicine, call your pharmacy. You can also call [facility]'s Behavioral Access at [phone], Monday to Friday, 8 a.m. to 4:30 p.m. It can take 1 to 3 business days to get a refill.   Forms, letters, and tests  You may have papers to fill out, like FMLA, short-term disability, and workability. We can help you with these forms at your visits, but you must have an appointment. You may need more than 1 visit for this, to be in an intensive therapy program, or both.  Before we can give you medicine for ADHD, we may refer you to get tested for it or confirm it another way.  We may not be able to give you an emotional support animal letter.  We don't do mental health checks ordered by the court.   We don't do mental health testing, but we can refer you to get tested.   Thank you for choosing us for your care.  For informational purposes only. Not to replace the advice of your health care provider. Copyright   2022 [facility]. All rights reserved. [employer] [id number] - Rev 11/24.

## 2025-03-27 NOTE — NURSING NOTE
Is the patient currently in the state of [place]? YES    Current patient location: [address] [place] [address]    Visit mode:Video    If the visit is dropped, the patient can be reconnected by: VIDEO VISIT: Text to cell phone:   Telephone Information:   Mobile [phone]       Will anyone else be joining the visit? No  (If patient encounters technical issues they should call [phone])    Are changes needed to the allergy or medication list? No    Are refills needed on medications prescribed by this physician? Discuss with Provider    Rooming Documentation: Questionnaire(s) completed.    Reason for visit: BUNNY Ramirez

## 2025-04-03 ENCOUNTER — HOSPITAL ENCOUNTER (EMERGENCY)
Facility: CLINIC | Age: 55
Discharge: HOME | End: 2025-04-04
Attending: FAMILY MEDICINE
Payer: COMMERCIAL

## 2025-04-03 ENCOUNTER — APPOINTMENT (OUTPATIENT)
Dept: CT IMAGING | Facility: CLINIC | Age: 55
End: 2025-04-03
Attending: FAMILY MEDICINE
Payer: COMMERCIAL

## 2025-04-03 DIAGNOSIS — N30.01 ACUTE CYSTITIS WITH HEMATURIA: ICD-10-CM

## 2025-04-03 DIAGNOSIS — R31.29 OTHER MICROSCOPIC HEMATURIA: ICD-10-CM

## 2025-04-03 LAB
ALBUMIN UR-MCNC: 20 MG/DL
ANION GAP SERPL CALCULATED.3IONS-SCNC: 13 MMOL/L (ref 7–15)
APPEARANCE UR: CLEAR
BASOPHILS # BLD AUTO: 0.1 10E3/UL (ref 0–0.2)
BASOPHILS NFR BLD AUTO: 1 %
BILIRUB UR QL STRIP: NEGATIVE
BUN SERPL-MCNC: 16.9 MG/DL (ref 6–20)
CALCIUM SERPL-MCNC: 9.7 MG/DL (ref 8.8–10.4)
CHLORIDE SERPL-SCNC: 100 MMOL/L (ref 98–107)
COLOR UR AUTO: YELLOW
CREAT SERPL-MCNC: 0.97 MG/DL (ref 0.67–1.17)
EGFRCR SERPLBLD CKD-EPI 2021: >90 ML/MIN/1.73M2
EOSINOPHIL # BLD AUTO: 0.1 10E3/UL (ref 0–0.7)
EOSINOPHIL NFR BLD AUTO: 1 %
ERYTHROCYTE [DISTWIDTH] IN BLOOD BY AUTOMATED COUNT: 14.2 % (ref 10–15)
GLUCOSE SERPL-MCNC: 166 MG/DL (ref 70–99)
GLUCOSE UR STRIP-MCNC: NEGATIVE MG/DL
HCO3 SERPL-SCNC: 24 MMOL/L (ref 22–29)
HCT VFR BLD AUTO: 41.8 % (ref 40–53)
HGB BLD-MCNC: 13.9 G/DL (ref 13.3–17.7)
HGB UR QL STRIP: ABNORMAL
HOLD SPECIMEN: NORMAL
HOLD SPECIMEN: NORMAL
HYALINE CASTS: 1 /LPF
IMM GRANULOCYTES # BLD: 0.1 10E3/UL
IMM GRANULOCYTES NFR BLD: 1 %
KETONES UR STRIP-MCNC: NEGATIVE MG/DL
LEUKOCYTE ESTERASE UR QL STRIP: NEGATIVE
LYMPHOCYTES # BLD AUTO: 1.7 10E3/UL (ref 0.8–5.3)
LYMPHOCYTES NFR BLD AUTO: 16 %
MCH RBC QN AUTO: 27.4 PG (ref 26.5–33)
MCHC RBC AUTO-ENTMCNC: 33.3 G/DL (ref 31.5–36.5)
MCV RBC AUTO: 82 FL (ref 78–100)
MONOCYTES # BLD AUTO: 0.6 10E3/UL (ref 0–1.3)
MONOCYTES NFR BLD AUTO: 6 %
MUCOUS THREADS #/AREA URNS LPF: PRESENT /LPF
NEUTROPHILS # BLD AUTO: 8.3 10E3/UL (ref 1.6–8.3)
NEUTROPHILS NFR BLD AUTO: 77 %
NITRATE UR QL: NEGATIVE
NRBC # BLD AUTO: 0 10E3/UL
NRBC BLD AUTO-RTO: 0 /100
PH UR STRIP: 5 [PH] (ref 5–7)
PLATELET # BLD AUTO: 216 10E3/UL (ref 150–450)
POTASSIUM SERPL-SCNC: 4 MMOL/L (ref 3.4–5.3)
RBC # BLD AUTO: 5.07 10E6/UL (ref 4.4–5.9)
RBC URINE: 123 /HPF
SODIUM SERPL-SCNC: 137 MMOL/L (ref 135–145)
SP GR UR STRIP: 1.02 (ref 1–1.03)
SQUAMOUS EPITHELIAL: <1 /HPF
UROBILINOGEN UR STRIP-MCNC: NORMAL MG/DL
WBC # BLD AUTO: 10.8 10E3/UL (ref 4–11)
WBC URINE: 3 /HPF

## 2025-04-03 PROCEDURE — 85004 AUTOMATED DIFF WBC COUNT: CPT | Performed by: FAMILY MEDICINE

## 2025-04-03 PROCEDURE — 82374 ASSAY BLOOD CARBON DIOXIDE: CPT | Performed by: FAMILY MEDICINE

## 2025-04-03 PROCEDURE — 74177 CT ABD & PELVIS W/CONTRAST: CPT

## 2025-04-03 PROCEDURE — 250N000011 HC RX IP 250 OP 636: Mod: JZ | Performed by: FAMILY MEDICINE

## 2025-04-03 PROCEDURE — 80048 BASIC METABOLIC PNL TOTAL CA: CPT | Performed by: FAMILY MEDICINE

## 2025-04-03 PROCEDURE — 99285 EMERGENCY DEPT VISIT HI MDM: CPT | Mod: 25

## 2025-04-03 PROCEDURE — 250N000011 HC RX IP 250 OP 636: Performed by: FAMILY MEDICINE

## 2025-04-03 PROCEDURE — 81003 URINALYSIS AUTO W/O SCOPE: CPT | Performed by: FAMILY MEDICINE

## 2025-04-03 PROCEDURE — 82310 ASSAY OF CALCIUM: CPT | Performed by: FAMILY MEDICINE

## 2025-04-03 PROCEDURE — 99284 EMERGENCY DEPT VISIT MOD MDM: CPT | Performed by: FAMILY MEDICINE

## 2025-04-03 PROCEDURE — 87086 URINE CULTURE/COLONY COUNT: CPT | Performed by: FAMILY MEDICINE

## 2025-04-03 PROCEDURE — 96374 THER/PROPH/DIAG INJ IV PUSH: CPT | Mod: 59

## 2025-04-03 PROCEDURE — 36415 COLL VENOUS BLD VENIPUNCTURE: CPT | Performed by: FAMILY MEDICINE

## 2025-04-03 PROCEDURE — 250N000009 HC RX 250: Performed by: FAMILY MEDICINE

## 2025-04-03 RX ORDER — KETOROLAC TROMETHAMINE 15 MG/ML
15 INJECTION, SOLUTION INTRAMUSCULAR; INTRAVENOUS ONCE
Status: COMPLETED | OUTPATIENT
Start: 2025-04-03 | End: 2025-04-03

## 2025-04-03 RX ORDER — IOPAMIDOL 755 MG/ML
149 INJECTION, SOLUTION INTRAVASCULAR ONCE
Status: COMPLETED | OUTPATIENT
Start: 2025-04-03 | End: 2025-04-03

## 2025-04-03 RX ADMIN — SODIUM CHLORIDE 77 ML: 9 INJECTION, SOLUTION INTRAVENOUS at 23:40

## 2025-04-03 RX ADMIN — KETOROLAC TROMETHAMINE 15 MG: 15 INJECTION, SOLUTION INTRAMUSCULAR; INTRAVENOUS at 22:38

## 2025-04-03 RX ADMIN — IOPAMIDOL 149 ML: 755 INJECTION, SOLUTION INTRAVENOUS at 23:40

## 2025-04-03 ASSESSMENT — ACTIVITIES OF DAILY LIVING (ADL): ADLS_ACUITY_SCORE: 41

## 2025-04-04 VITALS
WEIGHT: 315 LBS | RESPIRATION RATE: 18 BRPM | SYSTOLIC BLOOD PRESSURE: 127 MMHG | DIASTOLIC BLOOD PRESSURE: 70 MMHG | OXYGEN SATURATION: 96 % | TEMPERATURE: 99.2 F | BODY MASS INDEX: 42.37 KG/M2 | HEART RATE: 74 BPM

## 2025-04-04 PROCEDURE — 250N000013 HC RX MED GY IP 250 OP 250 PS 637: Performed by: FAMILY MEDICINE

## 2025-04-04 RX ORDER — CEPHALEXIN 500 MG/1
500 CAPSULE ORAL 2 TIMES DAILY
Qty: 14 CAPSULE | Refills: 0 | Status: SHIPPED | OUTPATIENT
Start: 2025-04-04 | End: 2025-04-11

## 2025-04-04 RX ORDER — CEPHALEXIN 500 MG/1
500 CAPSULE ORAL ONCE
Status: COMPLETED | OUTPATIENT
Start: 2025-04-04 | End: 2025-04-04

## 2025-04-04 RX ADMIN — CEPHALEXIN 500 MG: 500 CAPSULE ORAL at 00:39

## 2025-04-04 NOTE — ED PROVIDER NOTES
HPI   Patient is a 55-year-old male presenting with urinary urgency and right lower quadrant pain.  Per my chart review, the patient was seen on 3/13 for epiploic appendagitis.  He also describes struggling recently with abdominal bloating and bowel changes.  He had a colonoscopy which showed a polyp but otherwise was unremarkable.  He has not had abdominal surgery previously.  He was on a GLP-1 inhibitor but no longer because of side effects.  He denies recent medication changes.  He does have diabetes and obesity.  He is seeing an endocrinologist soon for further questions regarding both of these.    The patient recognized right lower quadrant discomfort and urinary urgency starting yesterday.  This is worsened with time.  No obvious hematuria.  No pain with urination.  He does have some right low back/flank discomfort.  No fever.  No vomiting.  Decreased appetite.  He has had some slower bowels recently.  No hematochezia or melena.  No testicular pain, tenderness, swelling.  No obvious hernia.  No falls or injuries.  No skin rash.      Allergies:  Allergies   Allergen Reactions    Molds & Smuts Other (See Comments)     Puffy eyes    No Clinical Screening - See Comments Other (See Comments)     Cigarette smoke     Problem List:    Patient Active Problem List    Diagnosis Date Noted    Colon adenoma 02/11/2025     Priority: Medium    Hyperlipidemia LDL goal <100 04/23/2024     Priority: Medium    JONA (obstructive sleep apnea) 06/13/2023     Priority: Medium    Gastroesophageal reflux disease with esophagitis without hemorrhage 06/13/2023     Priority: Medium    Mild episode of recurrent major depressive disorder 06/09/2022     Priority: Medium    Morbid obesity (H) 10/27/2020     Priority: Medium    Type II diabetes mellitus with peripheral autonomic neuropathy (H) 10/27/2020     Priority: Medium    Hypertension goal BP (blood pressure) < 140/90 10/27/2020     Priority: Medium      Past Medical History:    Past  Medical History:   Diagnosis Date    Cancer (H)     Depressive disorder     Diabetes (H)     Gastro-oesophageal reflux disease     Hypertension goal BP (blood pressure) < 140/90      Past Surgical History:    Past Surgical History:   Procedure Laterality Date    BIOPSY      Skin cancer times 2    COLONOSCOPY      COLONOSCOPY N/A 1/28/2025    Procedure: Colonoscopy;  Surgeon: Vince Pastor MD;  Location: [facility]    ESOPHAGOSCOPY, GASTROSCOPY, DUODENOSCOPY (EGD), COMBINED N/A 7/5/2023    Procedure: Esophagoscopy, gastroscopy, duodenoscopy, combined;  Surgeon: Jay Hoyt DO;  Location: [facility]    ESOPHAGOSCOPY, GASTROSCOPY, DUODENOSCOPY (EGD), COMBINED N/A 1/28/2025    Procedure: Esophagoscopy, gastroscopy, duodenoscopy, combined;  Surgeon: Vince Pastor MD;  Location: [facility]    VASECTOMY       Family History:    Family History   Problem Relation Age of Onset    Alcoholism Father     Prostate Cancer Father         He is getting tested further but he has a type 5 and 4 lesions    Lung Cancer Maternal Grandfather     Diabetes Maternal Grandfather     Hypertension Maternal Grandfather     Other Cancer Maternal Grandfather         Lung    Colon Cancer Maternal Grandmother      Social History:  Marital Status:   [2]  Social History     Tobacco Use    Smoking status: Never    Smokeless tobacco: Never   Vaping Use    Vaping status: Never Used   Substance Use Topics    Alcohol use: Yes     Comment: rare    Drug use: No      Medications:    cephALEXin (KEFLEX) 500 MG capsule  acetaminophen (TYLENOL) 500 MG tablet  alcohol swab prep pads  alcohol swab prep pads  aspirin (ASA) 81 MG EC tablet  atorvastatin (LIPITOR) 20 MG tablet  bisacodyl (DULCOLAX) 5 MG EC tablet  blood glucose (ACCU-CHEK FASTCLIX) lancing device  blood glucose (NO BRAND SPECIFIED) test strip  blood glucose monitoring (ACCU-CHEK FASTCLIX) lancets  blood glucose monitoring (NO BRAND SPECIFIED) meter device kit  ciclopirox (PENLAC) 8 %  external solution  clindamycin (CLEOCIN T) 1 % external solution  escitalopram (LEXAPRO) 10 MG tablet  ketoconazole (NIZORAL) 2 % external shampoo  lisinopril (ZESTRIL) 20 MG tablet  magnesium 250 MG tablet  MULTIPLE VITAMIN PO  omeprazole (PRILOSEC) 40 MG DR capsule  phentermine 30 MG capsule  polyethylene glycol (GOLYTELY) 236 g suspension  Probiotic Product (PROBIOTIC ADVANCED PO)  terbinafine (LAMISIL AT) 1 % external cream  vitamin (B COMPLEX-C) tablet  vitamin E 400 units TABS      Review of Systems   All other systems reviewed and are negative.      PE   BP: (!) 156/88  Pulse: 80  Temp: 99.2  F (37.3  C)  Resp: 18  Weight: (!) 149.7 kg (330 lb)  SpO2: 95 %  Physical Exam  Vitals and nursing note reviewed.   Constitutional:       General: He is not in acute distress.  HENT:      Head: Atraumatic.      Right Ear: External ear normal.      Left Ear: External ear normal.      Nose: Nose normal.      Mouth/Throat:      Mouth: Mucous membranes are moist.      Pharynx: Oropharynx is clear.   Eyes:      General: No scleral icterus.     Extraocular Movements: Extraocular movements intact.      Conjunctiva/sclera: Conjunctivae normal.      Pupils: Pupils are equal, round, and reactive to light.   Cardiovascular:      Rate and Rhythm: Normal rate.   Pulmonary:      Effort: Pulmonary effort is normal. No respiratory distress.   Abdominal:      Comments: Tender right lower quadrant.  Obese, distention?  The rest of his abdomen is soft and nontender.   Musculoskeletal:         General: Normal range of motion.      Cervical back: Normal range of motion.   Skin:     General: Skin is warm and dry.   Neurological:      Mental Status: He is alert and oriented to person, place, and time.   Psychiatric:         Behavior: Behavior normal.         ED COURSE and MDM   2236.  Patient has urinary urgency, right lower quadrant pain and tenderness, right low back discomfort/flank pain.  No other systemic symptoms except decreased  appetite.  Toradol offered.    2316.  Urine analysis abnormal with hematuria.  He has urinary urgency.  Urine culture will be added.  Ureteral stone?  Recent CT negative for nephrolithiasis.  No family history of nephrolithiasis.  Old CT scan also reviewed, also negative for nephrolithiasis.  Last CT scan also was positive for diffuse bladder wall thickening thought to be related to its position next to the epiploic appendagitis.  Urine analysis during last visit was negative/normal.  Repeat CT scan will be obtained today after discussion about ultrasound versus scan.    0030.  CT imaging shows bladder wall thickening still.  Patient has symptoms concerning for UTI/cystitis.  Keflex provided.  Urology referral order placed, outpatient cystoscopy?     Electronic medical chart reviewed, including medical problems, medications, medical allergies, social history.  Recent hospitalizations and surgical procedures reviewed.  Recent clinic visits and consultations reviewed.  Recent labs and test results reviewed.  Nursing notes reviewed.    The patient, their parent if applicable, and/or their medical decision maker(s) and I have reviewed all of the available historical information, applicable PMH, physical exam findings, and objective diagnostic data gathered during this ED visit.  We then discussed all work-up options and then together agreed upon the course taken during this visit.  The ultimate disposition and plan was a cooperative decision made between myself and the patient, their parent if applicable, and/or their legal decision maker(s).  The risks and benefits of all decisions made during this visit were discussed to the best of my abilities given the circumstances, and all parties are understanding of the pertinent ramifications of these decisions.      LABS  Labs Ordered and Resulted from Time of ED Arrival to Time of ED Departure   ROUTINE UA WITH MICROSCOPIC REFLEX TO CULTURE - Abnormal       Result Value     Color Urine Yellow      Appearance Urine Clear      Glucose Urine Negative      Bilirubin Urine Negative      Ketones Urine Negative      Specific Gravity Urine 1.020      Blood Urine Large (*)     pH Urine 5.0      Protein Albumin Urine 20 (*)     Urobilinogen Urine Normal      Nitrite Urine Negative      Leukocyte Esterase Urine Negative      Mucus Urine Present (*)     RBC Urine 123 (*)     WBC Urine 3      Squamous Epithelials Urine <1      Hyaline Casts Urine 1     BASIC METABOLIC PANEL - Abnormal    Sodium 137      Potassium 4.0      Chloride 100      Carbon Dioxide (CO2) 24      Anion Gap 13      Urea Nitrogen 16.9      Creatinine 0.97      GFR Estimate >90      Calcium 9.7      Glucose 166 (*)    CBC WITH PLATELETS AND DIFFERENTIAL    WBC Count 10.8      RBC Count 5.07      Hemoglobin 13.9      Hematocrit 41.8      MCV 82      MCH 27.4      MCHC 33.3      RDW 14.2      Platelet Count 216      % Neutrophils 77      % Lymphocytes 16      % Monocytes 6      % Eosinophils 1      % Basophils 1      % Immature Granulocytes 1      NRBCs per 100 WBC 0      Absolute Neutrophils 8.3      Absolute Lymphocytes 1.7      Absolute Monocytes 0.6      Absolute Eosinophils 0.1      Absolute Basophils 0.1      Absolute Immature Granulocytes 0.1      Absolute NRBCs 0.0     URINE CULTURE       IMAGING  Images reviewed by me.  Radiology report also reviewed.  CT Abdomen Pelvis w Contrast   Final Result   IMPRESSION:    1.  Urinary bladder wall thickening, versus pseudothickening due to underdistention. This is similar to prior. Correlate for clinical evidence of cystitis.   2.  Previously described findings of epiploic appendagitis at the sigmoid colon have mostly resolved.   3.  Probable hepatic steatosis.          Procedures    Medications   cephALEXin (KEFLEX) capsule 500 mg (has no administration in time range)   ketorolac (TORADOL) injection 15 mg (15 mg Intravenous $Given 4/3/25 2237)   iopamidol (ISOVUE-370) solution 149  mL (149 mLs Intravenous $Given 4/3/25 2340)   sodium chloride 0.9 % bag for CT scan flush (77 mLs Intravenous $Given 4/3/25 2340)         IMPRESSION       ICD-10-CM    1. Acute cystitis with hematuria  N30.01 Adult Urology  Referral      2. Other microscopic hematuria  R31.29 Adult Urology  Referral               Medication List        Started      cephALEXin 500 MG capsule  Commonly known as: KEFLEX  500 mg, Oral, 2 TIMES DAILY                                  Fadi Jaffe MD  04/04/25 0031

## 2025-04-04 NOTE — ED TRIAGE NOTES
Presents with lower right sided abdominal pain that started this evening and painful urination.   Pt states the pain started gradually in his back on Monday and is now radiating to his abdomen.   States his urine is cloudy     Triage Assessment (Adult)       Row Name 04/03/25 1891          Triage Assessment    Airway WDL WDL        Respiratory WDL    Respiratory WDL WDL        Skin Circulation/Temperature WDL    Skin Circulation/Temperature WDL WDL        Cardiac WDL    Cardiac WDL WDL        Peripheral/Neurovascular WDL    Peripheral Neurovascular WDL WDL        Cognitive/Neuro/Behavioral WDL    Cognitive/Neuro/Behavioral WDL WDL

## 2025-04-04 NOTE — DISCHARGE INSTRUCTIONS
RETURN TO THE EMERGENCY ROOM FOR THE FOLLOWING:    Really worsened pain, fever greater than 101, or at anytime for any concern.    FOLLOW UP:    Urology referral order placed at the time of discharge, expect a phone call within the next few days to help with scheduling.    TREATMENT RECOMMENDATIONS:    Keflex 500 mg twice a day.    NURSE ADVICE LINE:  [phone] or [phone]

## 2025-04-05 LAB — BACTERIA UR CULT: NO GROWTH

## 2025-04-21 ENCOUNTER — OFFICE VISIT (OUTPATIENT)
Dept: UROLOGY | Facility: CLINIC | Age: 55
End: 2025-04-21
Attending: FAMILY MEDICINE
Payer: COMMERCIAL

## 2025-04-21 VITALS
DIASTOLIC BLOOD PRESSURE: 76 MMHG | TEMPERATURE: 97.4 F | WEIGHT: 315 LBS | SYSTOLIC BLOOD PRESSURE: 118 MMHG | BODY MASS INDEX: 40.43 KG/M2 | HEIGHT: 74 IN

## 2025-04-21 DIAGNOSIS — N32.89 BLADDER WALL THICKENING: ICD-10-CM

## 2025-04-21 DIAGNOSIS — R39.15 URINARY URGENCY: ICD-10-CM

## 2025-04-21 DIAGNOSIS — R31.29 OTHER MICROSCOPIC HEMATURIA: Primary | ICD-10-CM

## 2025-04-21 LAB
ALBUMIN UR-MCNC: NEGATIVE MG/DL
APPEARANCE UR: CLEAR
BILIRUB UR QL STRIP: NEGATIVE
COLOR UR AUTO: NORMAL
GLUCOSE UR STRIP-MCNC: NEGATIVE MG/DL
HGB UR QL STRIP: NEGATIVE
KETONES UR STRIP-MCNC: NEGATIVE MG/DL
LEUKOCYTE ESTERASE UR QL STRIP: NEGATIVE
NITRATE UR QL: NEGATIVE
PH UR STRIP: 5 [PH] (ref 5–7)
SP GR UR STRIP: 1.02 (ref 1–1.03)
UROBILINOGEN UR STRIP-MCNC: NORMAL MG/DL

## 2025-04-21 PROCEDURE — 3074F SYST BP LT 130 MM HG: CPT | Performed by: UROLOGY

## 2025-04-21 PROCEDURE — 3078F DIAST BP <80 MM HG: CPT | Performed by: UROLOGY

## 2025-04-21 PROCEDURE — 1126F AMNT PAIN NOTED NONE PRSNT: CPT | Performed by: UROLOGY

## 2025-04-21 PROCEDURE — 81003 URINALYSIS AUTO W/O SCOPE: CPT | Performed by: UROLOGY

## 2025-04-21 PROCEDURE — 99243 OFF/OP CNSLTJ NEW/EST LOW 30: CPT | Mod: 25 | Performed by: UROLOGY

## 2025-04-21 PROCEDURE — 52000 CYSTOURETHROSCOPY: CPT | Performed by: UROLOGY

## 2025-04-21 ASSESSMENT — PAIN SCALES - GENERAL: PAINLEVEL_OUTOF10: NO PAIN (0)

## 2025-04-21 NOTE — PROGRESS NOTES
S: Patient is a pleasant 55-year-old male who was requested to be seen by Dr. Fadi Pruett for a consultation with regard to patient's recent history of urinary urgency and microscopic hematuria.  He was diagnosed with Torsed epiploic appendage of the sigmoid colon with associated inflammatory change and trace volume reactive free fluid.  Recently he has had some urinary urgency.  He was seen emergency room for it.  Urine test showed microscopic hematuria.  He did denies any gross hematuria.  He has no other voiding symptoms.  CT scan also showed bladder wall thickening otherwise unremarkable.  He is not a smoker.  Current Outpatient Medications   Medication Sig Dispense Refill    acetaminophen (TYLENOL) 500 MG tablet Take 1,000 mg by mouth every 6 hours as needed for mild pain      alcohol swab prep pads Use to swab area of injection/andrzej as directed. 100 each 6    alcohol swab prep pads Use to swab area of injection/andrzej as directed 100 each 3    aspirin (ASA) 81 MG EC tablet Take 1 tablet (81 mg) by mouth daily      atorvastatin (LIPITOR) 20 MG tablet Take 1 tablet (20 mg) by mouth daily 90 tablet 3    bisacodyl (DULCOLAX) 5 MG EC tablet 2 days prior to procedure, take 2 tablets at 4 pm. 1 day prior to procedure, take 2 tablets at 4 pm. For additional instructions refer to your colonoscopy prep instructions. 4 tablet 0    blood glucose (ACCU-CHEK FASTCLIX) lancing device Lancing device to be used with lancets. 1 each 0    blood glucose (NO BRAND SPECIFIED) test strip Use to test blood sugar 2 times daily or as directed. To accompany: Blood Glucose Monitor Brands: per insurance. 200 strip 1    blood glucose monitoring (ACCU-CHEK FASTCLIX) lancets Use to test blood sugar 2 times daily. 102 each 5    blood glucose monitoring (NO BRAND SPECIFIED) meter device kit Use to test blood sugar 2 times daily 1 kit 0    ciclopirox (PENLAC) 8 % external solution Apply topically daily. Apply to affected nails daily.  Remove  the residual build up weekly with nail polish remover or an [place] board. 6.6 mL 1    clindamycin (CLEOCIN T) 1 % external solution Apply to affected area on scalp once daily when lesions are active 60 mL 11    escitalopram (LEXAPRO) 10 MG tablet Take 1 tablet (10 mg) by mouth daily. 90 tablet 0    ketoconazole (NIZORAL) 2 % external shampoo Use to wash scalp 3x weekly. Let lather sit for 5 minutes prior to rinsing. 120 mL 3    lisinopril (ZESTRIL) 20 MG tablet Take 1 tablet (20 mg) by mouth daily      magnesium 250 MG tablet Take 1 tablet by mouth daily      MULTIPLE VITAMIN PO Take 1 tablet by mouth daily      omeprazole (PRILOSEC) 40 MG DR capsule Take 1 capsule (40 mg) by mouth daily 90 capsule 3    phentermine 30 MG capsule Take 1 capsule (30 mg) by mouth every morning. 30 capsule 5    polyethylene glycol (GOLYTELY) 236 g suspension 2 days prior at 5pm, mix and drink half of a jug of Golytely. Drink an 8 oz. glass of Golytely every 15 minutes until half of the jug is gone. Place remainder of Golytely in the refrigerator. 1 day prior at 5 pm, drink the 2nd half of a jug of Golytely bowel prep. 6 hours before your check-in time, drink an 8 oz. glass of Golytely every 15 minutes until half of the 2nd jug of Golytely is gone. Discard remainder of second jug. 8000 mL 0    Probiotic Product (PROBIOTIC ADVANCED PO) Take by mouth. Pre/probiotic      terbinafine (LAMISIL AT) 1 % external cream Apply topically 2 times daily 12 g 1    vitamin (B COMPLEX-C) tablet Take 1 tablet by mouth daily      vitamin E 400 units TABS Take 400 Units by mouth daily       Allergies   Allergen Reactions    Molds & Smuts Other (See Comments)     Puffy eyes    No Clinical Screening - See Comments Other (See Comments)     Cigarette smoke     Past Medical History:   Diagnosis Date    Cancer (H)     Skin    Depressive disorder     Diabetes (H)     Gastro-oesophageal reflux disease     Hypertension goal BP (blood pressure) < 140/90      Past  Surgical History:   Procedure Laterality Date    BIOPSY      Skin cancer times 2    COLONOSCOPY      COLONOSCOPY N/A 1/28/2025    Procedure: Colonoscopy;  Surgeon: Vince Pastor MD;  Location: [facility]    ESOPHAGOSCOPY, GASTROSCOPY, DUODENOSCOPY (EGD), COMBINED N/A 7/5/2023    Procedure: Esophagoscopy, gastroscopy, duodenoscopy, combined;  Surgeon: Jay Hoyt DO;  Location: [facility]    ESOPHAGOSCOPY, GASTROSCOPY, DUODENOSCOPY (EGD), COMBINED N/A 1/28/2025    Procedure: Esophagoscopy, gastroscopy, duodenoscopy, combined;  Surgeon: Vince Pastor MD;  Location: [facility]    VASECTOMY        Family History   Problem Relation Age of Onset    Alcoholism Father     Prostate Cancer Father         He is getting tested further but he has a type 5 and 4 lesions    Lung Cancer Maternal Grandfather     Diabetes Maternal Grandfather     Hypertension Maternal Grandfather     Other Cancer Maternal Grandfather         Lung    Colon Cancer Maternal Grandmother      Social History     Socioeconomic History    Marital status:      Spouse name: None    Number of children: None    Years of education: None    Highest education level: None   Tobacco Use    Smoking status: Never    Smokeless tobacco: Never   Vaping Use    Vaping status: Never Used   Substance and Sexual Activity    Alcohol use: Yes     Comment: rare    Drug use: No    Sexual activity: Yes     Partners: Female     Birth control/protection: Male Surgical   Other Topics Concern    Parent/sibling w/ CABG, MI or angioplasty before 65F 55M? No     Social Drivers of Health     Financial Resource Strain: Low Risk  (10/8/2024)    Financial Resource Strain     Within the past 12 months, have you or your family members you live with been unable to get utilities (heat, electricity) when it was really needed?: No   Food Insecurity: Low Risk  (10/8/2024)    Food Insecurity     Within the past 12 months, did you worry that your food would run out before you got money to  buy more?: No     Within the past 12 months, did the food you bought just not last and you didn t have money to get more?: No   Transportation Needs: Low Risk  (10/8/2024)    Transportation Needs     Within the past 12 months, has lack of transportation kept you from medical appointments, getting your medicines, non-medical meetings or appointments, work, or from getting things that you need?: No   Physical Activity: Insufficiently Active (10/8/2024)    Exercise Vital Sign     Days of Exercise per Week: 3 days     Minutes of Exercise per Session: 30 min   Stress: No Stress Concern Present (10/8/2024)    [place] citizen [place] [employer] - Occupational Stress Questionnaire     Feeling of Stress : Not at all   Social Connections: Unknown (10/8/2024)    Social Connection and Isolation Panel [NHANES]     Frequency of Social Gatherings with Friends and Family: Once a week   Interpersonal Safety: Low Risk  (1/28/2025)    Interpersonal Safety     Do you feel physically and emotionally safe where you currently live?: Yes     Within the past 12 months, have you been hit, slapped, kicked or otherwise physically hurt by someone?: No     Within the past 12 months, have you been humiliated or emotionally abused in other ways by your partner or ex-partner?: No   Housing Stability: Low Risk  (10/8/2024)    Housing Stability     Do you have housing? : Yes     Are you worried about losing your housing?: No       REVIEW OF SYSTEMS  =================  C: NEGATIVE for fever, chills, change in weight  I: NEGATIVE for worrisome rashes, moles or lesions  E/M: NEGATIVE for ear, mouth and throat problems  R: NEGATIVE for significant cough or SHORTNESS OF BREATH  CV:  NEGATIVE for chest pain, palpitations or peripheral edema  GI: NEGATIVE for nausea, abdominal pain, heartburn, or change in bowel habits  NEURO: NEGATIVE numbness/weakness  : see HPI  PSYCH: NEGATIVE depression/anxiety  LYmph: no new enlarged lymph nodes  Ortho: no new  "trauma/movements      Physical Exam:  /76 (BP Location: Right arm, Patient Position: Sitting, Cuff Size: Adult Large)   Temp 97.4  F (36.3  C) (Temporal)   Ht 1.88 m (6' 2.02\")   Wt (!) 149.7 kg (330 lb)   BMI 42.35 kg/m     Patient is pleasant, in no acute distress, good general condition.  Heart:  negative, PMI normal  Lung: no evidence of respiratory distress    Abdomen: Soft, nondistended, non tender. No masses. No rebound or guarding.   Exam: Penis no discharge.  Testes no masses.  No scrotal skin lesion.  Skin: Warm and dry.  No redness.  Neuro: grossly normal  Musculaskeletal: moving all extremities  Psych normal mood and affect  Musculoskeletal  moving all extremities  Hematologic/Lymphatic/Immunologic: normal ant/post cervical, axillary, supraclavicular and inguinal nodes    S: Heath Carpenter is a 55 year old male returns for hematuria.    Patient is draped and prepped.  Flexible cystoscopy placed under direct vision.      The anterior urethra is normal   The prostatic urethra showed bilateral lobe enlargement.     The length is 1cm,  the coaptation is 1 cm.     In the bladder there is trabeculation grade 2.    Assessment/Plan:  (R31.29) Other microscopic hematuria  (primary encounter diagnosis)  Comment:    Plan: Negative cystoscopy.  Repeat urine test today is unremarkable.  Most likely prostatic bleeding.             (R39.15) Urinary urgency  Comment:    Plan: Mild BPH.    (N32.89) Bladder wall thickening  Comment:    Plan: BPH related findings.     "

## 2025-04-29 ENCOUNTER — TRANSFERRED RECORDS (OUTPATIENT)
Dept: HEALTH INFORMATION MANAGEMENT | Facility: CLINIC | Age: 55
End: 2025-04-29
Payer: COMMERCIAL

## 2025-04-30 DIAGNOSIS — G62.9 PERIPHERAL NERVE DISORDER: Primary | ICD-10-CM

## 2025-05-02 ENCOUNTER — OFFICE VISIT (OUTPATIENT)
Dept: FAMILY MEDICINE | Facility: CLINIC | Age: 55
End: 2025-05-02
Payer: COMMERCIAL

## 2025-05-02 VITALS
TEMPERATURE: 97.4 F | SYSTOLIC BLOOD PRESSURE: 128 MMHG | HEART RATE: 73 BPM | BODY MASS INDEX: 43.63 KG/M2 | WEIGHT: 315 LBS | DIASTOLIC BLOOD PRESSURE: 82 MMHG | OXYGEN SATURATION: 97 % | RESPIRATION RATE: 14 BRPM

## 2025-05-02 DIAGNOSIS — K51.00 PANCOLITIS (H): ICD-10-CM

## 2025-05-02 DIAGNOSIS — E66.01 MORBID OBESITY (H): ICD-10-CM

## 2025-05-02 DIAGNOSIS — E78.5 HYPERLIPIDEMIA LDL GOAL <100: ICD-10-CM

## 2025-05-02 DIAGNOSIS — K21.00 GASTROESOPHAGEAL REFLUX DISEASE WITH ESOPHAGITIS WITHOUT HEMORRHAGE: ICD-10-CM

## 2025-05-02 DIAGNOSIS — F33.1 MODERATE EPISODE OF RECURRENT MAJOR DEPRESSIVE DISORDER (H): ICD-10-CM

## 2025-05-02 DIAGNOSIS — E11.43 TYPE II DIABETES MELLITUS WITH PERIPHERAL AUTONOMIC NEUROPATHY (H): Primary | ICD-10-CM

## 2025-05-02 LAB
CREAT UR-MCNC: 157.3 MG/DL
EST. AVERAGE GLUCOSE BLD GHB EST-MCNC: 143 MG/DL
HBA1C MFR BLD: 6.6 % (ref 0–5.6)
MICROALBUMIN UR-MCNC: 24.3 MG/L
MICROALBUMIN/CREAT UR: 15.45 MG/G CR (ref 0–17)
TOTAL PROTEIN SERUM FOR ELP: 7.5 G/DL (ref 6.4–8.3)

## 2025-05-02 PROCEDURE — 84155 ASSAY OF PROTEIN SERUM: CPT | Performed by: FAMILY MEDICINE

## 2025-05-02 PROCEDURE — 83036 HEMOGLOBIN GLYCOSYLATED A1C: CPT | Performed by: FAMILY MEDICINE

## 2025-05-02 PROCEDURE — 3074F SYST BP LT 130 MM HG: CPT | Performed by: FAMILY MEDICINE

## 2025-05-02 PROCEDURE — 82570 ASSAY OF URINE CREATININE: CPT | Performed by: FAMILY MEDICINE

## 2025-05-02 PROCEDURE — 36415 COLL VENOUS BLD VENIPUNCTURE: CPT | Performed by: FAMILY MEDICINE

## 2025-05-02 PROCEDURE — 1126F AMNT PAIN NOTED NONE PRSNT: CPT | Performed by: FAMILY MEDICINE

## 2025-05-02 PROCEDURE — 86038 ANTINUCLEAR ANTIBODIES: CPT | Performed by: FAMILY MEDICINE

## 2025-05-02 PROCEDURE — 82043 UR ALBUMIN QUANTITATIVE: CPT | Performed by: FAMILY MEDICINE

## 2025-05-02 PROCEDURE — 86039 ANTINUCLEAR ANTIBODIES (ANA): CPT | Performed by: FAMILY MEDICINE

## 2025-05-02 PROCEDURE — 84165 PROTEIN E-PHORESIS SERUM: CPT | Performed by: STUDENT IN AN ORGANIZED HEALTH CARE EDUCATION/TRAINING PROGRAM

## 2025-05-02 PROCEDURE — 99214 OFFICE O/P EST MOD 30 MIN: CPT | Performed by: FAMILY MEDICINE

## 2025-05-02 PROCEDURE — 86334 IMMUNOFIX E-PHORESIS SERUM: CPT | Performed by: STUDENT IN AN ORGANIZED HEALTH CARE EDUCATION/TRAINING PROGRAM

## 2025-05-02 PROCEDURE — 3079F DIAST BP 80-89 MM HG: CPT | Performed by: FAMILY MEDICINE

## 2025-05-02 PROCEDURE — G2211 COMPLEX E/M VISIT ADD ON: HCPCS | Performed by: FAMILY MEDICINE

## 2025-05-02 RX ORDER — MELOXICAM 15 MG/1
15 TABLET ORAL DAILY PRN
COMMUNITY
Start: 2025-04-30

## 2025-05-02 ASSESSMENT — PAIN SCALES - GENERAL: PAINLEVEL_OUTOF10: NO PAIN (0)

## 2025-05-02 NOTE — PROGRESS NOTES
Assessment & Plan     Type II diabetes mellitus with peripheral autonomic neuropathy (H)  Adjust meds as indicated by above labs.   - HEMOGLOBIN A1C; Future  - Albumin Random Urine Quantitative with Creat Ratio; Future  - Albumin Random Urine Quantitative with Creat Ratio  - HEMOGLOBIN A1C    Moderate episode of recurrent major depressive disorder (H)  Stable no change in treatment plan. Following with psychiatry     Pancolitis (H)  Stable no change in treatment plan.     Body mass index (BMI) 40.0-44.9, adult (H)  Comorbid with DM and HTN   Not able to tolerate glp1    Morbid obesity (H)    - phentermine 30 MG capsule; Take 1 capsule (30 mg) by mouth every morning.    Hyperlipidemia LDL goal <100  Stable no change in treatment plan.   - atorvastatin (LIPITOR) 20 MG tablet; Take 1 tablet (20 mg) by mouth daily.    Gastroesophageal reflux disease with esophagitis without hemorrhage  Stable no change in treatment plan.   - omeprazole (PRILOSEC) 40 MG DR capsule; Take 1 capsule (40 mg) by mouth daily.    The longitudinal plan of care for the diagnosis(es)/condition(s) as documented were addressed during this visit. Due to the added complexity in care, I will continue to support Heath in the subsequent management and with ongoing continuity of care.              Subjective   Heath is a 55 year old, presenting for the following health issues:  Hypertension, Diabetes, and Lipids        5/2/2025     9:52 AM   Additional Questions   Roomed by Fern WHITAKER   Accompanied by Wife         5/2/2025     9:52 AM   Patient Reported Additional Medications   Patient reports taking the following new medications .     Via the Health Maintenance questionnaire, the patient has reported the following services have been completed -Eye Exam: [facility] 2024-04-14, this information has been sent to the abstraction team.  History of Present Illness       Diabetes:   He presents for follow up of diabetes.  He is checking home blood glucose a  few times a week.   He checks blood glucose before and after meals.  Blood glucose is never over 200 and never under 70. He is aware of hypoglycemia symptoms including weakness.   He is concerned about other.   He is having numbness in feet, burning in feet, redness, sores, or blisters on feet and weight gain.  The patient has had a diabetic eye exam in the last 12 months. Eye exam performed on Unknown. Location of [facility]        Hyperlipidemia:  He presents for follow up of hyperlipidemia.   He is taking medication to lower cholesterol. He is having myalgia or other side effects to statin medications.    Hypertension: He presents for follow up of hypertension.  He does check blood pressure  regularly outside of the clinic. Outpatient blood pressures have not been over 140/90. He follows a low salt diet.     He eats 2-3 servings of fruits and vegetables daily.He consumes 0 sweetened beverage(s) daily.He exercises with enough effort to increase his heart rate 20 to 29 minutes per day.  He exercises with enough effort to increase his heart rate 5 days per week.   He is taking medications regularly.          Depression   How are you doing with your depression since your last visit? No change  Are you having other symptoms that might be associated with depression? No  Have you had a significant life event?  No   Are you feeling anxious or having panic attacks?   No  Do you have any concerns with your use of alcohol or other drugs? No    Social History     Tobacco Use    Smoking status: Never    Smokeless tobacco: Never   Vaping Use    Vaping status: Never Used   Substance Use Topics    Alcohol use: Yes     Comment: rare    Drug use: No         12/16/2024    10:13 PM 2/20/2025     1:07 PM 3/27/2025     3:59 PM   PHQ   PHQ-9 Total Score 6  6  8    Q9: Thoughts of better off dead/self-harm past 2 weeks Not at all Not at all Not at all       Patient-reported         4/23/2024     8:28 AM 10/8/2024      5:43 PM 12/12/2024     5:05 PM   RODRIGUE-7 SCORE   Total Score  4 (minimal anxiety) 4 (minimal anxiety)   Total Score 4 4 4        Patient-reported         Suicide Assessment Five-step Evaluation and Treatment (SAFE-T)            Review of Systems  Constitutional, HEENT, cardiovascular, pulmonary, gi and gu systems are negative, except as otherwise noted.      Objective    /82   Pulse 73   Temp 97.4  F (36.3  C) (Tympanic)   Resp 14   Wt (!) 154.2 kg (340 lb)   SpO2 97%   BMI 43.63 kg/m    Body mass index is 43.63 kg/m .  Physical Exam   GENERAL: alert and no distress  RESP: lungs clear to auscultation - no rales, rhonchi or wheezes  CV: regular rate and rhythm, normal S1 S2, no S3 or S4, no murmur, click or rub, no peripheral edema   PSYCH: mentation appears normal, affect normal/bright            Signed Electronically by: Maria Antonia Goodwin MD

## 2025-05-02 NOTE — NURSING NOTE
"Chief Complaint   Patient presents with    Hypertension    Diabetes    Lipids     /82   Pulse 73   Temp 97.4  F (36.3  C) (Tympanic)   Resp 14   Wt (!) 154.2 kg (340 lb)   SpO2 97%   BMI 43.63 kg/m   Estimated body mass index is 43.63 kg/m  as calculated from the following:    Height as of 4/21/25: 1.88 m (6' 2.02\").    Weight as of this encounter: 154.2 kg (340 lb).  Patient presents to the clinic using No DME      Health Maintenance that is potentially due pending provider review:    Health Maintenance Due   Topic Date Due    HEPATITIS B IMMUNIZATION (1 of 3 - 19+ 3-dose series) Never done    DIABETIC FOOT EXAM  06/18/2022    A1C  01/09/2025    EYE EXAM  04/10/2025    MICROALBUMIN  04/23/2025    ANNUAL REVIEW OF HM ORDERS  04/23/2025                  "

## 2025-05-05 LAB
ALBUMIN SERPL ELPH-MCNC: 4.1 G/DL (ref 3.7–5.1)
ALPHA1 GLOB SERPL ELPH-MCNC: 0.3 G/DL (ref 0.2–0.4)
ALPHA2 GLOB SERPL ELPH-MCNC: 1 G/DL (ref 0.5–0.9)
ANA PAT SER IF-IMP: ABNORMAL
ANA SER QL IF: POSITIVE
ANA TITR SER IF: ABNORMAL {TITER}
B-GLOBULIN SERPL ELPH-MCNC: 0.9 G/DL (ref 0.6–1)
GAMMA GLOB SERPL ELPH-MCNC: 1.1 G/DL (ref 0.7–1.6)
LOCATION OF TASK: ABNORMAL
LOCATION OF TASK: NORMAL
M PROTEIN SERPL ELPH-MCNC: 0 G/DL
PROT PATTERN SERPL ELPH-IMP: ABNORMAL
PROT PATTERN SERPL IFE-IMP: NORMAL

## 2025-05-05 RX ORDER — OMEPRAZOLE 40 MG/1
40 CAPSULE, DELAYED RELEASE ORAL DAILY
Qty: 90 CAPSULE | Refills: 3 | Status: SHIPPED | OUTPATIENT
Start: 2025-05-05

## 2025-05-05 RX ORDER — ATORVASTATIN CALCIUM 20 MG/1
20 TABLET, FILM COATED ORAL DAILY
Qty: 90 TABLET | Refills: 3 | Status: SHIPPED | OUTPATIENT
Start: 2025-05-05

## 2025-05-05 RX ORDER — PHENTERMINE HYDROCHLORIDE 30 MG/1
30 CAPSULE ORAL EVERY MORNING
Qty: 30 CAPSULE | Refills: 5 | Status: SHIPPED | OUTPATIENT
Start: 2025-05-05

## 2025-05-20 ENCOUNTER — TRANSFERRED RECORDS (OUTPATIENT)
Dept: HEALTH INFORMATION MANAGEMENT | Facility: CLINIC | Age: 55
End: 2025-05-20
Payer: COMMERCIAL

## 2025-05-20 LAB — RETINOPATHY: POSITIVE

## 2025-07-02 ENCOUNTER — TRANSFERRED RECORDS (OUTPATIENT)
Dept: HEALTH INFORMATION MANAGEMENT | Facility: CLINIC | Age: 55
End: 2025-07-02
Payer: COMMERCIAL

## 2025-07-06 ENCOUNTER — TRANSFERRED RECORDS (OUTPATIENT)
Dept: HEALTH INFORMATION MANAGEMENT | Facility: CLINIC | Age: 55
End: 2025-07-06

## 2025-07-07 ASSESSMENT — PATIENT HEALTH QUESTIONNAIRE - PHQ9
SUM OF ALL RESPONSES TO PHQ QUESTIONS 1-9: 7
10. IF YOU CHECKED OFF ANY PROBLEMS, HOW DIFFICULT HAVE THESE PROBLEMS MADE IT FOR YOU TO DO YOUR WORK, TAKE CARE OF THINGS AT HOME, OR GET ALONG WITH OTHER PEOPLE: SOMEWHAT DIFFICULT
SUM OF ALL RESPONSES TO PHQ QUESTIONS 1-9: 7

## 2025-07-08 ENCOUNTER — VIRTUAL VISIT (OUTPATIENT)
Dept: FAMILY MEDICINE | Facility: CLINIC | Age: 55
End: 2025-07-08
Payer: COMMERCIAL

## 2025-07-08 ENCOUNTER — ORDERS ONLY (AUTO-RELEASED) (OUTPATIENT)
Dept: FAMILY MEDICINE | Facility: CLINIC | Age: 55
End: 2025-07-08

## 2025-07-08 DIAGNOSIS — R23.2 FLUSHING: ICD-10-CM

## 2025-07-08 DIAGNOSIS — R42 DIZZINESS: Primary | ICD-10-CM

## 2025-07-08 DIAGNOSIS — R42 DIZZINESS: ICD-10-CM

## 2025-07-08 DIAGNOSIS — R00.2 PALPITATIONS: ICD-10-CM

## 2025-07-08 PROCEDURE — 98006 SYNCH AUDIO-VIDEO EST MOD 30: CPT | Performed by: NURSE PRACTITIONER

## 2025-07-08 RX ORDER — DULOXETIN HYDROCHLORIDE 30 MG/1
30 CAPSULE, DELAYED RELEASE ORAL DAILY
COMMUNITY
Start: 2025-05-19

## 2025-07-08 RX ORDER — DAPAGLIFLOZIN 10 MG/1
10 TABLET, FILM COATED ORAL DAILY
COMMUNITY
Start: 2025-06-19

## 2025-07-08 NOTE — PROGRESS NOTES
Heath is a 55 year old who is being evaluated via a billable video visit.    How would you like to obtain your AVS? MyChart  If the video visit is dropped, the invitation should be resent by: Text to cell phone: [phone]  Will anyone else be joining your video visit? No      Assessment & Plan     Dizziness  Palpitations  Flushing  Patient following up today after ER visit on 7/6/2025 for chest pain, dizziness and flushing.  Did also have elevated liver enzymes which patient has a known history of fatty liver and is being followed by endocrinology.  Had negative workup including EKG, chest x-ray and laboratory workup otherwise.  Patient reports symptoms have continued, feeling heart, skipping a beat, continues to have flushing episodes and intermittent dizziness.  Also notes increase in urination at night.  Comes and goes.  Denies any fevers, just feels warm.  Blood pressures have been slightly elevated and feels heart rates have been higher.  Patient did report in the ER had some a significant amount of PVCs when being monitored.  Has history of obstructive sleep apnea and feels is under good control.  Patient reports started duloxetine by endocrinology approximately 3 weeks ago.  Noted symptoms possibly started since.  Did discuss these could be side effects from medication, however recommend ruling out any underlying cardiac cause.  Would recommend patient holding duloxetine and monitoring symptoms over the next 2 weeks will completing a 2-week Zio patch monitor not only to monitor current symptoms, but to see if they do start improving as he is coming off of the duloxetine.  Also recommend an echocardiogram.  Orders placed.  Advised patient to update endocrinology on the medication and has follow-up appointment in 2 weeks and also recommend scheduling follow-up visit with this provider 2 weeks after sending Zio patch back in to review and discuss.  - ZIO PATCH MAIL OUT; Future  - Echocardiogram Complete;  "Future       MED REC REQUIRED  Post Medication Reconciliation Status: discharge medications reconciled and changed, per note/orders  BMI  Estimated body mass index is 43.63 kg/m  as calculated from the following:    Height as of 4/21/25: 1.88 m (6' 2.02\").    Weight as of 5/2/25: 154.2 kg (340 lb).   Weight management plan: Patient was referred to their PCP to discuss a diet and exercise plan.      Sabi Joe is a 55 year old, presenting for the following health issues:  ER F/U        7/8/2025    10:24 AM   Additional Questions   Roomed by Peg LANZA([facility])     HPI      ED/UC Followup:    Facility:  [facility] ([facility])  Date of visit: 7/6/25  Reason for visit: Chest pain, unspecified type (Primary Dx);  Transaminitis+ Discharge Disposition: Home Self Care   Current Status: Still having a lot hot flashes when doing barely anything =   Not having a Chest pain -     -Feels off - sometimes lightheaded.   Feels kind of the same, comes and goes  Gets hot flashes, has to lay down and turn ceiling fan on. Gets red in the face.   Doesn't necessarily go away  Feels a pressure right in the center of his chest, feels heavy  Endocrinology just recently started him on Duloxetine June 19th ~ getting up more at night to urinate since starting. Next appointment July 18th    Has been adjusting diabetes medication; previously on Monjauro  Pretty well under control; last week got some steroid shots in knee and has spiked his sugars   Has been having stool issues since hospitalization   Blood pressure 130/93 p 81  Heart rates feel like they have been higher  Machine says irregular heart beat; he can \"feel it\"; feels it skipping. Noted PVC's during ER visit. Was having a lot during visit and has continued since. Still feels off.   Feeling more fatigued with activity  Feeling some low grade confusion; spelling his wife's name wrong. Wife notices he's talking slower   Has a history of JONA ~ wears " machine    Nauseas: Yes - Last night  Vomiting: No   Diarrhea: Loose Stools a lot 2am  Pain: No - but pressure by the diaphragm - not pain     BP at home: says irregular heart beat:     Yesterday   118/77 - Pulse 103  111//70  -Pulse 111              Rehab visit scheduled for 7/9/25 &  7/18/25        Review of Systems  Constitutional, HEENT, cardiovascular, pulmonary, gi and gu systems are negative, except as otherwise noted.      Objective           Vitals:  No vitals were obtained today due to virtual visit.    Physical Exam   GENERAL: alert and no distress  EYES: Eyes grossly normal to inspection.  No discharge or erythema, or obvious scleral/conjunctival abnormalities.  RESP: No audible wheeze, cough, or visible cyanosis.    SKIN: Visible skin clear. No significant rash, abnormal pigmentation or lesions.  NEURO: Cranial nerves grossly intact.  Mentation and speech appropriate for age.  PSYCH: Appropriate affect, tone, and pace of words    Diagnostic Test Results:  Labs reviewed in [facility]  Pending Echo and Zio patch        Video-Visit Details    Type of service:  Video Visit   Originating Location (pt. Location): Home    Distant Location (provider location):  Off-site  Platform used for Video Visit: [employer]  Signed Electronically by: Amanda Lizarraga DNP      Chart documentation with Dragon Voice recognition Software. Although reviewed after completion, some words and grammatical errors may remain.

## 2025-07-08 NOTE — NURSING NOTE
"Chief Complaint   Patient presents with    ER F/U       Initial There were no vitals taken for this visit. Estimated body mass index is 43.63 kg/m  as calculated from the following:    Height as of 4/21/25: 1.88 m (6' 2.02\").    Weight as of 5/2/25: 154.2 kg (340 lb).    Patient presents to the clinic using No DME    Is there anyone who you would like to be able to receive your results? No  If yes have patient fill out MAZIN      "

## 2025-07-08 NOTE — PATIENT INSTRUCTIONS
Dizziness  Palpitations  Flushing  Patient following up today after ER visit on 7/6/2025 for chest pain, dizziness and flushing.  Did also have elevated liver enzymes which patient has a known history of fatty liver and is being followed by endocrinology.  Had negative workup including EKG, chest x-ray and laboratory workup otherwise.  Patient reports symptoms have continued, feeling heart, skipping a beat, continues to have flushing episodes and intermittent dizziness.  Also notes increase in urination at night.  Comes and goes.  Denies any fevers, just feels warm.  Blood pressures have been slightly elevated and feels heart rates have been higher.  Patient did report in the ER had some a significant amount of PVCs when being monitored.  Has history of obstructive sleep apnea and feels is under good control.  Patient reports started duloxetine by endocrinology approximately 3 weeks ago.  Noted symptoms possibly started since.  Did discuss these could be side effects from medication, however recommend ruling out any underlying cardiac cause.  Would recommend patient holding duloxetine and monitoring symptoms over the next 2 weeks will completing a 2-week Zio patch monitor not only to monitor current symptoms, but to see if they do start improving as he is coming off of the duloxetine.  Also recommend an echocardiogram.  Orders placed.  Advised patient to update endocrinology on the medication and has follow-up appointment in 2 weeks and also recommend scheduling follow-up visit with this provider 2 weeks after sending Zio patch back in to review and discuss.

## 2025-07-11 ENCOUNTER — TRANSFERRED RECORDS (OUTPATIENT)
Dept: HEALTH INFORMATION MANAGEMENT | Facility: CLINIC | Age: 55
End: 2025-07-11
Payer: COMMERCIAL

## 2025-07-15 ENCOUNTER — MYC MEDICAL ADVICE (OUTPATIENT)
Dept: FAMILY MEDICINE | Facility: CLINIC | Age: 55
End: 2025-07-15
Payer: COMMERCIAL

## 2025-07-15 DIAGNOSIS — R23.2 FLUSHING: ICD-10-CM

## 2025-07-15 DIAGNOSIS — R00.2 PALPITATIONS: Primary | ICD-10-CM

## 2025-07-15 DIAGNOSIS — R42 DIZZINESS: ICD-10-CM

## 2025-07-16 NOTE — TELEPHONE ENCOUNTER
Spoke with Gris Jay regarding options for a non-adhesive option for Zio patch, will order event monitor.

## 2025-07-23 ENCOUNTER — ANCILLARY PROCEDURE (OUTPATIENT)
Dept: CARDIOLOGY | Facility: CLINIC | Age: 55
End: 2025-07-23
Attending: NURSE PRACTITIONER
Payer: COMMERCIAL

## 2025-07-23 DIAGNOSIS — R00.2 PALPITATIONS: ICD-10-CM

## 2025-07-23 DIAGNOSIS — R23.2 FLUSHING: ICD-10-CM

## 2025-07-23 DIAGNOSIS — R42 DIZZINESS: ICD-10-CM

## 2025-07-23 LAB
CV ZIO PRELIM RESULTS: NORMAL
LVEF ECHO: NORMAL

## 2025-07-23 PROCEDURE — 93306 TTE W/DOPPLER COMPLETE: CPT | Performed by: INTERNAL MEDICINE

## 2025-07-23 RX ADMIN — Medication 3 ML (DILUTED): at 11:29

## 2025-07-28 ENCOUNTER — TRANSFERRED RECORDS (OUTPATIENT)
Dept: HEALTH INFORMATION MANAGEMENT | Facility: CLINIC | Age: 55
End: 2025-07-28
Payer: COMMERCIAL

## 2025-08-16 ENCOUNTER — HEALTH MAINTENANCE LETTER (OUTPATIENT)
Age: 55
End: 2025-08-16

## 2025-09-03 ENCOUNTER — PATIENT OUTREACH (OUTPATIENT)
Dept: CARE COORDINATION | Facility: CLINIC | Age: 55
End: 2025-09-03
Payer: COMMERCIAL

## 2025-09-04 ENCOUNTER — OFFICE VISIT (OUTPATIENT)
Dept: CARDIOLOGY | Facility: CLINIC | Age: 55
End: 2025-09-04
Attending: NURSE PRACTITIONER
Payer: COMMERCIAL

## 2025-09-04 ENCOUNTER — PRE VISIT (OUTPATIENT)
Dept: CARDIOLOGY | Facility: CLINIC | Age: 55
End: 2025-09-04
Payer: COMMERCIAL

## 2025-09-04 VITALS
DIASTOLIC BLOOD PRESSURE: 79 MMHG | BODY MASS INDEX: 43.52 KG/M2 | SYSTOLIC BLOOD PRESSURE: 123 MMHG | OXYGEN SATURATION: 94 % | HEART RATE: 82 BPM | WEIGHT: 315 LBS

## 2025-09-04 DIAGNOSIS — R42 DIZZINESS: ICD-10-CM

## 2025-09-04 DIAGNOSIS — R23.2 FLUSHING: ICD-10-CM

## 2025-09-04 DIAGNOSIS — R00.2 PALPITATIONS: ICD-10-CM

## 2025-09-04 PROCEDURE — 99213 OFFICE O/P EST LOW 20 MIN: CPT | Performed by: INTERNAL MEDICINE

## 2025-09-04 RX ORDER — GABAPENTIN 300 MG/1
300 CAPSULE ORAL 3 TIMES DAILY
COMMUNITY
Start: 2025-08-20

## 2025-09-04 ASSESSMENT — PAIN SCALES - GENERAL: PAINLEVEL_OUTOF10: NO PAIN (0)

## (undated) RX ORDER — LIDOCAINE HYDROCHLORIDE 10 MG/ML
INJECTION, SOLUTION EPIDURAL; INFILTRATION; INTRACAUDAL; PERINEURAL
Status: DISPENSED
Start: 2025-01-28